# Patient Record
Sex: FEMALE | Race: ASIAN | Employment: UNEMPLOYED | URBAN - METROPOLITAN AREA
[De-identification: names, ages, dates, MRNs, and addresses within clinical notes are randomized per-mention and may not be internally consistent; named-entity substitution may affect disease eponyms.]

---

## 2018-01-01 ENCOUNTER — OFFICE VISIT (OUTPATIENT)
Dept: INFUSION THERAPY | Facility: CLINIC | Age: 58
End: 2018-01-01
Attending: OBSTETRICS & GYNECOLOGY
Payer: COMMERCIAL

## 2018-01-01 ENCOUNTER — RADIANT APPOINTMENT (OUTPATIENT)
Dept: RADIOLOGY | Facility: AMBULATORY SURGERY CENTER | Age: 58
End: 2018-01-01
Attending: OBSTETRICS & GYNECOLOGY
Payer: COMMERCIAL

## 2018-01-01 ENCOUNTER — APPOINTMENT (OUTPATIENT)
Dept: PHYSICAL THERAPY | Facility: CLINIC | Age: 58
DRG: 356 | End: 2018-01-01
Attending: OBSTETRICS & GYNECOLOGY
Payer: COMMERCIAL

## 2018-01-01 ENCOUNTER — RADIANT APPOINTMENT (OUTPATIENT)
Dept: ULTRASOUND IMAGING | Facility: CLINIC | Age: 58
End: 2018-01-01
Attending: OBSTETRICS & GYNECOLOGY
Payer: COMMERCIAL

## 2018-01-01 ENCOUNTER — RADIANT APPOINTMENT (OUTPATIENT)
Dept: GENERAL RADIOLOGY | Facility: CLINIC | Age: 58
End: 2018-01-01
Attending: NURSE PRACTITIONER
Payer: COMMERCIAL

## 2018-01-01 ENCOUNTER — OFFICE VISIT (OUTPATIENT)
Dept: INTERPRETER SERVICES | Facility: CLINIC | Age: 58
End: 2018-01-01
Payer: COMMERCIAL

## 2018-01-01 ENCOUNTER — ANESTHESIA EVENT (OUTPATIENT)
Dept: SURGERY | Facility: AMBULATORY SURGERY CENTER | Age: 58
End: 2018-01-01

## 2018-01-01 ENCOUNTER — APPOINTMENT (OUTPATIENT)
Dept: ULTRASOUND IMAGING | Facility: CLINIC | Age: 58
DRG: 356 | End: 2018-01-01
Attending: OBSTETRICS & GYNECOLOGY
Payer: COMMERCIAL

## 2018-01-01 ENCOUNTER — APPOINTMENT (OUTPATIENT)
Dept: PHYSICAL THERAPY | Facility: CLINIC | Age: 58
DRG: 754 | End: 2018-01-01
Payer: COMMERCIAL

## 2018-01-01 ENCOUNTER — APPOINTMENT (OUTPATIENT)
Dept: PHYSICAL THERAPY | Facility: CLINIC | Age: 58
DRG: 356 | End: 2018-01-01
Attending: NURSE PRACTITIONER
Payer: COMMERCIAL

## 2018-01-01 ENCOUNTER — OFFICE VISIT (OUTPATIENT)
Dept: URGENT CARE | Facility: URGENT CARE | Age: 58
End: 2018-01-01
Payer: COMMERCIAL

## 2018-01-01 ENCOUNTER — ONCOLOGY VISIT (OUTPATIENT)
Dept: ONCOLOGY | Facility: CLINIC | Age: 58
DRG: 356 | End: 2018-01-01
Attending: OBSTETRICS & GYNECOLOGY
Payer: COMMERCIAL

## 2018-01-01 ENCOUNTER — APPOINTMENT (OUTPATIENT)
Dept: CARDIOLOGY | Facility: CLINIC | Age: 58
DRG: 754 | End: 2018-01-01
Payer: COMMERCIAL

## 2018-01-01 ENCOUNTER — CARE COORDINATION (OUTPATIENT)
Dept: ONCOLOGY | Facility: CLINIC | Age: 58
End: 2018-01-01

## 2018-01-01 ENCOUNTER — APPOINTMENT (OUTPATIENT)
Dept: GENERAL RADIOLOGY | Facility: CLINIC | Age: 58
DRG: 754 | End: 2018-01-01
Payer: COMMERCIAL

## 2018-01-01 ENCOUNTER — APPOINTMENT (OUTPATIENT)
Dept: GENERAL RADIOLOGY | Facility: CLINIC | Age: 58
DRG: 754 | End: 2018-01-01
Attending: EMERGENCY MEDICINE
Payer: COMMERCIAL

## 2018-01-01 ENCOUNTER — APPOINTMENT (OUTPATIENT)
Dept: GENERAL RADIOLOGY | Facility: CLINIC | Age: 58
DRG: 754 | End: 2018-01-01
Attending: STUDENT IN AN ORGANIZED HEALTH CARE EDUCATION/TRAINING PROGRAM
Payer: COMMERCIAL

## 2018-01-01 ENCOUNTER — HOSPITAL ENCOUNTER (INPATIENT)
Facility: CLINIC | Age: 58
LOS: 7 days | Discharge: HOME HEALTH | DRG: 356 | End: 2018-08-11
Attending: OBSTETRICS & GYNECOLOGY | Admitting: OBSTETRICS & GYNECOLOGY
Payer: COMMERCIAL

## 2018-01-01 ENCOUNTER — APPOINTMENT (OUTPATIENT)
Dept: OCCUPATIONAL THERAPY | Facility: CLINIC | Age: 58
DRG: 754 | End: 2018-01-01
Payer: COMMERCIAL

## 2018-01-01 ENCOUNTER — OFFICE VISIT (OUTPATIENT)
Dept: PALLIATIVE CARE | Facility: CLINIC | Age: 58
End: 2018-01-01
Attending: INTERNAL MEDICINE
Payer: COMMERCIAL

## 2018-01-01 ENCOUNTER — HOSPITAL ENCOUNTER (INPATIENT)
Facility: CLINIC | Age: 58
LOS: 3 days | Discharge: HOME | DRG: 754 | End: 2018-07-13
Attending: EMERGENCY MEDICINE | Admitting: OBSTETRICS & GYNECOLOGY
Payer: COMMERCIAL

## 2018-01-01 ENCOUNTER — APPOINTMENT (OUTPATIENT)
Dept: GENERAL RADIOLOGY | Facility: CLINIC | Age: 58
DRG: 746 | End: 2018-01-01
Attending: STUDENT IN AN ORGANIZED HEALTH CARE EDUCATION/TRAINING PROGRAM
Payer: COMMERCIAL

## 2018-01-01 ENCOUNTER — APPOINTMENT (OUTPATIENT)
Dept: SPEECH THERAPY | Facility: CLINIC | Age: 58
DRG: 754 | End: 2018-01-01
Payer: COMMERCIAL

## 2018-01-01 ENCOUNTER — APPOINTMENT (OUTPATIENT)
Dept: CARDIOLOGY | Facility: CLINIC | Age: 58
DRG: 746 | End: 2018-01-01
Attending: STUDENT IN AN ORGANIZED HEALTH CARE EDUCATION/TRAINING PROGRAM
Payer: COMMERCIAL

## 2018-01-01 ENCOUNTER — APPOINTMENT (OUTPATIENT)
Dept: CT IMAGING | Facility: CLINIC | Age: 58
DRG: 746 | End: 2018-01-01
Attending: PHYSICIAN ASSISTANT
Payer: COMMERCIAL

## 2018-01-01 ENCOUNTER — APPOINTMENT (OUTPATIENT)
Dept: CT IMAGING | Facility: CLINIC | Age: 58
DRG: 356 | End: 2018-01-01
Attending: OBSTETRICS & GYNECOLOGY
Payer: COMMERCIAL

## 2018-01-01 ENCOUNTER — APPOINTMENT (OUTPATIENT)
Dept: INTERVENTIONAL RADIOLOGY/VASCULAR | Facility: CLINIC | Age: 58
DRG: 356 | End: 2018-01-01
Attending: RADIOLOGY PRACTITIONER ASSISTANT
Payer: COMMERCIAL

## 2018-01-01 ENCOUNTER — APPOINTMENT (OUTPATIENT)
Dept: INTERVENTIONAL RADIOLOGY/VASCULAR | Facility: CLINIC | Age: 58
DRG: 356 | End: 2018-01-01
Attending: PHYSICIAN ASSISTANT
Payer: COMMERCIAL

## 2018-01-01 ENCOUNTER — TELEPHONE (OUTPATIENT)
Dept: ONCOLOGY | Facility: CLINIC | Age: 58
End: 2018-01-01

## 2018-01-01 ENCOUNTER — APPOINTMENT (OUTPATIENT)
Dept: INTERVENTIONAL RADIOLOGY/VASCULAR | Facility: CLINIC | Age: 58
DRG: 754 | End: 2018-01-01
Attending: PHYSICIAN ASSISTANT
Payer: COMMERCIAL

## 2018-01-01 ENCOUNTER — APPOINTMENT (OUTPATIENT)
Dept: OCCUPATIONAL THERAPY | Facility: CLINIC | Age: 58
DRG: 746 | End: 2018-01-01
Attending: STUDENT IN AN ORGANIZED HEALTH CARE EDUCATION/TRAINING PROGRAM
Payer: COMMERCIAL

## 2018-01-01 ENCOUNTER — SURGERY (OUTPATIENT)
Age: 58
End: 2018-01-01

## 2018-01-01 ENCOUNTER — APPOINTMENT (OUTPATIENT)
Dept: GENERAL RADIOLOGY | Facility: CLINIC | Age: 58
DRG: 356 | End: 2018-01-01
Attending: OBSTETRICS & GYNECOLOGY
Payer: COMMERCIAL

## 2018-01-01 ENCOUNTER — APPOINTMENT (OUTPATIENT)
Dept: LAB | Facility: CLINIC | Age: 58
End: 2018-01-01
Attending: OBSTETRICS & GYNECOLOGY
Payer: COMMERCIAL

## 2018-01-01 ENCOUNTER — HOSPITAL ENCOUNTER (INPATIENT)
Facility: CLINIC | Age: 58
LOS: 15 days | Discharge: HOSPICE | DRG: 754 | End: 2018-09-04
Attending: EMERGENCY MEDICINE | Admitting: OBSTETRICS & GYNECOLOGY
Payer: COMMERCIAL

## 2018-01-01 ENCOUNTER — APPOINTMENT (OUTPATIENT)
Dept: CT IMAGING | Facility: CLINIC | Age: 58
DRG: 746 | End: 2018-01-01
Attending: OBSTETRICS & GYNECOLOGY
Payer: COMMERCIAL

## 2018-01-01 ENCOUNTER — APPOINTMENT (OUTPATIENT)
Dept: CT IMAGING | Facility: CLINIC | Age: 58
DRG: 754 | End: 2018-01-01
Attending: STUDENT IN AN ORGANIZED HEALTH CARE EDUCATION/TRAINING PROGRAM
Payer: COMMERCIAL

## 2018-01-01 ENCOUNTER — HOSPITAL ENCOUNTER (INPATIENT)
Facility: CLINIC | Age: 58
LOS: 5 days | Discharge: HOME | DRG: 746 | End: 2018-06-24
Attending: OBSTETRICS & GYNECOLOGY | Admitting: OBSTETRICS & GYNECOLOGY
Payer: COMMERCIAL

## 2018-01-01 ENCOUNTER — INFUSION THERAPY VISIT (OUTPATIENT)
Dept: ONCOLOGY | Facility: CLINIC | Age: 58
End: 2018-01-01
Attending: NURSE PRACTITIONER
Payer: COMMERCIAL

## 2018-01-01 ENCOUNTER — APPOINTMENT (OUTPATIENT)
Dept: OCCUPATIONAL THERAPY | Facility: CLINIC | Age: 58
DRG: 754 | End: 2018-01-01
Attending: NURSE PRACTITIONER
Payer: COMMERCIAL

## 2018-01-01 ENCOUNTER — HOSPITAL ENCOUNTER (OUTPATIENT)
Facility: AMBULATORY SURGERY CENTER | Age: 58
End: 2018-07-09
Attending: PHYSICIAN ASSISTANT
Payer: COMMERCIAL

## 2018-01-01 ENCOUNTER — INTERPRETER (OUTPATIENT)
Dept: INTERPRETER SERVICES | Facility: CLINIC | Age: 58
End: 2018-01-01
Payer: COMMERCIAL

## 2018-01-01 ENCOUNTER — ANESTHESIA (OUTPATIENT)
Dept: SURGERY | Facility: AMBULATORY SURGERY CENTER | Age: 58
End: 2018-01-01

## 2018-01-01 ENCOUNTER — TRANSFERRED RECORDS (OUTPATIENT)
Dept: HEALTH INFORMATION MANAGEMENT | Facility: CLINIC | Age: 58
End: 2018-01-01

## 2018-01-01 ENCOUNTER — APPOINTMENT (OUTPATIENT)
Dept: ULTRASOUND IMAGING | Facility: CLINIC | Age: 58
DRG: 754 | End: 2018-01-01
Attending: NURSE PRACTITIONER
Payer: COMMERCIAL

## 2018-01-01 ENCOUNTER — DOCUMENTATION ONLY (OUTPATIENT)
Dept: OTHER | Facility: CLINIC | Age: 58
End: 2018-01-01

## 2018-01-01 ENCOUNTER — APPOINTMENT (OUTPATIENT)
Dept: GENERAL RADIOLOGY | Facility: CLINIC | Age: 58
DRG: 754 | End: 2018-01-01
Attending: OBSTETRICS & GYNECOLOGY
Payer: COMMERCIAL

## 2018-01-01 ENCOUNTER — ONCOLOGY VISIT (OUTPATIENT)
Dept: ONCOLOGY | Facility: CLINIC | Age: 58
End: 2018-01-01
Attending: OBSTETRICS & GYNECOLOGY
Payer: COMMERCIAL

## 2018-01-01 VITALS
TEMPERATURE: 97.3 F | DIASTOLIC BLOOD PRESSURE: 62 MMHG | WEIGHT: 95.8 LBS | BODY MASS INDEX: 17.52 KG/M2 | OXYGEN SATURATION: 99 % | HEART RATE: 105 BPM | SYSTOLIC BLOOD PRESSURE: 109 MMHG

## 2018-01-01 VITALS
DIASTOLIC BLOOD PRESSURE: 46 MMHG | BODY MASS INDEX: 18.85 KG/M2 | HEART RATE: 104 BPM | WEIGHT: 96.5 LBS | SYSTOLIC BLOOD PRESSURE: 94 MMHG

## 2018-01-01 VITALS
WEIGHT: 108 LBS | HEIGHT: 62 IN | SYSTOLIC BLOOD PRESSURE: 112 MMHG | HEIGHT: 60 IN | DIASTOLIC BLOOD PRESSURE: 68 MMHG | RESPIRATION RATE: 12 BRPM | DIASTOLIC BLOOD PRESSURE: 68 MMHG | OXYGEN SATURATION: 97 % | BODY MASS INDEX: 21.2 KG/M2 | WEIGHT: 98 LBS | RESPIRATION RATE: 16 BRPM | TEMPERATURE: 98.1 F | HEART RATE: 103 BPM | SYSTOLIC BLOOD PRESSURE: 128 MMHG | BODY MASS INDEX: 18.03 KG/M2 | OXYGEN SATURATION: 99 % | HEART RATE: 105 BPM | TEMPERATURE: 98.9 F

## 2018-01-01 VITALS
OXYGEN SATURATION: 96 % | SYSTOLIC BLOOD PRESSURE: 94 MMHG | DIASTOLIC BLOOD PRESSURE: 41 MMHG | BODY MASS INDEX: 19.47 KG/M2 | WEIGHT: 99.7 LBS | RESPIRATION RATE: 16 BRPM | TEMPERATURE: 98.2 F | HEART RATE: 95 BPM

## 2018-01-01 VITALS
DIASTOLIC BLOOD PRESSURE: 71 MMHG | RESPIRATION RATE: 28 BRPM | OXYGEN SATURATION: 95 % | TEMPERATURE: 98.4 F | HEART RATE: 98 BPM | SYSTOLIC BLOOD PRESSURE: 117 MMHG

## 2018-01-01 VITALS
OXYGEN SATURATION: 98 % | RESPIRATION RATE: 16 BRPM | WEIGHT: 101.38 LBS | DIASTOLIC BLOOD PRESSURE: 66 MMHG | SYSTOLIC BLOOD PRESSURE: 108 MMHG | BODY MASS INDEX: 18.66 KG/M2 | HEART RATE: 109 BPM | HEIGHT: 62 IN | TEMPERATURE: 98 F

## 2018-01-01 VITALS
DIASTOLIC BLOOD PRESSURE: 59 MMHG | WEIGHT: 94.8 LBS | BODY MASS INDEX: 16.79 KG/M2 | SYSTOLIC BLOOD PRESSURE: 108 MMHG | RESPIRATION RATE: 18 BRPM | HEART RATE: 99 BPM | TEMPERATURE: 98.3 F | OXYGEN SATURATION: 96 %

## 2018-01-01 VITALS
WEIGHT: 103 LBS | OXYGEN SATURATION: 94 % | BODY MASS INDEX: 18.25 KG/M2 | SYSTOLIC BLOOD PRESSURE: 132 MMHG | TEMPERATURE: 97.5 F | DIASTOLIC BLOOD PRESSURE: 67 MMHG | HEART RATE: 102 BPM | HEIGHT: 63 IN | RESPIRATION RATE: 26 BRPM

## 2018-01-01 VITALS
HEART RATE: 99 BPM | BODY MASS INDEX: 15.05 KG/M2 | OXYGEN SATURATION: 100 % | DIASTOLIC BLOOD PRESSURE: 54 MMHG | TEMPERATURE: 96.6 F | WEIGHT: 82.3 LBS | SYSTOLIC BLOOD PRESSURE: 90 MMHG | RESPIRATION RATE: 28 BRPM

## 2018-01-01 VITALS
WEIGHT: 99 LBS | SYSTOLIC BLOOD PRESSURE: 131 MMHG | HEART RATE: 83 BPM | BODY MASS INDEX: 18.22 KG/M2 | RESPIRATION RATE: 20 BRPM | OXYGEN SATURATION: 95 % | TEMPERATURE: 97.6 F | DIASTOLIC BLOOD PRESSURE: 58 MMHG | HEIGHT: 62 IN

## 2018-01-01 VITALS
SYSTOLIC BLOOD PRESSURE: 113 MMHG | RESPIRATION RATE: 36 BRPM | DIASTOLIC BLOOD PRESSURE: 78 MMHG | OXYGEN SATURATION: 99 %

## 2018-01-01 VITALS
TEMPERATURE: 98.5 F | WEIGHT: 100.4 LBS | SYSTOLIC BLOOD PRESSURE: 102 MMHG | BODY MASS INDEX: 17.79 KG/M2 | OXYGEN SATURATION: 96 % | DIASTOLIC BLOOD PRESSURE: 45 MMHG | HEART RATE: 110 BPM

## 2018-01-01 VITALS
TEMPERATURE: 98.9 F | HEIGHT: 62 IN | OXYGEN SATURATION: 97 % | SYSTOLIC BLOOD PRESSURE: 107 MMHG | WEIGHT: 96.7 LBS | RESPIRATION RATE: 36 BRPM | DIASTOLIC BLOOD PRESSURE: 57 MMHG | BODY MASS INDEX: 17.79 KG/M2 | HEART RATE: 98 BPM

## 2018-01-01 VITALS
HEART RATE: 101 BPM | RESPIRATION RATE: 16 BRPM | SYSTOLIC BLOOD PRESSURE: 116 MMHG | DIASTOLIC BLOOD PRESSURE: 57 MMHG | TEMPERATURE: 98.5 F

## 2018-01-01 VITALS
TEMPERATURE: 99.5 F | DIASTOLIC BLOOD PRESSURE: 49 MMHG | BODY MASS INDEX: 20.52 KG/M2 | RESPIRATION RATE: 18 BRPM | SYSTOLIC BLOOD PRESSURE: 115 MMHG | HEART RATE: 104 BPM | HEIGHT: 60 IN | OXYGEN SATURATION: 95 % | WEIGHT: 104.5 LBS

## 2018-01-01 VITALS
HEART RATE: 82 BPM | DIASTOLIC BLOOD PRESSURE: 58 MMHG | OXYGEN SATURATION: 98 % | TEMPERATURE: 99.2 F | WEIGHT: 104.6 LBS | SYSTOLIC BLOOD PRESSURE: 151 MMHG

## 2018-01-01 DIAGNOSIS — C56.9 OVARIAN CANCER, UNSPECIFIED LATERALITY (H): ICD-10-CM

## 2018-01-01 DIAGNOSIS — R06.02 SHORTNESS OF BREATH: ICD-10-CM

## 2018-01-01 DIAGNOSIS — R18.0 MALIGNANT ASCITES (H): Primary | ICD-10-CM

## 2018-01-01 DIAGNOSIS — D63.0 ANEMIA IN NEOPLASTIC DISEASE: ICD-10-CM

## 2018-01-01 DIAGNOSIS — J18.9 COMMUNITY ACQUIRED PNEUMONIA OF LEFT LOWER LOBE OF LUNG: ICD-10-CM

## 2018-01-01 DIAGNOSIS — G89.3 NEOPLASM RELATED PAIN: ICD-10-CM

## 2018-01-01 DIAGNOSIS — R19.00 PELVIC MASS: ICD-10-CM

## 2018-01-01 DIAGNOSIS — I26.99 OTHER ACUTE PULMONARY EMBOLISM WITHOUT ACUTE COR PULMONALE (H): ICD-10-CM

## 2018-01-01 DIAGNOSIS — C56.9 MALIGNANT NEOPLASM OF OVARY, UNSPECIFIED LATERALITY (H): Primary | ICD-10-CM

## 2018-01-01 DIAGNOSIS — E87.1 HYPONATREMIA: ICD-10-CM

## 2018-01-01 DIAGNOSIS — I26.99 OTHER ACUTE PULMONARY EMBOLISM WITHOUT ACUTE COR PULMONALE (H): Primary | ICD-10-CM

## 2018-01-01 DIAGNOSIS — R18.8 ASCITES: ICD-10-CM

## 2018-01-01 DIAGNOSIS — R06.82 TACHYPNEA: ICD-10-CM

## 2018-01-01 DIAGNOSIS — R18.0 MALIGNANT ASCITES (H): ICD-10-CM

## 2018-01-01 DIAGNOSIS — R14.0 DISTENDED ABDOMEN: Primary | ICD-10-CM

## 2018-01-01 DIAGNOSIS — C56.9 OVARIAN CANCER, UNSPECIFIED LATERALITY (H): Primary | ICD-10-CM

## 2018-01-01 DIAGNOSIS — T45.1X5A CHEMOTHERAPY-INDUCED NEUTROPENIA (H): ICD-10-CM

## 2018-01-01 DIAGNOSIS — Z79.899 ENCOUNTER FOR LONG-TERM (CURRENT) USE OF MEDICATIONS: ICD-10-CM

## 2018-01-01 DIAGNOSIS — R63.4 WEIGHT LOSS: ICD-10-CM

## 2018-01-01 DIAGNOSIS — R79.89 ELEVATED TROPONIN: ICD-10-CM

## 2018-01-01 DIAGNOSIS — D70.1 CHEMOTHERAPY-INDUCED NEUTROPENIA (H): ICD-10-CM

## 2018-01-01 DIAGNOSIS — D64.9 ANEMIA, UNSPECIFIED TYPE: ICD-10-CM

## 2018-01-01 DIAGNOSIS — J18.9 HEALTHCARE-ASSOCIATED PNEUMONIA: ICD-10-CM

## 2018-01-01 DIAGNOSIS — I26.99 OTHER PULMONARY EMBOLISM WITHOUT ACUTE COR PULMONALE (H): ICD-10-CM

## 2018-01-01 DIAGNOSIS — C56.9 MALIGNANT NEOPLASM OF OVARY, UNSPECIFIED LATERALITY (H): ICD-10-CM

## 2018-01-01 DIAGNOSIS — Z51.11 ENCOUNTER FOR ANTINEOPLASTIC CHEMOTHERAPY: ICD-10-CM

## 2018-01-01 DIAGNOSIS — I26.99 OTHER PULMONARY EMBOLISM WITHOUT ACUTE COR PULMONALE (H): Primary | ICD-10-CM

## 2018-01-01 DIAGNOSIS — R60.0 BILATERAL LOWER EXTREMITY EDEMA: ICD-10-CM

## 2018-01-01 DIAGNOSIS — T80.92XA BLOOD TRANSFUSION REACTION, INITIAL ENCOUNTER: ICD-10-CM

## 2018-01-01 DIAGNOSIS — J90 PLEURAL EFFUSION: Primary | ICD-10-CM

## 2018-01-01 DIAGNOSIS — D63.0 ANEMIA IN NEOPLASTIC DISEASE: Primary | ICD-10-CM

## 2018-01-01 DIAGNOSIS — R11.0 NAUSEA: ICD-10-CM

## 2018-01-01 DIAGNOSIS — Z51.5 ENCOUNTER FOR PALLIATIVE CARE: ICD-10-CM

## 2018-01-01 DIAGNOSIS — M62.81 MUSCLE WEAKNESS (GENERALIZED): ICD-10-CM

## 2018-01-01 DIAGNOSIS — R53.83 OTHER FATIGUE: ICD-10-CM

## 2018-01-01 DIAGNOSIS — C80.1 CANCER (H): Primary | ICD-10-CM

## 2018-01-01 LAB
ABO + RH BLD: NORMAL
ALBUMIN SERPL-MCNC: 1.3 G/DL (ref 3.4–5)
ALBUMIN SERPL-MCNC: 1.3 G/DL (ref 3.4–5)
ALBUMIN SERPL-MCNC: 1.4 G/DL (ref 3.4–5)
ALBUMIN SERPL-MCNC: 1.4 G/DL (ref 3.4–5)
ALBUMIN SERPL-MCNC: 1.5 G/DL (ref 3.4–5)
ALBUMIN SERPL-MCNC: 1.6 G/DL (ref 3.4–5)
ALBUMIN SERPL-MCNC: 1.6 G/DL (ref 3.4–5)
ALBUMIN SERPL-MCNC: 1.9 G/DL (ref 3.4–5)
ALBUMIN SERPL-MCNC: 2.3 G/DL (ref 3.4–5)
ALBUMIN UR-MCNC: 10 MG/DL
ALBUMIN UR-MCNC: 30 MG/DL
ALBUMIN UR-MCNC: NEGATIVE MG/DL
ALBUMIN UR-MCNC: NEGATIVE MG/DL
ALP SERPL-CCNC: 105 U/L (ref 40–150)
ALP SERPL-CCNC: 110 U/L (ref 40–150)
ALP SERPL-CCNC: 126 U/L (ref 40–150)
ALP SERPL-CCNC: 129 U/L (ref 40–150)
ALP SERPL-CCNC: 143 U/L (ref 40–150)
ALP SERPL-CCNC: 70 U/L (ref 40–150)
ALP SERPL-CCNC: 73 U/L (ref 40–150)
ALP SERPL-CCNC: 85 U/L (ref 40–150)
ALP SERPL-CCNC: 94 U/L (ref 40–150)
ALT SERPL W P-5'-P-CCNC: 13 U/L (ref 0–50)
ALT SERPL W P-5'-P-CCNC: 13 U/L (ref 0–50)
ALT SERPL W P-5'-P-CCNC: 15 U/L (ref 0–50)
ALT SERPL W P-5'-P-CCNC: 23 U/L (ref 0–50)
ALT SERPL W P-5'-P-CCNC: 33 U/L (ref 0–50)
ALT SERPL W P-5'-P-CCNC: 36 U/L (ref 0–50)
ALT SERPL W P-5'-P-CCNC: 38 U/L (ref 0–50)
ALT SERPL W P-5'-P-CCNC: 39 U/L (ref 0–50)
ALT SERPL W P-5'-P-CCNC: 50 U/L (ref 0–50)
AMORPH CRY #/AREA URNS HPF: ABNORMAL /HPF
ANION GAP SERPL CALCULATED.3IONS-SCNC: 10 MMOL/L (ref 3–14)
ANION GAP SERPL CALCULATED.3IONS-SCNC: 12 MMOL/L (ref 3–14)
ANION GAP SERPL CALCULATED.3IONS-SCNC: 12 MMOL/L (ref 3–14)
ANION GAP SERPL CALCULATED.3IONS-SCNC: 13 MMOL/L (ref 3–14)
ANION GAP SERPL CALCULATED.3IONS-SCNC: 14 MMOL/L (ref 3–14)
ANION GAP SERPL CALCULATED.3IONS-SCNC: 3 MMOL/L (ref 3–14)
ANION GAP SERPL CALCULATED.3IONS-SCNC: 6 MMOL/L (ref 3–14)
ANION GAP SERPL CALCULATED.3IONS-SCNC: 6 MMOL/L (ref 3–14)
ANION GAP SERPL CALCULATED.3IONS-SCNC: 7 MMOL/L (ref 3–14)
ANION GAP SERPL CALCULATED.3IONS-SCNC: 8 MMOL/L (ref 3–14)
ANION GAP SERPL CALCULATED.3IONS-SCNC: 9 MMOL/L (ref 3–14)
APPEARANCE FLD: NORMAL
APPEARANCE UR: CLEAR
APTT PPP: 37 SEC (ref 22–37)
APTT PPP: 54 SEC (ref 22–37)
AST SERPL W P-5'-P-CCNC: 12 U/L (ref 0–45)
AST SERPL W P-5'-P-CCNC: 14 U/L (ref 0–45)
AST SERPL W P-5'-P-CCNC: 24 U/L (ref 0–45)
AST SERPL W P-5'-P-CCNC: 24 U/L (ref 0–45)
AST SERPL W P-5'-P-CCNC: 26 U/L (ref 0–45)
AST SERPL W P-5'-P-CCNC: 32 U/L (ref 0–45)
AST SERPL W P-5'-P-CCNC: 53 U/L (ref 0–45)
BACTERIA SPEC CULT: ABNORMAL
BACTERIA SPEC CULT: NO GROWTH
BACTERIA SPEC CULT: NORMAL
BASOPHILS # BLD AUTO: 0 10E9/L (ref 0–0.2)
BASOPHILS # BLD AUTO: 0.1 10E9/L (ref 0–0.2)
BASOPHILS NFR BLD AUTO: 0.1 %
BASOPHILS NFR BLD AUTO: 0.2 %
BASOPHILS NFR BLD AUTO: 0.3 %
BASOPHILS NFR BLD AUTO: 0.4 %
BASOPHILS NFR BLD AUTO: 0.5 %
BASOPHILS NFR BLD AUTO: 0.6 %
BASOPHILS NFR BLD AUTO: 0.7 %
BILIRUB DIRECT SERPL-MCNC: 0.2 MG/DL (ref 0–0.2)
BILIRUB SERPL-MCNC: 0.3 MG/DL (ref 0.2–1.3)
BILIRUB SERPL-MCNC: 0.4 MG/DL (ref 0.2–1.3)
BILIRUB SERPL-MCNC: 0.5 MG/DL (ref 0.2–1.3)
BILIRUB SERPL-MCNC: 0.6 MG/DL (ref 0.2–1.3)
BILIRUB SERPL-MCNC: 0.7 MG/DL (ref 0.2–1.3)
BILIRUB SERPL-MCNC: 0.7 MG/DL (ref 0.2–1.3)
BILIRUB SERPL-MCNC: 0.8 MG/DL (ref 0.2–1.3)
BILIRUB UR QL STRIP: NEGATIVE
BLD GP AB SCN SERPL QL: NORMAL
BLD PROD TYP BPU: NORMAL
BLD UNIT ID BPU: 0
BLOOD BANK CMNT PATIENT-IMP: NORMAL
BLOOD PRODUCT CODE: NORMAL
BPU ID: NORMAL
BUN SERPL-MCNC: 10 MG/DL (ref 7–30)
BUN SERPL-MCNC: 11 MG/DL (ref 7–30)
BUN SERPL-MCNC: 12 MG/DL (ref 7–30)
BUN SERPL-MCNC: 13 MG/DL (ref 7–30)
BUN SERPL-MCNC: 16 MG/DL (ref 7–30)
BUN SERPL-MCNC: 16 MG/DL (ref 7–30)
BUN SERPL-MCNC: 17 MG/DL (ref 7–30)
BUN SERPL-MCNC: 24 MG/DL (ref 7–30)
BUN SERPL-MCNC: 8 MG/DL (ref 7–30)
BUN SERPL-MCNC: 9 MG/DL (ref 7–30)
CA-I BLD-MCNC: 4.1 MG/DL (ref 4.4–5.2)
CALCIUM SERPL-MCNC: 6.7 MG/DL (ref 8.5–10.1)
CALCIUM SERPL-MCNC: 7.4 MG/DL (ref 8.5–10.1)
CALCIUM SERPL-MCNC: 7.5 MG/DL (ref 8.5–10.1)
CALCIUM SERPL-MCNC: 7.6 MG/DL (ref 8.5–10.1)
CALCIUM SERPL-MCNC: 7.7 MG/DL (ref 8.5–10.1)
CALCIUM SERPL-MCNC: 7.8 MG/DL (ref 8.5–10.1)
CALCIUM SERPL-MCNC: 7.9 MG/DL (ref 8.5–10.1)
CALCIUM SERPL-MCNC: 8 MG/DL (ref 8.5–10.1)
CALCIUM SERPL-MCNC: 8.1 MG/DL (ref 8.5–10.1)
CALCIUM SERPL-MCNC: 8.2 MG/DL (ref 8.5–10.1)
CALCIUM SERPL-MCNC: 8.8 MG/DL (ref 8.5–10.1)
CANCER AG125 SERPL-ACNC: 220 U/ML (ref 0–30)
CANCER AG125 SERPL-ACNC: 403 U/ML (ref 0–30)
CANCER AG125 SERPL-ACNC: 592 U/ML (ref 0–30)
CANCER AG19-9 SERPL-ACNC: 22 U/ML (ref 0–37)
CANCER AG19-9 SERPL-ACNC: 25 U/ML (ref 0–37)
CEA SERPL-MCNC: <0.5 UG/L (ref 0–2.5)
CEA SERPL-MCNC: <0.5 UG/L (ref 0–2.5)
CEA SERPL-MCNC: NORMAL UG/L (ref 0–2.5)
CHLORIDE BLD-SCNC: 99 MMOL/L (ref 94–109)
CHLORIDE SERPL-SCNC: 100 MMOL/L (ref 94–109)
CHLORIDE SERPL-SCNC: 101 MMOL/L (ref 94–109)
CHLORIDE SERPL-SCNC: 102 MMOL/L (ref 94–109)
CHLORIDE SERPL-SCNC: 102 MMOL/L (ref 94–109)
CHLORIDE SERPL-SCNC: 103 MMOL/L (ref 94–109)
CHLORIDE SERPL-SCNC: 104 MMOL/L (ref 94–109)
CHLORIDE SERPL-SCNC: 105 MMOL/L (ref 94–109)
CHLORIDE SERPL-SCNC: 106 MMOL/L (ref 94–109)
CHLORIDE SERPL-SCNC: 92 MMOL/L (ref 94–109)
CHLORIDE SERPL-SCNC: 94 MMOL/L (ref 94–109)
CHLORIDE SERPL-SCNC: 95 MMOL/L (ref 94–109)
CHLORIDE SERPL-SCNC: 96 MMOL/L (ref 94–109)
CHLORIDE SERPL-SCNC: 97 MMOL/L (ref 94–109)
CHLORIDE SERPL-SCNC: 97 MMOL/L (ref 94–109)
CHLORIDE SERPL-SCNC: 98 MMOL/L (ref 94–109)
CHLORIDE SERPL-SCNC: 99 MMOL/L (ref 94–109)
CO2 SERPL-SCNC: 20 MMOL/L (ref 20–32)
CO2 SERPL-SCNC: 21 MMOL/L (ref 20–32)
CO2 SERPL-SCNC: 22 MMOL/L (ref 20–32)
CO2 SERPL-SCNC: 23 MMOL/L (ref 20–32)
CO2 SERPL-SCNC: 24 MMOL/L (ref 20–32)
CO2 SERPL-SCNC: 24 MMOL/L (ref 20–32)
CO2 SERPL-SCNC: 25 MMOL/L (ref 20–32)
CO2 SERPL-SCNC: 26 MMOL/L (ref 20–32)
CO2 SERPL-SCNC: 27 MMOL/L (ref 20–32)
CO2 SERPL-SCNC: 27 MMOL/L (ref 20–32)
CO2 SERPL-SCNC: 28 MMOL/L (ref 20–32)
COLOR FLD: NORMAL
COLOR UR AUTO: ABNORMAL
COLOR UR AUTO: YELLOW
COPATH REPORT: NORMAL
COPATH REPORT: NORMAL
CREAT SERPL-MCNC: 0.3 MG/DL (ref 0.52–1.04)
CREAT SERPL-MCNC: 0.31 MG/DL (ref 0.52–1.04)
CREAT SERPL-MCNC: 0.34 MG/DL (ref 0.52–1.04)
CREAT SERPL-MCNC: 0.35 MG/DL (ref 0.52–1.04)
CREAT SERPL-MCNC: 0.36 MG/DL (ref 0.52–1.04)
CREAT SERPL-MCNC: 0.37 MG/DL (ref 0.52–1.04)
CREAT SERPL-MCNC: 0.38 MG/DL (ref 0.52–1.04)
CREAT SERPL-MCNC: 0.38 MG/DL (ref 0.52–1.04)
CREAT SERPL-MCNC: 0.39 MG/DL (ref 0.52–1.04)
CREAT SERPL-MCNC: 0.41 MG/DL (ref 0.52–1.04)
CREAT SERPL-MCNC: 0.43 MG/DL (ref 0.52–1.04)
CREAT SERPL-MCNC: 0.43 MG/DL (ref 0.52–1.04)
CREAT SERPL-MCNC: 0.44 MG/DL (ref 0.52–1.04)
CREAT SERPL-MCNC: 0.44 MG/DL (ref 0.52–1.04)
CREAT SERPL-MCNC: 0.47 MG/DL (ref 0.52–1.04)
CREAT SERPL-MCNC: 0.47 MG/DL (ref 0.52–1.04)
CREAT SERPL-MCNC: 0.49 MG/DL (ref 0.52–1.04)
CREAT SERPL-MCNC: 0.51 MG/DL (ref 0.52–1.04)
CREAT SERPL-MCNC: 0.52 MG/DL (ref 0.52–1.04)
CREAT SERPL-MCNC: 0.55 MG/DL (ref 0.52–1.04)
CREAT SERPL-MCNC: 0.62 MG/DL (ref 0.52–1.04)
DAT POLY-SP REAG RBC QL: NORMAL
DIFFERENTIAL METHOD BLD: ABNORMAL
EOSINOPHIL # BLD AUTO: 0 10E9/L (ref 0–0.7)
EOSINOPHIL # BLD AUTO: 0.1 10E9/L (ref 0–0.7)
EOSINOPHIL NFR BLD AUTO: 0 %
EOSINOPHIL NFR BLD AUTO: 0.1 %
EOSINOPHIL NFR BLD AUTO: 0.2 %
EOSINOPHIL NFR BLD AUTO: 0.2 %
EOSINOPHIL NFR BLD AUTO: 0.3 %
EOSINOPHIL NFR BLD AUTO: 0.3 %
EOSINOPHIL NFR BLD AUTO: 0.4 %
EOSINOPHIL NFR BLD AUTO: 0.5 %
EOSINOPHIL NFR BLD AUTO: 0.5 %
EOSINOPHIL NFR BLD AUTO: 0.6 %
EOSINOPHIL NFR BLD AUTO: 0.6 %
EOSINOPHIL NFR BLD AUTO: 0.7 %
EOSINOPHIL NFR BLD AUTO: 0.8 %
EOSINOPHIL NFR BLD AUTO: 1 %
EOSINOPHIL NFR BLD AUTO: 1.4 %
ERYTHROCYTE [DISTWIDTH] IN BLOOD BY AUTOMATED COUNT: 17.2 % (ref 10–15)
ERYTHROCYTE [DISTWIDTH] IN BLOOD BY AUTOMATED COUNT: 17.6 % (ref 10–15)
ERYTHROCYTE [DISTWIDTH] IN BLOOD BY AUTOMATED COUNT: 18 % (ref 10–15)
ERYTHROCYTE [DISTWIDTH] IN BLOOD BY AUTOMATED COUNT: 18.2 % (ref 10–15)
ERYTHROCYTE [DISTWIDTH] IN BLOOD BY AUTOMATED COUNT: 18.3 % (ref 10–15)
ERYTHROCYTE [DISTWIDTH] IN BLOOD BY AUTOMATED COUNT: 18.7 % (ref 10–15)
ERYTHROCYTE [DISTWIDTH] IN BLOOD BY AUTOMATED COUNT: 19 % (ref 10–15)
ERYTHROCYTE [DISTWIDTH] IN BLOOD BY AUTOMATED COUNT: 19.1 % (ref 10–15)
ERYTHROCYTE [DISTWIDTH] IN BLOOD BY AUTOMATED COUNT: 19.2 % (ref 10–15)
ERYTHROCYTE [DISTWIDTH] IN BLOOD BY AUTOMATED COUNT: 19.4 % (ref 10–15)
ERYTHROCYTE [DISTWIDTH] IN BLOOD BY AUTOMATED COUNT: 19.8 % (ref 10–15)
ERYTHROCYTE [DISTWIDTH] IN BLOOD BY AUTOMATED COUNT: 20.4 % (ref 10–15)
ERYTHROCYTE [DISTWIDTH] IN BLOOD BY AUTOMATED COUNT: 20.5 % (ref 10–15)
ERYTHROCYTE [DISTWIDTH] IN BLOOD BY AUTOMATED COUNT: 20.6 % (ref 10–15)
ERYTHROCYTE [DISTWIDTH] IN BLOOD BY AUTOMATED COUNT: 20.6 % (ref 10–15)
ERYTHROCYTE [DISTWIDTH] IN BLOOD BY AUTOMATED COUNT: 20.8 % (ref 10–15)
ERYTHROCYTE [DISTWIDTH] IN BLOOD BY AUTOMATED COUNT: 20.8 % (ref 10–15)
ERYTHROCYTE [DISTWIDTH] IN BLOOD BY AUTOMATED COUNT: 20.9 % (ref 10–15)
ERYTHROCYTE [DISTWIDTH] IN BLOOD BY AUTOMATED COUNT: 21 % (ref 10–15)
ERYTHROCYTE [DISTWIDTH] IN BLOOD BY AUTOMATED COUNT: 21.3 % (ref 10–15)
ERYTHROCYTE [DISTWIDTH] IN BLOOD BY AUTOMATED COUNT: 23.2 % (ref 10–15)
ERYTHROCYTE [DISTWIDTH] IN BLOOD BY AUTOMATED COUNT: 23.4 % (ref 10–15)
ERYTHROCYTE [DISTWIDTH] IN BLOOD BY AUTOMATED COUNT: 23.7 % (ref 10–15)
ERYTHROCYTE [DISTWIDTH] IN BLOOD BY AUTOMATED COUNT: 23.8 % (ref 10–15)
ERYTHROCYTE [DISTWIDTH] IN BLOOD BY AUTOMATED COUNT: 24.4 % (ref 10–15)
ERYTHROCYTE [DISTWIDTH] IN BLOOD BY AUTOMATED COUNT: 24.4 % (ref 10–15)
ERYTHROCYTE [DISTWIDTH] IN BLOOD BY AUTOMATED COUNT: 24.6 % (ref 10–15)
ERYTHROCYTE [DISTWIDTH] IN BLOOD BY AUTOMATED COUNT: 24.7 % (ref 10–15)
ERYTHROCYTE [DISTWIDTH] IN BLOOD BY AUTOMATED COUNT: 24.7 % (ref 10–15)
ERYTHROCYTE [DISTWIDTH] IN BLOOD BY AUTOMATED COUNT: 24.8 % (ref 10–15)
ERYTHROCYTE [DISTWIDTH] IN BLOOD BY AUTOMATED COUNT: 24.8 % (ref 10–15)
ERYTHROCYTE [DISTWIDTH] IN BLOOD BY AUTOMATED COUNT: 24.9 % (ref 10–15)
ERYTHROCYTE [DISTWIDTH] IN BLOOD BY AUTOMATED COUNT: 25 % (ref 10–15)
ERYTHROCYTE [DISTWIDTH] IN BLOOD BY AUTOMATED COUNT: 25.1 % (ref 10–15)
ERYTHROCYTE [DISTWIDTH] IN BLOOD BY AUTOMATED COUNT: 25.3 % (ref 10–15)
ERYTHROCYTE [SEDIMENTATION RATE] IN BLOOD BY WESTERGREN METHOD: 137 MM/H (ref 0–30)
GFR SERPL CREATININE-BSD FRML MDRD: >90 ML/MIN/1.7M2
GLUCOSE BLD-MCNC: 149 MG/DL (ref 70–99)
GLUCOSE BLDC GLUCOMTR-MCNC: 114 MG/DL (ref 70–99)
GLUCOSE BLDC GLUCOMTR-MCNC: 169 MG/DL (ref 70–99)
GLUCOSE BLDC GLUCOMTR-MCNC: 70 MG/DL (ref 70–99)
GLUCOSE BLDC GLUCOMTR-MCNC: 77 MG/DL (ref 70–99)
GLUCOSE BLDC GLUCOMTR-MCNC: 82 MG/DL (ref 70–99)
GLUCOSE BLDC GLUCOMTR-MCNC: 85 MG/DL (ref 70–99)
GLUCOSE BLDC GLUCOMTR-MCNC: 99 MG/DL (ref 70–99)
GLUCOSE SERPL-MCNC: 101 MG/DL (ref 70–99)
GLUCOSE SERPL-MCNC: 102 MG/DL (ref 70–99)
GLUCOSE SERPL-MCNC: 102 MG/DL (ref 70–99)
GLUCOSE SERPL-MCNC: 103 MG/DL (ref 70–99)
GLUCOSE SERPL-MCNC: 104 MG/DL (ref 70–99)
GLUCOSE SERPL-MCNC: 110 MG/DL (ref 70–99)
GLUCOSE SERPL-MCNC: 111 MG/DL (ref 70–99)
GLUCOSE SERPL-MCNC: 111 MG/DL (ref 70–99)
GLUCOSE SERPL-MCNC: 112 MG/DL (ref 70–99)
GLUCOSE SERPL-MCNC: 113 MG/DL (ref 70–99)
GLUCOSE SERPL-MCNC: 114 MG/DL (ref 70–99)
GLUCOSE SERPL-MCNC: 118 MG/DL (ref 70–99)
GLUCOSE SERPL-MCNC: 119 MG/DL (ref 70–99)
GLUCOSE SERPL-MCNC: 121 MG/DL (ref 70–99)
GLUCOSE SERPL-MCNC: 130 MG/DL (ref 70–99)
GLUCOSE SERPL-MCNC: 140 MG/DL (ref 70–99)
GLUCOSE SERPL-MCNC: 146 MG/DL (ref 70–99)
GLUCOSE SERPL-MCNC: 42 MG/DL (ref 70–99)
GLUCOSE SERPL-MCNC: 45 MG/DL (ref 70–99)
GLUCOSE SERPL-MCNC: 73 MG/DL (ref 70–99)
GLUCOSE SERPL-MCNC: 74 MG/DL (ref 70–99)
GLUCOSE SERPL-MCNC: 74 MG/DL (ref 70–99)
GLUCOSE SERPL-MCNC: 78 MG/DL (ref 70–99)
GLUCOSE SERPL-MCNC: 82 MG/DL (ref 70–99)
GLUCOSE SERPL-MCNC: 92 MG/DL (ref 70–99)
GLUCOSE SERPL-MCNC: 94 MG/DL (ref 70–99)
GLUCOSE SERPL-MCNC: 97 MG/DL (ref 70–99)
GLUCOSE SERPL-MCNC: 98 MG/DL (ref 70–99)
GLUCOSE UR STRIP-MCNC: NEGATIVE MG/DL
GRAM STN SPEC: NORMAL
HCT VFR BLD AUTO: 19 % (ref 35–47)
HCT VFR BLD AUTO: 20.6 % (ref 35–47)
HCT VFR BLD AUTO: 21.6 % (ref 35–47)
HCT VFR BLD AUTO: 21.8 % (ref 35–47)
HCT VFR BLD AUTO: 22.6 % (ref 35–47)
HCT VFR BLD AUTO: 24.2 % (ref 35–47)
HCT VFR BLD AUTO: 24.2 % (ref 35–47)
HCT VFR BLD AUTO: 24.5 % (ref 35–47)
HCT VFR BLD AUTO: 24.7 % (ref 35–47)
HCT VFR BLD AUTO: 25 % (ref 35–47)
HCT VFR BLD AUTO: 25.2 % (ref 35–47)
HCT VFR BLD AUTO: 25.5 % (ref 35–47)
HCT VFR BLD AUTO: 25.5 % (ref 35–47)
HCT VFR BLD AUTO: 25.7 % (ref 35–47)
HCT VFR BLD AUTO: 25.8 % (ref 35–47)
HCT VFR BLD AUTO: 25.8 % (ref 35–47)
HCT VFR BLD AUTO: 26.4 % (ref 35–47)
HCT VFR BLD AUTO: 26.4 % (ref 35–47)
HCT VFR BLD AUTO: 26.7 % (ref 35–47)
HCT VFR BLD AUTO: 26.9 % (ref 35–47)
HCT VFR BLD AUTO: 27.1 % (ref 35–47)
HCT VFR BLD AUTO: 27.3 % (ref 35–47)
HCT VFR BLD AUTO: 27.4 % (ref 35–47)
HCT VFR BLD AUTO: 27.6 % (ref 35–47)
HCT VFR BLD AUTO: 28 % (ref 35–47)
HCT VFR BLD AUTO: 28.1 % (ref 35–47)
HCT VFR BLD AUTO: 28.2 % (ref 35–47)
HCT VFR BLD AUTO: 28.3 % (ref 35–47)
HCT VFR BLD AUTO: 28.4 % (ref 35–47)
HCT VFR BLD AUTO: 28.5 % (ref 35–47)
HCT VFR BLD AUTO: 28.6 % (ref 35–47)
HCT VFR BLD AUTO: 28.6 % (ref 35–47)
HCT VFR BLD AUTO: 29.1 % (ref 35–47)
HCT VFR BLD AUTO: 29.4 % (ref 35–47)
HCT VFR BLD AUTO: 29.6 % (ref 35–47)
HCT VFR BLD AUTO: 31.1 % (ref 35–47)
HCT VFR BLD AUTO: 31.6 % (ref 35–47)
HEMOCCULT STL QL: POSITIVE
HGB BLD-MCNC: 10.1 G/DL (ref 11.7–15.7)
HGB BLD-MCNC: 6 G/DL (ref 11.7–15.7)
HGB BLD-MCNC: 6.5 G/DL (ref 11.7–15.7)
HGB BLD-MCNC: 6.6 G/DL (ref 11.7–15.7)
HGB BLD-MCNC: 6.7 G/DL (ref 11.7–15.7)
HGB BLD-MCNC: 6.9 G/DL (ref 11.7–15.7)
HGB BLD-MCNC: 7 G/DL (ref 11.7–15.7)
HGB BLD-MCNC: 7.1 G/DL (ref 11.7–15.7)
HGB BLD-MCNC: 7.4 G/DL (ref 11.7–15.7)
HGB BLD-MCNC: 7.6 G/DL (ref 11.7–15.7)
HGB BLD-MCNC: 7.7 G/DL (ref 11.7–15.7)
HGB BLD-MCNC: 7.8 G/DL (ref 11.7–15.7)
HGB BLD-MCNC: 7.9 G/DL (ref 11.7–15.7)
HGB BLD-MCNC: 8 G/DL (ref 11.7–15.7)
HGB BLD-MCNC: 8.1 G/DL (ref 11.7–15.7)
HGB BLD-MCNC: 8.1 G/DL (ref 11.7–15.7)
HGB BLD-MCNC: 8.2 G/DL (ref 11.7–15.7)
HGB BLD-MCNC: 8.3 G/DL (ref 11.7–15.7)
HGB BLD-MCNC: 8.4 G/DL (ref 11.7–15.7)
HGB BLD-MCNC: 8.4 G/DL (ref 11.7–15.7)
HGB BLD-MCNC: 8.5 G/DL (ref 11.7–15.7)
HGB BLD-MCNC: 8.6 G/DL (ref 11.7–15.7)
HGB BLD-MCNC: 8.7 G/DL (ref 11.7–15.7)
HGB BLD-MCNC: 8.7 G/DL (ref 11.7–15.7)
HGB BLD-MCNC: 8.8 G/DL (ref 11.7–15.7)
HGB BLD-MCNC: 8.9 G/DL (ref 11.7–15.7)
HGB BLD-MCNC: 8.9 G/DL (ref 11.7–15.7)
HGB BLD-MCNC: 9 G/DL (ref 11.7–15.7)
HGB BLD-MCNC: 9 G/DL (ref 11.7–15.7)
HGB BLD-MCNC: 9.1 G/DL (ref 11.7–15.7)
HGB BLD-MCNC: 9.2 G/DL (ref 11.7–15.7)
HGB BLD-MCNC: 9.4 G/DL (ref 11.7–15.7)
HGB BLD-MCNC: 9.4 G/DL (ref 11.7–15.7)
HGB BLD-MCNC: 9.5 G/DL (ref 11.7–15.7)
HGB BLD-MCNC: 9.9 G/DL (ref 11.7–15.7)
HGB UR QL STRIP: ABNORMAL
HGB UR QL STRIP: NEGATIVE
IMM GRANULOCYTES # BLD: 0 10E9/L (ref 0–0.4)
IMM GRANULOCYTES # BLD: 0.1 10E9/L (ref 0–0.4)
IMM GRANULOCYTES # BLD: 0.2 10E9/L (ref 0–0.4)
IMM GRANULOCYTES # BLD: 0.3 10E9/L (ref 0–0.4)
IMM GRANULOCYTES # BLD: 0.3 10E9/L (ref 0–0.4)
IMM GRANULOCYTES NFR BLD: 0.4 %
IMM GRANULOCYTES NFR BLD: 0.5 %
IMM GRANULOCYTES NFR BLD: 0.6 %
IMM GRANULOCYTES NFR BLD: 0.6 %
IMM GRANULOCYTES NFR BLD: 0.8 %
IMM GRANULOCYTES NFR BLD: 0.8 %
IMM GRANULOCYTES NFR BLD: 1.1 %
IMM GRANULOCYTES NFR BLD: 1.1 %
IMM GRANULOCYTES NFR BLD: 1.3 %
IMM GRANULOCYTES NFR BLD: 1.4 %
IMM GRANULOCYTES NFR BLD: 1.5 %
IMM GRANULOCYTES NFR BLD: 1.6 %
IMM GRANULOCYTES NFR BLD: 1.6 %
IMM GRANULOCYTES NFR BLD: 1.7 %
IMM GRANULOCYTES NFR BLD: 2 %
IMM GRANULOCYTES NFR BLD: 2.9 %
INR PPP: 1.16 (ref 0.86–1.14)
INR PPP: 1.19 (ref 0.86–1.14)
INR PPP: 1.21 (ref 0.86–1.14)
INR PPP: 1.24 (ref 0.86–1.14)
INR PPP: 1.25 (ref 0.86–1.14)
INR PPP: 1.27 (ref 0.86–1.14)
INTERPRETATION ECG - MUSE: NORMAL
KETONES UR STRIP-MCNC: NEGATIVE MG/DL
LACTATE BLD-SCNC: 0.9 MMOL/L (ref 0.4–1.9)
LACTATE BLD-SCNC: 0.9 MMOL/L (ref 0.4–1.9)
LACTATE BLD-SCNC: 1 MMOL/L (ref 0.4–1.9)
LACTATE BLD-SCNC: 1 MMOL/L (ref 0.7–2)
LACTATE BLD-SCNC: 1.1 MMOL/L (ref 0.7–2)
LACTATE BLD-SCNC: 1.2 MMOL/L (ref 0.7–2)
LACTATE BLD-SCNC: 1.3 MMOL/L (ref 0.7–2)
LACTATE BLD-SCNC: 1.4 MMOL/L (ref 0.7–2)
LACTATE BLD-SCNC: 1.7 MMOL/L (ref 0.4–1.9)
LACTATE BLD-SCNC: 1.7 MMOL/L (ref 0.7–2)
LACTATE BLD-SCNC: 1.8 MMOL/L (ref 0.7–2)
LACTATE BLD-SCNC: 1.8 MMOL/L (ref 0.7–2)
LACTATE BLD-SCNC: 1.9 MMOL/L (ref 0.7–2)
LACTATE BLD-SCNC: 1.9 MMOL/L (ref 0.7–2)
LACTATE BLD-SCNC: 2 MMOL/L (ref 0.7–2)
LACTATE BLD-SCNC: 2.3 MMOL/L (ref 0.7–2)
LACTATE BLD-SCNC: 2.6 MMOL/L (ref 0.7–2)
LACTATE BLD-SCNC: 3 MMOL/L (ref 0.7–2)
LACTATE BLD-SCNC: 3.1 MMOL/L (ref 0.4–1.9)
LACTATE BLD-SCNC: 3.4 MMOL/L (ref 0.4–1.9)
LEUKOCYTE ESTERASE UR QL STRIP: NEGATIVE
LIPASE SERPL-CCNC: 98 U/L (ref 73–393)
LMWH PPP CHRO-ACNC: 0.1 IU/ML
LMWH PPP CHRO-ACNC: 0.15 IU/ML
LMWH PPP CHRO-ACNC: 0.17 IU/ML
LMWH PPP CHRO-ACNC: 0.18 IU/ML
LMWH PPP CHRO-ACNC: 0.31 IU/ML
LMWH PPP CHRO-ACNC: 0.34 IU/ML
LMWH PPP CHRO-ACNC: 0.44 IU/ML
LMWH PPP CHRO-ACNC: 0.45 IU/ML
LMWH PPP CHRO-ACNC: 0.53 IU/ML
LMWH PPP CHRO-ACNC: 0.55 IU/ML
LMWH PPP CHRO-ACNC: 0.86 IU/ML
LMWH PPP CHRO-ACNC: 0.93 IU/ML
LMWH PPP CHRO-ACNC: 0.95 IU/ML
LMWH PPP CHRO-ACNC: <0.1 IU/ML
LMWH PPP CHRO-ACNC: <0.1 IU/ML
LYMPHOCYTES # BLD AUTO: 0.3 10E9/L (ref 0.8–5.3)
LYMPHOCYTES # BLD AUTO: 0.4 10E9/L (ref 0.8–5.3)
LYMPHOCYTES # BLD AUTO: 0.5 10E9/L (ref 0.8–5.3)
LYMPHOCYTES # BLD AUTO: 0.6 10E9/L (ref 0.8–5.3)
LYMPHOCYTES # BLD AUTO: 0.7 10E9/L (ref 0.8–5.3)
LYMPHOCYTES # BLD AUTO: 1.1 10E9/L (ref 0.8–5.3)
LYMPHOCYTES NFR BLD AUTO: 13.8 %
LYMPHOCYTES NFR BLD AUTO: 17.4 %
LYMPHOCYTES NFR BLD AUTO: 2.2 %
LYMPHOCYTES NFR BLD AUTO: 2.4 %
LYMPHOCYTES NFR BLD AUTO: 24 %
LYMPHOCYTES NFR BLD AUTO: 3 %
LYMPHOCYTES NFR BLD AUTO: 3.4 %
LYMPHOCYTES NFR BLD AUTO: 3.4 %
LYMPHOCYTES NFR BLD AUTO: 3.9 %
LYMPHOCYTES NFR BLD AUTO: 4 %
LYMPHOCYTES NFR BLD AUTO: 4.1 %
LYMPHOCYTES NFR BLD AUTO: 4.4 %
LYMPHOCYTES NFR BLD AUTO: 4.7 %
LYMPHOCYTES NFR BLD AUTO: 4.8 %
LYMPHOCYTES NFR BLD AUTO: 4.9 %
LYMPHOCYTES NFR BLD AUTO: 5 %
LYMPHOCYTES NFR BLD AUTO: 5.1 %
LYMPHOCYTES NFR BLD AUTO: 5.1 %
LYMPHOCYTES NFR BLD AUTO: 7 %
LYMPHOCYTES NFR BLD AUTO: 9 %
LYMPHOCYTES NFR FLD MANUAL: 24 %
Lab: ABNORMAL
Lab: NORMAL
MAGNESIUM SERPL-MCNC: 1.8 MG/DL (ref 1.6–2.3)
MAGNESIUM SERPL-MCNC: 1.8 MG/DL (ref 1.6–2.3)
MAGNESIUM SERPL-MCNC: 1.9 MG/DL (ref 1.6–2.3)
MAGNESIUM SERPL-MCNC: 1.9 MG/DL (ref 1.6–2.3)
MAGNESIUM SERPL-MCNC: 2 MG/DL (ref 1.6–2.3)
MAGNESIUM SERPL-MCNC: 2.1 MG/DL (ref 1.6–2.3)
MAGNESIUM SERPL-MCNC: 2.2 MG/DL (ref 1.6–2.3)
MCH RBC QN AUTO: 20.9 PG (ref 26.5–33)
MCH RBC QN AUTO: 21.1 PG (ref 26.5–33)
MCH RBC QN AUTO: 21.4 PG (ref 26.5–33)
MCH RBC QN AUTO: 21.6 PG (ref 26.5–33)
MCH RBC QN AUTO: 21.7 PG (ref 26.5–33)
MCH RBC QN AUTO: 21.7 PG (ref 26.5–33)
MCH RBC QN AUTO: 22.2 PG (ref 26.5–33)
MCH RBC QN AUTO: 22.3 PG (ref 26.5–33)
MCH RBC QN AUTO: 22.4 PG (ref 26.5–33)
MCH RBC QN AUTO: 22.5 PG (ref 26.5–33)
MCH RBC QN AUTO: 22.5 PG (ref 26.5–33)
MCH RBC QN AUTO: 22.8 PG (ref 26.5–33)
MCH RBC QN AUTO: 23.6 PG (ref 26.5–33)
MCH RBC QN AUTO: 23.6 PG (ref 26.5–33)
MCH RBC QN AUTO: 23.7 PG (ref 26.5–33)
MCH RBC QN AUTO: 24.1 PG (ref 26.5–33)
MCH RBC QN AUTO: 24.1 PG (ref 26.5–33)
MCH RBC QN AUTO: 24.2 PG (ref 26.5–33)
MCH RBC QN AUTO: 25.4 PG (ref 26.5–33)
MCH RBC QN AUTO: 25.5 PG (ref 26.5–33)
MCH RBC QN AUTO: 25.6 PG (ref 26.5–33)
MCH RBC QN AUTO: 25.6 PG (ref 26.5–33)
MCH RBC QN AUTO: 25.7 PG (ref 26.5–33)
MCH RBC QN AUTO: 25.9 PG (ref 26.5–33)
MCH RBC QN AUTO: 25.9 PG (ref 26.5–33)
MCH RBC QN AUTO: 26 PG (ref 26.5–33)
MCH RBC QN AUTO: 26.4 PG (ref 26.5–33)
MCH RBC QN AUTO: 26.4 PG (ref 26.5–33)
MCH RBC QN AUTO: 26.6 PG (ref 26.5–33)
MCH RBC QN AUTO: 27 PG (ref 26.5–33)
MCH RBC QN AUTO: 27 PG (ref 26.5–33)
MCH RBC QN AUTO: 27.8 PG (ref 26.5–33)
MCH RBC QN AUTO: 27.9 PG (ref 26.5–33)
MCH RBC QN AUTO: 28.1 PG (ref 26.5–33)
MCH RBC QN AUTO: 28.2 PG (ref 26.5–33)
MCHC RBC AUTO-ENTMCNC: 30.3 G/DL (ref 31.5–36.5)
MCHC RBC AUTO-ENTMCNC: 30.4 G/DL (ref 31.5–36.5)
MCHC RBC AUTO-ENTMCNC: 30.5 G/DL (ref 31.5–36.5)
MCHC RBC AUTO-ENTMCNC: 30.6 G/DL (ref 31.5–36.5)
MCHC RBC AUTO-ENTMCNC: 30.6 G/DL (ref 31.5–36.5)
MCHC RBC AUTO-ENTMCNC: 30.7 G/DL (ref 31.5–36.5)
MCHC RBC AUTO-ENTMCNC: 30.8 G/DL (ref 31.5–36.5)
MCHC RBC AUTO-ENTMCNC: 30.8 G/DL (ref 31.5–36.5)
MCHC RBC AUTO-ENTMCNC: 30.9 G/DL (ref 31.5–36.5)
MCHC RBC AUTO-ENTMCNC: 31 G/DL (ref 31.5–36.5)
MCHC RBC AUTO-ENTMCNC: 31.1 G/DL (ref 31.5–36.5)
MCHC RBC AUTO-ENTMCNC: 31.2 G/DL (ref 31.5–36.5)
MCHC RBC AUTO-ENTMCNC: 31.3 G/DL (ref 31.5–36.5)
MCHC RBC AUTO-ENTMCNC: 31.4 G/DL (ref 31.5–36.5)
MCHC RBC AUTO-ENTMCNC: 31.5 G/DL (ref 31.5–36.5)
MCHC RBC AUTO-ENTMCNC: 31.6 G/DL (ref 31.5–36.5)
MCHC RBC AUTO-ENTMCNC: 31.6 G/DL (ref 31.5–36.5)
MCHC RBC AUTO-ENTMCNC: 31.8 G/DL (ref 31.5–36.5)
MCHC RBC AUTO-ENTMCNC: 32 G/DL (ref 31.5–36.5)
MCHC RBC AUTO-ENTMCNC: 32.3 G/DL (ref 31.5–36.5)
MCHC RBC AUTO-ENTMCNC: 32.3 G/DL (ref 31.5–36.5)
MCHC RBC AUTO-ENTMCNC: 32.5 G/DL (ref 31.5–36.5)
MCV RBC AUTO: 67 FL (ref 78–100)
MCV RBC AUTO: 69 FL (ref 78–100)
MCV RBC AUTO: 71 FL (ref 78–100)
MCV RBC AUTO: 72 FL (ref 78–100)
MCV RBC AUTO: 73 FL (ref 78–100)
MCV RBC AUTO: 74 FL (ref 78–100)
MCV RBC AUTO: 75 FL (ref 78–100)
MCV RBC AUTO: 75 FL (ref 78–100)
MCV RBC AUTO: 76 FL (ref 78–100)
MCV RBC AUTO: 78 FL (ref 78–100)
MCV RBC AUTO: 81 FL (ref 78–100)
MCV RBC AUTO: 81 FL (ref 78–100)
MCV RBC AUTO: 82 FL (ref 78–100)
MCV RBC AUTO: 83 FL (ref 78–100)
MCV RBC AUTO: 84 FL (ref 78–100)
MCV RBC AUTO: 84 FL (ref 78–100)
MCV RBC AUTO: 85 FL (ref 78–100)
MCV RBC AUTO: 86 FL (ref 78–100)
MCV RBC AUTO: 86 FL (ref 78–100)
MCV RBC AUTO: 88 FL (ref 78–100)
MCV RBC AUTO: 89 FL (ref 78–100)
MCV RBC AUTO: 90 FL (ref 78–100)
MCV RBC AUTO: 90 FL (ref 78–100)
MONOCYTES # BLD AUTO: 0.2 10E9/L (ref 0–1.3)
MONOCYTES # BLD AUTO: 0.2 10E9/L (ref 0–1.3)
MONOCYTES # BLD AUTO: 0.3 10E9/L (ref 0–1.3)
MONOCYTES # BLD AUTO: 0.3 10E9/L (ref 0–1.3)
MONOCYTES # BLD AUTO: 0.4 10E9/L (ref 0–1.3)
MONOCYTES # BLD AUTO: 0.5 10E9/L (ref 0–1.3)
MONOCYTES # BLD AUTO: 0.6 10E9/L (ref 0–1.3)
MONOCYTES # BLD AUTO: 0.6 10E9/L (ref 0–1.3)
MONOCYTES # BLD AUTO: 0.8 10E9/L (ref 0–1.3)
MONOCYTES # BLD AUTO: 1 10E9/L (ref 0–1.3)
MONOCYTES # BLD AUTO: 1.1 10E9/L (ref 0–1.3)
MONOCYTES # BLD AUTO: 1.1 10E9/L (ref 0–1.3)
MONOCYTES # BLD AUTO: 1.2 10E9/L (ref 0–1.3)
MONOCYTES NFR BLD AUTO: 1.2 %
MONOCYTES NFR BLD AUTO: 11.8 %
MONOCYTES NFR BLD AUTO: 13.2 %
MONOCYTES NFR BLD AUTO: 14.2 %
MONOCYTES NFR BLD AUTO: 15.4 %
MONOCYTES NFR BLD AUTO: 2 %
MONOCYTES NFR BLD AUTO: 3.2 %
MONOCYTES NFR BLD AUTO: 3.5 %
MONOCYTES NFR BLD AUTO: 3.5 %
MONOCYTES NFR BLD AUTO: 4.4 %
MONOCYTES NFR BLD AUTO: 4.8 %
MONOCYTES NFR BLD AUTO: 6 %
MONOCYTES NFR BLD AUTO: 6.3 %
MONOCYTES NFR BLD AUTO: 6.7 %
MONOCYTES NFR BLD AUTO: 7.7 %
MONOCYTES NFR BLD AUTO: 8.1 %
MONOCYTES NFR BLD AUTO: 8.3 %
MUCOUS THREADS #/AREA URNS LPF: PRESENT /LPF
NEUTROPHILS # BLD AUTO: 1.2 10E9/L (ref 1.6–8.3)
NEUTROPHILS # BLD AUTO: 1.7 10E9/L (ref 1.6–8.3)
NEUTROPHILS # BLD AUTO: 10.3 10E9/L (ref 1.6–8.3)
NEUTROPHILS # BLD AUTO: 11 10E9/L (ref 1.6–8.3)
NEUTROPHILS # BLD AUTO: 11.4 10E9/L (ref 1.6–8.3)
NEUTROPHILS # BLD AUTO: 11.9 10E9/L (ref 1.6–8.3)
NEUTROPHILS # BLD AUTO: 12.1 10E9/L (ref 1.6–8.3)
NEUTROPHILS # BLD AUTO: 12.2 10E9/L (ref 1.6–8.3)
NEUTROPHILS # BLD AUTO: 12.3 10E9/L (ref 1.6–8.3)
NEUTROPHILS # BLD AUTO: 12.5 10E9/L (ref 1.6–8.3)
NEUTROPHILS # BLD AUTO: 12.8 10E9/L (ref 1.6–8.3)
NEUTROPHILS # BLD AUTO: 13.4 10E9/L (ref 1.6–8.3)
NEUTROPHILS # BLD AUTO: 14.5 10E9/L (ref 1.6–8.3)
NEUTROPHILS # BLD AUTO: 15.1 10E9/L (ref 1.6–8.3)
NEUTROPHILS # BLD AUTO: 15.3 10E9/L (ref 1.6–8.3)
NEUTROPHILS # BLD AUTO: 18.1 10E9/L (ref 1.6–8.3)
NEUTROPHILS # BLD AUTO: 2.5 10E9/L (ref 1.6–8.3)
NEUTROPHILS # BLD AUTO: 3.2 10E9/L (ref 1.6–8.3)
NEUTROPHILS # BLD AUTO: 8.5 10E9/L (ref 1.6–8.3)
NEUTROPHILS # BLD AUTO: 9.3 10E9/L (ref 1.6–8.3)
NEUTROPHILS NFR BLD AUTO: 58.8 %
NEUTROPHILS NFR BLD AUTO: 66 %
NEUTROPHILS NFR BLD AUTO: 73.2 %
NEUTROPHILS NFR BLD AUTO: 76.2 %
NEUTROPHILS NFR BLD AUTO: 84.7 %
NEUTROPHILS NFR BLD AUTO: 85.4 %
NEUTROPHILS NFR BLD AUTO: 85.5 %
NEUTROPHILS NFR BLD AUTO: 86.1 %
NEUTROPHILS NFR BLD AUTO: 86.3 %
NEUTROPHILS NFR BLD AUTO: 87.6 %
NEUTROPHILS NFR BLD AUTO: 88.3 %
NEUTROPHILS NFR BLD AUTO: 88.9 %
NEUTROPHILS NFR BLD AUTO: 90.2 %
NEUTROPHILS NFR BLD AUTO: 90.6 %
NEUTROPHILS NFR BLD AUTO: 90.7 %
NEUTROPHILS NFR BLD AUTO: 90.7 %
NEUTROPHILS NFR BLD AUTO: 91.5 %
NEUTROPHILS NFR BLD AUTO: 92 %
NEUTROPHILS NFR BLD AUTO: 95 %
NEUTROPHILS NFR BLD AUTO: 95.5 %
NEUTS BAND NFR FLD MANUAL: 38 %
NITRATE UR QL: NEGATIVE
NRBC # BLD AUTO: 0 10*3/UL
NRBC BLD AUTO-RTO: 0 /100
NRBC BLD AUTO-RTO: 1 /100
NUM BPU REQUESTED: 1
NUM BPU REQUESTED: 2
OSMOLALITY SERPL: 268 MMOL/KG (ref 275–295)
OTHER CELLS FLD MANUAL: 38 %
PH UR STRIP: 6.5 PH (ref 5–7)
PH UR STRIP: 6.5 PH (ref 5–7)
PH UR STRIP: 7 PH (ref 5–7)
PH UR STRIP: 7 PH (ref 5–7)
PHOSPHATE SERPL-MCNC: 3.4 MG/DL (ref 2.5–4.5)
PLATELET # BLD AUTO: 134 10E9/L (ref 150–450)
PLATELET # BLD AUTO: 160 10E9/L (ref 150–450)
PLATELET # BLD AUTO: 181 10E9/L (ref 150–450)
PLATELET # BLD AUTO: 195 10E9/L (ref 150–450)
PLATELET # BLD AUTO: 204 10E9/L (ref 150–450)
PLATELET # BLD AUTO: 213 10E9/L (ref 150–450)
PLATELET # BLD AUTO: 215 10E9/L (ref 150–450)
PLATELET # BLD AUTO: 216 10E9/L (ref 150–450)
PLATELET # BLD AUTO: 234 10E9/L (ref 150–450)
PLATELET # BLD AUTO: 252 10E9/L (ref 150–450)
PLATELET # BLD AUTO: 254 10E9/L (ref 150–450)
PLATELET # BLD AUTO: 267 10E9/L (ref 150–450)
PLATELET # BLD AUTO: 268 10E9/L (ref 150–450)
PLATELET # BLD AUTO: 269 10E9/L (ref 150–450)
PLATELET # BLD AUTO: 271 10E9/L (ref 150–450)
PLATELET # BLD AUTO: 273 10E9/L (ref 150–450)
PLATELET # BLD AUTO: 276 10E9/L (ref 150–450)
PLATELET # BLD AUTO: 277 10E9/L (ref 150–450)
PLATELET # BLD AUTO: 284 10E9/L (ref 150–450)
PLATELET # BLD AUTO: 284 10E9/L (ref 150–450)
PLATELET # BLD AUTO: 327 10E9/L (ref 150–450)
PLATELET # BLD AUTO: 333 10E9/L (ref 150–450)
PLATELET # BLD AUTO: 347 10E9/L (ref 150–450)
PLATELET # BLD AUTO: 354 10E9/L (ref 150–450)
PLATELET # BLD AUTO: 359 10E9/L (ref 150–450)
PLATELET # BLD AUTO: 376 10E9/L (ref 150–450)
PLATELET # BLD AUTO: 382 10E9/L (ref 150–450)
PLATELET # BLD AUTO: 391 10E9/L (ref 150–450)
PLATELET # BLD AUTO: 403 10E9/L (ref 150–450)
PLATELET # BLD AUTO: 408 10E9/L (ref 150–450)
PLATELET # BLD AUTO: 414 10E9/L (ref 150–450)
PLATELET # BLD AUTO: 439 10E9/L (ref 150–450)
PLATELET # BLD AUTO: 461 10E9/L (ref 150–450)
PLATELET # BLD AUTO: 527 10E9/L (ref 150–450)
PLATELET # BLD AUTO: 538 10E9/L (ref 150–450)
PLATELET # BLD AUTO: 679 10E9/L (ref 150–450)
PLATELET # BLD AUTO: 77 10E9/L (ref 150–450)
PLATELET # BLD EST: ABNORMAL 10*3/UL
POTASSIUM BLD-SCNC: 4.1 MMOL/L (ref 3.4–5.3)
POTASSIUM SERPL-SCNC: 2.9 MMOL/L (ref 3.4–5.3)
POTASSIUM SERPL-SCNC: 3 MMOL/L (ref 3.4–5.3)
POTASSIUM SERPL-SCNC: 3.1 MMOL/L (ref 3.4–5.3)
POTASSIUM SERPL-SCNC: 3.1 MMOL/L (ref 3.4–5.3)
POTASSIUM SERPL-SCNC: 3.2 MMOL/L (ref 3.4–5.3)
POTASSIUM SERPL-SCNC: 3.3 MMOL/L (ref 3.4–5.3)
POTASSIUM SERPL-SCNC: 3.4 MMOL/L (ref 3.4–5.3)
POTASSIUM SERPL-SCNC: 3.5 MMOL/L (ref 3.4–5.3)
POTASSIUM SERPL-SCNC: 3.6 MMOL/L (ref 3.4–5.3)
POTASSIUM SERPL-SCNC: 3.8 MMOL/L (ref 3.4–5.3)
POTASSIUM SERPL-SCNC: 4 MMOL/L (ref 3.4–5.3)
POTASSIUM SERPL-SCNC: 4.1 MMOL/L (ref 3.4–5.3)
POTASSIUM SERPL-SCNC: 4.4 MMOL/L (ref 3.4–5.3)
POTASSIUM SERPL-SCNC: 4.4 MMOL/L (ref 3.4–5.3)
POTASSIUM SERPL-SCNC: 4.5 MMOL/L (ref 3.4–5.3)
PROCALCITONIN SERPL-MCNC: 0.24 NG/ML
PROT FLD-MCNC: 3.5 G/DL
PROT SERPL-MCNC: 5.3 G/DL (ref 6.8–8.8)
PROT SERPL-MCNC: 6 G/DL (ref 6.8–8.8)
PROT SERPL-MCNC: 6.2 G/DL (ref 6.8–8.8)
PROT SERPL-MCNC: 6.7 G/DL (ref 6.8–8.8)
PROT SERPL-MCNC: 6.7 G/DL (ref 6.8–8.8)
PROT SERPL-MCNC: 6.9 G/DL (ref 6.8–8.8)
PROT SERPL-MCNC: 6.9 G/DL (ref 6.8–8.8)
PROT SERPL-MCNC: 7 G/DL (ref 6.8–8.8)
PROT SERPL-MCNC: 7.9 G/DL (ref 6.8–8.8)
RBC # BLD AUTO: 2.32 10E12/L (ref 3.8–5.2)
RBC # BLD AUTO: 2.41 10E12/L (ref 3.8–5.2)
RBC # BLD AUTO: 2.63 10E12/L (ref 3.8–5.2)
RBC # BLD AUTO: 2.77 10E12/L (ref 3.8–5.2)
RBC # BLD AUTO: 2.88 10E12/L (ref 3.8–5.2)
RBC # BLD AUTO: 2.93 10E12/L (ref 3.8–5.2)
RBC # BLD AUTO: 2.94 10E12/L (ref 3.8–5.2)
RBC # BLD AUTO: 2.95 10E12/L (ref 3.8–5.2)
RBC # BLD AUTO: 2.99 10E12/L (ref 3.8–5.2)
RBC # BLD AUTO: 3.02 10E12/L (ref 3.8–5.2)
RBC # BLD AUTO: 3.03 10E12/L (ref 3.8–5.2)
RBC # BLD AUTO: 3.04 10E12/L (ref 3.8–5.2)
RBC # BLD AUTO: 3.05 10E12/L (ref 3.8–5.2)
RBC # BLD AUTO: 3.12 10E12/L (ref 3.8–5.2)
RBC # BLD AUTO: 3.16 10E12/L (ref 3.8–5.2)
RBC # BLD AUTO: 3.31 10E12/L (ref 3.8–5.2)
RBC # BLD AUTO: 3.38 10E12/L (ref 3.8–5.2)
RBC # BLD AUTO: 3.41 10E12/L (ref 3.8–5.2)
RBC # BLD AUTO: 3.41 10E12/L (ref 3.8–5.2)
RBC # BLD AUTO: 3.51 10E12/L (ref 3.8–5.2)
RBC # BLD AUTO: 3.52 10E12/L (ref 3.8–5.2)
RBC # BLD AUTO: 3.54 10E12/L (ref 3.8–5.2)
RBC # BLD AUTO: 3.6 10E12/L (ref 3.8–5.2)
RBC # BLD AUTO: 3.64 10E12/L (ref 3.8–5.2)
RBC # BLD AUTO: 3.65 10E12/L (ref 3.8–5.2)
RBC # BLD AUTO: 3.65 10E12/L (ref 3.8–5.2)
RBC # BLD AUTO: 3.66 10E12/L (ref 3.8–5.2)
RBC # BLD AUTO: 3.68 10E12/L (ref 3.8–5.2)
RBC # BLD AUTO: 3.76 10E12/L (ref 3.8–5.2)
RBC # BLD AUTO: 3.85 10E12/L (ref 3.8–5.2)
RBC # BLD AUTO: 3.86 10E12/L (ref 3.8–5.2)
RBC # BLD AUTO: 3.9 10E12/L (ref 3.8–5.2)
RBC # BLD AUTO: 3.92 10E12/L (ref 3.8–5.2)
RBC # BLD AUTO: 3.95 10E12/L (ref 3.8–5.2)
RBC # BLD AUTO: 4.19 10E12/L (ref 3.8–5.2)
RBC # BLD AUTO: 4.19 10E12/L (ref 3.8–5.2)
RBC # BLD AUTO: 4.22 10E12/L (ref 3.8–5.2)
RBC #/AREA URNS AUTO: 0 /HPF (ref 0–2)
RBC #/AREA URNS AUTO: 2 /HPF (ref 0–2)
RBC #/AREA URNS AUTO: 4 /HPF (ref 0–2)
RBC #/AREA URNS AUTO: <1 /HPF (ref 0–2)
SODIUM BLD-SCNC: 129 MMOL/L (ref 133–144)
SODIUM SERPL-SCNC: 126 MMOL/L (ref 133–144)
SODIUM SERPL-SCNC: 126 MMOL/L (ref 133–144)
SODIUM SERPL-SCNC: 127 MMOL/L (ref 133–144)
SODIUM SERPL-SCNC: 128 MMOL/L (ref 133–144)
SODIUM SERPL-SCNC: 129 MMOL/L (ref 133–144)
SODIUM SERPL-SCNC: 129 MMOL/L (ref 133–144)
SODIUM SERPL-SCNC: 130 MMOL/L (ref 133–144)
SODIUM SERPL-SCNC: 131 MMOL/L (ref 133–144)
SODIUM SERPL-SCNC: 132 MMOL/L (ref 133–144)
SODIUM SERPL-SCNC: 133 MMOL/L (ref 133–144)
SODIUM SERPL-SCNC: 133 MMOL/L (ref 133–144)
SODIUM SERPL-SCNC: 134 MMOL/L (ref 133–144)
SODIUM SERPL-SCNC: 135 MMOL/L (ref 133–144)
SODIUM SERPL-SCNC: 136 MMOL/L (ref 133–144)
SODIUM SERPL-SCNC: 137 MMOL/L (ref 133–144)
SODIUM SERPL-SCNC: 138 MMOL/L (ref 133–144)
SODIUM SERPL-SCNC: 139 MMOL/L (ref 133–144)
SODIUM SERPL-SCNC: 140 MMOL/L (ref 133–144)
SOURCE: ABNORMAL
SP GR UR STRIP: 1.01 (ref 1–1.03)
SP GR UR STRIP: 1.02 (ref 1–1.03)
SPECIMEN EXP DATE BLD: NORMAL
SPECIMEN SOURCE FLD: NORMAL
SPECIMEN SOURCE FLD: NORMAL
SPECIMEN SOURCE: ABNORMAL
SPECIMEN SOURCE: NORMAL
SQUAMOUS #/AREA URNS AUTO: <1 /HPF (ref 0–1)
SQUAMOUS #/AREA URNS AUTO: <1 /HPF (ref 0–1)
TRANS CELLS #/AREA URNS HPF: <1 /HPF (ref 0–1)
TRANSFUSION STATUS PATIENT QL: NORMAL
TROPONIN I SERPL-MCNC: 0.1 UG/L (ref 0–0.04)
TROPONIN I SERPL-MCNC: 0.16 UG/L (ref 0–0.04)
TROPONIN I SERPL-MCNC: 0.2 UG/L (ref 0–0.04)
TROPONIN I SERPL-MCNC: 0.2 UG/L (ref 0–0.04)
TROPONIN I SERPL-MCNC: <0.015 UG/L (ref 0–0.04)
TSH SERPL DL<=0.005 MIU/L-ACNC: 1.92 MU/L (ref 0.4–4)
TSH SERPL DL<=0.005 MIU/L-ACNC: 2.92 MU/L (ref 0.4–4)
UROBILINOGEN UR STRIP-MCNC: 2 MG/DL (ref 0–2)
UROBILINOGEN UR STRIP-MCNC: 4 MG/DL (ref 0–2)
UROBILINOGEN UR STRIP-MCNC: NORMAL MG/DL (ref 0–2)
UROBILINOGEN UR STRIP-MCNC: NORMAL MG/DL (ref 0–2)
WBC # BLD AUTO: 10.3 10E9/L (ref 4–11)
WBC # BLD AUTO: 10.3 10E9/L (ref 4–11)
WBC # BLD AUTO: 11.1 10E9/L (ref 4–11)
WBC # BLD AUTO: 11.4 10E9/L (ref 4–11)
WBC # BLD AUTO: 11.7 10E9/L (ref 4–11)
WBC # BLD AUTO: 11.9 10E9/L (ref 4–11)
WBC # BLD AUTO: 12.5 10E9/L (ref 4–11)
WBC # BLD AUTO: 12.8 10E9/L (ref 4–11)
WBC # BLD AUTO: 12.8 10E9/L (ref 4–11)
WBC # BLD AUTO: 12.9 10E9/L (ref 4–11)
WBC # BLD AUTO: 12.9 10E9/L (ref 4–11)
WBC # BLD AUTO: 13.3 10E9/L (ref 4–11)
WBC # BLD AUTO: 13.4 10E9/L (ref 4–11)
WBC # BLD AUTO: 13.4 10E9/L (ref 4–11)
WBC # BLD AUTO: 13.6 10E9/L (ref 4–11)
WBC # BLD AUTO: 13.8 10E9/L (ref 4–11)
WBC # BLD AUTO: 13.8 10E9/L (ref 4–11)
WBC # BLD AUTO: 14.1 10E9/L (ref 4–11)
WBC # BLD AUTO: 14.1 10E9/L (ref 4–11)
WBC # BLD AUTO: 14.2 10E9/L (ref 4–11)
WBC # BLD AUTO: 15.2 10E9/L (ref 4–11)
WBC # BLD AUTO: 16.4 10E9/L (ref 4–11)
WBC # BLD AUTO: 16.5 10E9/L (ref 4–11)
WBC # BLD AUTO: 16.6 10E9/L (ref 4–11)
WBC # BLD AUTO: 18.9 10E9/L (ref 4–11)
WBC # BLD AUTO: 2 10E9/L (ref 4–11)
WBC # BLD AUTO: 2.6 10E9/L (ref 4–11)
WBC # BLD AUTO: 3.4 10E9/L (ref 4–11)
WBC # BLD AUTO: 4.2 10E9/L (ref 4–11)
WBC # BLD AUTO: 5.2 10E9/L (ref 4–11)
WBC # BLD AUTO: 6.3 10E9/L (ref 4–11)
WBC # BLD AUTO: 6.8 10E9/L (ref 4–11)
WBC # BLD AUTO: 8.6 10E9/L (ref 4–11)
WBC # BLD AUTO: 9 10E9/L (ref 4–11)
WBC # BLD AUTO: 9 10E9/L (ref 4–11)
WBC # BLD AUTO: 9.4 10E9/L (ref 4–11)
WBC # BLD AUTO: 9.5 10E9/L (ref 4–11)
WBC # FLD AUTO: 1508 /UL
WBC #/AREA URNS AUTO: 1 /HPF (ref 0–5)
WBC #/AREA URNS AUTO: 6 /HPF (ref 0–5)

## 2018-01-01 PROCEDURE — 36430 TRANSFUSION BLD/BLD COMPNT: CPT | Performed by: EMERGENCY MEDICINE

## 2018-01-01 PROCEDURE — 40000225 ZZH STATISTIC SLP WARD VISIT

## 2018-01-01 PROCEDURE — 25000128 H RX IP 250 OP 636: Performed by: NURSE PRACTITIONER

## 2018-01-01 PROCEDURE — 86901 BLOOD TYPING SEROLOGIC RH(D): CPT | Performed by: OBSTETRICS & GYNECOLOGY

## 2018-01-01 PROCEDURE — 36415 COLL VENOUS BLD VENIPUNCTURE: CPT | Performed by: STUDENT IN AN ORGANIZED HEALTH CARE EDUCATION/TRAINING PROGRAM

## 2018-01-01 PROCEDURE — 85025 COMPLETE CBC W/AUTO DIFF WBC: CPT | Performed by: STUDENT IN AN ORGANIZED HEALTH CARE EDUCATION/TRAINING PROGRAM

## 2018-01-01 PROCEDURE — 99232 SBSQ HOSP IP/OBS MODERATE 35: CPT | Mod: GC | Performed by: OBSTETRICS & GYNECOLOGY

## 2018-01-01 PROCEDURE — 25000128 H RX IP 250 OP 636: Performed by: STUDENT IN AN ORGANIZED HEALTH CARE EDUCATION/TRAINING PROGRAM

## 2018-01-01 PROCEDURE — 36415 COLL VENOUS BLD VENIPUNCTURE: CPT | Performed by: NURSE PRACTITIONER

## 2018-01-01 PROCEDURE — C1769 GUIDE WIRE: HCPCS

## 2018-01-01 PROCEDURE — 85610 PROTHROMBIN TIME: CPT | Performed by: STUDENT IN AN ORGANIZED HEALTH CARE EDUCATION/TRAINING PROGRAM

## 2018-01-01 PROCEDURE — 94799 UNLISTED PULMONARY SVC/PX: CPT

## 2018-01-01 PROCEDURE — 36592 COLLECT BLOOD FROM PICC: CPT | Performed by: STUDENT IN AN ORGANIZED HEALTH CARE EDUCATION/TRAINING PROGRAM

## 2018-01-01 PROCEDURE — 25000132 ZZH RX MED GY IP 250 OP 250 PS 637: Performed by: NURSE PRACTITIONER

## 2018-01-01 PROCEDURE — 25000125 ZZHC RX 250: Performed by: RADIOLOGY

## 2018-01-01 PROCEDURE — T1013 SIGN LANG/ORAL INTERPRETER: HCPCS | Mod: U3

## 2018-01-01 PROCEDURE — 00000102 ZZHCL STATISTIC CYTO WRIGHT STAIN TC: Performed by: STUDENT IN AN ORGANIZED HEALTH CARE EDUCATION/TRAINING PROGRAM

## 2018-01-01 PROCEDURE — 36415 COLL VENOUS BLD VENIPUNCTURE: CPT | Performed by: OBSTETRICS & GYNECOLOGY

## 2018-01-01 PROCEDURE — 36592 COLLECT BLOOD FROM PICC: CPT | Performed by: OBSTETRICS & GYNECOLOGY

## 2018-01-01 PROCEDURE — 99231 SBSQ HOSP IP/OBS SF/LOW 25: CPT | Mod: GC | Performed by: OBSTETRICS & GYNECOLOGY

## 2018-01-01 PROCEDURE — 83605 ASSAY OF LACTIC ACID: CPT | Performed by: OBSTETRICS & GYNECOLOGY

## 2018-01-01 PROCEDURE — 25000132 ZZH RX MED GY IP 250 OP 250 PS 637: Performed by: EMERGENCY MEDICINE

## 2018-01-01 PROCEDURE — 12000001 ZZH R&B MED SURG/OB UMMC

## 2018-01-01 PROCEDURE — 80048 BASIC METABOLIC PNL TOTAL CA: CPT | Performed by: STUDENT IN AN ORGANIZED HEALTH CARE EDUCATION/TRAINING PROGRAM

## 2018-01-01 PROCEDURE — 99153 MOD SED SAME PHYS/QHP EA: CPT

## 2018-01-01 PROCEDURE — 83605 ASSAY OF LACTIC ACID: CPT | Performed by: STUDENT IN AN ORGANIZED HEALTH CARE EDUCATION/TRAINING PROGRAM

## 2018-01-01 PROCEDURE — 25000125 ZZHC RX 250: Performed by: STUDENT IN AN ORGANIZED HEALTH CARE EDUCATION/TRAINING PROGRAM

## 2018-01-01 PROCEDURE — 85027 COMPLETE CBC AUTOMATED: CPT | Performed by: STUDENT IN AN ORGANIZED HEALTH CARE EDUCATION/TRAINING PROGRAM

## 2018-01-01 PROCEDURE — 27211039 ZZH NEEDLE CR2

## 2018-01-01 PROCEDURE — 85520 HEPARIN ASSAY: CPT | Performed by: STUDENT IN AN ORGANIZED HEALTH CARE EDUCATION/TRAINING PROGRAM

## 2018-01-01 PROCEDURE — 97530 THERAPEUTIC ACTIVITIES: CPT | Mod: GP

## 2018-01-01 PROCEDURE — 97116 GAIT TRAINING THERAPY: CPT | Mod: GP | Performed by: PHYSICAL THERAPIST

## 2018-01-01 PROCEDURE — 93005 ELECTROCARDIOGRAM TRACING: CPT

## 2018-01-01 PROCEDURE — 96367 TX/PROPH/DG ADDL SEQ IV INF: CPT | Performed by: EMERGENCY MEDICINE

## 2018-01-01 PROCEDURE — 99222 1ST HOSP IP/OBS MODERATE 55: CPT | Mod: AI | Performed by: OBSTETRICS & GYNECOLOGY

## 2018-01-01 PROCEDURE — 83605 ASSAY OF LACTIC ACID: CPT

## 2018-01-01 PROCEDURE — 96366 THER/PROPH/DIAG IV INF ADDON: CPT

## 2018-01-01 PROCEDURE — P9047 ALBUMIN (HUMAN), 25%, 50ML: HCPCS | Mod: ZF | Performed by: OBSTETRICS & GYNECOLOGY

## 2018-01-01 PROCEDURE — 97110 THERAPEUTIC EXERCISES: CPT | Mod: GP

## 2018-01-01 PROCEDURE — 40000133 ZZH STATISTIC OT WARD VISIT: Performed by: OCCUPATIONAL THERAPIST

## 2018-01-01 PROCEDURE — 25000128 H RX IP 250 OP 636: Performed by: OBSTETRICS & GYNECOLOGY

## 2018-01-01 PROCEDURE — 85520 HEPARIN ASSAY: CPT | Performed by: OBSTETRICS & GYNECOLOGY

## 2018-01-01 PROCEDURE — 87205 SMEAR GRAM STAIN: CPT | Performed by: STUDENT IN AN ORGANIZED HEALTH CARE EDUCATION/TRAINING PROGRAM

## 2018-01-01 PROCEDURE — 96361 HYDRATE IV INFUSION ADD-ON: CPT

## 2018-01-01 PROCEDURE — 97535 SELF CARE MNGMENT TRAINING: CPT | Mod: GO | Performed by: OCCUPATIONAL THERAPIST

## 2018-01-01 PROCEDURE — 84295 ASSAY OF SERUM SODIUM: CPT | Performed by: STUDENT IN AN ORGANIZED HEALTH CARE EDUCATION/TRAINING PROGRAM

## 2018-01-01 PROCEDURE — 82378 CARCINOEMBRYONIC ANTIGEN: CPT | Performed by: OBSTETRICS & GYNECOLOGY

## 2018-01-01 PROCEDURE — 99215 OFFICE O/P EST HI 40 MIN: CPT | Mod: ZP | Performed by: OBSTETRICS & GYNECOLOGY

## 2018-01-01 PROCEDURE — 12000008 ZZH R&B INTERMEDIATE UMMC

## 2018-01-01 PROCEDURE — 25000132 ZZH RX MED GY IP 250 OP 250 PS 637: Performed by: STUDENT IN AN ORGANIZED HEALTH CARE EDUCATION/TRAINING PROGRAM

## 2018-01-01 PROCEDURE — 25000128 H RX IP 250 OP 636: Performed by: RADIOLOGY

## 2018-01-01 PROCEDURE — 71046 X-RAY EXAM CHEST 2 VIEWS: CPT

## 2018-01-01 PROCEDURE — 85610 PROTHROMBIN TIME: CPT | Performed by: EMERGENCY MEDICINE

## 2018-01-01 PROCEDURE — 84295 ASSAY OF SERUM SODIUM: CPT

## 2018-01-01 PROCEDURE — 86304 IMMUNOASSAY TUMOR CA 125: CPT | Performed by: OBSTETRICS & GYNECOLOGY

## 2018-01-01 PROCEDURE — P9016 RBC LEUKOCYTES REDUCED: HCPCS | Performed by: OBSTETRICS & GYNECOLOGY

## 2018-01-01 PROCEDURE — 85018 HEMOGLOBIN: CPT | Performed by: STUDENT IN AN ORGANIZED HEALTH CARE EDUCATION/TRAINING PROGRAM

## 2018-01-01 PROCEDURE — 25000128 H RX IP 250 OP 636: Mod: ZF | Performed by: NURSE PRACTITIONER

## 2018-01-01 PROCEDURE — 97116 GAIT TRAINING THERAPY: CPT | Mod: GP | Performed by: REHABILITATION PRACTITIONER

## 2018-01-01 PROCEDURE — 99223 1ST HOSP IP/OBS HIGH 75: CPT | Mod: GC | Performed by: INTERNAL MEDICINE

## 2018-01-01 PROCEDURE — 83735 ASSAY OF MAGNESIUM: CPT | Performed by: OBSTETRICS & GYNECOLOGY

## 2018-01-01 PROCEDURE — 25000132 ZZH RX MED GY IP 250 OP 250 PS 637: Performed by: OBSTETRICS & GYNECOLOGY

## 2018-01-01 PROCEDURE — 97116 GAIT TRAINING THERAPY: CPT | Mod: GP

## 2018-01-01 PROCEDURE — 99232 SBSQ HOSP IP/OBS MODERATE 35: CPT | Performed by: NURSE PRACTITIONER

## 2018-01-01 PROCEDURE — 80053 COMPREHEN METABOLIC PANEL: CPT | Performed by: OBSTETRICS & GYNECOLOGY

## 2018-01-01 PROCEDURE — 83690 ASSAY OF LIPASE: CPT | Performed by: EMERGENCY MEDICINE

## 2018-01-01 PROCEDURE — 94640 AIRWAY INHALATION TREATMENT: CPT | Mod: 76

## 2018-01-01 PROCEDURE — 85730 THROMBOPLASTIN TIME PARTIAL: CPT | Performed by: EMERGENCY MEDICINE

## 2018-01-01 PROCEDURE — 27210732 ZZH ACCESSORY CR1

## 2018-01-01 PROCEDURE — 40000141 ZZH STATISTIC PERIPHERAL IV START W/O US GUIDANCE

## 2018-01-01 PROCEDURE — 97530 THERAPEUTIC ACTIVITIES: CPT | Mod: GP | Performed by: PHYSICAL THERAPIST

## 2018-01-01 PROCEDURE — 83605 ASSAY OF LACTIC ACID: CPT | Performed by: EMERGENCY MEDICINE

## 2018-01-01 PROCEDURE — 49180 BIOPSY ABDOMINAL MASS: CPT

## 2018-01-01 PROCEDURE — 99285 EMERGENCY DEPT VISIT HI MDM: CPT | Mod: 25 | Performed by: EMERGENCY MEDICINE

## 2018-01-01 PROCEDURE — 97165 OT EVAL LOW COMPLEX 30 MIN: CPT | Mod: GO

## 2018-01-01 PROCEDURE — 99152 MOD SED SAME PHYS/QHP 5/>YRS: CPT

## 2018-01-01 PROCEDURE — 0W9B3ZZ DRAINAGE OF LEFT PLEURAL CAVITY, PERCUTANEOUS APPROACH: ICD-10-PCS | Performed by: INTERNAL MEDICINE

## 2018-01-01 PROCEDURE — 80048 BASIC METABOLIC PNL TOTAL CA: CPT | Performed by: NURSE PRACTITIONER

## 2018-01-01 PROCEDURE — 25000128 H RX IP 250 OP 636: Performed by: PHYSICIAN ASSISTANT

## 2018-01-01 PROCEDURE — 27210190 US PARACENTESIS

## 2018-01-01 PROCEDURE — G0463 HOSPITAL OUTPT CLINIC VISIT: HCPCS | Mod: 25

## 2018-01-01 PROCEDURE — 93010 ELECTROCARDIOGRAM REPORT: CPT | Performed by: INTERNAL MEDICINE

## 2018-01-01 PROCEDURE — 27210908 ZZH NEEDLE CR4

## 2018-01-01 PROCEDURE — G0463 HOSPITAL OUTPT CLINIC VISIT: HCPCS | Mod: ZF

## 2018-01-01 PROCEDURE — 83605 ASSAY OF LACTIC ACID: CPT | Performed by: NURSE PRACTITIONER

## 2018-01-01 PROCEDURE — 86301 IMMUNOASSAY TUMOR CA 19-9: CPT | Performed by: STUDENT IN AN ORGANIZED HEALTH CARE EDUCATION/TRAINING PROGRAM

## 2018-01-01 PROCEDURE — 74019 RADEX ABDOMEN 2 VIEWS: CPT | Mod: FY

## 2018-01-01 PROCEDURE — 99204 OFFICE O/P NEW MOD 45 MIN: CPT | Performed by: INTERNAL MEDICINE

## 2018-01-01 PROCEDURE — 99215 OFFICE O/P EST HI 40 MIN: CPT | Mod: ZP | Performed by: NURSE PRACTITIONER

## 2018-01-01 PROCEDURE — 49083 ABD PARACENTESIS W/IMAGING: CPT | Mod: GC | Performed by: HOSPITALIST

## 2018-01-01 PROCEDURE — 25000125 ZZHC RX 250: Performed by: OBSTETRICS & GYNECOLOGY

## 2018-01-01 PROCEDURE — 25000132 ZZH RX MED GY IP 250 OP 250 PS 637: Performed by: RADIOLOGY

## 2018-01-01 PROCEDURE — 49083 ABD PARACENTESIS W/IMAGING: CPT

## 2018-01-01 PROCEDURE — 25000128 H RX IP 250 OP 636

## 2018-01-01 PROCEDURE — 36592 COLLECT BLOOD FROM PICC: CPT | Performed by: NURSE PRACTITIONER

## 2018-01-01 PROCEDURE — 87040 BLOOD CULTURE FOR BACTERIA: CPT | Performed by: EMERGENCY MEDICINE

## 2018-01-01 PROCEDURE — 25000128 H RX IP 250 OP 636: Mod: ZF | Performed by: OBSTETRICS & GYNECOLOGY

## 2018-01-01 PROCEDURE — 25000125 ZZHC RX 250: Mod: ZF | Performed by: OBSTETRICS & GYNECOLOGY

## 2018-01-01 PROCEDURE — 40000193 ZZH STATISTIC PT WARD VISIT: Performed by: PHYSICAL THERAPIST

## 2018-01-01 PROCEDURE — 85520 HEPARIN ASSAY: CPT | Performed by: HOSPITALIST

## 2018-01-01 PROCEDURE — 97162 PT EVAL MOD COMPLEX 30 MIN: CPT | Mod: GP | Performed by: PHYSICAL THERAPIST

## 2018-01-01 PROCEDURE — 0W9B3ZZ DRAINAGE OF LEFT PLEURAL CAVITY, PERCUTANEOUS APPROACH: ICD-10-PCS | Performed by: RADIOLOGY

## 2018-01-01 PROCEDURE — 40000802 ZZH SITE CHECK

## 2018-01-01 PROCEDURE — 84132 ASSAY OF SERUM POTASSIUM: CPT

## 2018-01-01 PROCEDURE — 84132 ASSAY OF SERUM POTASSIUM: CPT | Performed by: STUDENT IN AN ORGANIZED HEALTH CARE EDUCATION/TRAINING PROGRAM

## 2018-01-01 PROCEDURE — 3E04305 INTRODUCTION OF OTHER ANTINEOPLASTIC INTO CENTRAL VEIN, PERCUTANEOUS APPROACH: ICD-10-PCS | Performed by: OBSTETRICS & GYNECOLOGY

## 2018-01-01 PROCEDURE — 27210903 ZZH KIT CR5

## 2018-01-01 PROCEDURE — 86900 BLOOD TYPING SEROLOGIC ABO: CPT | Performed by: NURSE PRACTITIONER

## 2018-01-01 PROCEDURE — 86900 BLOOD TYPING SEROLOGIC ABO: CPT | Performed by: OBSTETRICS & GYNECOLOGY

## 2018-01-01 PROCEDURE — 99223 1ST HOSP IP/OBS HIGH 75: CPT | Performed by: CLINICAL NURSE SPECIALIST

## 2018-01-01 PROCEDURE — 94640 AIRWAY INHALATION TREATMENT: CPT

## 2018-01-01 PROCEDURE — 85025 COMPLETE CBC W/AUTO DIFF WBC: CPT | Performed by: NURSE PRACTITIONER

## 2018-01-01 PROCEDURE — G0378 HOSPITAL OBSERVATION PER HR: HCPCS

## 2018-01-01 PROCEDURE — 82272 OCCULT BLD FECES 1-3 TESTS: CPT | Performed by: STUDENT IN AN ORGANIZED HEALTH CARE EDUCATION/TRAINING PROGRAM

## 2018-01-01 PROCEDURE — 82565 ASSAY OF CREATININE: CPT | Performed by: STUDENT IN AN ORGANIZED HEALTH CARE EDUCATION/TRAINING PROGRAM

## 2018-01-01 PROCEDURE — P9016 RBC LEUKOCYTES REDUCED: HCPCS | Performed by: NURSE PRACTITIONER

## 2018-01-01 PROCEDURE — 84145 PROCALCITONIN (PCT): CPT | Performed by: STUDENT IN AN ORGANIZED HEALTH CARE EDUCATION/TRAINING PROGRAM

## 2018-01-01 PROCEDURE — 0WBN3ZX EXCISION OF FEMALE PERINEUM, PERCUTANEOUS APPROACH, DIAGNOSTIC: ICD-10-PCS | Performed by: RADIOLOGY

## 2018-01-01 PROCEDURE — 82947 ASSAY GLUCOSE BLOOD QUANT: CPT

## 2018-01-01 PROCEDURE — 82378 CARCINOEMBRYONIC ANTIGEN: CPT | Performed by: STUDENT IN AN ORGANIZED HEALTH CARE EDUCATION/TRAINING PROGRAM

## 2018-01-01 PROCEDURE — 76942 ECHO GUIDE FOR BIOPSY: CPT

## 2018-01-01 PROCEDURE — 83930 ASSAY OF BLOOD OSMOLALITY: CPT | Performed by: STUDENT IN AN ORGANIZED HEALTH CARE EDUCATION/TRAINING PROGRAM

## 2018-01-01 PROCEDURE — 83540 ASSAY OF IRON: CPT | Performed by: STUDENT IN AN ORGANIZED HEALTH CARE EDUCATION/TRAINING PROGRAM

## 2018-01-01 PROCEDURE — 96374 THER/PROPH/DIAG INJ IV PUSH: CPT

## 2018-01-01 PROCEDURE — 97535 SELF CARE MNGMENT TRAINING: CPT | Mod: GP | Performed by: REHABILITATION PRACTITIONER

## 2018-01-01 PROCEDURE — 25000125 ZZHC RX 250: Performed by: NURSE PRACTITIONER

## 2018-01-01 PROCEDURE — 84443 ASSAY THYROID STIM HORMONE: CPT | Performed by: NURSE PRACTITIONER

## 2018-01-01 PROCEDURE — 99203 OFFICE O/P NEW LOW 30 MIN: CPT | Performed by: NURSE PRACTITIONER

## 2018-01-01 PROCEDURE — 80053 COMPREHEN METABOLIC PANEL: CPT | Performed by: EMERGENCY MEDICINE

## 2018-01-01 PROCEDURE — 85004 AUTOMATED DIFF WBC COUNT: CPT | Performed by: STUDENT IN AN ORGANIZED HEALTH CARE EDUCATION/TRAINING PROGRAM

## 2018-01-01 PROCEDURE — P9016 RBC LEUKOCYTES REDUCED: HCPCS | Performed by: EMERGENCY MEDICINE

## 2018-01-01 PROCEDURE — 25000132 ZZH RX MED GY IP 250 OP 250 PS 637: Performed by: INTERNAL MEDICINE

## 2018-01-01 PROCEDURE — 92610 EVALUATE SWALLOWING FUNCTION: CPT | Mod: GN

## 2018-01-01 PROCEDURE — 86850 RBC ANTIBODY SCREEN: CPT | Performed by: EMERGENCY MEDICINE

## 2018-01-01 PROCEDURE — 97110 THERAPEUTIC EXERCISES: CPT | Mod: GP | Performed by: REHABILITATION PRACTITIONER

## 2018-01-01 PROCEDURE — 40000193 ZZH STATISTIC PT WARD VISIT

## 2018-01-01 PROCEDURE — 93005 ELECTROCARDIOGRAM TRACING: CPT | Performed by: EMERGENCY MEDICINE

## 2018-01-01 PROCEDURE — 88341 IMHCHEM/IMCYTCHM EA ADD ANTB: CPT | Performed by: NURSE PRACTITIONER

## 2018-01-01 PROCEDURE — G0463 HOSPITAL OUTPT CLINIC VISIT: HCPCS

## 2018-01-01 PROCEDURE — 99221 1ST HOSP IP/OBS SF/LOW 40: CPT | Mod: AI | Performed by: OBSTETRICS & GYNECOLOGY

## 2018-01-01 PROCEDURE — 87040 BLOOD CULTURE FOR BACTERIA: CPT | Performed by: STUDENT IN AN ORGANIZED HEALTH CARE EDUCATION/TRAINING PROGRAM

## 2018-01-01 PROCEDURE — 85652 RBC SED RATE AUTOMATED: CPT | Performed by: EMERGENCY MEDICINE

## 2018-01-01 PROCEDURE — 84132 ASSAY OF SERUM POTASSIUM: CPT | Performed by: OBSTETRICS & GYNECOLOGY

## 2018-01-01 PROCEDURE — 40000986 XR CHEST PORT 1 VW

## 2018-01-01 PROCEDURE — 86850 RBC ANTIBODY SCREEN: CPT | Performed by: OBSTETRICS & GYNECOLOGY

## 2018-01-01 PROCEDURE — 97530 THERAPEUTIC ACTIVITIES: CPT | Mod: GP | Performed by: REHABILITATION PRACTITIONER

## 2018-01-01 PROCEDURE — 84484 ASSAY OF TROPONIN QUANT: CPT | Performed by: STUDENT IN AN ORGANIZED HEALTH CARE EDUCATION/TRAINING PROGRAM

## 2018-01-01 PROCEDURE — 85027 COMPLETE CBC AUTOMATED: CPT | Performed by: NURSE PRACTITIONER

## 2018-01-01 PROCEDURE — 85520 HEPARIN ASSAY: CPT | Performed by: NURSE PRACTITIONER

## 2018-01-01 PROCEDURE — 87088 URINE BACTERIA CULTURE: CPT | Performed by: STUDENT IN AN ORGANIZED HEALTH CARE EDUCATION/TRAINING PROGRAM

## 2018-01-01 PROCEDURE — 87070 CULTURE OTHR SPECIMN AEROBIC: CPT | Performed by: STUDENT IN AN ORGANIZED HEALTH CARE EDUCATION/TRAINING PROGRAM

## 2018-01-01 PROCEDURE — 87086 URINE CULTURE/COLONY COUNT: CPT | Performed by: STUDENT IN AN ORGANIZED HEALTH CARE EDUCATION/TRAINING PROGRAM

## 2018-01-01 PROCEDURE — 71260 CT THORAX DX C+: CPT

## 2018-01-01 PROCEDURE — 92526 ORAL FUNCTION THERAPY: CPT | Mod: GN

## 2018-01-01 PROCEDURE — 27210732 IR PARACENTESIS

## 2018-01-01 PROCEDURE — 82435 ASSAY OF BLOOD CHLORIDE: CPT

## 2018-01-01 PROCEDURE — 93321 DOPPLER ECHO F-UP/LMTD STD: CPT | Mod: 26 | Performed by: INTERNAL MEDICINE

## 2018-01-01 PROCEDURE — 36415 COLL VENOUS BLD VENIPUNCTURE: CPT

## 2018-01-01 PROCEDURE — 87075 CULTR BACTERIA EXCEPT BLOOD: CPT | Performed by: STUDENT IN AN ORGANIZED HEALTH CARE EDUCATION/TRAINING PROGRAM

## 2018-01-01 PROCEDURE — 83735 ASSAY OF MAGNESIUM: CPT | Performed by: STUDENT IN AN ORGANIZED HEALTH CARE EDUCATION/TRAINING PROGRAM

## 2018-01-01 PROCEDURE — 85025 COMPLETE CBC W/AUTO DIFF WBC: CPT | Performed by: EMERGENCY MEDICINE

## 2018-01-01 PROCEDURE — 93010 ELECTROCARDIOGRAM REPORT: CPT | Mod: Z6 | Performed by: EMERGENCY MEDICINE

## 2018-01-01 PROCEDURE — 40000133 ZZH STATISTIC OT WARD VISIT

## 2018-01-01 PROCEDURE — 27210995 ZZH RX 272: Performed by: STUDENT IN AN ORGANIZED HEALTH CARE EDUCATION/TRAINING PROGRAM

## 2018-01-01 PROCEDURE — 81001 URINALYSIS AUTO W/SCOPE: CPT | Performed by: EMERGENCY MEDICINE

## 2018-01-01 PROCEDURE — 0W9G3ZZ DRAINAGE OF PERITONEAL CAVITY, PERCUTANEOUS APPROACH: ICD-10-PCS | Performed by: RADIOLOGY

## 2018-01-01 PROCEDURE — 83735 ASSAY OF MAGNESIUM: CPT | Performed by: NURSE PRACTITIONER

## 2018-01-01 PROCEDURE — 84157 ASSAY OF PROTEIN OTHER: CPT | Performed by: STUDENT IN AN ORGANIZED HEALTH CARE EDUCATION/TRAINING PROGRAM

## 2018-01-01 PROCEDURE — 97530 THERAPEUTIC ACTIVITIES: CPT | Mod: GO

## 2018-01-01 PROCEDURE — 85025 COMPLETE CBC W/AUTO DIFF WBC: CPT | Performed by: OBSTETRICS & GYNECOLOGY

## 2018-01-01 PROCEDURE — 99233 SBSQ HOSP IP/OBS HIGH 50: CPT | Performed by: INTERNAL MEDICINE

## 2018-01-01 PROCEDURE — 88112 CYTOPATH CELL ENHANCE TECH: CPT | Performed by: STUDENT IN AN ORGANIZED HEALTH CARE EDUCATION/TRAINING PROGRAM

## 2018-01-01 PROCEDURE — 74177 CT ABD & PELVIS W/CONTRAST: CPT

## 2018-01-01 PROCEDURE — 80053 COMPREHEN METABOLIC PANEL: CPT | Performed by: NURSE PRACTITIONER

## 2018-01-01 PROCEDURE — 93308 TTE F-UP OR LMTD: CPT | Mod: 26 | Performed by: INTERNAL MEDICINE

## 2018-01-01 PROCEDURE — 81001 URINALYSIS AUTO W/SCOPE: CPT | Performed by: STUDENT IN AN ORGANIZED HEALTH CARE EDUCATION/TRAINING PROGRAM

## 2018-01-01 PROCEDURE — 00000146 ZZHCL STATISTIC GLUCOSE BY METER IP

## 2018-01-01 PROCEDURE — 84132 ASSAY OF SERUM POTASSIUM: CPT | Performed by: HOSPITALIST

## 2018-01-01 PROCEDURE — P9047 ALBUMIN (HUMAN), 25%, 50ML: HCPCS | Performed by: STUDENT IN AN ORGANIZED HEALTH CARE EDUCATION/TRAINING PROGRAM

## 2018-01-01 PROCEDURE — 88333 PATH CONSLTJ SURG CYTO XM 1: CPT | Performed by: NURSE PRACTITIONER

## 2018-01-01 PROCEDURE — 89051 BODY FLUID CELL COUNT: CPT | Performed by: STUDENT IN AN ORGANIZED HEALTH CARE EDUCATION/TRAINING PROGRAM

## 2018-01-01 PROCEDURE — 37191 INS ENDOVAS VENA CAVA FILTR: CPT

## 2018-01-01 PROCEDURE — 96372 THER/PROPH/DIAG INJ SC/IM: CPT

## 2018-01-01 PROCEDURE — 82330 ASSAY OF CALCIUM: CPT

## 2018-01-01 PROCEDURE — 32555 ASPIRATE PLEURA W/ IMAGING: CPT | Mod: GC | Performed by: INTERNAL MEDICINE

## 2018-01-01 PROCEDURE — 84443 ASSAY THYROID STIM HORMONE: CPT | Performed by: STUDENT IN AN ORGANIZED HEALTH CARE EDUCATION/TRAINING PROGRAM

## 2018-01-01 PROCEDURE — 84484 ASSAY OF TROPONIN QUANT: CPT | Performed by: EMERGENCY MEDICINE

## 2018-01-01 PROCEDURE — 86900 BLOOD TYPING SEROLOGIC ABO: CPT | Performed by: EMERGENCY MEDICINE

## 2018-01-01 PROCEDURE — 86923 COMPATIBILITY TEST ELECTRIC: CPT | Performed by: OBSTETRICS & GYNECOLOGY

## 2018-01-01 PROCEDURE — P9016 RBC LEUKOCYTES REDUCED: HCPCS | Performed by: STUDENT IN AN ORGANIZED HEALTH CARE EDUCATION/TRAINING PROGRAM

## 2018-01-01 PROCEDURE — 40000193 ZZH STATISTIC PT WARD VISIT: Performed by: REHABILITATION PRACTITIONER

## 2018-01-01 PROCEDURE — C1880 VENA CAVA FILTER: HCPCS

## 2018-01-01 PROCEDURE — 40000275 ZZH STATISTIC RCP TIME EA 10 MIN

## 2018-01-01 PROCEDURE — 85018 HEMOGLOBIN: CPT | Performed by: OBSTETRICS & GYNECOLOGY

## 2018-01-01 PROCEDURE — T1013 SIGN LANG/ORAL INTERPRETER: HCPCS | Mod: U3,ZF

## 2018-01-01 PROCEDURE — 36430 TRANSFUSION BLD/BLD COMPNT: CPT

## 2018-01-01 PROCEDURE — 97535 SELF CARE MNGMENT TRAINING: CPT | Mod: GO

## 2018-01-01 PROCEDURE — 80053 COMPREHEN METABOLIC PANEL: CPT | Performed by: STUDENT IN AN ORGANIZED HEALTH CARE EDUCATION/TRAINING PROGRAM

## 2018-01-01 PROCEDURE — 99238 HOSP IP/OBS DSCHRG MGMT 30/<: CPT | Mod: GC | Performed by: OBSTETRICS & GYNECOLOGY

## 2018-01-01 PROCEDURE — C1729 CATH, DRAINAGE: HCPCS

## 2018-01-01 PROCEDURE — 97110 THERAPEUTIC EXERCISES: CPT | Mod: GO | Performed by: OCCUPATIONAL THERAPIST

## 2018-01-01 PROCEDURE — 93306 TTE W/DOPPLER COMPLETE: CPT

## 2018-01-01 PROCEDURE — 71045 X-RAY EXAM CHEST 1 VIEW: CPT

## 2018-01-01 PROCEDURE — 25000128 H RX IP 250 OP 636: Performed by: EMERGENCY MEDICINE

## 2018-01-01 PROCEDURE — 86901 BLOOD TYPING SEROLOGIC RH(D): CPT | Performed by: EMERGENCY MEDICINE

## 2018-01-01 PROCEDURE — 93306 TTE W/DOPPLER COMPLETE: CPT | Mod: 26 | Performed by: INTERNAL MEDICINE

## 2018-01-01 PROCEDURE — 86901 BLOOD TYPING SEROLOGIC RH(D): CPT | Performed by: STUDENT IN AN ORGANIZED HEALTH CARE EDUCATION/TRAINING PROGRAM

## 2018-01-01 PROCEDURE — 86923 COMPATIBILITY TEST ELECTRIC: CPT | Performed by: NURSE PRACTITIONER

## 2018-01-01 PROCEDURE — 80076 HEPATIC FUNCTION PANEL: CPT | Performed by: STUDENT IN AN ORGANIZED HEALTH CARE EDUCATION/TRAINING PROGRAM

## 2018-01-01 PROCEDURE — 93321 DOPPLER ECHO F-UP/LMTD STD: CPT

## 2018-01-01 PROCEDURE — 97530 THERAPEUTIC ACTIVITIES: CPT | Mod: GO | Performed by: OCCUPATIONAL THERAPIST

## 2018-01-01 PROCEDURE — 25000125 ZZHC RX 250

## 2018-01-01 PROCEDURE — 06H03DZ INSERTION OF INTRALUMINAL DEVICE INTO INFERIOR VENA CAVA, PERCUTANEOUS APPROACH: ICD-10-PCS | Performed by: RADIOLOGY

## 2018-01-01 PROCEDURE — 84100 ASSAY OF PHOSPHORUS: CPT | Performed by: STUDENT IN AN ORGANIZED HEALTH CARE EDUCATION/TRAINING PROGRAM

## 2018-01-01 PROCEDURE — 25800025 ZZH RX 258

## 2018-01-01 PROCEDURE — 86850 RBC ANTIBODY SCREEN: CPT | Performed by: NURSE PRACTITIONER

## 2018-01-01 PROCEDURE — 88342 IMHCHEM/IMCYTCHM 1ST ANTB: CPT | Performed by: NURSE PRACTITIONER

## 2018-01-01 PROCEDURE — 83550 IRON BINDING TEST: CPT | Performed by: STUDENT IN AN ORGANIZED HEALTH CARE EDUCATION/TRAINING PROGRAM

## 2018-01-01 PROCEDURE — 0CJS8ZZ INSPECTION OF LARYNX, VIA NATURAL OR ARTIFICIAL OPENING ENDOSCOPIC: ICD-10-PCS | Performed by: PHYSICIAN ASSISTANT

## 2018-01-01 PROCEDURE — 27210903 IR PARACENTESIS

## 2018-01-01 PROCEDURE — 76705 ECHO EXAM OF ABDOMEN: CPT

## 2018-01-01 PROCEDURE — 99233 SBSQ HOSP IP/OBS HIGH 50: CPT | Mod: GC | Performed by: OBSTETRICS & GYNECOLOGY

## 2018-01-01 PROCEDURE — 96365 THER/PROPH/DIAG IV INF INIT: CPT | Performed by: EMERGENCY MEDICINE

## 2018-01-01 PROCEDURE — 86880 COOMBS TEST DIRECT: CPT | Performed by: EMERGENCY MEDICINE

## 2018-01-01 PROCEDURE — 99223 1ST HOSP IP/OBS HIGH 75: CPT | Performed by: NURSE PRACTITIONER

## 2018-01-01 PROCEDURE — 0W993ZZ DRAINAGE OF RIGHT PLEURAL CAVITY, PERCUTANEOUS APPROACH: ICD-10-PCS | Performed by: RADIOLOGY

## 2018-01-01 PROCEDURE — 86923 COMPATIBILITY TEST ELECTRIC: CPT | Performed by: STUDENT IN AN ORGANIZED HEALTH CARE EDUCATION/TRAINING PROGRAM

## 2018-01-01 PROCEDURE — 96361 HYDRATE IV INFUSION ADD-ON: CPT | Performed by: EMERGENCY MEDICINE

## 2018-01-01 PROCEDURE — 27210738 ZZH ACCESSORY CR2

## 2018-01-01 PROCEDURE — 99232 SBSQ HOSP IP/OBS MODERATE 35: CPT | Mod: 24 | Performed by: OBSTETRICS & GYNECOLOGY

## 2018-01-01 PROCEDURE — 86850 RBC ANTIBODY SCREEN: CPT | Performed by: STUDENT IN AN ORGANIZED HEALTH CARE EDUCATION/TRAINING PROGRAM

## 2018-01-01 PROCEDURE — 86900 BLOOD TYPING SEROLOGIC ABO: CPT | Performed by: STUDENT IN AN ORGANIZED HEALTH CARE EDUCATION/TRAINING PROGRAM

## 2018-01-01 PROCEDURE — 88305 TISSUE EXAM BY PATHOLOGIST: CPT | Performed by: NURSE PRACTITIONER

## 2018-01-01 PROCEDURE — G0463 HOSPITAL OUTPT CLINIC VISIT: HCPCS | Mod: 25,ZF

## 2018-01-01 PROCEDURE — 85610 PROTHROMBIN TIME: CPT | Performed by: NURSE PRACTITIONER

## 2018-01-01 PROCEDURE — 0W9G3ZZ DRAINAGE OF PERITONEAL CAVITY, PERCUTANEOUS APPROACH: ICD-10-PCS | Performed by: PHYSICIAN ASSISTANT

## 2018-01-01 PROCEDURE — 27210909 ZZH NEEDLE CR5

## 2018-01-01 PROCEDURE — 32555 ASPIRATE PLEURA W/ IMAGING: CPT | Mod: 50

## 2018-01-01 PROCEDURE — 36592 COLLECT BLOOD FROM PICC: CPT

## 2018-01-01 PROCEDURE — 25800025 ZZH RX 258: Performed by: OBSTETRICS & GYNECOLOGY

## 2018-01-01 PROCEDURE — 36416 COLLJ CAPILLARY BLOOD SPEC: CPT | Performed by: NURSE PRACTITIONER

## 2018-01-01 PROCEDURE — 27210905 ZZH KIT CR7

## 2018-01-01 PROCEDURE — 86923 COMPATIBILITY TEST ELECTRIC: CPT | Performed by: EMERGENCY MEDICINE

## 2018-01-01 PROCEDURE — 97161 PT EVAL LOW COMPLEX 20 MIN: CPT | Mod: GP | Performed by: PHYSICAL THERAPIST

## 2018-01-01 PROCEDURE — 88305 TISSUE EXAM BY PATHOLOGIST: CPT | Performed by: STUDENT IN AN ORGANIZED HEALTH CARE EDUCATION/TRAINING PROGRAM

## 2018-01-01 PROCEDURE — 93970 EXTREMITY STUDY: CPT

## 2018-01-01 PROCEDURE — 84295 ASSAY OF SERUM SODIUM: CPT | Performed by: OBSTETRICS & GYNECOLOGY

## 2018-01-01 PROCEDURE — 93325 DOPPLER ECHO COLOR FLOW MAPG: CPT | Mod: 26 | Performed by: INTERNAL MEDICINE

## 2018-01-01 PROCEDURE — 86901 BLOOD TYPING SEROLOGIC RH(D): CPT | Performed by: NURSE PRACTITIONER

## 2018-01-01 PROCEDURE — 96375 TX/PRO/DX INJ NEW DRUG ADDON: CPT

## 2018-01-01 PROCEDURE — 82728 ASSAY OF FERRITIN: CPT | Performed by: STUDENT IN AN ORGANIZED HEALTH CARE EDUCATION/TRAINING PROGRAM

## 2018-01-01 PROCEDURE — 00000155 ZZHCL STATISTIC H-CELL BLOCK W/STAIN: Performed by: STUDENT IN AN ORGANIZED HEALTH CARE EDUCATION/TRAINING PROGRAM

## 2018-01-01 DEVICE — CATH PORT POWERPORT CLEARVUE SLIM 6FR 5616000: Type: IMPLANTABLE DEVICE | Site: CHEST  WALL | Status: FUNCTIONAL

## 2018-01-01 RX ORDER — ALBUMIN (HUMAN) 12.5 G/50ML
12.5 SOLUTION INTRAVENOUS 4 TIMES DAILY PRN
Status: CANCELLED
Start: 2018-01-01

## 2018-01-01 RX ORDER — IOPAMIDOL 755 MG/ML
79 INJECTION, SOLUTION INTRAVASCULAR ONCE
Status: COMPLETED | OUTPATIENT
Start: 2018-01-01 | End: 2018-01-01

## 2018-01-01 RX ORDER — LIDOCAINE HYDROCHLORIDE 10 MG/ML
4 INJECTION, SOLUTION EPIDURAL; INFILTRATION; INTRACAUDAL; PERINEURAL ONCE
Status: COMPLETED | OUTPATIENT
Start: 2018-01-01 | End: 2018-01-01

## 2018-01-01 RX ORDER — EPINEPHRINE 1 MG/ML
0.3 INJECTION, SOLUTION, CONCENTRATE INTRAVENOUS EVERY 5 MIN PRN
Status: DISCONTINUED | OUTPATIENT
Start: 2018-01-01 | End: 2018-01-01 | Stop reason: HOSPADM

## 2018-01-01 RX ORDER — FUROSEMIDE 10 MG/ML
10 INJECTION INTRAMUSCULAR; INTRAVENOUS ONCE
Status: COMPLETED | OUTPATIENT
Start: 2018-01-01 | End: 2018-01-01

## 2018-01-01 RX ORDER — FLUMAZENIL 0.1 MG/ML
0.2 INJECTION, SOLUTION INTRAVENOUS
Status: DISCONTINUED | OUTPATIENT
Start: 2018-01-01 | End: 2018-01-01 | Stop reason: HOSPADM

## 2018-01-01 RX ORDER — AZITHROMYCIN 250 MG/1
250 TABLET, FILM COATED ORAL DAILY
Status: DISCONTINUED | OUTPATIENT
Start: 2018-01-01 | End: 2018-01-01 | Stop reason: HOSPADM

## 2018-01-01 RX ORDER — POTASSIUM CHLORIDE 29.8 MG/ML
20 INJECTION INTRAVENOUS ONCE
Status: COMPLETED | OUTPATIENT
Start: 2018-01-01 | End: 2018-01-01

## 2018-01-01 RX ORDER — AMOXICILLIN 250 MG
2 CAPSULE ORAL 2 TIMES DAILY
Status: DISCONTINUED | OUTPATIENT
Start: 2018-01-01 | End: 2018-01-01 | Stop reason: HOSPADM

## 2018-01-01 RX ORDER — POTASSIUM CL/LIDO/0.9 % NACL 10MEQ/0.1L
10 INTRAVENOUS SOLUTION, PIGGYBACK (ML) INTRAVENOUS
Status: DISCONTINUED | OUTPATIENT
Start: 2018-01-01 | End: 2018-01-01 | Stop reason: HOSPADM

## 2018-01-01 RX ORDER — LIDOCAINE 40 MG/G
CREAM TOPICAL
Status: DISCONTINUED | OUTPATIENT
Start: 2018-01-01 | End: 2018-01-01

## 2018-01-01 RX ORDER — DIPHENHYDRAMINE HYDROCHLORIDE 50 MG/ML
50 INJECTION INTRAMUSCULAR; INTRAVENOUS ONCE
Status: DISCONTINUED | OUTPATIENT
Start: 2018-01-01 | End: 2018-01-01 | Stop reason: HOSPADM

## 2018-01-01 RX ORDER — HEPARIN SODIUM (PORCINE) LOCK FLUSH IV SOLN 100 UNIT/ML 100 UNIT/ML
5 SOLUTION INTRAVENOUS
Status: DISCONTINUED | OUTPATIENT
Start: 2018-01-01 | End: 2018-01-01 | Stop reason: HOSPADM

## 2018-01-01 RX ORDER — MORPHINE SULFATE 2 MG/ML
1 INJECTION, SOLUTION INTRAMUSCULAR; INTRAVENOUS EVERY 4 HOURS PRN
Status: DISCONTINUED | OUTPATIENT
Start: 2018-01-01 | End: 2018-01-01 | Stop reason: HOSPADM

## 2018-01-01 RX ORDER — POLYETHYLENE GLYCOL 3350 17 G/17G
17 POWDER, FOR SOLUTION ORAL DAILY PRN
Status: DISCONTINUED | OUTPATIENT
Start: 2018-01-01 | End: 2018-01-01

## 2018-01-01 RX ORDER — OXYCODONE HYDROCHLORIDE 5 MG/1
5-10 TABLET ORAL EVERY 4 HOURS PRN
Status: DISCONTINUED | OUTPATIENT
Start: 2018-01-01 | End: 2018-01-01 | Stop reason: HOSPADM

## 2018-01-01 RX ORDER — DEXAMETHASONE SODIUM PHOSPHATE 4 MG/ML
20 INJECTION, SOLUTION INTRA-ARTICULAR; INTRALESIONAL; INTRAMUSCULAR; INTRAVENOUS; SOFT TISSUE ONCE
Status: COMPLETED | OUTPATIENT
Start: 2018-01-01 | End: 2018-01-01

## 2018-01-01 RX ORDER — ACETAMINOPHEN 325 MG/1
650 TABLET ORAL EVERY 4 HOURS PRN
Status: COMPLETED | OUTPATIENT
Start: 2018-01-01 | End: 2018-01-01

## 2018-01-01 RX ORDER — POTASSIUM CHLORIDE 29.8 MG/ML
20 INJECTION INTRAVENOUS
Status: COMPLETED | OUTPATIENT
Start: 2018-01-01 | End: 2018-01-01

## 2018-01-01 RX ORDER — DEXTROSE MONOHYDRATE 25 G/50ML
INJECTION, SOLUTION INTRAVENOUS
Status: COMPLETED
Start: 2018-01-01 | End: 2018-01-01

## 2018-01-01 RX ORDER — OXYCODONE HYDROCHLORIDE 5 MG/1
5 TABLET ORAL EVERY 4 HOURS PRN
Qty: 20 TABLET | Refills: 0 | Status: ON HOLD | OUTPATIENT
Start: 2018-01-01 | End: 2018-01-01

## 2018-01-01 RX ORDER — AMOXICILLIN 250 MG
1 CAPSULE ORAL 2 TIMES DAILY
Qty: 100 TABLET | Refills: 0 | Status: SHIPPED | OUTPATIENT
Start: 2018-01-01 | End: 2018-01-01

## 2018-01-01 RX ORDER — ALBUTEROL SULFATE 0.83 MG/ML
2.5 SOLUTION RESPIRATORY (INHALATION)
Status: DISCONTINUED | OUTPATIENT
Start: 2018-01-01 | End: 2018-01-01 | Stop reason: HOSPADM

## 2018-01-01 RX ORDER — LIDOCAINE 40 MG/G
CREAM TOPICAL
Status: DISCONTINUED | OUTPATIENT
Start: 2018-01-01 | End: 2018-01-01 | Stop reason: HOSPADM

## 2018-01-01 RX ORDER — NALOXONE HYDROCHLORIDE 0.4 MG/ML
.1-.4 INJECTION, SOLUTION INTRAMUSCULAR; INTRAVENOUS; SUBCUTANEOUS
Status: DISCONTINUED | OUTPATIENT
Start: 2018-01-01 | End: 2018-01-01 | Stop reason: HOSPADM

## 2018-01-01 RX ORDER — POTASSIUM CHLORIDE 14.9 MG/ML
20 INJECTION INTRAVENOUS
Status: DISCONTINUED | OUTPATIENT
Start: 2018-01-01 | End: 2018-01-01 | Stop reason: HOSPADM

## 2018-01-01 RX ORDER — ONDANSETRON 2 MG/ML
4 INJECTION INTRAMUSCULAR; INTRAVENOUS EVERY 6 HOURS PRN
Status: DISCONTINUED | OUTPATIENT
Start: 2018-01-01 | End: 2018-01-01 | Stop reason: HOSPADM

## 2018-01-01 RX ORDER — AMOXICILLIN 250 MG
1 CAPSULE ORAL 2 TIMES DAILY
Status: DISCONTINUED | OUTPATIENT
Start: 2018-01-01 | End: 2018-01-01 | Stop reason: HOSPADM

## 2018-01-01 RX ORDER — HEPARIN SODIUM,PORCINE 10 UNIT/ML
5-10 VIAL (ML) INTRAVENOUS
Status: DISCONTINUED | OUTPATIENT
Start: 2018-01-01 | End: 2018-01-01 | Stop reason: HOSPADM

## 2018-01-01 RX ORDER — ACETAMINOPHEN 325 MG/1
650 TABLET ORAL EVERY 4 HOURS PRN
Qty: 100 TABLET | Status: ON HOLD | COMMUNITY
Start: 2018-01-01 | End: 2018-01-01

## 2018-01-01 RX ORDER — LIDOCAINE HYDROCHLORIDE 20 MG/ML
INJECTION, SOLUTION INFILTRATION; PERINEURAL PRN
Status: DISCONTINUED | OUTPATIENT
Start: 2018-01-01 | End: 2018-01-01

## 2018-01-01 RX ORDER — LIDOCAINE HYDROCHLORIDE 10 MG/ML
1-30 INJECTION, SOLUTION EPIDURAL; INFILTRATION; INTRACAUDAL; PERINEURAL
Status: COMPLETED | OUTPATIENT
Start: 2018-01-01 | End: 2018-01-01

## 2018-01-01 RX ORDER — PROPOFOL 10 MG/ML
INJECTION, EMULSION INTRAVENOUS CONTINUOUS PRN
Status: DISCONTINUED | OUTPATIENT
Start: 2018-01-01 | End: 2018-01-01

## 2018-01-01 RX ORDER — ONDANSETRON 2 MG/ML
4 INJECTION INTRAMUSCULAR; INTRAVENOUS ONCE
Status: COMPLETED | OUTPATIENT
Start: 2018-01-01 | End: 2018-01-01

## 2018-01-01 RX ORDER — SODIUM CHLORIDE 9 MG/ML
INJECTION, SOLUTION INTRAVENOUS CONTINUOUS
Status: DISCONTINUED | OUTPATIENT
Start: 2018-01-01 | End: 2018-01-01 | Stop reason: HOSPADM

## 2018-01-01 RX ORDER — POTASSIUM CHLORIDE 29.8 MG/ML
20 INJECTION INTRAVENOUS
Status: DISCONTINUED | OUTPATIENT
Start: 2018-01-01 | End: 2018-01-01

## 2018-01-01 RX ORDER — MEGESTROL ACETATE 40 MG/1
320 TABLET ORAL DAILY
Status: DISCONTINUED | OUTPATIENT
Start: 2018-01-01 | End: 2018-01-01

## 2018-01-01 RX ORDER — 3% SODIUM CHLORIDE 3 G/100ML
INJECTION, SOLUTION INTRAVENOUS CONTINUOUS
Status: DISCONTINUED | OUTPATIENT
Start: 2018-01-01 | End: 2018-01-01

## 2018-01-01 RX ORDER — ACETAMINOPHEN 325 MG/1
650 TABLET ORAL EVERY 4 HOURS PRN
Status: DISCONTINUED | OUTPATIENT
Start: 2018-01-01 | End: 2018-01-01 | Stop reason: HOSPADM

## 2018-01-01 RX ORDER — OXYCODONE HYDROCHLORIDE 5 MG/1
5-10 TABLET ORAL
Status: DISCONTINUED | OUTPATIENT
Start: 2018-01-01 | End: 2018-01-01

## 2018-01-01 RX ORDER — PROCHLORPERAZINE MALEATE 10 MG
10 TABLET ORAL EVERY 6 HOURS PRN
Qty: 30 TABLET | Refills: 2 | Status: ON HOLD | OUTPATIENT
Start: 2018-01-01 | End: 2018-01-01

## 2018-01-01 RX ORDER — SODIUM CHLORIDE 9 MG/ML
INJECTION, SOLUTION INTRAVENOUS CONTINUOUS
Status: DISCONTINUED | OUTPATIENT
Start: 2018-01-01 | End: 2018-01-01

## 2018-01-01 RX ORDER — AMOXICILLIN 250 MG
2 CAPSULE ORAL 2 TIMES DAILY
Status: DISCONTINUED | OUTPATIENT
Start: 2018-01-01 | End: 2018-01-01

## 2018-01-01 RX ORDER — SODIUM CHLORIDE, SODIUM LACTATE, POTASSIUM CHLORIDE, CALCIUM CHLORIDE 600; 310; 30; 20 MG/100ML; MG/100ML; MG/100ML; MG/100ML
INJECTION, SOLUTION INTRAVENOUS CONTINUOUS
Status: DISCONTINUED | OUTPATIENT
Start: 2018-01-01 | End: 2018-01-01

## 2018-01-01 RX ORDER — LIDOCAINE HYDROCHLORIDE 10 MG/ML
INJECTION, SOLUTION EPIDURAL; INFILTRATION; INTRACAUDAL; PERINEURAL
Status: COMPLETED
Start: 2018-01-01 | End: 2018-01-01

## 2018-01-01 RX ORDER — OXYCODONE HYDROCHLORIDE 5 MG/1
5 TABLET ORAL EVERY 4 HOURS PRN
Qty: 20 TABLET | Refills: 0 | Status: SHIPPED | OUTPATIENT
Start: 2018-01-01 | End: 2018-01-01

## 2018-01-01 RX ORDER — PROCHLORPERAZINE MALEATE 5 MG
10 TABLET ORAL EVERY 6 HOURS PRN
Status: DISCONTINUED | OUTPATIENT
Start: 2018-01-01 | End: 2018-01-01 | Stop reason: HOSPADM

## 2018-01-01 RX ORDER — DEXTROSE MONOHYDRATE 25 G/50ML
25-50 INJECTION, SOLUTION INTRAVENOUS
Status: DISCONTINUED | OUTPATIENT
Start: 2018-01-01 | End: 2018-01-01 | Stop reason: HOSPADM

## 2018-01-01 RX ORDER — SODIUM CHLORIDE 9 MG/ML
1000 INJECTION, SOLUTION INTRAVENOUS CONTINUOUS PRN
Status: DISCONTINUED | OUTPATIENT
Start: 2018-01-01 | End: 2018-01-01 | Stop reason: HOSPADM

## 2018-01-01 RX ORDER — ACETAMINOPHEN 325 MG/1
650 TABLET ORAL EVERY 4 HOURS PRN
Status: DISCONTINUED | OUTPATIENT
Start: 2018-01-01 | End: 2018-01-01

## 2018-01-01 RX ORDER — AMOXICILLIN 875 MG
875 TABLET ORAL EVERY 12 HOURS SCHEDULED
Status: DISCONTINUED | OUTPATIENT
Start: 2018-01-01 | End: 2018-01-01 | Stop reason: HOSPADM

## 2018-01-01 RX ORDER — OXYCODONE HYDROCHLORIDE 5 MG/1
5 TABLET ORAL EVERY 4 HOURS PRN
Qty: 10 TABLET | Refills: 0 | Status: SHIPPED | OUTPATIENT
Start: 2018-01-01 | End: 2018-01-01

## 2018-01-01 RX ORDER — OXYCODONE HYDROCHLORIDE 5 MG/1
5 TABLET ORAL EVERY 6 HOURS PRN
Qty: 30 TABLET | Refills: 0 | Status: SHIPPED | OUTPATIENT
Start: 2018-01-01 | End: 2018-01-01

## 2018-01-01 RX ORDER — MIRTAZAPINE 15 MG/1
15 TABLET, FILM COATED ORAL AT BEDTIME
Status: DISCONTINUED | OUTPATIENT
Start: 2018-01-01 | End: 2018-01-01 | Stop reason: HOSPADM

## 2018-01-01 RX ORDER — AMOXICILLIN 250 MG
1-2 CAPSULE ORAL 2 TIMES DAILY PRN
Qty: 100 TABLET | Refills: 0 | Status: ON HOLD | OUTPATIENT
Start: 2018-01-01 | End: 2018-01-01

## 2018-01-01 RX ORDER — ACETAMINOPHEN 325 MG/1
975 TABLET ORAL ONCE
Status: COMPLETED | OUTPATIENT
Start: 2018-01-01 | End: 2018-01-01

## 2018-01-01 RX ORDER — AMOXICILLIN 250 MG
1-2 CAPSULE ORAL 2 TIMES DAILY PRN
Status: DISCONTINUED | OUTPATIENT
Start: 2018-01-01 | End: 2018-01-01 | Stop reason: HOSPADM

## 2018-01-01 RX ORDER — MEPERIDINE HYDROCHLORIDE 25 MG/ML
12.5 INJECTION INTRAMUSCULAR; INTRAVENOUS; SUBCUTANEOUS
Status: DISCONTINUED | OUTPATIENT
Start: 2018-01-01 | End: 2018-01-01 | Stop reason: HOSPADM

## 2018-01-01 RX ORDER — IBUPROFEN 200 MG
400 TABLET ORAL EVERY 6 HOURS PRN
Status: DISCONTINUED | OUTPATIENT
Start: 2018-01-01 | End: 2018-01-01

## 2018-01-01 RX ORDER — ALBUMIN (HUMAN) 12.5 G/50ML
12.5-5 SOLUTION INTRAVENOUS CONTINUOUS PRN
Status: DISCONTINUED | OUTPATIENT
Start: 2018-01-01 | End: 2018-01-01

## 2018-01-01 RX ORDER — IPRATROPIUM BROMIDE AND ALBUTEROL SULFATE 2.5; .5 MG/3ML; MG/3ML
3 SOLUTION RESPIRATORY (INHALATION)
Status: DISCONTINUED | OUTPATIENT
Start: 2018-01-01 | End: 2018-01-01

## 2018-01-01 RX ORDER — MEPERIDINE HYDROCHLORIDE 25 MG/ML
25 INJECTION INTRAMUSCULAR; INTRAVENOUS; SUBCUTANEOUS EVERY 30 MIN PRN
Status: DISCONTINUED | OUTPATIENT
Start: 2018-01-01 | End: 2018-01-01 | Stop reason: HOSPADM

## 2018-01-01 RX ORDER — CEFTRIAXONE 1 G/1
1 INJECTION, POWDER, FOR SOLUTION INTRAMUSCULAR; INTRAVENOUS EVERY 24 HOURS
Status: DISCONTINUED | OUTPATIENT
Start: 2018-01-01 | End: 2018-01-01

## 2018-01-01 RX ORDER — BISACODYL 10 MG
10 SUPPOSITORY, RECTAL RECTAL DAILY PRN
Status: DISCONTINUED | OUTPATIENT
Start: 2018-01-01 | End: 2018-01-01 | Stop reason: HOSPADM

## 2018-01-01 RX ORDER — OXYCODONE HYDROCHLORIDE 5 MG/1
5-10 TABLET ORAL
Status: DISCONTINUED | OUTPATIENT
Start: 2018-01-01 | End: 2018-01-01 | Stop reason: HOSPADM

## 2018-01-01 RX ORDER — FUROSEMIDE 10 MG/ML
INJECTION INTRAMUSCULAR; INTRAVENOUS
Status: DISCONTINUED
Start: 2018-01-01 | End: 2018-01-01 | Stop reason: HOSPADM

## 2018-01-01 RX ORDER — PIPERACILLIN SODIUM, TAZOBACTAM SODIUM 3; .375 G/15ML; G/15ML
3.38 INJECTION, POWDER, LYOPHILIZED, FOR SOLUTION INTRAVENOUS ONCE
Status: COMPLETED | OUTPATIENT
Start: 2018-01-01 | End: 2018-01-01

## 2018-01-01 RX ORDER — SENNOSIDES 8.6 MG
1-2 TABLET ORAL 2 TIMES DAILY PRN
Qty: 100 TABLET | Refills: 1 | Status: SHIPPED | OUTPATIENT
Start: 2018-01-01

## 2018-01-01 RX ORDER — POTASSIUM CHLORIDE 750 MG/1
20-40 TABLET, EXTENDED RELEASE ORAL
Status: DISCONTINUED | OUTPATIENT
Start: 2018-01-01 | End: 2018-01-01 | Stop reason: HOSPADM

## 2018-01-01 RX ORDER — HYDROMORPHONE HYDROCHLORIDE 1 MG/ML
.3-.5 INJECTION, SOLUTION INTRAMUSCULAR; INTRAVENOUS; SUBCUTANEOUS EVERY 10 MIN PRN
Status: DISCONTINUED | OUTPATIENT
Start: 2018-01-01 | End: 2018-01-01 | Stop reason: HOSPADM

## 2018-01-01 RX ORDER — AMOXICILLIN 250 MG
1 CAPSULE ORAL 2 TIMES DAILY
Qty: 100 TABLET | Refills: 0 | Status: ON HOLD | OUTPATIENT
Start: 2018-01-01 | End: 2018-01-01

## 2018-01-01 RX ORDER — POLYETHYLENE GLYCOL 3350 17 G/17G
17 POWDER, FOR SOLUTION ORAL DAILY
Qty: 30 PACKET | Refills: 1 | Status: SHIPPED | OUTPATIENT
Start: 2018-01-01 | End: 2018-01-01

## 2018-01-01 RX ORDER — 3% SODIUM CHLORIDE 3 G/100ML
INJECTION, SOLUTION INTRAVENOUS ONCE
Status: COMPLETED | OUTPATIENT
Start: 2018-01-01 | End: 2018-01-01

## 2018-01-01 RX ORDER — ALBUTEROL SULFATE 90 UG/1
1-2 AEROSOL, METERED RESPIRATORY (INHALATION)
Status: DISCONTINUED | OUTPATIENT
Start: 2018-01-01 | End: 2018-01-01 | Stop reason: HOSPADM

## 2018-01-01 RX ORDER — MULTIPLE VITAMINS W/ MINERALS TAB 9MG-400MCG
1 TAB ORAL DAILY
Status: DISCONTINUED | OUTPATIENT
Start: 2018-01-01 | End: 2018-01-01 | Stop reason: HOSPADM

## 2018-01-01 RX ORDER — OXYCODONE HCL 5 MG/5 ML
5-10 SOLUTION, ORAL ORAL
Qty: 500 ML | Refills: 0 | Status: SHIPPED | OUTPATIENT
Start: 2018-01-01 | End: 2018-01-01

## 2018-01-01 RX ORDER — ACETAMINOPHEN 650 MG/1
650 SUPPOSITORY RECTAL EVERY 4 HOURS PRN
Qty: 12 SUPPOSITORY | Refills: 1 | Status: SHIPPED | OUTPATIENT
Start: 2018-01-01

## 2018-01-01 RX ORDER — METHYLPREDNISOLONE SODIUM SUCCINATE 125 MG/2ML
125 INJECTION, POWDER, LYOPHILIZED, FOR SOLUTION INTRAMUSCULAR; INTRAVENOUS
Status: DISCONTINUED | OUTPATIENT
Start: 2018-01-01 | End: 2018-01-01 | Stop reason: HOSPADM

## 2018-01-01 RX ORDER — LORAZEPAM 1 MG/1
1 TABLET ORAL EVERY 6 HOURS PRN
Qty: 30 TABLET | Refills: 1 | Status: ON HOLD | OUTPATIENT
Start: 2018-01-01 | End: 2018-01-01

## 2018-01-01 RX ORDER — LIDOCAINE HYDROCHLORIDE 10 MG/ML
10 INJECTION, SOLUTION EPIDURAL; INFILTRATION; INTRACAUDAL; PERINEURAL ONCE
Status: COMPLETED | OUTPATIENT
Start: 2018-01-01 | End: 2018-01-01

## 2018-01-01 RX ORDER — LIDOCAINE 4 G/G
2 PATCH TOPICAL
Status: DISCONTINUED | OUTPATIENT
Start: 2018-01-01 | End: 2018-01-01 | Stop reason: HOSPADM

## 2018-01-01 RX ORDER — HEPARIN SODIUM,PORCINE 10 UNIT/ML
5-10 VIAL (ML) INTRAVENOUS EVERY 24 HOURS
Status: DISCONTINUED | OUTPATIENT
Start: 2018-01-01 | End: 2018-01-01 | Stop reason: HOSPADM

## 2018-01-01 RX ORDER — PALONOSETRON 0.05 MG/ML
0.25 INJECTION, SOLUTION INTRAVENOUS ONCE
Status: COMPLETED | OUTPATIENT
Start: 2018-01-01 | End: 2018-01-01

## 2018-01-01 RX ORDER — CEFAZOLIN SODIUM 1 G/50ML
1250 SOLUTION INTRAVENOUS ONCE
Status: COMPLETED | OUTPATIENT
Start: 2018-01-01 | End: 2018-01-01

## 2018-01-01 RX ORDER — IPRATROPIUM BROMIDE AND ALBUTEROL SULFATE 2.5; .5 MG/3ML; MG/3ML
3 SOLUTION RESPIRATORY (INHALATION) EVERY 4 HOURS PRN
Status: DISCONTINUED | OUTPATIENT
Start: 2018-01-01 | End: 2018-01-01 | Stop reason: HOSPADM

## 2018-01-01 RX ORDER — EPINEPHRINE 0.3 MG/.3ML
0.3 INJECTION SUBCUTANEOUS EVERY 5 MIN PRN
Status: DISCONTINUED | OUTPATIENT
Start: 2018-01-01 | End: 2018-01-01

## 2018-01-01 RX ORDER — DIPHENHYDRAMINE HCL 50 MG
50 CAPSULE ORAL ONCE
Status: COMPLETED | OUTPATIENT
Start: 2018-01-01 | End: 2018-01-01

## 2018-01-01 RX ORDER — ONDANSETRON 4 MG/1
4 TABLET, ORALLY DISINTEGRATING ORAL EVERY 6 HOURS PRN
Status: DISCONTINUED | OUTPATIENT
Start: 2018-01-01 | End: 2018-01-01 | Stop reason: HOSPADM

## 2018-01-01 RX ORDER — NALOXONE HYDROCHLORIDE 0.4 MG/ML
.1-.4 INJECTION, SOLUTION INTRAMUSCULAR; INTRAVENOUS; SUBCUTANEOUS
Status: DISCONTINUED | OUTPATIENT
Start: 2018-01-01 | End: 2018-01-01

## 2018-01-01 RX ORDER — IOPAMIDOL 755 MG/ML
64 INJECTION, SOLUTION INTRAVASCULAR ONCE
Status: COMPLETED | OUTPATIENT
Start: 2018-01-01 | End: 2018-01-01

## 2018-01-01 RX ORDER — OXYCODONE HYDROCHLORIDE 5 MG/1
5 TABLET ORAL EVERY 4 HOURS PRN
Status: DISCONTINUED | OUTPATIENT
Start: 2018-01-01 | End: 2018-01-01

## 2018-01-01 RX ORDER — ALBUMIN (HUMAN) 12.5 G/50ML
12.5 SOLUTION INTRAVENOUS 4 TIMES DAILY PRN
Status: DISCONTINUED | OUTPATIENT
Start: 2018-01-01 | End: 2018-01-01 | Stop reason: HOSPADM

## 2018-01-01 RX ORDER — PROCHLORPERAZINE 25 MG
25 SUPPOSITORY, RECTAL RECTAL EVERY 12 HOURS PRN
Status: DISCONTINUED | OUTPATIENT
Start: 2018-01-01 | End: 2018-01-01 | Stop reason: HOSPADM

## 2018-01-01 RX ORDER — ONDANSETRON 4 MG/1
4 TABLET, FILM COATED ORAL EVERY 6 HOURS PRN
Qty: 20 TABLET | Refills: 1 | Status: ON HOLD | OUTPATIENT
Start: 2018-01-01 | End: 2018-01-01

## 2018-01-01 RX ORDER — AZITHROMYCIN 250 MG/1
500 TABLET, FILM COATED ORAL ONCE
Status: COMPLETED | OUTPATIENT
Start: 2018-01-01 | End: 2018-01-01

## 2018-01-01 RX ORDER — BISACODYL 10 MG
SUPPOSITORY, RECTAL RECTAL
Qty: 12 SUPPOSITORY | Refills: 1 | Status: SHIPPED | OUTPATIENT
Start: 2018-01-01

## 2018-01-01 RX ORDER — HEPARIN SODIUM,PORCINE 10 UNIT/ML
5 VIAL (ML) INTRAVENOUS EVERY 24 HOURS
Status: DISCONTINUED | OUTPATIENT
Start: 2018-01-01 | End: 2018-01-01 | Stop reason: HOSPADM

## 2018-01-01 RX ORDER — MAGNESIUM SULFATE HEPTAHYDRATE 40 MG/ML
4 INJECTION, SOLUTION INTRAVENOUS EVERY 4 HOURS PRN
Status: DISCONTINUED | OUTPATIENT
Start: 2018-01-01 | End: 2018-01-01 | Stop reason: HOSPADM

## 2018-01-01 RX ORDER — SODIUM CHLORIDE 9 MG/ML
INJECTION, SOLUTION INTRAVENOUS
Status: DISPENSED
Start: 2018-01-01 | End: 2018-01-01

## 2018-01-01 RX ORDER — HEPARIN SODIUM 10000 [USP'U]/100ML
0-3500 INJECTION, SOLUTION INTRAVENOUS CONTINUOUS
Status: DISCONTINUED | OUTPATIENT
Start: 2018-01-01 | End: 2018-01-01

## 2018-01-01 RX ORDER — ACETAMINOPHEN 650 MG/1
650 SUPPOSITORY RECTAL EVERY 4 HOURS PRN
Status: DISCONTINUED | OUTPATIENT
Start: 2018-01-01 | End: 2018-01-01 | Stop reason: HOSPADM

## 2018-01-01 RX ORDER — IOPAMIDOL 755 MG/ML
50 INJECTION, SOLUTION INTRAVASCULAR ONCE
Status: COMPLETED | OUTPATIENT
Start: 2018-01-01 | End: 2018-01-01

## 2018-01-01 RX ORDER — PANTOPRAZOLE SODIUM 40 MG/1
40 TABLET, DELAYED RELEASE ORAL
Status: DISCONTINUED | OUTPATIENT
Start: 2018-01-01 | End: 2018-01-01

## 2018-01-01 RX ORDER — ESOMEPRAZOLE SODIUM 40 MG/5ML
40 INJECTION INTRAVENOUS 2 TIMES DAILY
Status: DISCONTINUED | OUTPATIENT
Start: 2018-01-01 | End: 2018-01-01 | Stop reason: ALTCHOICE

## 2018-01-01 RX ORDER — OXYCODONE HCL 5 MG/5 ML
5-10 SOLUTION, ORAL ORAL
Status: DISCONTINUED | OUTPATIENT
Start: 2018-01-01 | End: 2018-01-01 | Stop reason: HOSPADM

## 2018-01-01 RX ORDER — POTASSIUM CL/LIDO/0.9 % NACL 10MEQ/0.1L
10 INTRAVENOUS SOLUTION, PIGGYBACK (ML) INTRAVENOUS
Status: DISCONTINUED | OUTPATIENT
Start: 2018-01-01 | End: 2018-01-01 | Stop reason: RX

## 2018-01-01 RX ORDER — OXYCODONE HYDROCHLORIDE 5 MG/1
5 TABLET ORAL EVERY 4 HOURS PRN
Qty: 180 TABLET | Refills: 0 | Status: ON HOLD | OUTPATIENT
Start: 2018-01-01 | End: 2018-01-01

## 2018-01-01 RX ORDER — DRONABINOL 2.5 MG/1
2.5 CAPSULE ORAL 2 TIMES DAILY
Status: DISCONTINUED | OUTPATIENT
Start: 2018-01-01 | End: 2018-01-01 | Stop reason: HOSPADM

## 2018-01-01 RX ORDER — SODIUM CHLORIDE 1 G/1
1 TABLET ORAL 2 TIMES DAILY WITH MEALS
Status: DISCONTINUED | OUTPATIENT
Start: 2018-01-01 | End: 2018-01-01 | Stop reason: HOSPADM

## 2018-01-01 RX ORDER — DEXTROSE MONOHYDRATE 25 G/50ML
25 INJECTION, SOLUTION INTRAVENOUS ONCE
Status: COMPLETED | OUTPATIENT
Start: 2018-01-01 | End: 2018-01-01

## 2018-01-01 RX ORDER — AMOXICILLIN 250 MG
1-2 CAPSULE ORAL 2 TIMES DAILY PRN
Qty: 100 TABLET | Refills: 0 | Status: SHIPPED | OUTPATIENT
Start: 2018-01-01 | End: 2018-01-01

## 2018-01-01 RX ORDER — POTASSIUM CHLORIDE 7.45 MG/ML
10 INJECTION INTRAVENOUS
Status: DISCONTINUED | OUTPATIENT
Start: 2018-01-01 | End: 2018-01-01 | Stop reason: HOSPADM

## 2018-01-01 RX ORDER — DRONABINOL 2.5 MG/1
2.5 CAPSULE ORAL 2 TIMES DAILY
Qty: 30 CAPSULE | Refills: 0 | Status: SHIPPED | OUTPATIENT
Start: 2018-01-01

## 2018-01-01 RX ORDER — LORAZEPAM 2 MG/ML
1 INJECTION INTRAMUSCULAR EVERY 6 HOURS PRN
Status: DISCONTINUED | OUTPATIENT
Start: 2018-01-01 | End: 2018-01-01 | Stop reason: HOSPADM

## 2018-01-01 RX ORDER — ONDANSETRON 4 MG/1
4 TABLET, ORALLY DISINTEGRATING ORAL EVERY 30 MIN PRN
Status: DISCONTINUED | OUTPATIENT
Start: 2018-01-01 | End: 2018-01-01

## 2018-01-01 RX ORDER — OXYCODONE HYDROCHLORIDE 5 MG/1
5-10 TABLET ORAL EVERY 4 HOURS PRN
Status: DISCONTINUED | OUTPATIENT
Start: 2018-01-01 | End: 2018-01-01

## 2018-01-01 RX ORDER — ACETAMINOPHEN 325 MG/1
650 TABLET ORAL EVERY 4 HOURS
Status: DISPENSED | OUTPATIENT
Start: 2018-01-01 | End: 2018-01-01

## 2018-01-01 RX ORDER — HEPARIN SODIUM,PORCINE 10 UNIT/ML
VIAL (ML) INTRAVENOUS PRN
Status: DISCONTINUED | OUTPATIENT
Start: 2018-01-01 | End: 2018-01-01 | Stop reason: HOSPADM

## 2018-01-01 RX ORDER — POLYETHYLENE GLYCOL 3350 17 G/17G
17 POWDER, FOR SOLUTION ORAL DAILY
Status: DISCONTINUED | OUTPATIENT
Start: 2018-01-01 | End: 2018-01-01 | Stop reason: HOSPADM

## 2018-01-01 RX ORDER — SODIUM CHLORIDE, SODIUM LACTATE, POTASSIUM CHLORIDE, CALCIUM CHLORIDE 600; 310; 30; 20 MG/100ML; MG/100ML; MG/100ML; MG/100ML
INJECTION, SOLUTION INTRAVENOUS CONTINUOUS
Status: DISCONTINUED | OUTPATIENT
Start: 2018-01-01 | End: 2018-01-01 | Stop reason: HOSPADM

## 2018-01-01 RX ORDER — POLYETHYLENE GLYCOL 3350 17 G/17G
17 POWDER, FOR SOLUTION ORAL DAILY PRN
Status: DISCONTINUED | OUTPATIENT
Start: 2018-01-01 | End: 2018-01-01 | Stop reason: HOSPADM

## 2018-01-01 RX ORDER — POTASSIUM CHLORIDE 750 MG/1
20-40 TABLET, EXTENDED RELEASE ORAL
Status: DISCONTINUED | OUTPATIENT
Start: 2018-01-01 | End: 2018-01-01

## 2018-01-01 RX ORDER — MIRTAZAPINE 15 MG/1
15 TABLET, FILM COATED ORAL AT BEDTIME
Qty: 30 TABLET | Refills: 3 | Status: SHIPPED | OUTPATIENT
Start: 2018-01-01

## 2018-01-01 RX ORDER — PIPERACILLIN SODIUM, TAZOBACTAM SODIUM 4; .5 G/20ML; G/20ML
4.5 INJECTION, POWDER, LYOPHILIZED, FOR SOLUTION INTRAVENOUS EVERY 6 HOURS
Status: DISCONTINUED | OUTPATIENT
Start: 2018-01-01 | End: 2018-01-01

## 2018-01-01 RX ORDER — ONDANSETRON 4 MG/1
4 TABLET, FILM COATED ORAL EVERY 6 HOURS PRN
Status: DISCONTINUED | OUTPATIENT
Start: 2018-01-01 | End: 2018-01-01 | Stop reason: HOSPADM

## 2018-01-01 RX ORDER — ESOMEPRAZOLE SODIUM 40 MG/5ML
80 INJECTION INTRAVENOUS ONCE
Status: DISCONTINUED | OUTPATIENT
Start: 2018-01-01 | End: 2018-01-01 | Stop reason: ALTCHOICE

## 2018-01-01 RX ORDER — MULTIPLE VITAMINS W/ MINERALS TAB 9MG-400MCG
1 TAB ORAL DAILY
Status: DISCONTINUED | OUTPATIENT
Start: 2018-01-01 | End: 2018-01-01

## 2018-01-01 RX ORDER — ATROPINE SULFATE 10 MG/ML
2-4 SOLUTION/ DROPS OPHTHALMIC
Qty: 5 ML | Refills: 1 | Status: SHIPPED | OUTPATIENT
Start: 2018-01-01

## 2018-01-01 RX ORDER — FENTANYL CITRATE 50 UG/ML
25-50 INJECTION, SOLUTION INTRAMUSCULAR; INTRAVENOUS EVERY 5 MIN PRN
Status: DISCONTINUED | OUTPATIENT
Start: 2018-01-01 | End: 2018-01-01 | Stop reason: HOSPADM

## 2018-01-01 RX ORDER — NICOTINE POLACRILEX 4 MG
15-30 LOZENGE BUCCAL
Status: DISCONTINUED | OUTPATIENT
Start: 2018-01-01 | End: 2018-01-01

## 2018-01-01 RX ORDER — DEXAMETHASONE SODIUM PHOSPHATE 10 MG/ML
20 INJECTION INTRAMUSCULAR; INTRAVENOUS ONCE
Status: COMPLETED | OUTPATIENT
Start: 2018-01-01 | End: 2018-01-01

## 2018-01-01 RX ORDER — DIPHENHYDRAMINE HYDROCHLORIDE 50 MG/ML
50 INJECTION INTRAMUSCULAR; INTRAVENOUS
Status: DISCONTINUED | OUTPATIENT
Start: 2018-01-01 | End: 2018-01-01 | Stop reason: HOSPADM

## 2018-01-01 RX ORDER — POTASSIUM CHLORIDE 1.5 G/1.58G
20-40 POWDER, FOR SOLUTION ORAL
Status: DISCONTINUED | OUTPATIENT
Start: 2018-01-01 | End: 2018-01-01 | Stop reason: HOSPADM

## 2018-01-01 RX ORDER — NICOTINE POLACRILEX 4 MG
15-30 LOZENGE BUCCAL
Status: DISCONTINUED | OUTPATIENT
Start: 2018-01-01 | End: 2018-01-01 | Stop reason: HOSPADM

## 2018-01-01 RX ORDER — OXYCODONE HYDROCHLORIDE 5 MG/1
5 TABLET ORAL ONCE
Status: COMPLETED | OUTPATIENT
Start: 2018-01-01 | End: 2018-01-01

## 2018-01-01 RX ORDER — POTASSIUM CHLORIDE 1.5 G/1.58G
20-40 POWDER, FOR SOLUTION ORAL
Status: DISCONTINUED | OUTPATIENT
Start: 2018-01-01 | End: 2018-01-01

## 2018-01-01 RX ORDER — ALBUMIN (HUMAN) 12.5 G/50ML
12.5 SOLUTION INTRAVENOUS ONCE
Status: COMPLETED | OUTPATIENT
Start: 2018-01-01 | End: 2018-01-01

## 2018-01-01 RX ORDER — AZITHROMYCIN 250 MG/1
250 TABLET, FILM COATED ORAL DAILY
Qty: 3 TABLET | Refills: 0 | Status: ON HOLD | OUTPATIENT
Start: 2018-01-01 | End: 2018-01-01

## 2018-01-01 RX ORDER — ONDANSETRON 2 MG/ML
4 INJECTION INTRAMUSCULAR; INTRAVENOUS EVERY 30 MIN PRN
Status: DISCONTINUED | OUTPATIENT
Start: 2018-01-01 | End: 2018-01-01

## 2018-01-01 RX ORDER — AMOXICILLIN 250 MG
1 CAPSULE ORAL 2 TIMES DAILY PRN
Status: DISCONTINUED | OUTPATIENT
Start: 2018-01-01 | End: 2018-01-01

## 2018-01-01 RX ORDER — EPINEPHRINE 0.3 MG/.3ML
0.3 INJECTION SUBCUTANEOUS EVERY 5 MIN PRN
Status: DISCONTINUED | OUTPATIENT
Start: 2018-01-01 | End: 2018-01-01 | Stop reason: HOSPADM

## 2018-01-01 RX ORDER — AMOXICILLIN 875 MG
875 TABLET ORAL EVERY 12 HOURS
Qty: 6 TABLET | Refills: 0 | Status: SHIPPED | OUTPATIENT
Start: 2018-01-01 | End: 2018-01-01

## 2018-01-01 RX ORDER — OXYCODONE HYDROCHLORIDE 5 MG/1
5 TABLET ORAL EVERY 6 HOURS PRN
Qty: 10 TABLET | Refills: 0 | Status: SHIPPED | OUTPATIENT
Start: 2018-01-01 | End: 2018-01-01

## 2018-01-01 RX ORDER — POLYETHYLENE GLYCOL 3350 17 G/17G
17 POWDER, FOR SOLUTION ORAL DAILY
Qty: 30 PACKET | Refills: 1 | Status: ON HOLD | OUTPATIENT
Start: 2018-01-01 | End: 2018-01-01

## 2018-01-01 RX ORDER — OXYCODONE HCL 20 MG/ML
10 CONCENTRATE, ORAL ORAL EVERY 4 HOURS PRN
Qty: 30 ML | Refills: 0 | Status: SHIPPED | OUTPATIENT
Start: 2018-01-01

## 2018-01-01 RX ORDER — SODIUM CHLORIDE 9 MG/ML
1000 INJECTION, SOLUTION INTRAVENOUS CONTINUOUS
Status: DISCONTINUED | OUTPATIENT
Start: 2018-01-01 | End: 2018-01-01

## 2018-01-01 RX ORDER — OXYCODONE HYDROCHLORIDE 5 MG/1
5 TABLET ORAL EVERY 4 HOURS PRN
Status: DISCONTINUED | OUTPATIENT
Start: 2018-01-01 | End: 2018-01-01 | Stop reason: HOSPADM

## 2018-01-01 RX ORDER — ONDANSETRON 4 MG/1
4 TABLET, ORALLY DISINTEGRATING ORAL EVERY 6 HOURS PRN
Status: DISCONTINUED | OUTPATIENT
Start: 2018-01-01 | End: 2018-01-01

## 2018-01-01 RX ORDER — HALOPERIDOL 1 MG/1
1-2 TABLET ORAL EVERY 6 HOURS PRN
Qty: 30 TABLET | Refills: 1 | Status: SHIPPED | OUTPATIENT
Start: 2018-01-01

## 2018-01-01 RX ORDER — POTASSIUM CHLORIDE 7.45 MG/ML
10 INJECTION INTRAVENOUS
Status: DISCONTINUED | OUTPATIENT
Start: 2018-01-01 | End: 2018-01-01

## 2018-01-01 RX ORDER — LORAZEPAM 0.5 MG/1
0.5 TABLET ORAL EVERY 6 HOURS PRN
Status: DISCONTINUED | OUTPATIENT
Start: 2018-01-01 | End: 2018-01-01 | Stop reason: HOSPADM

## 2018-01-01 RX ORDER — AMOXICILLIN 250 MG
1 CAPSULE ORAL 2 TIMES DAILY
Status: DISCONTINUED | OUTPATIENT
Start: 2018-01-01 | End: 2018-01-01

## 2018-01-01 RX ORDER — PANTOPRAZOLE SODIUM 40 MG/1
40 TABLET, DELAYED RELEASE ORAL
Status: DISCONTINUED | OUTPATIENT
Start: 2018-01-01 | End: 2018-01-01 | Stop reason: HOSPADM

## 2018-01-01 RX ORDER — LORAZEPAM 0.5 MG/1
.5-1 TABLET ORAL EVERY 4 HOURS PRN
Qty: 30 TABLET | Refills: 1 | Status: SHIPPED | OUTPATIENT
Start: 2018-01-01

## 2018-01-01 RX ORDER — AMOXICILLIN 875 MG
875 TABLET ORAL EVERY 12 HOURS
Qty: 6 TABLET | Refills: 0 | Status: ON HOLD | OUTPATIENT
Start: 2018-01-01 | End: 2018-01-01

## 2018-01-01 RX ORDER — FENTANYL CITRATE 50 UG/ML
25-50 INJECTION, SOLUTION INTRAMUSCULAR; INTRAVENOUS
Status: DISCONTINUED | OUTPATIENT
Start: 2018-01-01 | End: 2018-01-01 | Stop reason: HOSPADM

## 2018-01-01 RX ORDER — LIDOCAINE HYDROCHLORIDE 10 MG/ML
INJECTION, SOLUTION EPIDURAL; INFILTRATION; INTRACAUDAL; PERINEURAL
Status: DISCONTINUED
Start: 2018-01-01 | End: 2018-01-01 | Stop reason: WASHOUT

## 2018-01-01 RX ORDER — IODIXANOL 320 MG/ML
50 INJECTION, SOLUTION INTRAVASCULAR ONCE
Status: COMPLETED | OUTPATIENT
Start: 2018-01-01 | End: 2018-01-01

## 2018-01-01 RX ORDER — ACETAMINOPHEN 325 MG/1
325-650 TABLET ORAL EVERY 4 HOURS PRN
Qty: 100 TABLET | Refills: 1 | Status: SHIPPED | OUTPATIENT
Start: 2018-01-01

## 2018-01-01 RX ORDER — MIRTAZAPINE 15 MG/1
15 TABLET, FILM COATED ORAL AT BEDTIME
Qty: 30 TABLET | Refills: 3 | Status: SHIPPED | OUTPATIENT
Start: 2018-01-01 | End: 2018-01-01

## 2018-01-01 RX ORDER — ONDANSETRON 2 MG/ML
4 INJECTION INTRAMUSCULAR; INTRAVENOUS EVERY 6 HOURS PRN
Status: DISCONTINUED | OUTPATIENT
Start: 2018-01-01 | End: 2018-01-01

## 2018-01-01 RX ORDER — 3% SODIUM CHLORIDE 3 G/100ML
INJECTION, SOLUTION INTRAVENOUS CONTINUOUS
Status: DISPENSED | OUTPATIENT
Start: 2018-01-01 | End: 2018-01-01

## 2018-01-01 RX ORDER — FERROUS SULFATE 325(65) MG
325 TABLET ORAL DAILY
Status: DISCONTINUED | OUTPATIENT
Start: 2018-01-01 | End: 2018-01-01

## 2018-01-01 RX ADMIN — SENNOSIDES AND DOCUSATE SODIUM 2 TABLET: 8.6; 5 TABLET ORAL at 08:42

## 2018-01-01 RX ADMIN — FERROUS SULFATE TAB 325 MG (65 MG ELEMENTAL FE) 325 MG: 325 (65 FE) TAB at 08:10

## 2018-01-01 RX ADMIN — SODIUM CHLORIDE: 9 INJECTION, SOLUTION INTRAVENOUS at 03:55

## 2018-01-01 RX ADMIN — SODIUM CHLORIDE, PRESERVATIVE FREE 5 ML: 5 INJECTION INTRAVENOUS at 19:44

## 2018-01-01 RX ADMIN — OXYCODONE HYDROCHLORIDE 5 MG: 5 TABLET ORAL at 15:31

## 2018-01-01 RX ADMIN — FENTANYL CITRATE 50 MCG: 50 INJECTION INTRAMUSCULAR; INTRAVENOUS at 14:50

## 2018-01-01 RX ADMIN — FERROUS SULFATE TAB 325 MG (65 MG ELEMENTAL FE) 325 MG: 325 (65 FE) TAB at 07:34

## 2018-01-01 RX ADMIN — OXYCODONE HYDROCHLORIDE 5 MG: 5 TABLET ORAL at 12:41

## 2018-01-01 RX ADMIN — ACETAMINOPHEN 650 MG: 325 TABLET, FILM COATED ORAL at 18:46

## 2018-01-01 RX ADMIN — OXYCODONE HYDROCHLORIDE 5 MG: 5 TABLET ORAL at 03:00

## 2018-01-01 RX ADMIN — OXYCODONE HYDROCHLORIDE 5 MG: 5 TABLET ORAL at 12:33

## 2018-01-01 RX ADMIN — LIDOCAINE HYDROCHLORIDE 20 ML: 10 INJECTION, SOLUTION EPIDURAL; INFILTRATION; INTRACAUDAL; PERINEURAL at 13:07

## 2018-01-01 RX ADMIN — ENOXAPARIN SODIUM 50 MG: 60 INJECTION SUBCUTANEOUS at 10:26

## 2018-01-01 RX ADMIN — SODIUM CHLORIDE 1000 ML: 9 INJECTION, SOLUTION INTRAVENOUS at 09:33

## 2018-01-01 RX ADMIN — FUROSEMIDE 10 MG: 10 INJECTION, SOLUTION INTRAVENOUS at 17:11

## 2018-01-01 RX ADMIN — OXYCODONE HYDROCHLORIDE 5 MG: 5 TABLET ORAL at 19:01

## 2018-01-01 RX ADMIN — MIDAZOLAM 1 MG: 1 INJECTION INTRAMUSCULAR; INTRAVENOUS at 14:50

## 2018-01-01 RX ADMIN — ACETAMINOPHEN 650 MG: 325 TABLET, FILM COATED ORAL at 00:23

## 2018-01-01 RX ADMIN — PANTOPRAZOLE SODIUM 40 MG: 40 INJECTION, POWDER, FOR SOLUTION INTRAVENOUS at 08:38

## 2018-01-01 RX ADMIN — RANITIDINE 150 MG: 150 TABLET, FILM COATED ORAL at 20:23

## 2018-01-01 RX ADMIN — SODIUM CHLORIDE, POTASSIUM CHLORIDE, SODIUM LACTATE AND CALCIUM CHLORIDE 500 ML: 600; 310; 30; 20 INJECTION, SOLUTION INTRAVENOUS at 15:45

## 2018-01-01 RX ADMIN — ENOXAPARIN SODIUM 60 MG: 60 INJECTION SUBCUTANEOUS at 07:47

## 2018-01-01 RX ADMIN — SODIUM CHLORIDE, PRESERVATIVE FREE 65 ML: 5 INJECTION INTRAVENOUS at 19:35

## 2018-01-01 RX ADMIN — SODIUM CHLORIDE TAB 1 GM 1 G: 1 TAB at 18:31

## 2018-01-01 RX ADMIN — RANITIDINE 150 MG: 150 TABLET, FILM COATED ORAL at 21:20

## 2018-01-01 RX ADMIN — ALBUMIN HUMAN 12.5 G: 0.25 SOLUTION INTRAVENOUS at 10:29

## 2018-01-01 RX ADMIN — ENOXAPARIN SODIUM 60 MG: 60 INJECTION SUBCUTANEOUS at 03:57

## 2018-01-01 RX ADMIN — OXYCODONE HYDROCHLORIDE 5 MG: 5 TABLET ORAL at 03:21

## 2018-01-01 RX ADMIN — SODIUM CHLORIDE, PRESERVATIVE FREE 5 ML: 5 INJECTION INTRAVENOUS at 16:16

## 2018-01-01 RX ADMIN — SENNOSIDES AND DOCUSATE SODIUM 2 TABLET: 8.6; 5 TABLET ORAL at 22:39

## 2018-01-01 RX ADMIN — FAMOTIDINE 40 MG: 20 INJECTION, SOLUTION INTRAVENOUS at 10:13

## 2018-01-01 RX ADMIN — ALBUMIN HUMAN 12.5 G: 0.25 SOLUTION INTRAVENOUS at 14:47

## 2018-01-01 RX ADMIN — SODIUM CHLORIDE, PRESERVATIVE FREE 5 ML: 5 INJECTION INTRAVENOUS at 05:32

## 2018-01-01 RX ADMIN — DIPHENHYDRAMINE HYDROCHLORIDE 50 MG: 50 CAPSULE ORAL at 16:16

## 2018-01-01 RX ADMIN — POTASSIUM CHLORIDE 20 MEQ: 400 INJECTION, SOLUTION INTRAVENOUS at 05:37

## 2018-01-01 RX ADMIN — FAMOTIDINE 20 MG: 20 INJECTION, SOLUTION INTRAVENOUS at 13:52

## 2018-01-01 RX ADMIN — LIDOCAINE HYDROCHLORIDE 20 MG: 10 INJECTION, SOLUTION EPIDURAL; INFILTRATION; INTRACAUDAL; PERINEURAL at 15:36

## 2018-01-01 RX ADMIN — DRONABINOL 2.5 MG: 2.5 CAPSULE ORAL at 20:26

## 2018-01-01 RX ADMIN — DRONABINOL 2.5 MG: 2.5 CAPSULE ORAL at 08:17

## 2018-01-01 RX ADMIN — IPRATROPIUM BROMIDE AND ALBUTEROL SULFATE 3 ML: .5; 3 SOLUTION RESPIRATORY (INHALATION) at 16:45

## 2018-01-01 RX ADMIN — SODIUM CHLORIDE 80 MG: 9 INJECTION, SOLUTION INTRAVENOUS at 01:42

## 2018-01-01 RX ADMIN — ENOXAPARIN SODIUM 60 MG: 60 INJECTION SUBCUTANEOUS at 20:08

## 2018-01-01 RX ADMIN — OXYCODONE HYDROCHLORIDE 5 MG: 5 TABLET ORAL at 05:43

## 2018-01-01 RX ADMIN — ALBUMIN HUMAN 12.5 G: 0.25 SOLUTION INTRAVENOUS at 10:24

## 2018-01-01 RX ADMIN — SODIUM CHLORIDE, PRESERVATIVE FREE 5 ML: 5 INJECTION INTRAVENOUS at 05:26

## 2018-01-01 RX ADMIN — SODIUM CHLORIDE, PRESERVATIVE FREE 5 ML: 5 INJECTION INTRAVENOUS at 11:22

## 2018-01-01 RX ADMIN — SODIUM CHLORIDE, POTASSIUM CHLORIDE, SODIUM LACTATE AND CALCIUM CHLORIDE: 600; 310; 30; 20 INJECTION, SOLUTION INTRAVENOUS at 22:52

## 2018-01-01 RX ADMIN — ENOXAPARIN SODIUM 50 MG: 60 INJECTION SUBCUTANEOUS at 11:22

## 2018-01-01 RX ADMIN — ENOXAPARIN SODIUM 60 MG: 60 INJECTION SUBCUTANEOUS at 06:02

## 2018-01-01 RX ADMIN — OXYCODONE HYDROCHLORIDE 10 MG: 5 SOLUTION ORAL at 16:23

## 2018-01-01 RX ADMIN — POLYETHYLENE GLYCOL 3350 17 G: 17 POWDER, FOR SOLUTION ORAL at 09:33

## 2018-01-01 RX ADMIN — SODIUM CHLORIDE, PRESERVATIVE FREE 5 ML: 5 INJECTION INTRAVENOUS at 08:35

## 2018-01-01 RX ADMIN — OXYCODONE HYDROCHLORIDE 5 MG: 5 TABLET ORAL at 15:46

## 2018-01-01 RX ADMIN — SODIUM CHLORIDE TAB 1 GM 1 G: 1 TAB at 07:47

## 2018-01-01 RX ADMIN — POTASSIUM CHLORIDE 20 MEQ: 14.9 INJECTION, SOLUTION INTRAVENOUS at 20:19

## 2018-01-01 RX ADMIN — POTASSIUM CHLORIDE 40 MEQ: 1.5 POWDER, FOR SOLUTION ORAL at 09:13

## 2018-01-01 RX ADMIN — OXYCODONE HYDROCHLORIDE 10 MG: 5 TABLET ORAL at 08:47

## 2018-01-01 RX ADMIN — OXYCODONE HYDROCHLORIDE 5 MG: 5 TABLET ORAL at 15:04

## 2018-01-01 RX ADMIN — ENOXAPARIN SODIUM 50 MG: 80 INJECTION SUBCUTANEOUS at 00:14

## 2018-01-01 RX ADMIN — IODIXANOL 30 ML: 320 INJECTION, SOLUTION INTRAVASCULAR at 14:52

## 2018-01-01 RX ADMIN — SODIUM CHLORIDE, PRESERVATIVE FREE 5 ML: 5 INJECTION INTRAVENOUS at 10:36

## 2018-01-01 RX ADMIN — OXYCODONE HYDROCHLORIDE 10 MG: 5 TABLET ORAL at 09:37

## 2018-01-01 RX ADMIN — OXYCODONE HYDROCHLORIDE 5 MG: 5 TABLET ORAL at 05:42

## 2018-01-01 RX ADMIN — PROCHLORPERAZINE EDISYLATE 10 MG: 5 INJECTION INTRAMUSCULAR; INTRAVENOUS at 10:52

## 2018-01-01 RX ADMIN — ENOXAPARIN SODIUM 60 MG: 60 INJECTION SUBCUTANEOUS at 09:49

## 2018-01-01 RX ADMIN — SODIUM CHLORIDE TAB 1 GM 1 G: 1 TAB at 08:43

## 2018-01-01 RX ADMIN — OXYCODONE HYDROCHLORIDE 5 MG: 5 TABLET ORAL at 15:44

## 2018-01-01 RX ADMIN — SODIUM CHLORIDE, PRESERVATIVE FREE 5 ML: 5 INJECTION INTRAVENOUS at 06:40

## 2018-01-01 RX ADMIN — SODIUM CHLORIDE, PRESERVATIVE FREE 66 ML: 5 INJECTION INTRAVENOUS at 11:46

## 2018-01-01 RX ADMIN — ENOXAPARIN SODIUM 47 MG: 100 INJECTION SUBCUTANEOUS at 21:57

## 2018-01-01 RX ADMIN — OXYCODONE HYDROCHLORIDE 10 MG: 5 SOLUTION ORAL at 10:36

## 2018-01-01 RX ADMIN — HEPARIN SODIUM 850 UNITS/HR: 10000 INJECTION, SOLUTION INTRAVENOUS at 20:06

## 2018-01-01 RX ADMIN — ENOXAPARIN SODIUM 60 MG: 60 INJECTION SUBCUTANEOUS at 17:11

## 2018-01-01 RX ADMIN — OXYCODONE HYDROCHLORIDE 5 MG: 5 TABLET ORAL at 02:44

## 2018-01-01 RX ADMIN — OXYCODONE HYDROCHLORIDE 5 MG: 5 TABLET ORAL at 13:39

## 2018-01-01 RX ADMIN — IOPAMIDOL 50 ML: 755 INJECTION, SOLUTION INTRAVENOUS at 01:00

## 2018-01-01 RX ADMIN — LIDOCAINE HYDROCHLORIDE 10 ML: 10 INJECTION, SOLUTION EPIDURAL; INFILTRATION; INTRACAUDAL; PERINEURAL at 11:28

## 2018-01-01 RX ADMIN — DEXAMETHASONE SODIUM PHOSPHATE 20 MG: 10 INJECTION INTRAMUSCULAR; INTRAVENOUS at 14:07

## 2018-01-01 RX ADMIN — CARBOPLATIN 535 MG: 10 INJECTION, SOLUTION INTRAVENOUS at 20:30

## 2018-01-01 RX ADMIN — SENNOSIDES AND DOCUSATE SODIUM 2 TABLET: 8.6; 5 TABLET ORAL at 08:54

## 2018-01-01 RX ADMIN — SENNOSIDES AND DOCUSATE SODIUM 2 TABLET: 8.6; 5 TABLET ORAL at 19:30

## 2018-01-01 RX ADMIN — SODIUM CHLORIDE, PRESERVATIVE FREE 5 ML: 5 INJECTION INTRAVENOUS at 08:38

## 2018-01-01 RX ADMIN — SODIUM CHLORIDE, PRESERVATIVE FREE 5 ML: 5 INJECTION INTRAVENOUS at 17:06

## 2018-01-01 RX ADMIN — FUROSEMIDE 10 MG: 10 INJECTION, SOLUTION INTRAVENOUS at 16:49

## 2018-01-01 RX ADMIN — DIPHENHYDRAMINE HYDROCHLORIDE 50 MG: 50 CAPSULE ORAL at 13:51

## 2018-01-01 RX ADMIN — LIDOCAINE HYDROCHLORIDE 10 ML: 10 INJECTION, SOLUTION INFILTRATION; PERINEURAL at 14:51

## 2018-01-01 RX ADMIN — PANTOPRAZOLE SODIUM 40 MG: 40 TABLET, DELAYED RELEASE ORAL at 08:04

## 2018-01-01 RX ADMIN — OXYCODONE HYDROCHLORIDE 5 MG: 5 TABLET ORAL at 11:18

## 2018-01-01 RX ADMIN — IOPAMIDOL 79 ML: 755 INJECTION, SOLUTION INTRAVENOUS at 19:35

## 2018-01-01 RX ADMIN — OXYCODONE HYDROCHLORIDE 5 MG: 5 TABLET ORAL at 11:19

## 2018-01-01 RX ADMIN — SODIUM CHLORIDE: 9 INJECTION, SOLUTION INTRAVENOUS at 07:13

## 2018-01-01 RX ADMIN — SENNOSIDES AND DOCUSATE SODIUM 2 TABLET: 8.6; 5 TABLET ORAL at 07:41

## 2018-01-01 RX ADMIN — RANITIDINE 150 MG: 150 TABLET, FILM COATED ORAL at 09:32

## 2018-01-01 RX ADMIN — SENNOSIDES AND DOCUSATE SODIUM 2 TABLET: 8.6; 5 TABLET ORAL at 09:32

## 2018-01-01 RX ADMIN — ENOXAPARIN SODIUM 47 MG: 100 INJECTION SUBCUTANEOUS at 06:33

## 2018-01-01 RX ADMIN — FUROSEMIDE 10 MG: 10 INJECTION, SOLUTION INTRAVENOUS at 09:29

## 2018-01-01 RX ADMIN — ALBUMIN HUMAN 12.5 G: 0.25 SOLUTION INTRAVENOUS at 14:43

## 2018-01-01 RX ADMIN — FAMOTIDINE 40 MG: 10 INJECTION, SOLUTION INTRAVENOUS at 16:17

## 2018-01-01 RX ADMIN — SODIUM CHLORIDE 0.1 MG/KG/HR: 9 INJECTION, SOLUTION INTRAVENOUS at 12:00

## 2018-01-01 RX ADMIN — OXYCODONE HYDROCHLORIDE 5 MG: 5 TABLET ORAL at 05:18

## 2018-01-01 RX ADMIN — SODIUM CHLORIDE, PRESERVATIVE FREE 5 ML: 5 INJECTION INTRAVENOUS at 16:21

## 2018-01-01 RX ADMIN — SODIUM CHLORIDE: 9 INJECTION, SOLUTION INTRAVENOUS at 01:53

## 2018-01-01 RX ADMIN — SODIUM CHLORIDE, PRESERVATIVE FREE 5 ML: 5 INJECTION INTRAVENOUS at 21:06

## 2018-01-01 RX ADMIN — ONDANSETRON HYDROCHLORIDE 4 MG: 2 INJECTION, SOLUTION INTRAMUSCULAR; INTRAVENOUS at 05:45

## 2018-01-01 RX ADMIN — FUROSEMIDE 10 MG: 10 INJECTION, SOLUTION INTRAVENOUS at 13:24

## 2018-01-01 RX ADMIN — OXYCODONE HYDROCHLORIDE 5 MG: 5 TABLET ORAL at 22:59

## 2018-01-01 RX ADMIN — HEPARIN SODIUM 800 UNITS/HR: 10000 INJECTION, SOLUTION INTRAVENOUS at 08:38

## 2018-01-01 RX ADMIN — LIDOCAINE HYDROCHLORIDE 10 ML: 10 INJECTION, SOLUTION EPIDURAL; INFILTRATION; INTRACAUDAL; PERINEURAL at 10:16

## 2018-01-01 RX ADMIN — OXYCODONE HYDROCHLORIDE 5 MG: 5 TABLET ORAL at 22:27

## 2018-01-01 RX ADMIN — SODIUM CHLORIDE 0.1 MG/KG/HR: 9 INJECTION, SOLUTION INTRAVENOUS at 19:34

## 2018-01-01 RX ADMIN — PIPERACILLIN SODIUM AND TAZOBACTAM SODIUM 3.38 G: 3; .375 INJECTION, POWDER, LYOPHILIZED, FOR SOLUTION INTRAVENOUS at 21:38

## 2018-01-01 RX ADMIN — MULTIPLE VITAMINS W/ MINERALS TAB 1 TABLET: TAB at 08:35

## 2018-01-01 RX ADMIN — ACETAMINOPHEN 650 MG: 325 TABLET, FILM COATED ORAL at 00:00

## 2018-01-01 RX ADMIN — OXYCODONE HYDROCHLORIDE 10 MG: 5 TABLET ORAL at 14:03

## 2018-01-01 RX ADMIN — SODIUM CHLORIDE, PRESERVATIVE FREE 5 ML: 5 INJECTION INTRAVENOUS at 02:45

## 2018-01-01 RX ADMIN — OXYCODONE HYDROCHLORIDE 5 MG: 5 TABLET ORAL at 01:29

## 2018-01-01 RX ADMIN — SODIUM CHLORIDE, PRESERVATIVE FREE 5 ML: 5 INJECTION INTRAVENOUS at 04:34

## 2018-01-01 RX ADMIN — OXYCODONE HYDROCHLORIDE 5 MG: 5 TABLET ORAL at 23:32

## 2018-01-01 RX ADMIN — OXYCODONE HYDROCHLORIDE 5 MG: 5 TABLET ORAL at 22:36

## 2018-01-01 RX ADMIN — ACETAMINOPHEN 650 MG: 325 TABLET, FILM COATED ORAL at 08:39

## 2018-01-01 RX ADMIN — FUROSEMIDE 10 MG: 10 INJECTION, SOLUTION INTRAVENOUS at 13:39

## 2018-01-01 RX ADMIN — OXYCODONE HYDROCHLORIDE 5 MG: 5 TABLET ORAL at 21:08

## 2018-01-01 RX ADMIN — PACLITAXEL 252 MG: 6 INJECTION, SOLUTION INTRAVENOUS at 17:06

## 2018-01-01 RX ADMIN — SENNOSIDES AND DOCUSATE SODIUM 2 TABLET: 8.6; 5 TABLET ORAL at 07:57

## 2018-01-01 RX ADMIN — LIDOCAINE HYDROCHLORIDE 10 ML: 10 INJECTION, SOLUTION EPIDURAL; INFILTRATION; INTRACAUDAL; PERINEURAL at 15:35

## 2018-01-01 RX ADMIN — OXYCODONE HYDROCHLORIDE 5 MG: 5 TABLET ORAL at 01:05

## 2018-01-01 RX ADMIN — RANITIDINE 150 MG: 150 TABLET, FILM COATED ORAL at 19:44

## 2018-01-01 RX ADMIN — ACETAMINOPHEN 650 MG: 325 TABLET, FILM COATED ORAL at 14:10

## 2018-01-01 RX ADMIN — OXYCODONE HYDROCHLORIDE 5 MG: 5 TABLET ORAL at 07:47

## 2018-01-01 RX ADMIN — SODIUM CHLORIDE 1000 ML: 9 INJECTION, SOLUTION INTRAVENOUS at 19:43

## 2018-01-01 RX ADMIN — SODIUM CHLORIDE, PRESERVATIVE FREE 5 ML: 5 INJECTION INTRAVENOUS at 16:42

## 2018-01-01 RX ADMIN — ENOXAPARIN SODIUM 60 MG: 60 INJECTION SUBCUTANEOUS at 22:37

## 2018-01-01 RX ADMIN — SODIUM CHLORIDE: 3 INJECTION, SOLUTION INTRAVENOUS at 15:04

## 2018-01-01 RX ADMIN — ENOXAPARIN SODIUM 60 MG: 60 INJECTION SUBCUTANEOUS at 07:44

## 2018-01-01 RX ADMIN — FUROSEMIDE 10 MG: 10 INJECTION, SOLUTION INTRAVENOUS at 16:20

## 2018-01-01 RX ADMIN — ALBUMIN HUMAN 25 G: 0.25 SOLUTION INTRAVENOUS at 08:34

## 2018-01-01 RX ADMIN — SODIUM CHLORIDE, PRESERVATIVE FREE 5 ML: 5 INJECTION INTRAVENOUS at 05:05

## 2018-01-01 RX ADMIN — OXYCODONE HYDROCHLORIDE 5 MG: 5 TABLET ORAL at 05:47

## 2018-01-01 RX ADMIN — ENOXAPARIN SODIUM 60 MG: 60 INJECTION SUBCUTANEOUS at 21:33

## 2018-01-01 RX ADMIN — LIDOCAINE HYDROCHLORIDE 20 MG: 10 INJECTION, SOLUTION EPIDURAL; INFILTRATION; INTRACAUDAL; PERINEURAL at 15:35

## 2018-01-01 RX ADMIN — MIRTAZAPINE 15 MG: 15 TABLET, FILM COATED ORAL at 23:00

## 2018-01-01 RX ADMIN — PALONOSETRON HYDROCHLORIDE 0.25 MG: 0.25 INJECTION INTRAVENOUS at 16:16

## 2018-01-01 RX ADMIN — SODIUM CHLORIDE: 9 INJECTION, SOLUTION INTRAVENOUS at 15:00

## 2018-01-01 RX ADMIN — OXYCODONE HYDROCHLORIDE 5 MG: 5 TABLET ORAL at 18:23

## 2018-01-01 RX ADMIN — OXYCODONE HYDROCHLORIDE 5 MG: 5 TABLET ORAL at 21:39

## 2018-01-01 RX ADMIN — SODIUM CHLORIDE: 9 INJECTION, SOLUTION INTRAVENOUS at 20:17

## 2018-01-01 RX ADMIN — ENOXAPARIN SODIUM 60 MG: 60 INJECTION SUBCUTANEOUS at 08:42

## 2018-01-01 RX ADMIN — SODIUM CHLORIDE, PRESERVATIVE FREE 5 ML: 5 INJECTION INTRAVENOUS at 14:03

## 2018-01-01 RX ADMIN — Medication 4 ML: at 08:09

## 2018-01-01 RX ADMIN — RANITIDINE 150 MG: 150 TABLET, FILM COATED ORAL at 08:05

## 2018-01-01 RX ADMIN — OXYCODONE HYDROCHLORIDE 5 MG: 5 TABLET ORAL at 01:20

## 2018-01-01 RX ADMIN — POLYETHYLENE GLYCOL 3350 17 G: 17 POWDER, FOR SOLUTION ORAL at 07:41

## 2018-01-01 RX ADMIN — MULTIPLE VITAMINS W/ MINERALS TAB 1 TABLET: TAB at 08:31

## 2018-01-01 RX ADMIN — LIDOCAINE 2 PATCH: 560 PATCH PERCUTANEOUS; TOPICAL; TRANSDERMAL at 16:06

## 2018-01-01 RX ADMIN — SODIUM CHLORIDE 250 ML: 9 INJECTION, SOLUTION INTRAVENOUS at 16:42

## 2018-01-01 RX ADMIN — DEXTROSE MONOHYDRATE 25 ML: 25 INJECTION, SOLUTION INTRAVENOUS at 06:31

## 2018-01-01 RX ADMIN — OXYCODONE HYDROCHLORIDE 5 MG: 5 TABLET ORAL at 03:23

## 2018-01-01 RX ADMIN — OXYCODONE HYDROCHLORIDE 5 MG: 5 TABLET ORAL at 22:47

## 2018-01-01 RX ADMIN — LIDOCAINE HYDROCHLORIDE 15 ML: 10 INJECTION, SOLUTION EPIDURAL; INFILTRATION; INTRACAUDAL; PERINEURAL at 10:46

## 2018-01-01 RX ADMIN — POTASSIUM CHLORIDE 20 MEQ: 1.5 POWDER, FOR SOLUTION ORAL at 05:04

## 2018-01-01 RX ADMIN — POTASSIUM CHLORIDE 40 MEQ: 750 TABLET, EXTENDED RELEASE ORAL at 02:43

## 2018-01-01 RX ADMIN — FUROSEMIDE 10 MG: 10 INJECTION, SOLUTION INTRAVENOUS at 15:31

## 2018-01-01 RX ADMIN — OXYCODONE HYDROCHLORIDE 10 MG: 5 TABLET ORAL at 02:24

## 2018-01-01 RX ADMIN — MIRTAZAPINE 15 MG: 15 TABLET, FILM COATED ORAL at 22:47

## 2018-01-01 RX ADMIN — SODIUM CHLORIDE, PRESERVATIVE FREE 5 ML: 5 INJECTION INTRAVENOUS at 21:37

## 2018-01-01 RX ADMIN — ENOXAPARIN SODIUM 60 MG: 60 INJECTION SUBCUTANEOUS at 04:00

## 2018-01-01 RX ADMIN — SODIUM CHLORIDE, POTASSIUM CHLORIDE, SODIUM LACTATE AND CALCIUM CHLORIDE: 600; 310; 30; 20 INJECTION, SOLUTION INTRAVENOUS at 19:03

## 2018-01-01 RX ADMIN — ACETAMINOPHEN 650 MG: 325 TABLET, FILM COATED ORAL at 04:21

## 2018-01-01 RX ADMIN — IBUPROFEN 400 MG: 200 TABLET, FILM COATED ORAL at 14:39

## 2018-01-01 RX ADMIN — ENOXAPARIN SODIUM 50 MG: 60 INJECTION SUBCUTANEOUS at 00:11

## 2018-01-01 RX ADMIN — FUROSEMIDE 10 MG: 10 INJECTION, SOLUTION INTRAVENOUS at 07:59

## 2018-01-01 RX ADMIN — LIDOCAINE HYDROCHLORIDE 20 MG: 10 INJECTION, SOLUTION EPIDURAL; INFILTRATION; INTRACAUDAL; PERINEURAL at 13:27

## 2018-01-01 RX ADMIN — OXYCODONE HYDROCHLORIDE 5 MG: 5 TABLET ORAL at 22:49

## 2018-01-01 RX ADMIN — FERROUS SULFATE TAB 325 MG (65 MG ELEMENTAL FE) 325 MG: 325 (65 FE) TAB at 07:56

## 2018-01-01 RX ADMIN — FERROUS SULFATE TAB 325 MG (65 MG ELEMENTAL FE) 325 MG: 325 (65 FE) TAB at 15:31

## 2018-01-01 RX ADMIN — SODIUM CHLORIDE: 9 INJECTION, SOLUTION INTRAVENOUS at 20:15

## 2018-01-01 RX ADMIN — SODIUM CHLORIDE, PRESERVATIVE FREE 5 ML: 5 INJECTION INTRAVENOUS at 05:06

## 2018-01-01 RX ADMIN — OXYCODONE HYDROCHLORIDE 5 MG: 5 TABLET ORAL at 11:49

## 2018-01-01 RX ADMIN — MORPHINE SULFATE 1 MG: 2 INJECTION, SOLUTION INTRAMUSCULAR; INTRAVENOUS at 12:38

## 2018-01-01 RX ADMIN — POTASSIUM CHLORIDE 20 MEQ: 400 INJECTION, SOLUTION INTRAVENOUS at 08:14

## 2018-01-01 RX ADMIN — SODIUM CHLORIDE, PRESERVATIVE FREE 5 ML: 5 INJECTION INTRAVENOUS at 18:15

## 2018-01-01 RX ADMIN — MIRTAZAPINE 15 MG: 15 TABLET, FILM COATED ORAL at 22:34

## 2018-01-01 RX ADMIN — SODIUM CHLORIDE, PRESERVATIVE FREE 5 ML: 5 INJECTION INTRAVENOUS at 13:46

## 2018-01-01 RX ADMIN — OXYCODONE HYDROCHLORIDE 5 MG: 5 TABLET ORAL at 20:38

## 2018-01-01 RX ADMIN — DRONABINOL 2.5 MG: 2.5 CAPSULE ORAL at 08:12

## 2018-01-01 RX ADMIN — SODIUM CHLORIDE, POTASSIUM CHLORIDE, SODIUM LACTATE AND CALCIUM CHLORIDE: 600; 310; 30; 20 INJECTION, SOLUTION INTRAVENOUS at 10:15

## 2018-01-01 RX ADMIN — ALBUMIN HUMAN 12.5 G: 0.25 SOLUTION INTRAVENOUS at 08:28

## 2018-01-01 RX ADMIN — MULTIPLE VITAMINS W/ MINERALS TAB 1 TABLET: TAB at 08:10

## 2018-01-01 RX ADMIN — HEPARIN SODIUM 1150 UNITS/HR: 10000 INJECTION, SOLUTION INTRAVENOUS at 01:32

## 2018-01-01 RX ADMIN — OXYCODONE HYDROCHLORIDE 5 MG: 5 TABLET ORAL at 13:21

## 2018-01-01 RX ADMIN — Medication 15 ML: at 08:09

## 2018-01-01 RX ADMIN — SODIUM CHLORIDE, PRESERVATIVE FREE 5 ML: 5 INJECTION INTRAVENOUS at 11:20

## 2018-01-01 RX ADMIN — MULTIPLE VITAMINS W/ MINERALS TAB 1 TABLET: TAB at 08:21

## 2018-01-01 RX ADMIN — FUROSEMIDE 10 MG: 10 INJECTION, SOLUTION INTRAVENOUS at 21:58

## 2018-01-01 RX ADMIN — OXYCODONE HYDROCHLORIDE 5 MG: 5 TABLET ORAL at 06:05

## 2018-01-01 RX ADMIN — ENOXAPARIN SODIUM 47 MG: 100 INJECTION SUBCUTANEOUS at 18:20

## 2018-01-01 RX ADMIN — OXYCODONE HYDROCHLORIDE 5 MG: 5 TABLET ORAL at 16:18

## 2018-01-01 RX ADMIN — FUROSEMIDE 10 MG: 10 INJECTION, SOLUTION INTRAVENOUS at 23:01

## 2018-01-01 RX ADMIN — AMOXICILLIN 875 MG: 875 TABLET, FILM COATED ORAL at 10:26

## 2018-01-01 RX ADMIN — SODIUM CHLORIDE TAB 1 GM 1 G: 1 TAB at 12:04

## 2018-01-01 RX ADMIN — CARBOPLATIN 715 MG: 10 INJECTION, SOLUTION INTRAVENOUS at 14:07

## 2018-01-01 RX ADMIN — ENOXAPARIN SODIUM 50 MG: 80 INJECTION SUBCUTANEOUS at 12:39

## 2018-01-01 RX ADMIN — DRONABINOL 2.5 MG: 2.5 CAPSULE ORAL at 07:35

## 2018-01-01 RX ADMIN — PACLITAXEL 252 MG: 6 INJECTION, SOLUTION INTRAVENOUS at 10:53

## 2018-01-01 RX ADMIN — ACETAMINOPHEN 650 MG: 325 TABLET, FILM COATED ORAL at 19:30

## 2018-01-01 RX ADMIN — SENNOSIDES AND DOCUSATE SODIUM 2 TABLET: 8.6; 5 TABLET ORAL at 08:21

## 2018-01-01 RX ADMIN — DRONABINOL 2.5 MG: 2.5 CAPSULE ORAL at 19:44

## 2018-01-01 RX ADMIN — OXYCODONE HYDROCHLORIDE 5 MG: 5 TABLET ORAL at 04:03

## 2018-01-01 RX ADMIN — FUROSEMIDE 10 MG: 10 INJECTION, SOLUTION INTRAVENOUS at 11:43

## 2018-01-01 RX ADMIN — HEPARIN SODIUM 1400 UNITS/HR: 10000 INJECTION, SOLUTION INTRAVENOUS at 03:39

## 2018-01-01 RX ADMIN — SODIUM CHLORIDE, PRESERVATIVE FREE 5 ML: 5 INJECTION INTRAVENOUS at 13:35

## 2018-01-01 RX ADMIN — BENZOCAINE AND MENTHOL 1 LOZENGE: 15; 3.6 LOZENGE ORAL at 17:12

## 2018-01-01 RX ADMIN — SODIUM CHLORIDE 100 ML: 3 INJECTION, SOLUTION INTRAVENOUS at 20:57

## 2018-01-01 RX ADMIN — MULTIPLE VITAMINS W/ MINERALS TAB 1 TABLET: TAB at 07:34

## 2018-01-01 RX ADMIN — OXYCODONE HYDROCHLORIDE 5 MG: 5 TABLET ORAL at 20:06

## 2018-01-01 RX ADMIN — LIDOCAINE HYDROCHLORIDE 6 ML: 10 INJECTION, SOLUTION EPIDURAL; INFILTRATION; INTRACAUDAL; PERINEURAL at 12:42

## 2018-01-01 RX ADMIN — MIRTAZAPINE 15 MG: 15 TABLET, FILM COATED ORAL at 22:19

## 2018-01-01 RX ADMIN — IOPAMIDOL 64 ML: 755 INJECTION, SOLUTION INTRAVENOUS at 11:45

## 2018-01-01 RX ADMIN — LIDOCAINE HYDROCHLORIDE 60 MG: 20 INJECTION, SOLUTION INFILTRATION; PERINEURAL at 07:17

## 2018-01-01 RX ADMIN — OXYCODONE HYDROCHLORIDE 5 MG: 5 TABLET ORAL at 07:57

## 2018-01-01 RX ADMIN — SENNOSIDES AND DOCUSATE SODIUM 2 TABLET: 8.6; 5 TABLET ORAL at 07:47

## 2018-01-01 RX ADMIN — FUROSEMIDE 10 MG: 10 INJECTION, SOLUTION INTRAVENOUS at 21:32

## 2018-01-01 RX ADMIN — SODIUM CHLORIDE, PRESERVATIVE FREE 5 ML: 5 INJECTION INTRAVENOUS at 10:00

## 2018-01-01 RX ADMIN — OXYCODONE HYDROCHLORIDE 5 MG: 5 TABLET ORAL at 19:46

## 2018-01-01 RX ADMIN — SODIUM CHLORIDE: 9 INJECTION, SOLUTION INTRAVENOUS at 23:16

## 2018-01-01 RX ADMIN — LIDOCAINE HYDROCHLORIDE 4 ML: 10 INJECTION, SOLUTION EPIDURAL; INFILTRATION; INTRACAUDAL; PERINEURAL at 16:11

## 2018-01-01 RX ADMIN — RANITIDINE 150 MG: 150 TABLET, FILM COATED ORAL at 07:58

## 2018-01-01 RX ADMIN — RANITIDINE 150 MG: 150 TABLET, FILM COATED ORAL at 21:30

## 2018-01-01 RX ADMIN — ENOXAPARIN SODIUM 60 MG: 60 INJECTION SUBCUTANEOUS at 21:29

## 2018-01-01 RX ADMIN — OXYCODONE HYDROCHLORIDE 5 MG: 5 TABLET ORAL at 13:55

## 2018-01-01 RX ADMIN — MEGESTROL ACETATE 160 MG: 40 TABLET ORAL at 07:48

## 2018-01-01 RX ADMIN — CEFTRIAXONE SODIUM 1 G: 1 INJECTION, POWDER, FOR SOLUTION INTRAMUSCULAR; INTRAVENOUS at 20:47

## 2018-01-01 RX ADMIN — ACETAMINOPHEN 975 MG: 325 TABLET ORAL at 06:23

## 2018-01-01 RX ADMIN — MIRTAZAPINE 15 MG: 15 TABLET, FILM COATED ORAL at 22:24

## 2018-01-01 RX ADMIN — MULTIPLE VITAMINS W/ MINERALS TAB 1 TABLET: TAB at 16:16

## 2018-01-01 RX ADMIN — SODIUM CHLORIDE, PRESERVATIVE FREE 5 ML: 5 INJECTION INTRAVENOUS at 15:44

## 2018-01-01 RX ADMIN — POLYETHYLENE GLYCOL 3350 17 G: 17 POWDER, FOR SOLUTION ORAL at 07:58

## 2018-01-01 RX ADMIN — ENOXAPARIN SODIUM 60 MG: 60 INJECTION SUBCUTANEOUS at 05:24

## 2018-01-01 RX ADMIN — OXYCODONE HYDROCHLORIDE 5 MG: 5 TABLET ORAL at 18:47

## 2018-01-01 RX ADMIN — FERROUS SULFATE TAB 325 MG (65 MG ELEMENTAL FE) 325 MG: 325 (65 FE) TAB at 07:47

## 2018-01-01 RX ADMIN — SODIUM CHLORIDE, PRESERVATIVE FREE 5 ML: 5 INJECTION INTRAVENOUS at 15:28

## 2018-01-01 RX ADMIN — ENOXAPARIN SODIUM 60 MG: 60 INJECTION SUBCUTANEOUS at 05:19

## 2018-01-01 RX ADMIN — ONDANSETRON HYDROCHLORIDE 4 MG: 2 INJECTION, SOLUTION INTRAMUSCULAR; INTRAVENOUS at 13:51

## 2018-01-01 RX ADMIN — OXYCODONE HYDROCHLORIDE 5 MG: 5 TABLET ORAL at 08:22

## 2018-01-01 RX ADMIN — LIDOCAINE HYDROCHLORIDE 15 ML: 10 INJECTION, SOLUTION EPIDURAL; INFILTRATION; INTRACAUDAL; PERINEURAL at 14:43

## 2018-01-01 RX ADMIN — OXYCODONE HYDROCHLORIDE 5 MG: 5 TABLET ORAL at 15:33

## 2018-01-01 RX ADMIN — SODIUM CHLORIDE, PRESERVATIVE FREE 5 ML: 5 INJECTION INTRAVENOUS at 17:57

## 2018-01-01 RX ADMIN — MULTIPLE VITAMINS W/ MINERALS TAB 1 TABLET: TAB at 09:38

## 2018-01-01 RX ADMIN — DEXAMETHASONE SODIUM PHOSPHATE 20 MG: 4 INJECTION, SOLUTION INTRA-ARTICULAR; INTRALESIONAL; INTRAMUSCULAR; INTRAVENOUS; SOFT TISSUE at 16:24

## 2018-01-01 RX ADMIN — OXYCODONE HYDROCHLORIDE 5 MG: 5 TABLET ORAL at 05:41

## 2018-01-01 RX ADMIN — SODIUM CHLORIDE 0.1 MG/KG/HR: 9 INJECTION, SOLUTION INTRAVENOUS at 09:00

## 2018-01-01 RX ADMIN — SODIUM CHLORIDE, PRESERVATIVE FREE 5 ML: 5 INJECTION INTRAVENOUS at 12:28

## 2018-01-01 RX ADMIN — SODIUM CHLORIDE 0.1 MG/KG/HR: 9 INJECTION, SOLUTION INTRAVENOUS at 05:06

## 2018-01-01 RX ADMIN — DEXAMETHASONE SODIUM PHOSPHATE 20 MG: 4 INJECTION, SOLUTION INTRAMUSCULAR; INTRAVENOUS at 09:48

## 2018-01-01 RX ADMIN — FUROSEMIDE 10 MG: 10 INJECTION, SOLUTION INTRAVENOUS at 13:12

## 2018-01-01 RX ADMIN — SODIUM CHLORIDE, PRESERVATIVE FREE 5 ML: 5 INJECTION INTRAVENOUS at 08:33

## 2018-01-01 RX ADMIN — MIDAZOLAM 2 MG: 1 INJECTION INTRAMUSCULAR; INTRAVENOUS at 11:27

## 2018-01-01 RX ADMIN — SODIUM CHLORIDE, PRESERVATIVE FREE 5 ML: 5 INJECTION INTRAVENOUS at 14:58

## 2018-01-01 RX ADMIN — MULTIPLE VITAMINS W/ MINERALS TAB 1 TABLET: TAB at 07:56

## 2018-01-01 RX ADMIN — Medication 5000 UNITS: at 14:52

## 2018-01-01 RX ADMIN — ENOXAPARIN SODIUM 60 MG: 60 INJECTION SUBCUTANEOUS at 18:26

## 2018-01-01 RX ADMIN — FENTANYL CITRATE 100 MCG: 50 INJECTION INTRAMUSCULAR; INTRAVENOUS at 11:27

## 2018-01-01 RX ADMIN — ENOXAPARIN SODIUM 60 MG: 60 INJECTION SUBCUTANEOUS at 15:49

## 2018-01-01 RX ADMIN — MIRTAZAPINE 15 MG: 15 TABLET, FILM COATED ORAL at 22:36

## 2018-01-01 RX ADMIN — OXYCODONE HYDROCHLORIDE 10 MG: 5 SOLUTION ORAL at 00:05

## 2018-01-01 RX ADMIN — RANITIDINE 150 MG: 150 TABLET, FILM COATED ORAL at 08:15

## 2018-01-01 RX ADMIN — POTASSIUM CHLORIDE 20 MEQ: 14.9 INJECTION, SOLUTION INTRAVENOUS at 22:49

## 2018-01-01 RX ADMIN — POTASSIUM CHLORIDE 20 MEQ: 29.8 INJECTION, SOLUTION INTRAVENOUS at 12:49

## 2018-01-01 RX ADMIN — RANITIDINE 150 MG: 150 TABLET, FILM COATED ORAL at 20:00

## 2018-01-01 RX ADMIN — AZITHROMYCIN 250 MG: 250 TABLET, FILM COATED ORAL at 20:12

## 2018-01-01 RX ADMIN — RANITIDINE 150 MG: 150 TABLET, FILM COATED ORAL at 22:37

## 2018-01-01 RX ADMIN — ALBUMIN HUMAN 12.5 G: 0.25 SOLUTION INTRAVENOUS at 08:35

## 2018-01-01 RX ADMIN — OXYCODONE HYDROCHLORIDE 5 MG: 5 TABLET ORAL at 23:13

## 2018-01-01 RX ADMIN — LIDOCAINE HYDROCHLORIDE 15 ML: 10 INJECTION, SOLUTION EPIDURAL; INFILTRATION; INTRACAUDAL; PERINEURAL at 08:15

## 2018-01-01 RX ADMIN — MULTIPLE VITAMINS W/ MINERALS TAB 1 TABLET: TAB at 08:04

## 2018-01-01 RX ADMIN — FUROSEMIDE 10 MG: 10 INJECTION, SOLUTION INTRAVENOUS at 12:34

## 2018-01-01 RX ADMIN — ALBUMIN HUMAN 50 G: 0.25 SOLUTION INTRAVENOUS at 10:09

## 2018-01-01 RX ADMIN — OXYCODONE HYDROCHLORIDE 5 MG: 5 TABLET ORAL at 05:52

## 2018-01-01 RX ADMIN — PROPOFOL 75 MCG/KG/MIN: 10 INJECTION, EMULSION INTRAVENOUS at 07:17

## 2018-01-01 RX ADMIN — AZITHROMYCIN 500 MG: 250 TABLET, FILM COATED ORAL at 20:47

## 2018-01-01 RX ADMIN — IPRATROPIUM BROMIDE AND ALBUTEROL SULFATE 3 ML: .5; 3 SOLUTION RESPIRATORY (INHALATION) at 13:35

## 2018-01-01 RX ADMIN — POTASSIUM CHLORIDE 20 MEQ: 29.8 INJECTION, SOLUTION INTRAVENOUS at 10:04

## 2018-01-01 RX ADMIN — OXYCODONE HYDROCHLORIDE 5 MG: 5 TABLET ORAL at 14:20

## 2018-01-01 RX ADMIN — FUROSEMIDE 10 MG: 10 INJECTION, SOLUTION INTRAMUSCULAR; INTRAVENOUS at 17:44

## 2018-01-01 RX ADMIN — SODIUM CHLORIDE, PRESERVATIVE FREE 5 ML: 5 INJECTION INTRAVENOUS at 13:12

## 2018-01-01 RX ADMIN — ENOXAPARIN SODIUM 50 MG: 60 INJECTION SUBCUTANEOUS at 01:20

## 2018-01-01 RX ADMIN — SODIUM CHLORIDE, PRESERVATIVE FREE 5 ML: 5 INJECTION INTRAVENOUS at 16:59

## 2018-01-01 RX ADMIN — OXYCODONE HYDROCHLORIDE 5 MG: 5 TABLET ORAL at 00:15

## 2018-01-01 RX ADMIN — PANTOPRAZOLE SODIUM 40 MG: 40 TABLET, DELAYED RELEASE ORAL at 07:34

## 2018-01-01 RX ADMIN — HEPARIN SODIUM 1400 UNITS/HR: 10000 INJECTION, SOLUTION INTRAVENOUS at 22:24

## 2018-01-01 RX ADMIN — SODIUM CHLORIDE 0.1 MG/KG/HR: 9 INJECTION, SOLUTION INTRAVENOUS at 17:04

## 2018-01-01 RX ADMIN — OXYCODONE HYDROCHLORIDE 5 MG: 5 TABLET ORAL at 03:47

## 2018-01-01 RX ADMIN — SODIUM CHLORIDE: 9 INJECTION, SOLUTION INTRAVENOUS at 06:57

## 2018-01-01 RX ADMIN — OXYCODONE HYDROCHLORIDE 5 MG: 5 TABLET ORAL at 11:25

## 2018-01-01 RX ADMIN — FUROSEMIDE 10 MG: 10 INJECTION, SOLUTION INTRAVENOUS at 20:00

## 2018-01-01 RX ADMIN — SODIUM CHLORIDE, PRESERVATIVE FREE 5 ML: 5 INJECTION INTRAVENOUS at 17:32

## 2018-01-01 RX ADMIN — POTASSIUM CHLORIDE 20 MEQ: 29.8 INJECTION, SOLUTION INTRAVENOUS at 12:04

## 2018-01-01 RX ADMIN — CEFTRIAXONE SODIUM 1 G: 1 INJECTION, POWDER, FOR SOLUTION INTRAMUSCULAR; INTRAVENOUS at 20:14

## 2018-01-01 RX ADMIN — POTASSIUM CHLORIDE 20 MEQ: 29.8 INJECTION, SOLUTION INTRAVENOUS at 10:06

## 2018-01-01 RX ADMIN — SENNOSIDES AND DOCUSATE SODIUM 2 TABLET: 8.6; 5 TABLET ORAL at 21:39

## 2018-01-01 RX ADMIN — OXYCODONE HYDROCHLORIDE 10 MG: 5 TABLET ORAL at 04:20

## 2018-01-01 RX ADMIN — ACETAMINOPHEN 650 MG: 325 TABLET, FILM COATED ORAL at 22:52

## 2018-01-01 RX ADMIN — POTASSIUM CHLORIDE 20 MEQ: 1.5 POWDER, FOR SOLUTION ORAL at 14:41

## 2018-01-01 RX ADMIN — MULTIPLE VITAMINS W/ MINERALS TAB 1 TABLET: TAB at 07:41

## 2018-01-01 RX ADMIN — DIPHENHYDRAMINE HYDROCHLORIDE 50 MG: 50 CAPSULE ORAL at 09:54

## 2018-01-01 RX ADMIN — ENOXAPARIN SODIUM 60 MG: 60 INJECTION SUBCUTANEOUS at 20:23

## 2018-01-01 RX ADMIN — SODIUM CHLORIDE 0.1 MG/KG/HR: 9 INJECTION, SOLUTION INTRAVENOUS at 23:03

## 2018-01-01 RX ADMIN — ENOXAPARIN SODIUM 60 MG: 60 INJECTION SUBCUTANEOUS at 09:33

## 2018-01-01 RX ADMIN — MULTIPLE VITAMINS W/ MINERALS TAB 1 TABLET: TAB at 07:47

## 2018-01-01 RX ADMIN — OXYCODONE HYDROCHLORIDE 5 MG: 5 TABLET ORAL at 08:23

## 2018-01-01 RX ADMIN — ACETAMINOPHEN 650 MG: 325 TABLET, FILM COATED ORAL at 22:35

## 2018-01-01 RX ADMIN — SODIUM CHLORIDE: 3 INJECTION, SOLUTION INTRAVENOUS at 13:35

## 2018-01-01 RX ADMIN — OXYCODONE HYDROCHLORIDE 10 MG: 5 TABLET ORAL at 04:04

## 2018-01-01 RX ADMIN — ACETAMINOPHEN 650 MG: 325 TABLET, FILM COATED ORAL at 14:55

## 2018-01-01 RX ADMIN — SODIUM CHLORIDE, PRESERVATIVE FREE 5 ML: 5 INJECTION INTRAVENOUS at 16:23

## 2018-01-01 RX ADMIN — FUROSEMIDE 10 MG: 10 INJECTION, SOLUTION INTRAVENOUS at 00:35

## 2018-01-01 RX ADMIN — ENOXAPARIN SODIUM 60 MG: 60 INJECTION SUBCUTANEOUS at 21:09

## 2018-01-01 RX ADMIN — ENOXAPARIN SODIUM 50 MG: 80 INJECTION SUBCUTANEOUS at 09:43

## 2018-01-01 RX ADMIN — DEXTROSE MONOHYDRATE 75 ML: 100 INJECTION, SOLUTION INTRAVENOUS at 10:15

## 2018-01-01 RX ADMIN — LIDOCAINE 2 PATCH: 560 PATCH PERCUTANEOUS; TOPICAL; TRANSDERMAL at 16:30

## 2018-01-01 RX ADMIN — OXYCODONE HYDROCHLORIDE 5 MG: 5 TABLET ORAL at 10:44

## 2018-01-01 RX ADMIN — OXYCODONE HYDROCHLORIDE 10 MG: 5 TABLET ORAL at 06:31

## 2018-01-01 RX ADMIN — OXYCODONE HYDROCHLORIDE 10 MG: 5 SOLUTION ORAL at 05:33

## 2018-01-01 RX ADMIN — VANCOMYCIN HYDROCHLORIDE 1250 MG: 10 INJECTION, POWDER, LYOPHILIZED, FOR SOLUTION INTRAVENOUS at 22:30

## 2018-01-01 RX ADMIN — ENOXAPARIN SODIUM 47 MG: 100 INJECTION SUBCUTANEOUS at 06:28

## 2018-01-01 RX ADMIN — OXYCODONE HYDROCHLORIDE 5 MG: 5 TABLET ORAL at 21:34

## 2018-01-01 RX ADMIN — ONDANSETRON HYDROCHLORIDE 4 MG: 2 INJECTION, SOLUTION INTRAMUSCULAR; INTRAVENOUS at 09:49

## 2018-01-01 RX ADMIN — POTASSIUM CHLORIDE 20 MEQ: 29.8 INJECTION, SOLUTION INTRAVENOUS at 14:33

## 2018-01-01 RX ADMIN — SODIUM CHLORIDE, PRESERVATIVE FREE 5 ML: 5 INJECTION INTRAVENOUS at 05:50

## 2018-01-01 RX ADMIN — OXYCODONE HYDROCHLORIDE 5 MG: 5 TABLET ORAL at 19:42

## 2018-01-01 RX ADMIN — OXYCODONE HYDROCHLORIDE 10 MG: 5 TABLET ORAL at 17:01

## 2018-01-01 RX ADMIN — ENOXAPARIN SODIUM 50 MG: 60 INJECTION SUBCUTANEOUS at 22:35

## 2018-01-01 RX ADMIN — PROCHLORPERAZINE EDISYLATE 10 MG: 5 INJECTION INTRAMUSCULAR; INTRAVENOUS at 18:13

## 2018-01-01 RX ADMIN — SENNOSIDES AND DOCUSATE SODIUM 2 TABLET: 8.6; 5 TABLET ORAL at 20:25

## 2018-01-01 RX ADMIN — LIDOCAINE HYDROCHLORIDE 20 ML: 10 INJECTION, SOLUTION EPIDURAL; INFILTRATION; INTRACAUDAL; PERINEURAL at 08:34

## 2018-01-01 RX ADMIN — SODIUM CHLORIDE, PRESERVATIVE FREE 5 ML: 5 INJECTION INTRAVENOUS at 08:24

## 2018-01-01 RX ADMIN — POTASSIUM CHLORIDE 20 MEQ: 400 INJECTION, SOLUTION INTRAVENOUS at 22:20

## 2018-01-01 RX ADMIN — MIRTAZAPINE 15 MG: 15 TABLET, FILM COATED ORAL at 23:13

## 2018-01-01 RX ADMIN — PANTOPRAZOLE SODIUM 40 MG: 40 TABLET, DELAYED RELEASE ORAL at 08:08

## 2018-01-01 RX ADMIN — MIRTAZAPINE 15 MG: 15 TABLET, FILM COATED ORAL at 21:39

## 2018-01-01 RX ADMIN — ENOXAPARIN SODIUM 60 MG: 60 INJECTION SUBCUTANEOUS at 18:44

## 2018-01-01 RX ADMIN — RANITIDINE 150 MG: 150 TABLET, FILM COATED ORAL at 08:42

## 2018-01-01 RX ADMIN — RANITIDINE 150 MG: 150 TABLET, FILM COATED ORAL at 20:25

## 2018-01-01 RX ADMIN — OXYCODONE HYDROCHLORIDE 5 MG: 5 TABLET ORAL at 16:16

## 2018-01-01 RX ADMIN — SODIUM CHLORIDE, PRESERVATIVE FREE 5 ML: 5 INJECTION INTRAVENOUS at 10:53

## 2018-01-01 RX ADMIN — SODIUM CHLORIDE, PRESERVATIVE FREE 5 ML: 5 INJECTION INTRAVENOUS at 18:26

## 2018-01-01 RX ADMIN — SENNOSIDES AND DOCUSATE SODIUM 1 TABLET: 8.6; 5 TABLET ORAL at 20:12

## 2018-01-01 RX ADMIN — SODIUM CHLORIDE, PRESERVATIVE FREE 5 ML: 5 INJECTION INTRAVENOUS at 12:10

## 2018-01-01 RX ADMIN — OXYCODONE HYDROCHLORIDE 5 MG: 5 TABLET ORAL at 19:44

## 2018-01-01 RX ADMIN — ALBUMIN HUMAN 25 G: 0.25 SOLUTION INTRAVENOUS at 13:07

## 2018-01-01 RX ADMIN — DRONABINOL 2.5 MG: 2.5 CAPSULE ORAL at 20:37

## 2018-01-01 RX ADMIN — MIRTAZAPINE 15 MG: 15 TABLET, FILM COATED ORAL at 21:03

## 2018-01-01 RX ADMIN — FERROUS SULFATE TAB 325 MG (65 MG ELEMENTAL FE) 325 MG: 325 (65 FE) TAB at 08:04

## 2018-01-01 RX ADMIN — OXYCODONE HYDROCHLORIDE 5 MG: 5 TABLET ORAL at 23:47

## 2018-01-01 RX ADMIN — SODIUM CHLORIDE, PRESERVATIVE FREE 82 ML: 5 INJECTION INTRAVENOUS at 01:01

## 2018-01-01 RX ADMIN — OXYCODONE HYDROCHLORIDE 5 MG: 5 TABLET ORAL at 09:46

## 2018-01-01 RX ADMIN — OXYCODONE HYDROCHLORIDE 10 MG: 5 TABLET ORAL at 22:36

## 2018-01-01 RX ADMIN — SODIUM CHLORIDE, PRESERVATIVE FREE 5 ML: 5 INJECTION INTRAVENOUS at 10:32

## 2018-01-01 ASSESSMENT — ACTIVITIES OF DAILY LIVING (ADL)
SWALLOWING: 0-->SWALLOWS FOODS/LIQUIDS WITHOUT DIFFICULTY
ADLS_ACUITY_SCORE: 11
ADLS_ACUITY_SCORE: 24
ADLS_ACUITY_SCORE: 22
ADLS_ACUITY_SCORE: 11
ADLS_ACUITY_SCORE: 11
ADLS_ACUITY_SCORE: 23
ADLS_ACUITY_SCORE: 23
ADLS_ACUITY_SCORE: 11
ADLS_ACUITY_SCORE: 24
ADLS_ACUITY_SCORE: 23
ADLS_ACUITY_SCORE: 11
ADLS_ACUITY_SCORE: 22
ADLS_ACUITY_SCORE: 22
ADLS_ACUITY_SCORE: 11
AMBULATION: 0-->INDEPENDENT
ADLS_ACUITY_SCORE: 24
ADLS_ACUITY_SCORE: 23
PREVIOUS_RESPONSIBILITIES: MEAL PREP
ADLS_ACUITY_SCORE: 26
ADLS_ACUITY_SCORE: 28
ADLS_ACUITY_SCORE: 11
FALL_HISTORY_WITHIN_LAST_SIX_MONTHS: NO
TOILETING: 0-->INDEPENDENT
ADLS_ACUITY_SCORE: 24
ADLS_ACUITY_SCORE: 24
ADLS_ACUITY_SCORE: 22
ADLS_ACUITY_SCORE: 22
ADLS_ACUITY_SCORE: 23
ADLS_ACUITY_SCORE: 22
ADLS_ACUITY_SCORE: 28
ADLS_ACUITY_SCORE: 11
ADLS_ACUITY_SCORE: 23
ADLS_ACUITY_SCORE: 22
ADLS_ACUITY_SCORE: 23
RETIRED_COMMUNICATION: 0-->UNDERSTANDS/COMMUNICATES WITHOUT DIFFICULTY
ADLS_ACUITY_SCORE: 11
ADLS_ACUITY_SCORE: 23
ADLS_ACUITY_SCORE: 26
ADLS_ACUITY_SCORE: 23
TOILETING: 0-->INDEPENDENT
ADLS_ACUITY_SCORE: 24
BATHING: 0-->INDEPENDENT
ADLS_ACUITY_SCORE: 23
ADLS_ACUITY_SCORE: 11
ADLS_ACUITY_SCORE: 23
ADLS_ACUITY_SCORE: 27
ADLS_ACUITY_SCORE: 11
ADLS_ACUITY_SCORE: 28
SWALLOWING: 0-->SWALLOWS FOODS/LIQUIDS WITHOUT DIFFICULTY
ADLS_ACUITY_SCORE: 11
ADLS_ACUITY_SCORE: 23
ADLS_ACUITY_SCORE: 23
ADLS_ACUITY_SCORE: 24
ADLS_ACUITY_SCORE: 11
ADLS_ACUITY_SCORE: 23
ADLS_ACUITY_SCORE: 23
ADLS_ACUITY_SCORE: 28
ADLS_ACUITY_SCORE: 23
ADLS_ACUITY_SCORE: 23
ADLS_ACUITY_SCORE: 27
ADLS_ACUITY_SCORE: 11
ADLS_ACUITY_SCORE: 11
RETIRED_COMMUNICATION: 0-->UNDERSTANDS/COMMUNICATES WITHOUT DIFFICULTY
ADLS_ACUITY_SCORE: 11
ADLS_ACUITY_SCORE: 22
ADLS_ACUITY_SCORE: 24
ADLS_ACUITY_SCORE: 23
ADLS_ACUITY_SCORE: 22
ADLS_ACUITY_SCORE: 22
ADLS_ACUITY_SCORE: 11
ADLS_ACUITY_SCORE: 24
ADLS_ACUITY_SCORE: 23
ADLS_ACUITY_SCORE: 22
ADLS_ACUITY_SCORE: 11
ADLS_ACUITY_SCORE: 23
ADLS_ACUITY_SCORE: 11
ADLS_ACUITY_SCORE: 11
ADLS_ACUITY_SCORE: 24
ADLS_ACUITY_SCORE: 11
COGNITION: 0 - NO COGNITION ISSUES REPORTED
TRANSFERRING: 0-->INDEPENDENT
ADLS_ACUITY_SCORE: 28
DRESS: 0-->INDEPENDENT
ADLS_ACUITY_SCORE: 23
ADLS_ACUITY_SCORE: 11
RETIRED_EATING: 0-->INDEPENDENT
ADLS_ACUITY_SCORE: 11
ADLS_ACUITY_SCORE: 27
ADLS_ACUITY_SCORE: 23
ADLS_ACUITY_SCORE: 11
ADLS_ACUITY_SCORE: 24
ADLS_ACUITY_SCORE: 23
ADLS_ACUITY_SCORE: 22
DRESS: 0-->INDEPENDENT
ADLS_ACUITY_SCORE: 28
ADLS_ACUITY_SCORE: 22
ADLS_ACUITY_SCORE: 22
ADLS_ACUITY_SCORE: 23
ADLS_ACUITY_SCORE: 11
ADLS_ACUITY_SCORE: 23
ADLS_ACUITY_SCORE: 11
ADLS_ACUITY_SCORE: 23
ADLS_ACUITY_SCORE: 11
ADLS_ACUITY_SCORE: 24
ADLS_ACUITY_SCORE: 11
ADLS_ACUITY_SCORE: 11
ADLS_ACUITY_SCORE: 23
ADLS_ACUITY_SCORE: 23
ADLS_ACUITY_SCORE: 26
ADLS_ACUITY_SCORE: 24
ADLS_ACUITY_SCORE: 11
ADLS_ACUITY_SCORE: 23
ADLS_ACUITY_SCORE: 22
ADLS_ACUITY_SCORE: 11
ADLS_ACUITY_SCORE: 27
ADLS_ACUITY_SCORE: 11
RETIRED_EATING: 0-->INDEPENDENT
ADLS_ACUITY_SCORE: 24
ADLS_ACUITY_SCORE: 28
ADLS_ACUITY_SCORE: 11
ADLS_ACUITY_SCORE: 23
ADLS_ACUITY_SCORE: 24
ADLS_ACUITY_SCORE: 24
ADLS_ACUITY_SCORE: 11
ADLS_ACUITY_SCORE: 11
ADLS_ACUITY_SCORE: 22
ADLS_ACUITY_SCORE: 23
ADLS_ACUITY_SCORE: 11
ADLS_ACUITY_SCORE: 23
ADLS_ACUITY_SCORE: 27
ADLS_ACUITY_SCORE: 11
ADLS_ACUITY_SCORE: 26
PREVIOUS_RESPONSIBILITIES: MEAL PREP;HOUSEKEEPING;LAUNDRY;SHOPPING;DRIVING;WORK;CHILD CARE
ADLS_ACUITY_SCORE: 28
BATHING: 0-->INDEPENDENT
ADLS_ACUITY_SCORE: 27
ADLS_ACUITY_SCORE: 23
ADLS_ACUITY_SCORE: 27
ADLS_ACUITY_SCORE: 24
ADLS_ACUITY_SCORE: 11

## 2018-01-01 ASSESSMENT — PAIN DESCRIPTION - DESCRIPTORS
DESCRIPTORS: DISCOMFORT;SORE
DESCRIPTORS: DISCOMFORT
DESCRIPTORS: PATIENT UNABLE TO DESCRIBE
DESCRIPTORS: SORE
DESCRIPTORS: PRESSURE
DESCRIPTORS: DISCOMFORT
DESCRIPTORS: ACHING
DESCRIPTORS: ACHING
DESCRIPTORS: ACHING;SORE
DESCRIPTORS: ACHING
DESCRIPTORS: DISCOMFORT
DESCRIPTORS: ACHING
DESCRIPTORS: DISCOMFORT
DESCRIPTORS: SHARP
DESCRIPTORS: ACHING
DESCRIPTORS: DISCOMFORT;SORE
DESCRIPTORS: ACHING
DESCRIPTORS: SORE
DESCRIPTORS: ACHING
DESCRIPTORS: ACHING;SORE
DESCRIPTORS: DISCOMFORT
DESCRIPTORS: SORE;ACHING
DESCRIPTORS: ACHING
DESCRIPTORS: DISCOMFORT
DESCRIPTORS: PATIENT UNABLE TO DESCRIBE
DESCRIPTORS: DISCOMFORT;PRESSURE
DESCRIPTORS: DISCOMFORT
DESCRIPTORS: PATIENT UNABLE TO DESCRIBE
DESCRIPTORS: PATIENT UNABLE TO DESCRIBE
DESCRIPTORS: DISCOMFORT
DESCRIPTORS: ACHING
DESCRIPTORS: PATIENT UNABLE TO DESCRIBE
DESCRIPTORS: ACHING
DESCRIPTORS: DISCOMFORT
DESCRIPTORS: SORE
DESCRIPTORS: PRESSURE
DESCRIPTORS: DISCOMFORT
DESCRIPTORS: ACHING;SHARP
DESCRIPTORS: DISCOMFORT
DESCRIPTORS: DISCOMFORT;SORE
DESCRIPTORS: DISCOMFORT
DESCRIPTORS: SORE
DESCRIPTORS: DISCOMFORT
DESCRIPTORS: SORE
DESCRIPTORS: ACHING
DESCRIPTORS: DISCOMFORT;DULL
DESCRIPTORS: PRESSURE
DESCRIPTORS: DISCOMFORT
DESCRIPTORS: DISCOMFORT
DESCRIPTORS: SORE
DESCRIPTORS: ACHING
DESCRIPTORS: ACHING
DESCRIPTORS: SORE
DESCRIPTORS: ACHING
DESCRIPTORS: DISCOMFORT
DESCRIPTORS: PATIENT UNABLE TO DESCRIBE
DESCRIPTORS: DISCOMFORT
DESCRIPTORS: DISCOMFORT;SORE;TIGHTNESS
DESCRIPTORS: PRESSURE;SORE;TIGHTNESS
DESCRIPTORS: PRESSURE
DESCRIPTORS: SORE

## 2018-01-01 ASSESSMENT — ENCOUNTER SYMPTOMS
SHORTNESS OF BREATH: 0
FEVER: 0
APPETITE CHANGE: 1
FATIGUE: 1
FEVER: 0
VOMITING: 0
DIARRHEA: 0
CHILLS: 0
ABDOMINAL PAIN: 0
WEAKNESS: 1
DIFFICULTY URINATING: 0
RHINORRHEA: 0
ARTHRALGIAS: 0
SORE THROAT: 0
CONFUSION: 0
COLOR CHANGE: 0
FATIGUE: 1
UNEXPECTED WEIGHT CHANGE: 1
EYES NEGATIVE: 1
HEADACHES: 0
DIAPHORESIS: 1
COUGH: 0
SHORTNESS OF BREATH: 1
SHORTNESS OF BREATH: 0
ENDOCRINE NEGATIVE: 1
FEVER: 1
ABDOMINAL DISTENTION: 1
HEADACHES: 0
NECK STIFFNESS: 0
EYE REDNESS: 0
NAUSEA: 0

## 2018-01-01 ASSESSMENT — PAIN SCALES - GENERAL
PAINLEVEL: NO PAIN (0)
PAINLEVEL: MODERATE PAIN (4)
PAINLEVEL: MODERATE PAIN (4)
PAINLEVEL: NO PAIN (0)

## 2018-05-30 NOTE — MR AVS SNAPSHOT
"              After Visit Summary   2018    Maria Esther Wang    MRN: [id number]           Patient Information     Date Of Birth          1960        Visit Information        Provider Department      2018 11:25 AM Marla Rai NP [facility]        Today's Diagnoses     Distended abdomen    -  1    Weight loss        Other fatigue           Follow-ups after your visit        Follow-up notes from your care team     Return if symptoms worsen or fail to improve.      Who to contact     If you have questions or need follow up information about today's clinic visit or your schedule please contact [facility] directly at [phone].  Normal or non-critical lab and imaging results will be communicated to you by MyChart, letter or phone within 4 business days after the clinic has received the results. If you do not hear from us within 7 days, please contact the clinic through MyChart or phone. If you have a critical or abnormal lab result, we will notify you by phone as soon as possible.  Submit refill requests through [facility] or call your pharmacy and they will forward the refill request to us. Please allow 3 business days for your refill to be completed.          Additional Information About Your Visit        MyChart Information     [facility] lets you send messages to your doctor, view your test results, renew your prescriptions, schedule appointments and more. To sign up, go to [email or web address] . Click on \"Log in\" on the left side of the screen, which will take you to the Welcome page. Then click on \"Sign up Now\" on the right side of the page.     You will be asked to enter the access code listed below, as well as some personal information. Please follow the directions to create your username and password.     Your access code is: [email or web address]  Expires: 2018  1:27 PM     Your access code will  in 90 days. If you need help or a new code, please call " your [place] clinic or [phone].        Care EveryWhere ID     This is your Care EveryWhere ID. This could be used by other organizations to access your [place] medical records  [id number]        Your Vitals Were     Pulse Temperature Pulse Oximetry             82 99.2  F (37.3  C) (Oral) 98%          Blood Pressure from Last 3 Encounters:   05/30/18 151/58    Weight from Last 3 Encounters:   05/30/18 104 lb 9.6 oz (47.4 kg)              We Performed the Following     CBC with platelets and differential     Comprehensive metabolic panel (BMP + Alb, Alk Phos, ALT, AST, Total. Bili, TP)     TSH with free T4 reflex     XR Abdomen 2 Views        Primary Care Provider Fax #    Provider Not In System [phone]                Equal Access to Services     KHADIJAH ROCHA : Hadii aad ku hadasho Soomaali, waaxda luqadaha, qaybta kaalmada adeegyada, waxay kayleigh jacques . So [facility] [place] [phone].    ATENCIÓN: Si habla español, tiene a alford disposición servicios gratuitos de asistencia lingüística. Llame al [phone].    We comply with applicable federal civil rights laws and [place] laws. We do not discriminate on the basis of race, color, national origin, age, disability, sex, sexual orientation, or gender identity.            Thank you!     Thank you for choosing [facility]  for your care. Our goal is always to provide you with excellent care. Hearing back from our patients is one way we can continue to improve our services. Please take a few minutes to complete the written survey that you may receive in the mail after your visit with us. Thank you!             Your Updated Medication List - Protect others around you: Learn how to safely use, store and throw away your medicines at www.disposemymeds.org.          This list is accurate as of 5/30/18  1:27 PM.  Always use your most recent med list.                   Brand Name Dispense Instructions for use Diagnosis    PRILOSEC PO

## 2018-05-30 NOTE — PROGRESS NOTES
SUBJECTIVE:   Maria Esther Wang is a 58 year old female presenting with a chief complaint of   Chief Complaint   Patient presents with     Abdominal Pain     Patient complains of fullness in abdomen, weight loss, and fatigue       She is a new patient of [facility].  Maria Esther is a 58 years old female presenting with complains of abdominal distention that has been progressing in the last 2 weeks, weight loss in the last 2 weeks of 11 pounds and fatigue.  The patient reports that she has never seen a doctor for many years.  She denies abdominal pain, constipation, diarrhea or gas distention.  She reports normal bowel movements and has been eating well.  She has not used any medications. The patient is here with his sister.  An appointment will be made with our scheduling team       Review of Systems   Constitutional: Positive for fatigue and unexpected weight change. Negative for chills and fever.   HENT: Negative for congestion, ear pain, rhinorrhea and sore throat.    Eyes: Negative.    Respiratory: Negative for cough and shortness of breath.    Gastrointestinal: Positive for abdominal distention. Negative for diarrhea, nausea and vomiting.   Endocrine: Negative.    Skin: Negative.    Neurological: Negative for headaches.       No past medical history on file.  No family history on file.  Current Outpatient Prescriptions   Medication Sig Dispense Refill     Omeprazole (PRILOSEC PO)        Social History   Substance Use Topics     Smoking status: Never Smoker     Smokeless tobacco: Never Used     Alcohol use Not on file       OBJECTIVE  /58 (BP Location: Left arm, Patient Position: Chair, Cuff Size: Adult Regular)  Pulse 82  Temp 99.2  F (37.3  C) (Oral)  Wt 104 lb 9.6 oz (47.4 kg)  SpO2 98%    Physical Exam   Constitutional: She appears well-developed and well-nourished. No distress.   HENT:   Head: Normocephalic and atraumatic.   Right Ear: Tympanic membrane and external ear normal.   Left Ear:  Tympanic membrane and external ear normal.   Mouth/Throat: Oropharynx is clear and moist.   Eyes: EOM are normal. Pupils are equal, round, and reactive to light.   Neck: Normal range of motion. Neck supple.   Pulmonary/Chest: Effort normal and breath sounds normal. No respiratory distress.   Abdominal: Bowel sounds are normal. She exhibits distension.   Lymphadenopathy:     She has no cervical adenopathy.   Neurological: She is alert. No cranial nerve deficit.   Skin: Skin is warm and dry. She is not diaphoretic.   Psychiatric: She has a normal mood and affect.   Nursing note and vitals reviewed.      Labs:  Results for orders placed or performed in visit on 05/30/18 (from the past 24 hour(s))   XR Abdomen 2 Views    Narrative    XR ABDOMEN 2 VW 5/30/2018 11:52 AM    COMPARISON: None.    HISTORY: Distended abdomen for 2 weeks, no constipation.      Impression    IMPRESSION: Centralization of bowel loops suggests ascites. No  distended loops of bowel are seen. Lung bases are clear. No free air  identified.    SAMUEL BAH MD       ASSESSMENT:      ICD-10-CM    1. Distended abdomen R14.0 Comprehensive metabolic panel (BMP + Alb, Alk Phos, ALT, AST, Total. Bili, TP)     XR Abdomen 2 Views   2. Weight loss R63.4 CBC with platelets and differential   3. Other fatigue R53.83 CBC with platelets and differential     TSH with free T4 reflex        Differential Diagnosis:  GERD/Ulcer, Bowel Obstruction, Hepatitis and Ascites    Serious Comorbid Conditions:  Adult:  None    PLAN:  Unexplained weight loss in the last 2 weeks, abdominal distension with no constipation or diarrhea. Fatigue. I will order a bdominal xray, CBC, Comprehensive panel and have a follow up with primary in a day or two.  Advised to go to ER if abdominal distension or other symptoms are getting worse and unable to see PCP.

## 2018-06-19 PROBLEM — I26.99 PULMONARY EMBOLI (H): Status: ACTIVE | Noted: 2018-01-01

## 2018-06-19 NOTE — H&P
Gynecology/Oncology   History and Physical    CC: Pulmonary embolism    HPI: Maria Esther Wang is a 58 year old  post-menopausal female who presents as a transfer of care from [facility] for new diagnosis of incidental PE in the setting of suspected gynecologic malignancy.  She reports no cardiac or respiratory symptoms- denies chest pain, shortness of breath, or dyspnea. She has had a cough for 2 weeks which has been treated with over the counter cough medications. Denies a family history of DVT or PE.    She reports being healthy until 3 weeks ago when she noted increased abdominal bloating and tightness. She endorses associated decreased appetite and a 10 pound weight loss over the past month. She denies nausea or vomiting. Denies fevers, chills, urinary symptoms, vaginal/rectal/urinary bleeding, or constipation. Had a loose bowel movement this morning. She denies a family history of cervical, uterine, ovarian, breast, or colon cancer.     From an OB/GYN history, she reports she has never had a Pap smear. She has never been pregnant.     Cancer History  Patient presented to urgent care on 18 with complaints of abdominal bloating, 11 lb wt loss in span of 2-3 wks. At urgent care, XR showed ascites. She followed up with her PCP on 18. Per the PCP note she denied pain, diarrhea, constipation, bloody stools, dysuria, and fever/chills. PCP obtained CT abdomen/pelvis which showed complex pelvic masses highly suspicious for primary ovarian malignancy, possible invasin of uterus, diffuse peritoneal carcinomatosis, metastatic LAD, and large volume ascites. She was then referred to IR at [facility] for paracentesis and CT w/contrast, scheduled for 18, during this appt she was incidentally found to have bilateral PE and was admitted directly to [facility] ICU and was started on a heparin gtt. She was also found to be anemic to 6.5 and received 1 unit PRBCs with appropriate rise to 7.6 on  discharge. Her anemia was evaluated with peripheral smear and iron studies, which were consistent with anemia of chronic disease. She was transferred to [facility] for management of her PE in the setting of suspected gynecologic malignancy.     ROS:  Gen: No fevers/chills  Neuro: No dizziness.  CV: No CP  Pulm: No SOB or cough  GI: No N/V.  No constipation.   : No dysuria, frequency, urgency.  No bothersome vaginal discharge  Psych: No anxiety/depression    PMH:  Past Medical History:   Diagnosis Date     NO ACTIVE PROBLEMS        PSHx:  Past Surgical History:   Procedure Laterality Date     OTHER SURGICAL HISTORY      no surgical history       Meds:  None     Allergies:   No Known Allergies     FamHx:  Denies a family history of DVT or PE. Denies a family history of cervical, uterine, ovarian, breast, or colon cancer.     SocialHx:  Social History     Social History     Marital status: Single     Spouse name: N/A     Number of children: N/A     Years of education: N/A     Social History Main Topics     Smoking status: Never Smoker     Smokeless tobacco: Never Used     Alcohol use None     Drug use: Denies     Sexual activity: Not sexually active       Vitals:  Vitals:    06/19/18 1922   BP: 120/53   BP Location: Left arm   Pulse: 108   Resp: 18   Temp: 100  F (37.8  C)   TempSrc: Oral     PEx:  Gen: Cachectic. No distress, comfortable; lines present include PIV x1  HEENT: Normocephalic, atraumatic  Neuro: grossly normal motor movement bilaterally  CV: RRR, nl S1/S2, no murmurs/clicks/gallops, peripheral pulses 2+ bilaterally  Pulm: Diminished left lower lobe lung sounds, bilateral crackles. No wheezing. Non labored breathing   Abd: Distended but soft, dullness to percussion. + fluid wave.   Pelvic: deferred per patient preference, will perform exam in AM  Ext: Non-tender, no erythema; trace edema  Skin: No rashes appreciated  Psych: Appropriate insight/judgment    Labs from [facility]   6/18/18 Labs   6/19/18  Repeat Labs  Potassium 3.3   3.7  Mg 2.1    2.0  Cr 0.49    0.47  CA-125 = 267  Na 135  Ca 7.6  Albumin 1.8  Direct bili 0.31    WBC    10.8  RBC    3.45  Hgb    7.6  Hct     24.3  MCV    70  Plt    263    6/18/2018 Iron studies  TIBC 115 (L) 250 - 450 ug/dL   IRON SATURATION 14 (L) 15 - 50 %   FERRITIN, SERUM 657 (H) 8 - 388 ng/mL   IRON 16 (L) 40 - 170 ug/dL   TRANSFERRIN, SERUM 83 (L)      HAPTOGLOBIN, SERUM 298 (H) 30 - 200 mg/dL     6/18/2018 LFTs  ALT 37 12 - 68 IU/L   ALKALINE P'TASE 65 45 - 117 IU/L   AST (SGOT) 25 12 - 37 IU/L   PROTEIN TOTAL 6.5 6.4 - 8.2 g/dL   ALBUMIN 1.8 (L) 3.4 - 5.0 gm/dL   BILIRUBIN-DIRECT 0.31 (H) 0.05 - 0.24 mg/dL   BILIRUBIN-TOTAL 0.8 0.2 - 1.0 mg/dL     6/18/18 Blood smear  Peripheral blood, morphology -   1. Moderate microcytic anemia  2. Mild absolute neutrophilia  3. Moderate absolute lymphocytopenia  Comment Serologic iron studies are most consistent with anemia of chronic disease.     Peritoneal fluid cytology  Final Diagnosis Peritoneal fluid, cytology and cell block-Adenocarcinoma.   Gross Description Peritoneal fluid: 50 cc of cloudy, red fluid received fresh. One ThinPrep, one Diff-Quik, and one cell block. ([facility])   Microscopic Description Three cytologic preparations and cell block reveal abundant malignant appearing glandular structures consistent with involvement of peritoneal fluid by adenocarcinoma.       Labs on admission- 6/19/2018  Component      Latest Ref Rng & Units 6/19/2018   Sodium      133 - 144 mmol/L 135   Potassium      3.4 - 5.3 mmol/L 3.5   Chloride      94 - 109 mmol/L 104   Carbon Dioxide      20 - 32 mmol/L 22   Anion Gap      3 - 14 mmol/L 8   Glucose      70 - 99 mg/dL 112 (H)   Urea Nitrogen      7 - 30 mg/dL 13   Creatinine      0.52 - 1.04 mg/dL 0.51 (L)     Component      Latest Ref Rng & Units 6/19/2018   Albumin      3.4 - 5.0 g/dL 1.6 (L)   Protein Total      6.8 - 8.8 g/dL 6.7 (L)     Component      Latest Ref Rng & Units 6/19/2018   WBC       4.0 - 11.0 10e9/L 12.9 (H)   RBC Count      3.8 - 5.2 10e12/L 3.85   Hemoglobin      11.7 - 15.7 g/dL 8.3 (L)   Hematocrit      35.0 - 47.0 % 27.3 (L)   MCV      78 - 100 fl 71 (L)   MCH      26.5 - 33.0 pg 21.6 (L)   MCHC      31.5 - 36.5 g/dL 30.4 (L)   RDW      10.0 - 15.0 % 24.8 (H)   Platelet Count      150 - 450 10e9/L 327     Component      Latest Ref Rng & Units 6/19/2018   Lactate for Sepsis Protocol      0.4 - 1.9 mmol/L 0.9       Imaging:  Pelvic U/S 6/18/2018   IMPRESSION:  1.  Enlarged mixed cystic solid left adnexal mass with dominant solid-appearing mural nodules probably of ovarian etiology and concerning for neoplasm. A CT abdomen pelvis is pending.  2.  Suspected right ovary appears normal.  3.  Unremarkable uterus.  4.  Large complex pelvic ascites in keeping with carcinomatosis.    Lower extremity venous U/S 6/18/2018  IMPRESSION:  This study is positive for deep vein thrombosis bilaterally with clot seen in the right and left popliteal veins, the right and left posterior tibial veins, and in the left peroneal vein. Clot is also seen at the left saphenofemoral Junction.    CT abdomen/pelvis 6/1/2018  Impression:     1. Complex pelvic masses, highly suspicious for primary ovarian     malignancy (e.g. cystadenocarcinoma) with possible invasion of the     uterine fundus.    2. Diffuse peritoneal carcinomatosis and metastatic lymphadenopathy.    3. Large volume ascites.       Assessment: Maria Esther Wang is a 58 year old with a pelvic mass, malignant ascites, and peritoneal carcinomatosis concerning for ovarian or primary peritoneal malignancy, currently admitted for a new diagnosis of bilateral pulmonary emboli. She was on a heparin gtt at OSH, but now will transition to therapeutic lovenox. She has cytology proven adenocarcinoma based on thoracentesis performed at [facility], however we don't have biopsy proven diagnosis. Therefore will obtain an discuss CT images with IR to determine  if any lesions are amenable to biopsy.      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 Elevated CA-125 and peritoneal fluid positive for adenocarcinoma. Imaging suggests metastatic disease with mediastinal and axillary lymphadenopathy. Plan for biopsy if amenable to IR guided biopsy.  FEN: NPO at midnight in anticipation of possible biopsy tomorrow. Nutrition consulted for poor nutritional status.   Pain: PRN tylenol   Heme: Anemia of chronic disease based on workup at OSH, s/p 1U pRBCs. Hemoglobin stable.  CV: Bilateral DVTs and pulmonary embolism, likely secondary to malignancy. Was on heparin gtt at OSH, will transition to lovenox now.  Resp: Newly diagnosed bilateral PEs, see above.  Not requiring oxygen, asymptomatic.    GI: Malignancy ascites s/p therapeutic and diagnostic paracentesis on 6/18/18 with 3.6L. .   : No issues.   MSK: No issues  Neuro/Psych: No issues  Endocrine: No issues.   ID: Febrile shortly after admission, Lactate and WBC unremakable. Urinalysis ordered. Blood culture collected. PPx: SCDs, on therapeutic lovenox  Disposition: Inpatient for transition to lovenox anticoagulation, and possible biopsy of metastatic lesion.     Staffed with Dr. Phipps and Dr. Jim Dasilva MD   Resident Physician, PGY3  Obstetrics, Gynecology, and Women's Health      Provider Disclosure:   I agree with above History, Review of Systems, Physical exam and Plan. I have reviewed the content of the documentation and have edited it as needed. I have seen and personally performed the services documented here and the documentation accurately represents those services and the decisions I have made.     Electronically signed by:   Geo Fernandez MD    Gynecologic Oncology   [facility]

## 2018-06-19 NOTE — IP AVS SNAPSHOT
[address] [address]    Phone:  [phone]                                       After Visit Summary   6/19/2018    Maria Esther Wang    MRN: [id number]           After Visit Summary Signature Page     I have received my discharge instructions, and my questions have been answered. I have discussed any challenges I see with this plan with the nurse or doctor.    ..........................................................................................................................................  Patient/Patient Representative Signature      ..........................................................................................................................................  Patient Representative Print Name and Relationship to Patient    ..................................................               ................................................  Date                                            Time    ..........................................................................................................................................  Reviewed by Signature/Title    ...................................................              ..............................................  Date                                                            Time

## 2018-06-19 NOTE — IP AVS SNAPSHOT
MRN:[id number]                      After Visit Summary   6/19/2018    Maria Esther Wang    MRN: [id number]           Thank you!     Thank you for choosing [facility] for your care. Our goal is always to provide you with excellent care. Hearing back from our patients is one way we can continue to improve our services. Please take a few minutes to complete the written survey that you may receive in the mail after you visit with us. Thank you!        Patient Information     Date Of Birth          1960        Designated Caregiver       Most Recent Value    Caregiver    Will someone help with your care after discharge? yes    Name of designated caregiver Jarret    Phone number of caregiver [phone]    Caregiver address declined      About your hospital stay     You were admitted on:  June 19, 2018 You last received care in the:  [facility]    You were discharged on:  June 24, 2018        Reason for your hospital stay       Pelvic mass  Ascites  Cancer                  Who to Call     For medical emergencies, please call 911.  For non-urgent questions about your medical care, please call your primary care provider or clinic, None          Attending Provider     Provider Specialty    Geo Fernandez MD Obstetrics & Gynecology, Maternal & Fetal Medicine       Primary Care Provider Fax #    Physician No Ref-Primary [phone]      After Care Instructions     Activity       Your activity upon discharge: activity as tolerated            Diet       Follow this diet upon discharge: Orders Placed This Encounter      Snacks/Supplements Adult: Boost Shake; Between Meals      Regular Diet Adult                  Follow-up Appointments     Adult [facility] Follow-up and recommended labs and tests       Follow-up appointment: Monday 6/25 8:15 AM Dr. Fernandez [address] [facility].    Please call the clinic at [phone] with questions or concerns.                  Your next 10  appointments already scheduled     Jun 25, 2018  7:45 AM CDT   [facility]  [facility]   [facility] ([facility])    [address]  [address]  [facility] [address] [phone]            Jun 25, 2018  8:15 AM CDT   New Patient Visit with Geo Fernandez MD   [facility] ([facility])    [address]  [address]  [facility] [address] [phone]              Pending Results     Date and Time Order Name Status Description    6/22/2018 1407 Urine Culture Aerobic Bacterial Preliminary     6/22/2018 1407 Blood culture Preliminary     6/21/2018 0658 Fluid Culture Aerobic Bacterial Preliminary     6/20/2018 1800 Surgical Path Exam In process     6/20/2018 0922 IR Paracentesis Preliminary     6/20/2018 0921 IR Abdominal Retroperitoneal Biopsy Preliminary     6/20/2018 0040 Blood culture Preliminary     6/20/2018 0040 Blood culture Preliminary             Statement of Approval     Ordered          06/24/18 1304  I have reviewed and agree with all the recommendations and orders detailed in this document.  EFFECTIVE NOW     Approved and electronically signed by:  Argelia Brooks MD           06/22/18 1225  I have reviewed and agree with all the recommendations and orders detailed in this document.  EFFECTIVE NOW     Approved and electronically signed by:  Argelia Brooks MD             Admission Information     Date & Time Provider Department Dept. Phone    6/19/2018 Geo Fernandez MD [address] [phone]      Your Vitals Were     Blood Pressure Pulse Temperature Respirations Height Weight    115/49 (BP Location: Left arm) 104 99.5  F (37.5  C) (Oral) 18 1.524 m (5') 47.4 kg (104 lb 8 oz)    Pulse Oximetry BMI (Body Mass Index)                95% 20.41 kg/m2          MyChart Information     [facility] lets you send messages to your doctor, view your test results, renew your  "prescriptions, schedule appointments and more. To sign up, go to [email or web address] . Click on \"Log in\" on the left side of the screen, which will take you to the Welcome page. Then click on \"Sign up Now\" on the right side of the page.     You will be asked to enter the access code listed below, as well as some personal information. Please follow the directions to create your username and password.     Your access code is: [email or web address]  Expires: 2018  1:27 PM     Your access code will  in 90 days. If you need help or a new code, please call your [facility] clinic or [phone].        Care EveryWhere ID     This is your Care EveryWhere ID. This could be used by other organizations to access your [facility] medical records  [id number]        Equal Access to Services     KHADIJAH ROCHA : Arthur Nathan, brett de paz, jackson conklin, bessie jacques . [facility] [phone].    ATENCIÓN: Si habla español, tiene a alford disposición servicios gratuitos de asistencia lingüística. Llame al [phone].    We comply with applicable federal civil rights laws and [place] laws. We do not discriminate on the basis of race, color, national origin, age, disability, sex, sexual orientation, or gender identity.               Review of your medicines      START taking        Dose / Directions    amoxicillin 875 MG tablet   Commonly known as:  AMOXIL   Indication:  Community Acquired Pneumonia   Used for:  Community acquired pneumonia of left lower lobe of lung (H)        Dose:  875 mg   Take 1 tablet (875 mg) by mouth every 12 hours   Quantity:  6 tablet   Refills:  0       azithromycin 250 MG tablet   Commonly known as:  ZITHROMAX   Indication:  Community Acquired Pneumonia   Used for:  Community acquired pneumonia of left lower lobe of lung (H)        Dose:  250 mg   Take 1 tablet (250 mg) by mouth daily   Quantity:  3 tablet   Refills:  0       enoxaparin 60 " MG/0.6ML injection   Commonly known as:  LOVENOX        Dose:  1 mg/kg   Inject 0.47 mLs (47 mg) Subcutaneous every 12 hours   Quantity:  60 Syringe   Refills:  0       oxyCODONE IR 5 MG tablet   Commonly known as:  ROXICODONE   Used for:  Pelvic mass        Dose:  5 mg   Take 1 tablet (5 mg) by mouth every 4 hours as needed for moderate to severe pain   Quantity:  10 tablet   Refills:  0       senna-docusate 8.6-50 MG per tablet   Commonly known as:  SENOKOT-S;PERICOLACE   Used for:  Pelvic mass        Dose:  1 tablet   Take 1 tablet by mouth 2 times daily   Quantity:  100 tablet   Refills:  0         CONTINUE these medicines which have NOT CHANGED        Dose / Directions    PRILOSEC PO        Refills:  0            Where to get your medicines      These medications were sent to [facility] [place], [place] - [address]  [address] [address]     Phone:  [phone]     amoxicillin 875 MG tablet    azithromycin 250 MG tablet    enoxaparin 60 MG/0.6ML injection    senna-docusate 8.6-50 MG per tablet         Some of these will need a paper prescription and others can be bought over the counter. Ask your nurse if you have questions.     Bring a paper prescription for each of these medications     oxyCODONE IR 5 MG tablet                Protect others around you: Learn how to safely use, store and throw away your medicines at www.disposemymeds.org.        ANTIBIOTIC INSTRUCTION     You've Been Prescribed an Antibiotic - Now What?  Your healthcare team thinks that you or your loved one might have an infection. Some infections can be treated with antibiotics, which are powerful, life-saving drugs. Like all medications, antibiotics have side effects and should only be used when necessary. There are some important things you should know about your antibiotic treatment.      Your healthcare team may run tests before you start taking an antibiotic.    Your team may take  samples (e.g., from your blood, urine or other areas) to run tests to look for bacteria. These test can be important to determine if you need an antibiotic at all and, if you do, which antibiotic will work best.      Within a few days, your healthcare team might change or even stop your antibiotic.    Your team may start you on an antibiotic while they are working to find out what is making you sick.    Your team might change your antibiotic because test results show that a different antibiotic would be better to treat your infection.    In some cases, once your team has more information, they learn that you do not need an antibiotic at all. They may find out that you don't have an infection, or that the antibiotic you're taking won't work against your infection. For example, an infection caused by a virus can't be treated with antibiotics. Staying on an antibiotic when you don't need it is more likely to be harmful than helpful.      You may experience side effects from your antibiotic.    Like all medications, antibiotics have side effects. Some of these can be serious.    Let you healthcare team know if you have any known allergies when you are admitted to the hospital.    One significant side effect of nearly all antibiotics is the risk of severe and sometimes deadly diarrhea caused by Clostridium difficile (C. Difficile). This occurs when a person takes antibiotics because some good germs are destroyed. Antibiotic use allows C. diificile to take over, putting patients at high risk for this serious infection.    As a patient or caregiver, it is important to understand your or your loved one's antibiotic treatment. It is especially important for caregivers to speak up when patients can't speak for themselves. Here are some important questions to ask your healthcare team.    What infection is this antibiotic treating and how do you know I have that infection?    What side effects might occur from this  antibiotic?    How long will I need to take this antibiotic?    Is it safe to take this antibiotic with other medications or supplements (e.g., vitamins) that I am taking?     Are there any special directions I need to know about taking this antibiotic? For example, should I take it with food?    How will I be monitored to know whether my infection is responding to the antibiotic?    What tests may help to make sure the right antibiotic is prescribed for me?      Information provided by:  www.cdc.gov/getsmart  U.S. Department of Health and Human Services  Centers for disease Control and Prevention  National Center for Emerging and Zoonotic Infectious Diseases  Division of Healthcare Quality Promotion        Information about OPIOIDS     PRESCRIPTION OPIOIDS: WHAT YOU NEED TO KNOW   We gave you an opioid (narcotic) pain medicine. It is important to manage your pain, but opioids are not always the best choice. You should first try all the other options your care team gave you. Take this medicine for as short a time (and as few doses) as possible.     These medicines have risks:    DO NOT drive when on new or higher doses of pain medicine. These medicines can affect your alertness and reaction times, and you could be arrested for driving under the influence (DUI). If you need to use opioids long-term, talk to your care team about driving.    DO NOT operate heave machinery    DO NOT do any other dangerous activities while taking these medicines.     DO NOT drink any alcohol while taking these medicines.      If the opioid prescribed includes acetaminophen, DO NOT take with any other medicines that contain acetaminophen. Read all labels carefully. Look for the word  acetaminophen  or  Tylenol.  Ask your pharmacist if you have questions or are unsure.    You can get addicted to pain medicines, especially if you have a history of addiction (chemical, alcohol or substance dependence). Talk to your care team about ways to  reduce this risk.    Store your pills in a secure place, locked if possible. We will not replace any lost or stolen medicine. If you don t finish your medicine, please throw away (dispose) as directed by your pharmacist. The [place] Pollution Control Agency has more information about safe disposal: https://www.pca.[facility].[place].us/living-green/managing-unwanted-medications.     All opioids tend to cause constipation. Drink plenty of water and eat foods that have a lot of fiber, such as fruits, vegetables, prune juice, apple juice and high-fiber cereal. Take a laxative (Miralax, milk of magnesia, Colace, Senna) if you don t move your bowels at least every other day.              Medication List: This is a list of all your medications and when to take them. Check marks below indicate your daily home schedule. Keep this list as a reference.      Medications           Morning Afternoon Evening Bedtime As Needed    amoxicillin 875 MG tablet   Commonly known as:  AMOXIL   Take 1 tablet (875 mg) by mouth every 12 hours   Last time this was given:  875 mg on 6/24/2018 10:26 AM                                azithromycin 250 MG tablet   Commonly known as:  ZITHROMAX   Take 1 tablet (250 mg) by mouth daily   Last time this was given:  250 mg on 6/23/2018  8:12 PM                                enoxaparin 60 MG/0.6ML injection   Commonly known as:  LOVENOX   Inject 0.47 mLs (47 mg) Subcutaneous every 12 hours   Last time this was given:  50 mg on 6/24/2018 10:26 AM                                oxyCODONE IR 5 MG tablet   Commonly known as:  ROXICODONE   Take 1 tablet (5 mg) by mouth every 4 hours as needed for moderate to severe pain                                PRILOSEC PO                                senna-docusate 8.6-50 MG per tablet   Commonly known as:  SENOKOT-S;PERICOLACE   Take 1 tablet by mouth 2 times daily   Last time this was given:  1 tablet on 6/23/2018  8:12 PM

## 2018-06-19 NOTE — LETTER
[address] [address]  [phone]          June 21, 2018    RE:  Maria Esther Wang                                                                                                                                                       [address]  [place] [address]            To whom it may concern:    Maria Esther Wang is under my professional care. She has been hospitalized since 6/18/18 until present. She will require frequent clinic visits and medical care over the next 6 months. Please contact our clinic [facility] at [phone]/Fax [phone] for additional questions or concerns.    Sincerely,          Modesta Cheng, PRABHA  6/21/2018 12:18 PM

## 2018-06-20 NOTE — PLAN OF CARE
Problem: Patient Care Overview  Goal: Plan of Care/Patient Progress Review  7C PT: Orders acknowledged and appreciated. Per discussion with OT, pt is ambulating with SBA and has no acute PT needs, only one discipline needs to follow. PT to defer at this time, OT to continue to follow.

## 2018-06-20 NOTE — PLAN OF CARE
Problem: Patient Care Overview  Goal: Plan of Care/Patient Progress Review  Outcome: No Change  Heparin drip started this morning at 800u/hr per protocol. Heparin 10A check placed for 14:30. Patient had a [place]  present this morning. Abdomen rounded with acities, patient will go to IR tomorrow. Family at bedside, patient ambulated with OT this morning. Tmax 99.4, heart rate in low 100's.

## 2018-06-20 NOTE — PROVIDER NOTIFICATION
Notified Resident at 0025 AM regarding elevated temperature.      Spoke with: [address]    Orders were obtained.    Comments: Temp 100.4 @ 2250- Recheck of 99.6.  Ordered BC, UA. Continue to monitor temp

## 2018-06-20 NOTE — PROGRESS NOTES
" 06/20/18 0900   Quick Adds   Type of Visit Initial Occupational Therapy Evaluation   Living Environment   Lives With other relative(s) (specify)   Living Arrangements house   Home Accessibility stairs (1 railing present);stairs to enter home;stairs within home;bed and bath on same level   Number of Stairs to Enter Home 3   Number of Stairs Within Home 10   Stair Railings at Home inside, present on left side   Transportation Available car;family or friend will provide   Living Environment Comment Pt lives with brother and multiple neices and nephews. All needs met on main floor, including laundry, but pt does reports accessing upper level on occasion.    Self-Care   Dominant Hand left   Usual Activity Tolerance good   Current Activity Tolerance fair   Regular Exercise no   Equipment Currently Used at Home none   Activity/Exercise/Self-Care Comment Pt works full time as a cook; also completes house cleaning, cooking, and laundry. Pt drives self.   Functional Level Prior   Ambulation 0-->independent   Transferring 0-->independent   Toileting 0-->independent   Bathing 0-->independent   Dressing 0-->independent   Eating 0-->independent   Communication 0-->understands/communicates without difficulty   Swallowing 0-->swallows foods/liquids without difficulty   Cognition 0 - no cognition issues reported   Fall history within last six months no   Which of the above functional risks had a recent onset or change? none   Prior Functional Level Comment Pt endorses increased difficulty with LB dressing recently given increased abdominal distension; has remained IND.        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    Visual Perception   Visual Perception No deficits were identified;Wears glasses   Sensory Examination   Sensory Comments No symptoms currently, but pt endorses N/T in hands and feet on occasion which she attributes to working too mujch   Integumentary/Edema   Integumentary/Edema Comments Significant abdominal distension but no other areas noted   Posture   Posture not impaired   Range of Motion (ROM)   ROM Quick Adds No deficits were identified   Strength   Strength Comments MMT: BUE grossly 4/5; generalized weakness noted   Hand Strength   Hand Strength Comments Bilateral gross grasp WFL   Coordination   Upper Extremity Coordination No deficits were identified   Mobility   Bed Mobility Bed mobility skill: Supine to sit;Bed mobility skill: Sit to supine   Bed Mobility Skill: Sit to Supine   Level of [place]: Sit/Supine independent   Bed Mobility Skill: Supine to Sit   Level of [place]: Supine/Sit independent   Transfer Skill: Sit to Stand   Level of [place]: Sit/Stand stand-by assist   Transfer Skill: Toilet Transfer   Level of [place]: Toilet stand-by assist   Toileting   Level of [place]: Toilet independent   Eating/Self Feeding   Level of [place]: Eating independent   Instrumental Activities of Daily Living (IADL)   Previous Responsibilities meal prep;housekeeping;laundry;shopping;driving;work;   Activities of Daily " "Living Analysis   Impairments Contributing to Impaired Activities of Daily Living strength decreased;pain   General Therapy Interventions   Planned Therapy Interventions strengthening;home program guidelines;progressive activity/exercise;risk factor education;ADL retraining   Clinical Impression   Criteria for Skilled Therapeutic Interventions Met yes, treatment indicated   OT Diagnosis decreased tolerance for I/ADLs   Influenced by the following impairments pain/discomfort, functional strength and endurance   Assessment of Occupational Performance 1-3 Performance Deficits   Identified Performance Deficits home management, work   Clinical Decision Making (Complexity) Low complexity   Therapy Frequency 3 times/wk   Predicted Duration of Therapy Intervention (days/wks) 2 weeks   Anticipated Discharge Disposition Home with Assist   Risks and Benefits of Treatment have been explained. Yes   Patient, Family & other staff in agreement with plan of care Yes   [facility] [employer] TM \"6 Clicks\"   2016, Trustees of [facility], under license to [employer].  All rights reserved.   6 Clicks Short Forms Daily Activity Inpatient Short Form   [facility] [employer]  \"6 Clicks\" Daily Activity Inpatient Short Form   1. Putting on and taking off regular lower body clothing? 4 - None   2. Bathing (including washing, rinsing, drying)? 4 - None   3. Toileting, which includes using toilet, bedpan or urinal? 4 - None   4. Putting on and taking off regular upper body clothing? 4 - None   5. Taking care of personal grooming such as brushing teeth? 4 - None   6. Eating meals? 4 - None   Daily Activity Raw Score (Score out of 24.Lower scores equate to lower levels of function) 24   Total Evaluation Time   Total Evaluation Time (Minutes) 15     "

## 2018-06-20 NOTE — PLAN OF CARE
Problem: Patient Care Overview  Goal: Plan of Care/Patient Progress Review  OT 7C:   Discharge Planner OT   Patient plan for discharge: home   Current status: Pt IND with bed mobility. SBA for STS and ~125 feet functional mobility with no AD/external support. Pt quick to fatigue; also limited by abdominal discomfort given extent of distension. SBA for toilet transfer and IND with toileting.   Barriers to return to prior living situation: medical status   Recommendations for discharge: home   Rationale for recommendations: Pt with support at home and currently ambulating SBA and IND in ADLs; will continue to follow inpatient to facilitate increased strength and endurance given deconditioning with medical changes.        Entered by: Daphne Claire 06/20/2018 11:14 AM

## 2018-06-20 NOTE — PLAN OF CARE
Problem: Patient Care Overview  Goal: Plan of Care/Patient Progress Review  Outcome: No Change    /63 (BP Location: Left arm)  Pulse 105  Temp 100.4  F (38  C) (Oral)  Resp 16  Wt 45.6 kg (100 lb 9.6 oz)  SpO2 93%    Time: 9798-3517   Reason for admission: Transferred from [facility] around 1930. Her for bilateral DVTs in legs & PE as well as thought tumors in kidneys & ovaries. [place] speaking, understands basic English, blue  phone in room.   VS: VSS on RA. Tmax of 100.4, PRN Tylenol given. Sepsis triggered, lactic acid 0.9.   Activity: Up with SBA, steady on feet, calls appropriately.   Neuros: A&Ox4. Denies numbness or tingling in hands or feet. Denies pain.   Cardiac: WDL except tachycardia.   Respiratory: WDL except infrequent productive cough.   GI/: WDL, voiding without difficulty. No BM on this shift, LBM today 6/19. +BS, +gas. Denies nausea or vomiting. Abdomen very distended, taut. Right pin point site from previous paracentesis yesterday.   Diet: NPO at midnight for possible IR guided biopsy tomorrow, pt aware.   Skin: WDL except distended abdomen & para site.   Lines: x2 right PIVs, one infusing LR at 75 mL/hr, other SL'd.   Labs: WDL except WBC 12.9. To be started on Lovenox this evening, awaiting med to arrive from pharmacy.   New changes this shift: Admitted to unit, heparin gtt from outside hospital stopped, Lovenox started.   Plan: Monitor VS, possible IR guided biopsy tomorrow, NPO.   Will continue to monitor & follow POC.

## 2018-06-20 NOTE — PROGRESS NOTES
Gynecologic Oncology   Progress Note      HD#:2 admitted for PE  Disease: pelvic mass, malignant ascites, peritoneal carcinomatosis likely gynecologic primary    24 hour events:   - Admitted for the above  - Started on lovenox for PE     Subjective: Patient denies pain this morning. Slept well overnight. Denies fevers or chills. She is eating and drinking small amounts, no nausea or vomiting.  Passing flatus, soft BM.  Voiding spontaneously.     Objective:   Vitals:    06/19/18 2253 06/20/18 0012 06/20/18 0320 06/20/18 0727   BP: 124/63  129/54 125/52   BP Location: Left arm  Left arm Left arm   Pulse: 105  101 101   Resp: 16 16 18   Temp: 100.4  F (38  C) 99.6  F (37.6  C) 97.6  F (36.4  C) 99.3  F (37.4  C)   TempSrc: Oral  Oral Oral   SpO2: 93%  92% 96%   Weight:         GEN - NAD, sitting comfortably in bed  CV - RRR  PULM - CTAB, no wheezing  ABD - distended but soft. Dull to percussion. No rebound or guarding.   Pelvic exam -  External genitalia normal well-estrogenized, healthy tissue. On bimanual exam, no pelvic tenderness. Cervix without obvious masses on bimanual exam. Uterus difficult to palpate abdominally due to tense ascites, but does not appear to be fixed. Unable to palpate for adnexal masses due to tense ascites.    Ext - warm and well perfused, trace edema, non-tender, SCDs on    Lines/drains:   PIV    I/Os  (Yesterday // Since Midnight)  PO: 0cc // 0cc  IVF: 10cc // 0cc  Urine: 200cc // 0cc      New Labs/Imaging-   Results for orders placed or performed during the hospital encounter of 06/19/18 (from the past 24 hour(s))   CBC with platelets   Result Value Ref Range    WBC 12.9 (H) 4.0 - 11.0 10e9/L    RBC Count 3.85 3.8 - 5.2 10e12/L    Hemoglobin 8.3 (L) 11.7 - 15.7 g/dL    Hematocrit 27.3 (L) 35.0 - 47.0 %    MCV 71 (L) 78 - 100 fl    MCH 21.6 (L) 26.5 - 33.0 pg    MCHC 30.4 (L) 31.5 - 36.5 g/dL    RDW 24.8 (H) 10.0 - 15.0 %    Platelet Count 327 150 - 450 10e9/L   Comprehensive metabolic  panel   Result Value Ref Range    Sodium 135 133 - 144 mmol/L    Potassium 3.5 3.4 - 5.3 mmol/L    Chloride 104 94 - 109 mmol/L    Carbon Dioxide 22 20 - 32 mmol/L    Anion Gap 8 3 - 14 mmol/L    Glucose 112 (H) 70 - 99 mg/dL    Urea Nitrogen 13 7 - 30 mg/dL    Creatinine 0.51 (L) 0.52 - 1.04 mg/dL    GFR Estimate >90 >60 mL/min/1.7m2    GFR Estimate If Black >90 >60 mL/min/1.7m2    Calcium 7.7 (L) 8.5 - 10.1 mg/dL    Bilirubin Total 0.7 0.2 - 1.3 mg/dL    Albumin 1.6 (L) 3.4 - 5.0 g/dL    Protein Total 6.7 (L) 6.8 - 8.8 g/dL    Alkaline Phosphatase 73 40 - 150 U/L    ALT 50 0 - 50 U/L    AST 53 (H) 0 - 45 U/L   Lactic acid level STAT for sepsis protocol   Result Value Ref Range    Lactate for Sepsis Protocol 0.9 0.4 - 1.9 mmol/L   Blood culture   Result Value Ref Range    Specimen Description Blood Left Hand     Special Requests Received in aerobic bottle only     Culture Micro No growth after 4 hours    Blood culture   Result Value Ref Range    Specimen Description Blood Left Arm     Special Requests Received in aerobic bottle only     Culture Micro No growth after 4 hours    Urine Culture Aerobic Bacterial   Result Value Ref Range    Specimen Description Midstream Urine     Special Requests Specimen received in preservative     Culture Micro PENDING    UA with Microscopic   Result Value Ref Range    Color Urine Yellow     Appearance Urine Clear     Glucose Urine Negative NEG^Negative mg/dL    Bilirubin Urine Negative NEG^Negative    Ketones Urine Negative NEG^Negative mg/dL    Specific Gravity Urine 1.019 1.003 - 1.035    Blood Urine Negative NEG^Negative    pH Urine 6.5 5.0 - 7.0 pH    Protein Albumin Urine 30 (A) NEG^Negative mg/dL    Urobilinogen mg/dL 4.0 (H) 0.0 - 2.0 mg/dL    Nitrite Urine Negative NEG^Negative    Leukocyte Esterase Urine Negative NEG^Negative    Source Midstream Urine     WBC Urine 6 (H) 0 - 5 /HPF    RBC Urine 2 0 - 2 /HPF    Squamous Epithelial /HPF Urine <1 0 - 1 /HPF    Mucous Urine  Present (A) NEG^Negative /LPF   Basic metabolic panel   Result Value Ref Range    Sodium 138 133 - 144 mmol/L    Potassium 3.4 3.4 - 5.3 mmol/L    Chloride 106 94 - 109 mmol/L    Carbon Dioxide 20 20 - 32 mmol/L    Anion Gap 12 3 - 14 mmol/L    Glucose 103 (H) 70 - 99 mg/dL    Urea Nitrogen 11 7 - 30 mg/dL    Creatinine 0.49 (L) 0.52 - 1.04 mg/dL    GFR Estimate >90 >60 mL/min/1.7m2    GFR Estimate If Black >90 >60 mL/min/1.7m2    Calcium 7.7 (L) 8.5 - 10.1 mg/dL   CBC with platelets   Result Value Ref Range    WBC 11.4 (H) 4.0 - 11.0 10e9/L    RBC Count 3.41 (L) 3.8 - 5.2 10e12/L    Hemoglobin 7.4 (L) 11.7 - 15.7 g/dL    Hematocrit 24.2 (L) 35.0 - 47.0 %    MCV 71 (L) 78 - 100 fl    MCH 21.7 (L) 26.5 - 33.0 pg    MCHC 30.6 (L) 31.5 - 36.5 g/dL    RDW 24.7 (H) 10.0 - 15.0 %    Platelet Count 252 150 - 450 10e9/L   INR   Result Value Ref Range    INR 1.27 (H) 0.86 - 1.14       Pending cultures (date obtained):   Urine culture (6/19)- pending  Blood culture (6/19)- NGTD    Assessment:  Maria Esther Wang is a 58 year old with a pelvic mass, malignant ascites, and peritoneal carcinomatosis concerning for ovarian or primary peritoneal malignancy, currently admitted for a new diagnosis of bilateral pulmonary emboli. Plan for biopsy of metastatic lesion today for tissue diagnosis. However due to bilateral DVTs, she will need an IVC filter placed to allow her to be off anticoagulation for biopsy of metastatic lesion. Currently on therapeutic lovenox, but will transition back to heparin drip in anticipation of procedure today.    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 Elevated CA-125 and peritoneal fluid positive for adenocarcinoma. Imaging suggests metastatic disease with  mediastinal and axillary lymphadenopathy. Plan for biopsy if amenable to IR guided biopsy.  FEN: NPO in anticipation of possible biopsy. Nutrition consulted for poor nutritional status.   Pain: PRN tylenol   Heme: Anemia of chronic disease based on workup at OSH, s/p 1U pRBCs. Hemoglobin downtrending this morning but no active site of bleeding, suspect due to hemodilution. Continue to trend.  CV: Bilateral DVTs and pulmonary embolism, likely secondary to malignancy. Will transition back to heparin drip due to anticipated procedure today.   Resp: Newly diagnosed bilateral PEs, see above. Mild left pleural effusion.  Not requiring oxygen, asymptomatic.  GI: Malignancy ascites s/p therapeutic and diagnostic paracentesis on 6/18/18 with 3.6L.    : No issues.   MSK: No issues  Neuro/Psych: No issues  Endocrine: No issues.   ID: Febrile shortly after admission, Lactate and WBC unremakable. Urinalysis negative. Blood culture collected. PPx: SCDs, transition to heparin drip for planned procedure.  Disposition: Inpatient for transition to lovenox anticoagulation, and possible biopsy of metastatic lesion.     # Pain Assessment:   Maria Esther khan pain level was assessed and she currently denies pain.      Jocy Dasilva MD   Resident Physician, PGY3  Obstetrics, Gynecology, and Women's Health    I agree with the resident's findings, assessment and plan. I interviewed and examined the patient.     Jillian Phipps MD  Gynecologic Oncology Fellow

## 2018-06-20 NOTE — PROGRESS NOTES
"CLINICAL NUTRITION SERVICES - ASSESSMENT NOTE     Nutrition Prescription    RECOMMENDATIONS FOR MDs/PROVIDERS TO ORDER:  None at this time.    Malnutrition Status:    Severe malnutrition in the context of acute illness.     Recommendations already ordered by Registered Dietitian (RD):  None at this time.    Future/Additional Recommendations:  - If pt gets distension/gas from Boost Plus Shake, change order to Boost Plus vanilla (w/out ice cream) which is lactose free.   - Encourage intake of high protein foods and oral nutrition supplements.   - Monitor weights  - If pt consistently eating <50% of meals TID, order calorie counts to quantify PO intake and assess whether nutrition support indicated.     REASON FOR ASSESSMENT  Maria Esther Wang is a/an 58 year old female assessed by the dietitian for Admission Nutrition Risk Screen for unintentional loss of 10# or more in the past two months and Provider Order - \"poor nutrition status\".     NUTRITION HISTORY  Spoke with assistance of phone  during visit with patient. Per pt, her usual body weight is 117 lbs but she could not specify when she last weighed this. Her typical intake is 2-3 meals per day of a variety of foods including [place] dishes (fish, vegetables, chicken, beef, fruits, etc.). Prior to admission pt says her appetite was good at home. No food allergies per pt report. Pt says appetite now is fair (ate ~1/2 burger for lunch per pt and family report). Pt also had Boost Plus Shake at bedside during visit and pt says she liked the taste (vanilla).     CURRENT NUTRITION ORDERS  Diet: Regular  Intake: per pt, she ate 1/2 burger with fair appetite for lunch today. Has Boost Plus Shakes (ice cream) ordered.     LABS  Labs reviewed    MEDICATIONS  Bowel regimen    ANTHROPOMETRICS  Height: 0 cm (Data Unavailable) 5' 0\" - Per Care Everywhere (6/8/18)  Most Recent Weight: 45.6 kg (100 lb 9.6 oz)    IBW: 45.5 kg  BMI: Normal BMI (19.5)  Weight History: pt " says her UBW is 117 lbs but unsure when she last weighed this. A 17 lb loss is 15% of the patients UBW. Per chart review pt has 4% wt loss in 3 weeks.   Wt Readings from Last 10 Encounters:   06/19/18 45.6 kg (100 lb 9.6 oz)   05/30/18 47.4 kg (104 lb 9.6 oz)     Dosing Weight: 46 kg (actual) - based on lowest documented wt so far this adm (45.6 kg on 6/19) and IBW of 45.5 kg.     ASSESSED NUTRITION NEEDS  Estimated Energy Needs: 8945-6763 kcals/day (30 - 35 kcals/kg )  Justification: Repletion  Estimated Protein Needs: 55-69 grams protein/day (1.2 - 1.5 grams of pro/kg)  Justification: Repletion  Estimated Fluid Needs: (1 mL/kcal)   Justification: Maintenance and Per provider pending fluid status    PHYSICAL FINDINGS  See malnutrition section below.  Ascites - visible    MALNUTRITION  % Intake: No decreased intake noted  % Weight Loss: Up to 5% in 1 month (non-severe) - suspect pt meets criteria for severe wt loss given 15% body weight loss, however, unable to quantify time period of weight loss.   Subcutaneous Fat Loss: Facial region, Upper arm and Thoracic/intercostal:  Moderate  Muscle Loss: Temporal, Thoracic region (clavicle, acromium bone, deltoid, trapezius, pectoral), Lower arm  (forearm) and Dorsal hand:  Moderate-Severe  Fluid Accumulation/Edema: None noted  Malnutrition Diagnosis: Severe malnutrition in the context of acute illness.     NUTRITION DIAGNOSIS  Unintended weight loss related to predicted inadequate oral intake r/t early satiety from ascites as evidenced by pt with 17 lbs (15%) weight loss overall and 4% wt loss in 3 weeks.    INTERVENTIONS  Implementation  Nutrition Education: Encouraged pt to eat foods high in protein (eggs, meats, beans, etc) to maintain muscle mass given recent weight loss and pt understood. Discussed oral nutrition supplement shakes and encouraged pt to let RN know if Boost Plus Shake (w/ice cream) making her distended/gassy/abdominal pain.     Goals  Patient to consume  % of nutritionally adequate meal trays TID, or the equivalent with supplements/snacks.     Monitoring/Evaluation  Progress toward goals will be monitored and evaluated per protocol.    Adriane Davison MS, RD, LD  Unit 7C pgr: [phone]

## 2018-06-20 NOTE — PLAN OF CARE
Problem: Patient Care Overview  Goal: Plan of Care/Patient Progress Review  Outcome: No Change  Pt A/Ox4,up ind  first language [place] can speak and understand basic english, blue phone in room,max temp 100.4  Tylenol given recheck 97.6 HR tachy, triggered septic protocol LA 0.9,otherwise VSS, NPO, IVF@75ml/hr abdomin is distended and firm denies pain/nausea. MD notified of max temp- Blood cultures/UA/UC ordered and results pending, family at bedside continue to moniotr and follow POC

## 2018-06-20 NOTE — DISCHARGE SUMMARY
Gynecologic Oncology Discharge Summary    Maria Esther Wang  [id number]    Admit Date: 6/19/2018  Discharge Date: 6/22/2018  Admitting Provider: Geo Fernandez MD  Discharge Provider: Geo Fernandez MD    Admission Dx:   - Pelvic mass, malignant ascites and lymphadenopathy concerning for metastatic ovarian malignancy   - Bilateral pulmonary emboli  - Severe malnutrition   - Anemia of chronic disease  - Fevers     Discharge Dx:  - Pelvic mass, malignant ascites and lymphadenopathy concerning for metastatic ovarian malignancy   - Bilateral pulmonary emboli  - Severe malnutrition   - Anemia of chronic disease  - Fevers     Patient Active Problem List   Diagnosis     Pulmonary emboli (H)       Procedures:   - Therapeutic paracentesis   - IR biopsy of peritoneal nodule   - Heparin gtt   - 1 unit pRBCs    Prior to Admission Medications:  Prescriptions Prior to Admission   Medication Sig Dispense Refill Last Dose     Omeprazole (PRILOSEC PO)           Discharge Medications:   Maria Esther Wang   Home Medication Instructions TL:[id number]    Printed on:06/22/18 0709   Medication Information                      enoxaparin (LOVENOX) 60 MG/0.6ML injection  Inject 0.47 mLs (47 mg) Subcutaneous every 12 hours             Omeprazole (PRILOSEC PO)               oxyCODONE IR (ROXICODONE) 5 MG tablet  Take 1 tablet (5 mg) by mouth every 4 hours as needed for moderate to severe pain             senna-docusate (SENOKOT-S;PERICOLACE) 8.6-50 MG per tablet  Take 1 tablet by mouth 2 times daily                   Consultations:  Nutrition   Interventional radiology   Physical therapy   Occupational therapy    Brief History of Illness:  Maria Esther Wang is a 58 year old who presented to urgent care on 5/30/18 with complaints of abdominal bloating, 11 lb wt loss in span of 2-3 wks. At urgent care, XR showed ascites. She followed up with her PCP on 6/1/18. Per the PCP note she denied pain, diarrhea, constipation,  bloody stools, dysuria, and fever/chills. PCP obtained CT abdomen/pelvis which showed complex pelvic masses highly suspicious for primary ovarian malignancy, possible invasin of uterus, diffuse peritoneal carcinomatosis, metastatic LAD, and large volume ascites. She was then referred to IR at [facility] for paracentesis and CT w/contrast, scheduled for 6/18/18, during this appt she was incidentally found to have bilateral PE and was admitted directly to [facility] ICU and was started on a heparin gtt. She was also found to be anemic to 6.5 and received 1 unit PRBCs with appropriate rise to 7.6 on discharge. Her anemia was evaluated with peripheral smear and iron studies, which were consistent with anemia of chronic disease. She was transferred to [facility] for management of her PE in the setting of suspected gynecologic malignancy.     Hospital Course:  Dz:   - Newly diagnosed pelvic mass, malignant ascites and peritoneal carcinomatosis in the setting of elevated CA-125.  Ascites positive for malignancy at outside hospital.  Likely metastatic ovarian cancer due to mediastinal and axillary lymphadenopathy. She underwent IR-guided biopsy of a peritoneal nodule while inpatient on HD#3. Final pathology pending at the time of discharge. She will follow-up postoperatively for a care plan.  FEN:   - She was maintained on a regular diet except prior to procedures.  She was seen by nutrition due to her history of malnutrition with albumin 1.8.   Pain:   - She had no acute pain issues.   CV:   - She has no history of CV issues.  Her vital signs were stable while in house and she had no acute CV issues.  PULM:   - She had newly diagnosed DVTs and PE, secondary to malignancy.  She was continued on a heparin gtt until her procedures were completed with IR.  She was then transitioned to therapeutic lovenox, which she will continue on discharge.   HEME:   - She has anemia of chronic disease.  She underwent anemia work-up at OSH  and received 1U of pRBCs prior to transfer.  Her Hgb on HD#3 trended down to 7.0 and she was symptomatic with palpitations and fatigue, so she received 1u pRBC. Her Hgb was 8.7 at the time of discharge.   GI:   - She underwent a therapeutic paracentesis on HD#3 with return of 3.3 L of fluid.  She was given as needed anti-emetics and given a bowel regimen to prevent constipation.   :    - She had no acute  issues while in house.  ID:   - She was febrile to 100.4 F on the evening of HD#1 and to 100.8 F on the evening of HD#2.  Work-up was unremarkable including lactate and UA/UCx.  Blood cultures were NGTD at the time of discharge. Ascites sent for culture was NGTD at the time of discharge; gram stain. Low grade temp likely secondary to tumor burden and/or blood clots and she had no had no other fevers during her admission.  ENDO:   - No acute issues  PSYCH/NEURO:   - No acute issues  PPX:    - She was given SCDs and lovenox during her hospital course. She will be continued on lovenox after discharge for her PEs.       Discharge Instructions and Follow up:  Ms. Maria Esther Wang was discharged from the hospital with follow up for suspected ovarian cancer, bilateral pulmonary emboli.    Discharge Diet: Regular  Discharge Activity: Activity as tolerated  Discharge Follow up: 6/25/18 Dr Fernandez at 8:15 am     Discharge Disposition:  Discharged to home    Discharge Staff: Geo Fernandez MD     # Discharge Pain Plan:   - During her hospitalization, Maria Esther experienced pain due to ovarian cancer and related procedures.  The pain plan for discharge was discussed with Maria Esther and her spouse and the plan was created in a collaborative fashion.    - Opioids prescribed on discharge: Oxycodone  - Duration of opioids after discharge: Per CDC opioid prescribing guidelines, a 3 day prescription of opioids was provided.  - Bowel regimen: senna and nanabel Brooks MD  PGY-2 Department of Obstetrics,  Gynecology, and Women's Health  06/22/18

## 2018-06-21 NOTE — PROGRESS NOTES
Interventional Radiology Pre-Procedure Sedation Assessment   Time of Assessment: 10:40 AM    Expected Level: Moderate Sedation    Indication: Sedation is required for the following type of Procedure: Biopsy    Sedation and procedural consent: Risks, benefits and alternatives were discussed with Patient    PO Intake: Appropriately NPO for procedure    ASA Class: Class 2 - MILD SYSTEMIC DISEASE, NO ACUTE PROBLEMS, NO FUNCTIONAL LIMITATIONS.    Mallampati: Grade 2:  Soft palate, base of uvula, tonsillar pillars, and portion of posterior pharyngeal wall visible    Lungs: Lungs Clear with good breath sounds bilaterally    Heart: Normal heart sounds and rate    History and physical reviewed and no updates needed. I have reviewed the lab findings, diagnostic data, medications, and the plan for sedation. I have determined this patient to be an appropriate candidate for the planned sedation and procedure and have reassessed the patient IMMEDIATELY PRIOR to sedation and procedure.    Syed Agosto MD

## 2018-06-21 NOTE — PROGRESS NOTES
Patient seen at bedside due to sore in mouth.  On exam, appears to be canker sore.  No evidence of thrush or infection.  She may have bit the side of her mouth recently.  Will order oragel for comfort.      Modesta Mayer MD   OB/GYN PGY-4

## 2018-06-21 NOTE — PROGRESS NOTES
Gyn/Onc Brief Progress Note     Patient seen at bedside for subjective feeling of palpitations, inability to get enough air in.  She denies chest pain, SOB.  Has discomfort when taking in a deep breath.  These symptoms started a little bit after the procedure and worsened over the last 30 minutes.  She also feels hot.  Denies diaphoresis, nausea, vomiting.  Did eat after her procedure, but got full after a couple of bites.  Denies dizziness.  Has mild soreness over biopsy site.     Vital signs:  Temp: 99.6  F (37.6  C) Temp src: Oral BP: 106/52 Pulse: 105   Resp: 28 SpO2: 97 % O2 Device: None (Room air)   Height: 152.4 cm (5') Weight: 46.9 kg (103 lb 6.4 oz)  Estimated body mass index is 20.19 kg/(m^2) as calculated from the following:    Height as of this encounter: 1.524 m (5').    Weight as of this encounter: 46.9 kg (103 lb 6.4 oz).    Gen: A&O, appears mildly anxious   CV: tachycardia, regular rhythm, no r/m/g   Resp: lungs CTAB, mildly decreased breath sounds in bases   Abd: soft, decreased distention, no rebound or guarding  Ext: warm, well-perfused     A/P: 58 year old HD#3 admitted for PE, now POD#0 s/p paracentesis, IR biopsy now with pleuritic pain and palpitations.    - STAT EKG ordered.   - RN had placed patient on 1L O2 by NC, which improved symptoms.    - Suspect secondary to fluid shifts, but will have low threshold to ensure Hgb is stable.     Signed out to CARLOZ Dasilva PGY-3 who will follow-up on results     Modesta Mayer MD   OB/GYN PGY-4

## 2018-06-21 NOTE — PROGRESS NOTES
Patient Name: Maria Esther Wang  Medical Record Number: [id number]  Today's Date: 6/21/2018    Procedure: Paracentesis and peritoneal nodule biopsy  Proceduralist: Dr. Agosto and Dr. Mcgrath    Fentanyl Administered: 100 mcg  Versed Administered: 2 mg    Sedation start time: 1045  Sedation end time: 1130 minutes  Sedation medications administered: Fentanyl and Versed   Total sedation time: 45 minutes    Procedure start time: 1045  Puncture time: 1047  Procedure end time: 1130    Report given to: Apolonia WHITAKER  : Fuentes Gunter Notes: Pt arrived to IR room CT-2 from . Pt denies any questions or concerns regarding procedure. Pt positioned supine and monitored per protocol. Surg-Path present to examine samples.3,300 mls brow-serosanguenous fluid removed during paracentesis. Pt tolerated procedure without any noted complications. Pt transferred back to .    Matthew Yeboah RN

## 2018-06-21 NOTE — PROVIDER NOTIFICATION
At 1745 pt c/o shortness of breath and not able to catch breath, even though the saturation is 97% room. She c/o being hot. The vital sign is as follow: /52 (BP Location: Left arm)  Pulse 105  Temp 99.6  F (37.6  C) (Oral)  Resp 28  Ht 1.524 m (5')  Wt 46.9 kg (103 lb 6.4 oz)  SpO2 97%  BMI 20.19 kg/m2  MD was informed and came to assess the pt. The in person  is at bedside and is assisting with communication needs. Will continue to monitor.

## 2018-06-21 NOTE — PLAN OF CARE
Problem: Patient Care Overview  Goal: Plan of Care/Patient Progress Review  Outcome: No Change  Pt A/Ox4, SBA max temp 100.8 HR tachy tylenol given and recheck 99.5, otherwise VSS  heparin gtt @14ml/hrHep 10A was 0.34 required no change   voiding, denies pain and nausea. NPO need second preop shower, potential time for procedure  Is 1000 awaiting confirmation from IR, heparin gtt need to be stopped 2 hours before procedure. [place]  schedule for 1000 and 1600 .however pt can understand basic English  Continue to monitor and follow POC

## 2018-06-21 NOTE — BRIEF OP NOTE
Interventional Radiology Brief Post Procedure Note    Procedure: IR ABDOMINAL RETROPERITONEAL BIOPSY    Proceduralist: Syed Agosto MD and Jose Mcgrath MD    Assistant: None    Time Out: Prior to the start of the procedure and with procedural staff participation, I verbally confirmed the patient s identity using two indicators, relevant allergies, that the procedure was appropriate and matched the consent or emergent situation, and that the correct equipment/implants were available. Immediately prior to starting the procedure I conducted the Time Out with the procedural staff and re-confirmed the patient s name, procedure, and site/side. (The Joint Commission universal protocol was followed.)  Yes    Medications   Medication Event Details Admin User Admin Time   fentaNYL (PF) (SUBLIMAZE) injection 25-50 mcg Medication Given Dose: 100 mcg; Route: Intravenous Dose, JULIANNE Castro 6/21/2018 11:27 AM   midazolam (VERSED) injection 0.5-1 mg Medication Given Dose: 2 mg; Route: Intravenous Dose, JULIANNE Castro 6/21/2018 11:27 AM   lidocaine 1 % 1-30 mL Medication Given by Other Clinician Dose: 10 mL; Route: Intradermal Dose, JULIANNE Castro 6/21/2018 11:28 AM       Sedation: IR Nurse Monitored Care   Post Procedure Summary:  Prior to the start of the procedure and with procedural staff participation, I verbally confirmed the patient s identity using two indicators, relevant allergies, that the procedure was appropriate and matched the consent or emergent situation, and that the correct equipment/implants were available. Immediately prior to starting the procedure I conducted the Time Out with the procedural staff and re-confirmed the patient s name, procedure, and site/side. (The Joint Commission universal protocol was followed.)  Yes       Sedatives: Fentanyl and Midazolam (Versed)    Vital signs, airway and pulse oximetry were monitored and remained stable throughout the procedure and sedation was maintained until  the procedure was complete.  The patient was monitored by staff until sedation discharge criteria were met.    Patient tolerance: Patient tolerated the procedure well with no immediate complications.    Time of sedation in minutes: 45 Minutes minutes from beginning to end of physician one to one monitoring.          Findings: Dark red ascites On paracentesis. RUQ peritoneal nodule biopsied.    Estimated Blood Loss: Minimal    Fluoroscopy Time: 0 minute(s)    SPECIMENS: Core needle biopsy specimens sent for pathological analysis    Complications: 1. None     Condition: Stable    Plan: 1 hr bed rest after sedation    Comments: See dictated procedure note for full details.    Syed Agosto MD

## 2018-06-21 NOTE — PROGRESS NOTES
Gynecologic Oncology   Progress Note      HD#:3 admitted for PE  Disease: pelvic mass, malignant ascites, peritoneal carcinomatosis likely gynecologic primary      24 hour events:   - Febrile to 100.8 overnight x1  - Transitioned from lovenox to heparin gtt for planned procedure  - Evaluated by nutrition, recommend calorie count if eating <50% of meals  - Evaluated by PT, recommend discharge to home with asssit   - Urine culture final 10-50K urogenital jaya    Subjective: Patient reports overnight she felt warm, which resolved after taking tylenol. Denies any pain. Reports cough is stable, has had it for several weeks. It is nonproductive. She is eating and drinking small amounts, no nausea or vomiting. Passing flatus, soft BM.  Voiding spontaneously.     Objective:   Vitals:    06/21/18 0049 06/21/18 0356 06/21/18 0614 06/21/18 0800   BP:  118/51 119/61    BP Location:  Left arm Left arm    Pulse:  93 95    Resp:  16 16    Temp: 99.6  F (37.6  C) 99.5  F (37.5  C) 98.5  F (36.9  C)    TempSrc: Oral Oral Oral    SpO2:  95% 100%    Weight:    46.9 kg (103 lb 6.4 oz)   Height:         GEN - NAD, sitting comfortably in bed  CV - RRR  PULM - Decreased left lower lung sounds, bilateral crackles, no wheezing  ABD - distended but soft. Dull to percussion. No rebound or guarding.   Ext - warm and well perfused, +1 edema, non-tender, SCDs on    Lines/drains:   PIV    I/Os  (Yesterday // Since Midnight)  PO: 480cc // 0cc  IVF: 680cc // 56cc  Urine: 525cc // 200cc (not being recorded consistently)    New Labs/Imaging-   Results for orders placed or performed during the hospital encounter of 06/19/18 (from the past 24 hour(s))   Interventional Radiology Adult/Peds IP Consult: Patient to be seen: Routine within 24 hours; Call back #: [id number]; paracentesis and tissue biopsy    Narrative    Alexandro Sharif PA-C     6/20/2018  9:36 AM  Patient is on IR schedule on 6/21/2018 for a CT guided biopsy of   intra-abdominal metastatic  lesion. Consider ct series 2 image 115   but ultimately radiologist choice. Ultrasound guided paracentesis   following CT guided biopsy.   Labs WNL for procedure.    Orders for NPO, scrubs and antibiotics have been entered.   Medications to be held include: heparin drip prior to procedure  Consent will be done prior to procedure.      Please contact the [place] RN [place] [phone] for estimated time of   procedure.     Case discussed with Modesta PERALTA CNP and Erik Ordonez MD.    Alexandro Sharif PA-C  Interventional Radiology  Phone: [phone]  Pager: [phone]     Heparin 10a Level   Result Value Ref Range    Heparin 10A Level 0.10 IU/mL   Heparin 10a Level   Result Value Ref Range    Heparin 10A Level 0.15 IU/mL   Heparin 10a Level   Result Value Ref Range    Heparin 10A Level 0.34 IU/mL   CEA   Result Value Ref Range    CEA <0.5 0 - 2.5 ug/L   INR   Result Value Ref Range    INR 1.24 (H) 0.86 - 1.14       Pending cultures (date obtained):   Urine culture (6/20)-  10-50k mixed urogenital jaya  Blood culture (6/20)- NGTD     Assessment:  Maria Esther Wang is a 58 year old with a pelvic mass, malignant ascites, and peritoneal carcinomatosis concerning for ovarian or primary peritoneal malignancy, currently admitted for a new diagnosis of bilateral pulmonary emboli. Plan for biopsy of metastatic lesion today for tissue diagnosis.     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 Elevated CA-125 and peritoneal fluid positive for adenocarcinoma. Imaging suggests metastatic disease with mediastinal and axillary lymphadenopathy. Plan for biopsy if amenable to IR guided biopsy.  FEN: NPO in anticipation of biopsy today. Nutrition consulted for poor nutritional  status.   Pain: PRN tylenol   Heme: Anemia of chronic disease based on workup at OSH, s/p 1U pRBCs. Hemoglobin downtrending this morning but no active site of bleeding, suspect due to hemodilution. Continue to trend.  CV: Bilateral DVTs and pulmonary embolism, likely secondary to malignancy. On heparin drip.  Resp: Newly diagnosed bilateral PEs, see above. Mild left pleural effusion.  Not requiring oxygen, asymptomatic.  - Cough- IS ordered, continue symptomatic management  GI: Malignancy ascites s/p therapeutic and diagnostic paracentesis on 6/18/18 with 3.6L. Plan for paracentesis today following biopsy.  : No issues.   MSK: No issues  Neuro/Psych: No issues  Endocrine: No issues.   ID: Febrile overnight x1. Workup with Lactate and WBC unremakable. Urinalysis and UCx negative. Blood culture NGTD  PPx: SCDs, heparin gtt for planned procedure  Disposition: Inpatient for transition to lovenox anticoagulation, and biopsy of metastatic lesion. PT recommends home with assist.    # Pain Assessment:  Current Pain Score 6/20/2018   Patient currently in pain? denies   Maria Esther s pain level was assessed and she currently denies pain.      Jocy Dasilva MD   Resident Physician, PGY3  Obstetrics, Gynecology, and Women's Health    Provider Disclosure:   I agree with above History, Review of Systems, Physical exam and Plan. I have reviewed the content of the documentation and have edited it as needed. I have seen and personally performed the services documented here and the documentation accurately represents those services and the decisions I have made.     Electronically signed by:   Geo Fernandez MD   Gynecologic Oncology   [facility]

## 2018-06-21 NOTE — PLAN OF CARE
Problem: Patient Care Overview  Goal: Plan of Care/Patient Progress Review  Outcome: Improving  Patient went to IR this morning, they drained 3.3L of acities and took biopsies. Patient has primpore over right abdomen, dressings clean and dry.  and family at bedside. VSS, no complaints of pain, patient voiding, had a BM. Started sq Lovenox, heparin was shut off at 8 am, 2 hours prior to IR procedure. Patient tripped the sepsis protocol, lactic 0.9.

## 2018-06-21 NOTE — PLAN OF CARE
Problem: Patient Care Overview  Goal: Plan of Care/Patient Progress Review  Outcome: No Change  Transferred from OSH 6/19. Tachycardic otherwise VSS. Started on Heparin drip today d/t bilateral pulmonary embolism. Hep 10 A drawn at 2130. Completing draws and adjustments to drip q6h. Dry non productive cough frequently. Up SBA. 2 BMs today. Voiding but forgot to save. Writer reminded pt to use hat in toilet. Regular diet. Eating well. Denies pain. No shortness of breath or chest pain. 2 PIVs. Abdominal ascites. NPO at midnight. IV tomorrow for biopsy, drainage of abdominal ascites and IVC placement. Continue POC.    *Loas speaking.  scheduled for 0800, 1000 and 1600 tomorrow.   *FYI call in am to find out IR procedure time. Heparin drip needs to be stopped 2 hours before procedure.   *Pt showered this evening, although did not use surgical prep soap. Wipes completed this evening. Surgical scrub soap is in the room for am shower.       Pt would LIKE bedside shift report.

## 2018-06-22 NOTE — PLAN OF CARE
Problem: Patient Care Overview  Goal: Plan of Care/Patient Progress Review  Outcome: Improving   06/22/18 0542   OTHER   Plan Of Care Reviewed With patient   Plan of Care Review   Progress improving     Pt slept between care.  Neuro: A&Ox4.   Cardiac: SR. VSS.   Respiratory: Sating 98%on RA.  GI/: No Bm. Adequate urine output.   Diet/appetite: Tolerating regular diet, no solid intake for the night.  Activity:  Stand by assist, up to the bathroom.  Pain: At acceptable level on current regimen.   Skin: No new deficits noted.  LDA's: PIV saline locked. The PRBC finished without any problem.  Plan: Continue with POC. Notify primary team with changes.

## 2018-06-22 NOTE — PROGRESS NOTES
Temp 100.5. Discharge delayed, for blood cultures Lactic Acid protocol triggered. Continuous Pulse ox, UA ordered, CBC and lactic acid recheck in 4 hours.      /45 RR 24 97% on RA.

## 2018-06-22 NOTE — PLAN OF CARE
Problem: Patient Care Overview  Goal: Plan of Care/Patient Progress Review  OT 7C: Per RN, pt to d/c home with family in the early PM. OT session with  cancelled as was scheduled for 2pm.     Occupational Therapy Discharge Summary    Reason for therapy discharge:    Discharged to home.    Progress towards therapy goal(s). See goals on Care Plan in [facility] electronic health record for goal details.  Goals not met.  Barriers to achieving goals:   discharge from facility.    Therapy recommendation(s):    Pt may benefit from outpatient therapy to promote functional strength and endurance but has remained IND in ADLs during hospital admission. Has strong family support at home.

## 2018-06-22 NOTE — PLAN OF CARE
Problem: Patient Care Overview  Goal: Plan of Care/Patient Progress Review  Outcome: Improving   06/21/18 0186   OTHER   Plan Of Care Reviewed With patient   Plan of Care Review   Progress improving     BP 95/46 (BP Location: Left arm)  Pulse 88  Temp 97.3  F (36.3  C) (Oral)  Resp 18  Ht 1.524 m (5')  Wt 46.9 kg (103 lb 6.4 oz)  SpO2 98%  BMI 20.19 kg/m2  Pt reported feeling better after she was put on 1 liter of oxygen for shortness of breath. MD did EKG, when she c/o shortness of breath and heart palpitation. The hemoglobin was checked and it came back at 7.0 from 7.7 this am.  Neuro: A&Ox4.   Cardiac: SR. VSS.   Respiratory: Sating 99% on 1 liter.  GI/: No BM. Adequate urine output.   Diet/appetite: Tolerating regular diet. Eating well.  Activity:  Stand by assist up to chair and the bathroom.  Pain: At acceptable level on current regimen.   Skin: No new deficits noted.  LDA's: PIV saline locked x 1 and the other one is infusing PRBC for hgb of 7.0 from 7.7 this am.  Plan: Continue with POC. Notify primary team with changes.

## 2018-06-22 NOTE — PROGRESS NOTES
Gyn/Onc Brief Progress Note    S: Patient is still feeling symptomatic with palpitations. She has been feeling tired since her procedure.       O:Temp: 99.3  F (37.4  C) Temp src: Oral BP: 116/54 Pulse: 97   Resp: 28 SpO2: 97 % O2 Device: None (Room air)    General: cachectic, appears tired  CV: tachycardia, regular rhythm, no murmurs  Resp: tachypnic, decreased left lower lung sounds, bilateral crackles  Abd: markedly decreased distension from prior exams. Soft, nontender. Tympanic to percussion. No rebound or guarding. Paracentesis site covered in dressing, clean, dry.     Labs  Lab 06/21/18 1926   HGB 7.0*     ECG:   Sinus tachycardia (reviewed with on call internal medicine team)      A/P: Maria Esther Wang is a 58 year old admitted for new diagnosis of PE now s/p paracentesis and IR guided biopsy. Continues to feel palpitations, however her heart rate is at baseline. ECG with sinus tachycardia. Hemoglobin downtrended from 7.7 this morning to 7.0, which may be contributing to her palpitations and fatigue. Using a phone Georgian , the patient was counseled on the risks, benefits, and alternatives of blood transfusion. She signed consent for blood transfusion. Give 1u pRBC.     Jocy Dasilva MD   Resident Physician, PGY3  Obstetrics, Gynecology, and Women's Health

## 2018-06-22 NOTE — PROGRESS NOTES
Gynecologic Oncology   Progress Note      HD#:4 admitted for PE, now POD#1 s/p paracentesis and IR guided biopsy  Disease: pelvic mass, malignant ascites, peritoneal carcinomatosis likely gynecologic primary    24 hour events:   - Underwent paracentesis, 3.3L removed. Gram stain with WBCs, predominately mononuclear cells and RBCs.   - Underwent IR guided core needle biopsy of metastatic lesion  - Heparin discontinued, lovenox started  - Palpitations- ECG sinus tachy, Hg 7.0. Given 1U pRBC  - Triggered sepsis protocol due to tachycardia- lactate 0.9    Subjective: Patient reports palpitations and fatigue improved after blood transfusion. Has a small appetite, eating some foods. No nausea or vomiting. Denies pain. Passing flatus, had a BM two days ago. Doesn't feel constipated.  Voiding spontaneously.     Objective:   Vitals:    06/22/18 0020 06/22/18 0055 06/22/18 0330 06/22/18 0611   BP: 104/51 104/47 99/49 104/46   BP Location: Left arm  Left arm Left arm   Pulse: 81 80 83 80   Resp: 26 24 16 18   Temp: 97.6  F (36.4  C) 97.3  F (36.3  C) 98.3  F (36.8  C) 99.1  F (37.3  C)   TempSrc: Oral Oral Oral Oral   SpO2:  100% 97% 95%   Weight:       Height:         GEN - NAD, sitting comfortably in bed  CV - RRR  PULM - CTAB, no wheezing  ABD - compared to yesterday evening's exam, abdomen is more distended, remains soft and non-tender. Dullness to percussion  Ext - warm and well perfused, trace edema, non-tender, SCDs on    Lines/drains:   PIV    I/Os  (Yesterday // Since Midnight)  PO: 0cc // 0cc  IVF: 112cc // 0cc  Urine: 750cc // 850cc      New Labs/Imaging-   Results for orders placed or performed during the hospital encounter of 06/19/18 (from the past 24 hour(s))   Gram stain   Result Value Ref Range    Specimen Description Peritoneal fluid     Gram Stain No organisms seen     Gram Stain       Moderate  WBC'S seen  predominantly mononuclear cells      Gram Stain Many  Red blood cells seen       Gram Stain       Gram  stain and culture performed on concentrated specimen   Lactic acid level STAT for sepsis protocol   Result Value Ref Range    Lactate for Sepsis Protocol 0.9 0.4 - 1.9 mmol/L   Hemoglobin   Result Value Ref Range    Hemoglobin 7.0 (L) 11.7 - 15.7 g/dL   CBC with platelets   Result Value Ref Range    WBC 12.5 (H) 4.0 - 11.0 10e9/L    RBC Count 3.90 3.8 - 5.2 10e12/L    Hemoglobin 8.7 (L) 11.7 - 15.7 g/dL    Hematocrit 28.6 (L) 35.0 - 47.0 %    MCV 73 (L) 78 - 100 fl    MCH 22.3 (L) 26.5 - 33.0 pg    MCHC 30.4 (L) 31.5 - 36.5 g/dL    RDW 24.8 (H) 10.0 - 15.0 %    Platelet Count 347 150 - 450 10e9/L       Pending cultures (date obtained):   Urine culture (6/20)-  10-50k mixed urogenital jaya  Blood culture (6/20)- NGTD  Paracentesis culture (6/21)- pending      Assessment:  Maria Esther Wang is a 58 year old with a pelvic mass, malignant ascites, and peritoneal carcinomatosis concerning for ovarian or primary peritoneal malignancy, currently admitted for a new diagnosis of bilateral pulmonary emboli. Now s/p IR guided biopsy of metastatic lesion and paracentesis of 3.3L. Meeting all goals for discharge, plan for follow up on Monday with Dr. Fernandez for further management of her cancer.     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 Elevated CA-125 and peritoneal fluid positive for adenocarcinoma. Imaging suggests metastatic disease with mediastinal and axillary lymphadenopathy. Now s/p IR guided biopsy and paracentesis on 6/20. Patient will follow up as an outpatient for further management of disease when final pathology results.   FEN: Regular diet. Nutrition following for poor nutritional status.   Pain: PRN tylenol   Heme: Anemia of chronic disease based  on workup at OSH, s/p 1U pRBCs. Hemoglobin downtrended to 7.0 on 6/21, given 1u pRBC due to symptomatic anemia. Continue to trend.  CV: Bilateral DVTs and pulmonary embolism, likely secondary to malignancy. Now s/p heparin drip, transitioned to therapeutic lovenox.   Resp: Newly diagnosed bilateral PEs, see above. Mild left pleural effusion.  Not requiring oxygen, asymptomatic.  - Cough- IS ordered, continue symptomatic management  GI: Malignancy ascites s/p therapeutic paracentesis on 6/18/18 (3.6L) and 6/21 (3.3L).    : No issues.   MSK: No issues  Neuro/Psych: No issues  Endocrine: No issues.   ID: Afebrile overnight. Lactate and WBC unremakable. Urinalysis and UCx negative. Blood culture NGTD  PPx: SCDs, therapeutic lovenox  Disposition: Inpatient until clinically stable. PT recommends home with assist. Likely discharge today    # Pain Assessment:  Current Pain Score 6/22/2018   Patient currently in pain? denies   Pain location -   Maria Esther khan pain level was assessed and she currently denies pain.      Jocy Dasilva MD   Resident Physician, PGY3  Obstetrics, Gynecology, and Women's Health        Provider Disclosure:   I agree with above History, Review of Systems, Physical exam and Plan. I have reviewed the content of the documentation and have edited it as needed. I have seen and personally performed the services documented here and the documentation accurately represents those services and the decisions I have made.     Electronically signed by:   Geo Fernandez MD   Gynecologic Oncology   [facility]

## 2018-06-22 NOTE — PROGRESS NOTES
Brief Progress Note    Visited patient to discuss discharge planning, and she reported feeling warm. Temp obtained, 100.5 F. Triggered sepsis protocol, Lactate 3.4, returned to evaluate patient.    S: Patient reports feeling well, just feverish. Desires discharge to home. Having some pain over incisions from biopsy and paracentesis. Denies chest pain, shortness of breath. Cough is the same, not productive. Continues to eat well.    O: Temp: 100.4  F (38  C) Temp src: Oral BP: 115/55 Pulse: 106 Heart Rate: 106 Resp: (!) 32 SpO2: 97 % O2 Device: None (Room air) Oxygen Delivery: 1 LPM  General: appears flushed  CV: Mild tachycardia, regular rhythm, no m/g/r  Resp: tachypnea with shallow breath, slightly diminished breath sounds LLQ, no crackles or wheezes  Abd: moderately tense and distended, non-tender, dressings in place x 2, c/d/i without shadowing or erythema  Extremities: non-tender, trace edema    A/P: Maria Esther Wang is a 58 year old with a pelvic mass, malignant ascites, and peritoneal carcinomatosis concerning for ovarian or primary peritoneal malignancy, currently admitted for a new diagnosis of bilateral pulmonary emboli. Now POD#1 IR guided biopsy of metastatic lesion and paracentesis of 3.3L.    - lactic acid elevated at 3.4. She has had low grade fevers occasionally since admission, and lactate has been normal with these, so fever warrants further work-up.  - CBC ordered.  - UA, UCx, Blood Cx ordered  - Unclear if tachypnea, lung exam are due to known PE or new etiology. Tachypnea has been present throughout admission. Consider chest xray to evaluate for pneumonia.    Will discuss with fellow.    Argelia Brooks MD  PGY-2 Department of Obstetrics, Gynecology, and Women's Health  06/22/18

## 2018-06-22 NOTE — PLAN OF CARE
Problem: Patient Care Overview  Goal: Plan of Care/Patient Progress Review  Outcome: Adequate for Discharge Date Met: 06/22/18  HD4 admitted with a Pulmonary embolism. A&Ox4, VSS on room air, tachycardic with activity. LSC, diminished in bases. Edema present under right eye, pt sleeps on right side. BS+ passsing gas, LBM 6/21. Abd rounded, distended and taut. Demonstrated administration of lovenox with family present. Moves independently and voids adequately. Discharging to home.

## 2018-06-22 NOTE — DISCHARGE SUMMARY
Gynecologic Oncology Discharge Summary    Maria Esther Wang  [id number]    Admit Date: 6/19/2018  Discharge Date: 6/24/2018  Admitting Provider: Geo Hubbard MD  Discharge Provider: Geo hubbard MD    Admission Dx:   - Pelvic mass, malignant ascites and lymphadenopathy concerning for metastatic ovarian malignancy   - Bilateral pulmonary emboli  - Severe malnutrition   - Anemia of chronic disease  - Fevers     Discharge Dx:  - Pelvic mass, malignant ascites and lymphadenopathy concerning for metastatic ovarian malignancy   - Bilateral pulmonary emboli  - Severe malnutrition   - Anemia of chronic disease  - Fevers   - Suspected community acquired pneumonia    Patient Active Problem List   Diagnosis     Pulmonary emboli (H)       Procedures:   - Therapeutic paracentesis   - IR biopsy of peritoneal nodule   - Heparin gtt   - 1 unit pRBCs    Prior to Admission Medications:  Prescriptions Prior to Admission   Medication Sig Dispense Refill Last Dose     Omeprazole (PRILOSEC PO)           Discharge Medications:   Maria Esther Wang   Home Medication Instructions TL:[id number]    Printed on:06/24/18 0171   Medication Information                      amoxicillin (AMOXIL) 875 MG tablet  Take 1 tablet (875 mg) by mouth every 12 hours             azithromycin (ZITHROMAX) 250 MG tablet  Take 1 tablet (250 mg) by mouth daily             enoxaparin (LOVENOX) 60 MG/0.6ML injection  Inject 0.47 mLs (47 mg) Subcutaneous every 12 hours             Omeprazole (PRILOSEC PO)               oxyCODONE IR (ROXICODONE) 5 MG tablet  Take 1 tablet (5 mg) by mouth every 4 hours as needed for moderate to severe pain             senna-docusate (SENOKOT-S;PERICOLACE) 8.6-50 MG per tablet  Take 1 tablet by mouth 2 times daily                   Consultations:  Nutrition   Interventional radiology   Physical therapy   Occupational therapy    Brief History of Illness:  Maria Esther Wang is a 58 year old who presented to urgent  care on 5/30/18 with complaints of abdominal bloating, 11 lb wt loss in span of 2-3 wks. At urgent care, XR showed ascites. She followed up with her PCP on 6/1/18. Per the PCP note she denied pain, diarrhea, constipation, bloody stools, dysuria, and fever/chills. PCP obtained CT abdomen/pelvis which showed complex pelvic masses highly suspicious for primary ovarian malignancy, possible invasin of uterus, diffuse peritoneal carcinomatosis, metastatic LAD, and large volume ascites. She was then referred to IR at [facility] for paracentesis and CT w/contrast, scheduled for 6/18/18, during this appt she was incidentally found to have bilateral PE and was admitted directly to [facility] ICU and was started on a heparin gtt. She was also found to be anemic to 6.5 and received 1 unit PRBCs with appropriate rise to 7.6 on discharge. Her anemia was evaluated with peripheral smear and iron studies, which were consistent with anemia of chronic disease. She was transferred to [facility] for management of her PE in the setting of suspected gynecologic malignancy.     Hospital Course:  Dz:   - Newly diagnosed pelvic mass, malignant ascites and peritoneal carcinomatosis in the setting of elevated CA-125.  Ascites positive for malignancy at outside hospital.  Likely metastatic ovarian cancer due to mediastinal and axillary lymphadenopathy. She underwent IR-guided biopsy of a peritoneal nodule while inpatient on HD#3. Final pathology pending at the time of discharge. She will follow-up postoperatively for a care plan.  FEN:   - She was maintained on a regular diet except prior to procedures.  She was seen by nutrition due to her history of malnutrition with albumin 1.8.   Pain:   - She received Tylenol for pain.   CV:   - She has no history of CV issues.  EKG was performed for palpitations and was NSR. Echocardiogram was obtained due to bilateral pulmonary emboli and was normal with an EF of 60-65%. She received one dose of IV  albumin for hypovolemia.   PULM:   - She had newly diagnosed DVTs and PE, secondary to malignancy.  She was continued on a heparin gtt until her procedures were completed with IR.  She was then transitioned to therapeutic lovenox, which she will continue on discharge.   HEME:   - She has anemia of chronic disease.  She underwent anemia work-up at OSH and received 1U of pRBCs prior to transfer.  Her Hgb on HD#3 trended down to 7.0 and she was symptomatic with palpitations and fatigue, so she received 1u pRBC. Her Hgb was 8.7 at the time of discharge.   GI:   - She underwent a therapeutic paracentesis on HD#3 with return of 3.3 L of fluid.  She was given as needed anti-emetics and given a bowel regimen to prevent constipation.   :    - She had no acute  issues while in house.  ID:   - She was febrile to 100.4 F on the evening of HD#1 and to 100.8 F on the evening of HD#2. Lactate was normal, and work-up was unremarkable. On HD#4 she was febrile to 100.5 and lactate was elevated at 3.4. Urinalysis were normal. Chest xray was concerning for pneumonia, so she was started on antibiotics for community acquired pneumonia. She was transitioned to a PO regimen to complete a 5 day course and was discharged with these medications. At the time of discharge, she had been afebrile for greater than 24 hours, and her lactate was normal. Blood cultures and urine cultures were NGTD at the time of discharge.  Ascites sent for culture was NGTD at the time of discharge; gram stain was negative for organisms.  ENDO:   - No acute issues  PSYCH/NEURO:   - No acute issues  PPX:    - She was given SCDs and lovenox during her hospital course. She will be continued on lovenox after discharge for her PEs.     Discharge Instructions and Follow up:  Ms. Maria Esther Wang was discharged from the hospital with follow up for suspected ovarian cancer, bilateral pulmonary emboli.    Discharge Diet: Regular  Discharge Activity: Activity as  tolerated  Discharge Follow up: 6/25/18 Dr Fernandez at 8:15 am     Discharge Disposition:  Discharged to home    Discharge Staff: Geo Fernandez MD     # Discharge Pain Plan:   - During her hospitalization, Maria Esther experienced pain due to ovarian cancer and related procedures.  The pain plan for discharge was discussed with Maria Esther and her spouse and the plan was created in a collaborative fashion.    - Opioids prescribed on discharge: Oxycodone  - Duration of opioids after discharge: Per CDC opioid prescribing guidelines, a 3 day prescription of opioids was provided.  - Bowel regimen: senna and docusate    Argelia Brooks MD  PGY-2 Department of Obstetrics, Gynecology, and Women's Health  06/22/18      Provider Disclosure:   I agree with above History, Review of Systems, Physical exam and Plan. I have reviewed the content of the documentation and have edited it as needed. I have seen and personally performed the services documented here and the documentation accurately represents those services and the decisions I have made.     Electronically signed by:   Geo Fernandez MD   Gynecologic Oncology   [facility]

## 2018-06-22 NOTE — PROVIDER NOTIFICATION
Notified MD of Lactic acid of 3.4.     Response: doing blood cultures, has bilateral pleural effusions, may order xray, LSC, diminished in LLL, Continuous Pulse ox, UA ordered, CBC and lactic acid recheck in 4 hours.

## 2018-06-23 NOTE — PLAN OF CARE
Problem: Patient Care Overview  Goal: Plan of Care/Patient Progress Review  Outcome: No Change  T 99.3, BP soft. 124/41. AOVSS. Lactic 1.0, hemoglobin 7.9. Denies pain as well as n/v. Abdomen distended and round d/t ascites from malignant cancer. Underwent CT of chest/abdomen today and ECHO, awaiting results. MIVF running at 75ml/hr. Tolerating regular diet. Up SBA. Voiding adequate amounts. Pt is [place] speaking. Family is at bedside.  present today until 12pm. If needed call [place]  services.     Plan of care: Monitor labs and assess for s/s of fevers.

## 2018-06-23 NOTE — PLAN OF CARE
Problem: Patient Care Overview  Goal: Plan of Care/Patient Progress Review  Outcome: No Change  T Max 99.6, BPs soft, otherwise VSS. Plan was to dc today, but pt had lactic acid 3.4. 500 mL LR fluid bolus given. Lactic acid re-check 1.8. ECHO, chest x ray, BMP ordered. Treating for CAP. ECHO not completed yet. Called, no response back. Sodium low, fluid changed to NS. MIVF running @ 75 mL/hr. UA/UC sent. Regular diet.  Up SBA. BM x 2 today. Voiding adequately. Distended abdomen, ascites from malignant cancer. Denies pain, nausea, SOB, numbness/tingling and chest pain. Plan to possibly dc tomorrow. [place] speaking. Family at bedside currently.  should be ordered for 0800 and 1600.     Pt would LIKE to receive bedside shift report.

## 2018-06-23 NOTE — PROGRESS NOTES
Gynecologic Oncology   Progress Note      HD#5: admitted for PE, now POD#2 s/p paracentesis and IR guided biopsy  Disease: pelvic mass, malignant ascites, peritoneal carcinomatosis likely gynecologic primary    24 hour events:   - febrile to 100.5, triggered sepsis protocol. Lactic acid 3.4. WBC 14.2.  - CXR with left lower lobe opacity, started on Ceftriaxone/Azitrhomycin for CAP.    Subjective: Patient feels feverish and sick this morning. Has a small appetite, ate some stir meadows and chocolate boost last night. No nausea or vomiting. Denies pain. Passing flatus, had a BM yesterday.  Voiding spontaneously.     Objective:   Vitals:    06/22/18 2250 06/23/18 0412 06/23/18 0500 06/23/18 0720   BP: 136/49  107/44 98/47   BP Location: Left arm  Left arm Left arm   Pulse:    83   Resp: 16  16 20   Temp: 99.6  F (37.6  C) 101.2  F (38.4  C)  97.9  F (36.6  C)   TempSrc: Oral Oral  Oral   SpO2: 97%  100% 99%   Weight:       Height:         GEN - NAD, sitting comfortably in bed  CV - RRR  PULM - CTAB, increased work of breathing  ABD - compared to yesterday evening's exam, abdomen is more distended and tense.. Dullness to percussion  Ext - warm and well perfused, trace edema, non-tender, SCDs on    Lines/drains:   PIV    I/Os  (Yesterday // Since Midnight)  PO:  600 cc // 0cc  IVF: 705 cc // not recorded yet  Urine: 700 cc // 300cc      New Labs/Imaging-   Lactate 3.4>1.8> 1.0  WBC 14.2> 15.2>12.9  Na 132  BMP otherwise normal  Mg Phos normal  Hgb 12.5 >EBL 1000.   Results for LEANNE ESCOBEDO (MRN [id number]) as of 6/22/2018 20:55   6/22/2018 18:05   Color Urine Yellow   Appearance Urine Clear   Glucose Urine Negative   Bilirubin Urine Negative   Ketones Urine Negative   Specific Gravity Urine 1.008   pH Urine 6.5   Protein Albumin Urine 10 (A)   Urobilinogen mg/dL 2.0   Nitrite Urine Negative   Blood Urine Negative   Leukocyte Esterase Urine Negative   Source Catheterized Urine   WBC Urine 1   RBC Urine 0   Mucous  Urine Present (A)       Pending cultures (date obtained):   Urine culture (6/20)-  10-50k mixed urogenital jaya  Blood culture (6/20)- NGTD  Paracentesis culture (6/21)- pending  Urine culture (6/22)- pending  Blood cultures (6/22)- pending      CXR 6/22  PA and lateral views of the chest were obtained. Small left  pleural effusions with overlying opacities. The right lung is  relatively clear. Distinct pulmonary vasculature. Cardiomediastinal  silhouette within normal limits. No right pleural effusion. No  pneumothorax. No acute bone abnormalities.       Assessment:  Maria Esther Wang is a 58 year old with a pelvic mass, malignant ascites, and peritoneal carcinomatosis concerning for ovarian or primary peritoneal malignancy, currently admitted for a new diagnosis of bilateral pulmonary emboli. Now s/p IR guided biopsy of metastatic lesion and paracentesis of 3.3L. Planned discharge yesterday, but new onset fever has kept her inpatient.     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 Elevated CA-125 and peritoneal fluid positive for adenocarcinoma. Imaging suggests metastatic disease with mediastinal and axillary lymphadenopathy. Now s/p IR guided biopsy and paracentesis on 6/20. Patient will follow up as an outpatient for further management of disease when final pathology results.   FEN: Regular diet. Nutrition following for poor nutritional status.   Pain: PRN tylenol   Heme: Anemia of chronic disease based on workup at OSH, s/p 1U pRBCs. Hemoglobin downtrended to 7.0 on 6/21, given 1u pRBC due to symptomatic anemia. Continue to trend.  CV: Bilateral DVTs and pulmonary embolism, likely secondary to malignancy. Now s/p heparin drip, transitioned to  therapeutic lovenox.   Resp: Newly diagnosed bilateral PEs, see above. Mild left pleural effusion.  Not requiring oxygen, though does have labored breathing.  - Cough- IS ordered, continue symptomatic management  GI: Malignancy ascites s/p therapeutic paracentesis on 6/18/18 (3.6L) and 6/21 (3.3L).    : No issues.   MSK: No issues  Neuro/Psych: No issues  Endocrine: No issues.   ID: Febrile with elevated lactate triggered sepsis protocol. UA normal again. Repeat UCx, Blood Cx ordered. C Urinalysis and UCx negative. Blood culture NGTD. Due to opacity on CXR, patient is now D#2 Ceftriaxone/Azithromycin for presumptive CAP, but in light of improved lactate and WBC after hydration, suspect non-infectious cause for fever.  PPx: SCDs, therapeutic lovenox  Disposition: Inpatient until clinically stable. PT recommends home with assist. Plan for follow up on 6/26 with Dr. Fernandez for further management of her cancer.    # Pain Assessment:  Current Pain Score 6/23/2018   Patient currently in pain? denies   Pain location -   Maria Esther khan pain level was assessed and she currently denies pain.      Argelia Brooks MD   Resident Physician, PGY2  Obstetrics, Gynecology, and Women's Health      Provider Disclosure:   I agree with above History, Review of Systems, Physical exam and Plan. I have reviewed the content of the documentation and have edited it as needed. I have seen and personally performed the services documented here and the documentation accurately represents those services and the decisions I have made.     Electronically signed by:   Geo Fernandez MD   Gynecologic Oncology   [facility]

## 2018-06-24 NOTE — PROGRESS NOTES
Gynecologic Oncology Daily Progress Note  18    HD#6 admitted for PE, now POD#3 s/p paracentesis and IR guided biopsy  Disease: pelvic mass, malignant ascites, peritoneal carcinomatosis likely gynecologic primary    24 hour events:   - Fever to 101.2F  @0415  - Echo done, showed normal RV function  - CT CAP showing bilateral pleural effusions L>R, no consolidations (PEs stable)  - Lactate 1.0>1.7 during day  - WBC downtrended (15.2>12.9)    Subjective: Reports subjective fevers overnight.  Otherwise denies significant pain.  Denies N/V, CP.  Had a BM yesterday, continues to pass flatus.  Denies any shortness of breath or cough. Tolerating a regular diet.      Objective:  Temp: 99.5  F (37.5  C) Temp  Min: 98.1  F (36.7  C)  Max: 99.5  F (37.5  C)  Resp: 18 Resp  Min: 16  Max: 20  SpO2: 95 % SpO2  Min: 95 %  Max: 100 %  Pulse: 104 Pulse  Min: 90  Max: 104  Heart Rate: 92 Heart Rate  Min: 92  Max: 101  BP: 115/49 Systolic (24hrs), Av , Min:109 , Max:137   Diastolic (24hrs), Av, Min:40, Max:51    GEN - NAD, sitting comfortably in bed  CV - RRR  PULM - CTAB, no wheezing  ABD - soft, +distended, +fluid wave, minimal tenderness  Ext - no edema, non-tender, SCDs in place  L/D - PIV x2    I/O last 3 completed shifts:  In:  [P.O.:260; I.V.:1800]  Out: 2550 [Urine:2550]    I/O (last 24h//since MN)  I: PO 260mL//NR; IV 1800mL//NR  O: Urine 2550mL//600mL; unmeasured stool x1    ROUTINE IP LABS (Last four results)  BMP    Recent Labs  Lab 18  0526 188 18  0526 18    129* 132* 138 135   POTASSIUM 3.6 4.1 4.0 3.4 3.5   CHLORIDE 101 99 100 106 104   IRON 7.4*  --  7.5* 7.7* 7.7*   CO2 25  --  22 20 22   BUN 8  --  9 11 13   CR 0.49*  --  0.52 0.49* 0.51*   * 149* 140* 103* 112*     CBC    Recent Labs  Lab 18  0526 18  2330 18  1539 18  0522   WBC 12.9* 15.2* 14.2* 12.5*   RBC 3.52* 3.65* 3.68* 3.90   HGB 7.9* 8.2* 8.4* 8.7*    HCT 25.5* 26.9* 27.1* 28.6*   MCV 72* 74* 74* 73*   MCH 22.4* 22.5* 22.8* 22.3*   MCHC 31.0* 30.5* 31.0* 30.4*   RDW 25.1* 25.0* 25.1* 24.8*    403 408 347     INR    Recent Labs  Lab 06/21/18  0536 06/20/18  0526   INR 1.24* 1.27*     LACTATE:  0.9>3.4>1.8>1.8>1.0>1.7 (6/23)    Imaging:  Echocardiogram TTE 6/23  Interpretation Summary:  Right ventricular function, chamber size, wall motion, and thickness are normal.  Right ventricular systolic pressure is 45mmHg above the right atrial pressure.  The inferior vena cava is normal.  A left pleural effusion is present.    CT C/A/P 6/23  1. Bilateral adnexal cystic masses with papillary projections. Findings are most suspicious for ovarian malignancy.  2. Diffuse metastatic disease with peritoneal carcinomatosis and ascites. Adenopathy in the iliac chains, retroperitoneum, mediastinum, inferior neck, in right axilla. Pleural metastases and increasing pleural effusions.  3. Extensive bilateral pulmonary emboli, overall similar to 6/18/2018, though there has been some shifting of clot burden from previous    Cultures:  Urine culture (6/20)-  10-50k mixed urogenital jaya  Blood culture (6/20)- NGTD  Paracentesis culture (6/21)- NGTD, final pending  Urine culture (6/22)- pending  Blood cultures (6/22)- NGTD, final pending    Assessment: Maria Esther Wang is a 58 year old with a pelvic mass, malignant ascites, and peritoneal carcinomatosis concerning for ovarian or primary peritoneal malignancy, currently HD#6 admitted for a new diagnosis of bilateral pulmonary emboli. Now POD#3 s/p IR guided biopsy of metastatic lesion and paracentesis of 3.3L. Planned discharge 6/22, but new onset fever has kept her inpatient.  She is now 24h afebrile without symptoms.  Will readdress dispo planning today.      Active Problem list:  Metastatic disease likely ovarian or primary peritoneal  Malignant ascites  Bilateral pulmonary embolism  Pulmonary effusion  Anemia of chronic  disease   Severe malnutrition  Fevers      Plan:    Disease: Newly diagnosed pelvic mass and peritoneal carcinomatosis, suspicious for ovarian malignancy. Elevated CA-125 and peritoneal fluid positive for adenocarcinoma. Imaging suggests metastatic disease with mediastinal and axillary lymphadenopathy. Now s/p IR guided biopsy and paracentesis on 6/20. Considering starting chemotherapy in patient vs follow-up outpatient.   FEN: Regular diet. Nutrition following for poor nutritional status.   Pain: PRN tylenol   Heme: Anemia of chronic disease based on workup at OSH, s/p 1U pRBCs. Hemoglobin downtrended to 7.0 on 6/21, given 1u pRBC due to symptomatic anemia. On therapeutic lovenox as below.  Hgbs have since stabilized, but will continue to trend.  CV: Bilateral DVTs and pulmonary embolism, likely secondary to malignancy. Now s/p heparin drip, transitioned to therapeutic lovenox.   Resp: Newly diagnosed bilateral PEs, see above. Mild bilateral pleural effusions L>R.  Intermittently requiring O2, but currently on RA   - Cough - IS ordered, continue symptomatic management  GI: Malignancy ascites s/p therapeutic paracentesis on 6/18/18 (3.6L) and 6/21 (3.3L).    : No issues.   MSK: No issues  Neuro/Psych: No issues  Endocrine: No issues.   ID: Febrile to 101.2 6/23.  Lactate returned 1.0, however triggered sepsis protocol 6/23 afternoon which showed lactate of 1.7. UA normal again. Repeat UCx, Blood Cx ordered and are pending but so far NGTD. CT C/A/P obtained which showed bilateral pulmonary effusions, no consolidation.   - Previous UCx and Blood Cx from 6/20 are NGTD. Due to opacity on CXR (6/22), patient was started on Ceftriaxone/Azithromycin for presumptive CAP (D#3/5)  - In light of improved lactate and WBC after hydration, suspect non-infectious cause for fever.    PPx: SCDs, therapeutic lovenox  Lines/Drains: PIV x2  Disposition: Inpatient until clinically stable. PT recommends home with assist. Plan for  follow up on 6/26 with Dr. Fernandez for further management of her cancer vs starting chemotherapy as an in-patient on this admission.     # Pain Assessment:  Current Pain Score 6/23/2018   Patient currently in pain? denies   Pain location -   - Maria Esther is experiencing pain due to abdominal pain/mass. Pain management was discussed and the plan was created in a collaborative fashion.  Maria Esther's response to the current recommendations: engaged  - Please see the plan for pain management as documented above    Kezia Cheng MD  Gynecology Oncology PGY2  Pager: [phone]  6/24/2018 8:26 AM

## 2018-06-24 NOTE — PLAN OF CARE
Problem: Patient Care Overview  Goal: Plan of Care/Patient Progress Review  Outcome: No Change    Patient denies pain, no nausea, good PO intake. Pt is [place] speaking but understand and speak some english,  phone at bedside. Lovenox teaching started. However, teaching would be more beneficial with  at bedside, please follow up. PIV infusing, pt is voiding and ambulating without difficultly, call light in reach, family at bedside.    Update: Sepsis protocol triggered, lactic acid pending

## 2018-06-24 NOTE — PLAN OF CARE
Problem: Patient Care Overview  Goal: Plan of Care/Patient Progress Review  Outcome: No Change  AVSS.  LS diminished.  Denies SOB.  Pt resting well between cares.  Denies pain and nausea.  Abd distended, firm - 2 dressings on R, c/d/i.  Up with SBA to BR to void, good vols.  SL.  Family at bedside,  phone available as well.   scheduled this morning for 10.  Cont with POC.  Please continue lovenox teaching while  here.

## 2018-06-25 PROBLEM — R18.8 ASCITES: Status: ACTIVE | Noted: 2018-01-01

## 2018-06-25 PROBLEM — Z79.899 ENCOUNTER FOR LONG-TERM (CURRENT) USE OF MEDICATIONS: Status: ACTIVE | Noted: 2018-01-01

## 2018-06-25 PROBLEM — C56.9 OVARIAN CANCER, UNSPECIFIED LATERALITY (H): Status: ACTIVE | Noted: 2018-01-01

## 2018-06-25 NOTE — NURSING NOTE
Chief Complaint   Patient presents with     Blood Draw     vitals and venipuncture done by CLARK Tapia CMA on 6/25/2018 at 8:27 AM

## 2018-06-25 NOTE — NURSING NOTE
Oncology Rooming Note    June 25, 2018 8:41 AM   Maria Esther Wang is a 58 year old female who presents for:    Chief Complaint   Patient presents with     Blood Draw     vitals and venipuncture done by [facility]     Oncology Clinic Visit     New patient; Ovarian Ca     Initial Vitals: /68 (BP Location: Left arm, Patient Position: Sitting, Cuff Size: Adult Regular)  Pulse 105  Temp 98.9  F (37.2  C) (Oral)  Resp 16  Ht 1.524 m (5')  Wt 49 kg (108 lb)  SpO2 99%  BMI 21.09 kg/m2 Estimated body mass index is 21.09 kg/(m^2) as calculated from the following:    Height as of this encounter: 1.524 m (5').    Weight as of this encounter: 49 kg (108 lb). Body surface area is 1.44 meters squared.  No Pain (0) Comment: tightness in abdomen and leg swelling   No LMP recorded. Patient is postmenopausal.  Allergies reviewed: Yes  Medications reviewed: Yes    Medications: MEDICATION REFILLS NEEDED TODAY. Provider was notified.  Pharmacy name entered into EPIC: Data Unavailable    Clinical concerns: new patient; needs refills on senna, prilosec and oxycodone; Mammogram and colonoscopy have never been done. See AVS    6 minutes for nursing intake (face to face time)     Susan Alex [facility]

## 2018-06-25 NOTE — LETTER
2018       RE: Maria Esther Wang  [address] [facility] [address]     Dear Colleague,    Thank you for referring your patient, Maria Esther Wang, to the [facility]. Please see a copy of my visit note below.                            Consult Notes on Referred Patient    Date: 2018       Dr. Richi Holloway MD  No address on file       RE: Maria Esther Wang  : 1960  DELANEY: 2018    Dear Dr. Referred Self:    I had the pleasure of seeing your patient Maria Esther Wang here at the Gynecologic Cancer Clinic at the [facility] on 2018.  As you know she is a very pleasant 58 year old woman with a recent diagnosis of ascites carcinomatosis and PE.  Given these findings she was subsequently sent to the Gynecologic Cancer Clinic for new patient consultation.   G0, P0.  Went through menopause at age 48.  Never on hormone replacement therapy.  Recently diagnosed with ascites, shortness of breath and was seen at [facility] and diagnosed with bilateral PE and transferred to the [place] here.  Treated for bilateral PE.  Significant amount of ascites, 9 liters, was taken off.  We have gotten an ultrasound-guided biopsy that is currently pending.  She is significantly malnourished and has most likely chest disease.             Past Medical History:    Past Medical History:   Diagnosis Date     NO ACTIVE PROBLEMS          Past Surgical History:    Past Surgical History:   Procedure Laterality Date     OTHER SURGICAL HISTORY      no surgical history         Health Maintenance:  Health Maintenance Due   Topic Date Due     PHQ-2 Q1 YR  1973     TETANUS IMMUNIZATION (SYSTEM ASSIGNED)  1979     HIV SCREEN (SYSTEM ASSIGNED)  1979     HEPATITIS C SCREENING  1979     PAP SCREENING Q3 YR (SYSTEM ASSIGNED)  1982     LIPID SCREEN Q5 YR FEMALE (SYSTEM ASSIGNED)  2006     MAMMO SCREEN Q2 YR (SYSTEM ASSIGNED)  2011      COLON CANCER SCREEN (SYSTEM ASSIGNED)  05/06/2011     ADVANCE DIRECTIVE PLANNING Q5 YRS  05/06/2016       She never had a Pap smear.             Current Medications:     has a current medication list which includes the following prescription(s): amoxicillin, azithromycin, enoxaparin, omeprazole, oxycodone ir, and senna-docusate.       Allergies:     [unfilled]        Social History:     Social History   Substance Use Topics     Smoking status: Never Smoker     Smokeless tobacco: Never Used     Alcohol use No       History   Drug Use No           Family History:   Family history of cancer.  The patient's mother had irregular bleeding, but no definite diagnosis.           Physical Exam:     /68 (BP Location: Left arm, Patient Position: Sitting, Cuff Size: Adult Regular)  Pulse 105  Temp 98.9  F (37.2  C) (Oral)  Resp 16  Ht 1.524 m (5')  Wt 49 kg (108 lb)  SpO2 99%  BMI 21.09 kg/m2  Body mass index is 21.09 kg/(m^2).    General Appearance: Cachectic.        and alert, no distress     Neurological: normal gait, no gross defects     Psychiatric: appropriate mood and affect                ABDOMEN:  Distended, nontender.  No organomegaly.   PELVIC:  There is a fixed pelvic mass.  Rectovaginal confirms.  Normal-appearing cervix and external genitalia as well as vaginal mucosa.  The mass is nontender.                              Assessment:    Maria Esther Wang is a 58 year old woman with a new diagnosis of ascites carcinomatosis and PE.     A total of 60 minutes was spent with the patient, 40 minutes of which were spent in counseling the patient and/or treatment planning.      1.  Suspect ovarian cancer.    2.  Poor nutritional status.      I discussed with the patient that we will await the biopsy results and then plan to start neoadjuvant chemotherapy, if this is indeed an ovarian cancer, start with neoadjuvant chemotherapy with carboplatin dose of AUC of 6 and paclitaxel to 175 mg/m2 every 3 weeks as  an outpatient.  We will also place an IV port.  Continue with therapeutic Lovenox for pulmonary embolism.  I discussed the alternatives, risks and benefits of chemotherapy and the general treatment strategy with a combination of surgery and chemotherapy. The patient was seen with her family and the .  She agrees with the plan.  They are very appreciative of her care.  All questions were answered.             Thank you for allowing us to participate in the care of your patient.         Sincerely,    Geo Fernandez MD, MS    Department of Obstetrics and Gynecology   Division of Gynecologic Oncology   [facility]  Phone: [phone]      CC  Patient Care Team:  No Ref-Primary, Physician as PCP - General  SELF, REFERRED     Result reviewed by MD    Again, thank you for allowing me to participate in the care of your patient.      Sincerely,    Geo Fernandez MD

## 2018-06-25 NOTE — PROGRESS NOTES
Consult Notes on Referred Patient    Date: 2018       Dr. Richi Holloway MD  No address on file       RE: Maria Esther Wang  : 1960  DELANEY: 2018    Dear Dr. Referred Self:    I had the pleasure of seeing your patient Maria Esther Wang here at the Gynecologic Cancer Clinic at the [facility] on 2018.  As you know she is a very pleasant 58 year old woman with a recent diagnosis of ascites carcinomatosis and PE.  Given these findings she was subsequently sent to the Gynecologic Cancer Clinic for new patient consultation.   G0, P0.  Went through menopause at age 48.  Never on hormone replacement therapy.  Recently diagnosed with ascites, shortness of breath and was seen at [facility] and diagnosed with bilateral PE and transferred to the [place] here.  Treated for bilateral PE.  Significant amount of ascites, 9 liters, was taken off.  We have gotten an ultrasound-guided biopsy that is currently pending.  She is significantly malnourished and has most likely chest disease.             Past Medical History:    Past Medical History:   Diagnosis Date     NO ACTIVE PROBLEMS          Past Surgical History:    Past Surgical History:   Procedure Laterality Date     OTHER SURGICAL HISTORY      no surgical history         Health Maintenance:  Health Maintenance Due   Topic Date Due     PHQ-2 Q1 YR  1973     TETANUS IMMUNIZATION (SYSTEM ASSIGNED)  1979     HIV SCREEN (SYSTEM ASSIGNED)  1979     HEPATITIS C SCREENING  1979     PAP SCREENING Q3 YR (SYSTEM ASSIGNED)  1982     LIPID SCREEN Q5 YR FEMALE (SYSTEM ASSIGNED)  2006     MAMMO SCREEN Q2 YR (SYSTEM ASSIGNED)  2011     COLON CANCER SCREEN (SYSTEM ASSIGNED)  2011     ADVANCE DIRECTIVE PLANNING Q5 YRS  2016       She never had a Pap smear.             Current Medications:     has a current medication list which includes the following prescription(s):  amoxicillin, azithromycin, enoxaparin, omeprazole, oxycodone ir, and senna-docusate.       Allergies:     [unfilled]        Social History:     Social History   Substance Use Topics     Smoking status: Never Smoker     Smokeless tobacco: Never Used     Alcohol use No       History   Drug Use No           Family History:   Family history of cancer.  The patient's mother had irregular bleeding, but no definite diagnosis.           Physical Exam:     /68 (BP Location: Left arm, Patient Position: Sitting, Cuff Size: Adult Regular)  Pulse 105  Temp 98.9  F (37.2  C) (Oral)  Resp 16  Ht 1.524 m (5')  Wt 49 kg (108 lb)  SpO2 99%  BMI 21.09 kg/m2  Body mass index is 21.09 kg/(m^2).    General Appearance: Cachectic.        and alert, no distress     Neurological: normal gait, no gross defects     Psychiatric: appropriate mood and affect                ABDOMEN:  Distended, nontender.  No organomegaly.   PELVIC:  There is a fixed pelvic mass.  Rectovaginal confirms.  Normal-appearing cervix and external genitalia as well as vaginal mucosa.  The mass is nontender.                              Assessment:    Maria Esther Wang is a 58 year old woman with a new diagnosis of ascites carcinomatosis and PE.     A total of 60 minutes was spent with the patient, 40 minutes of which were spent in counseling the patient and/or treatment planning.      1.  Suspect ovarian cancer.    2.  Poor nutritional status.      I discussed with the patient that we will await the biopsy results and then plan to start neoadjuvant chemotherapy, if this is indeed an ovarian cancer, start with neoadjuvant chemotherapy with carboplatin dose of AUC of 6 and paclitaxel to 175 mg/m2 every 3 weeks as an outpatient.  We will also place an IV port.  Continue with therapeutic Lovenox for pulmonary embolism.  I discussed the alternatives, risks and benefits of chemotherapy and the general treatment strategy with a combination of surgery and  chemotherapy. The patient was seen with her family and the .  She agrees with the plan.  They are very appreciative of her care.  All questions were answered.             Thank you for allowing us to participate in the care of your patient.         Sincerely,    Geo Fernandez MD, MS    Department of Obstetrics and Gynecology   Division of Gynecologic Oncology   [facility]  Phone: [phone]      CC  Patient Care Team:  No Ref-Primary, Physician as PCP - General  SELF, REFERRED

## 2018-06-25 NOTE — PROGRESS NOTES
[facility] GYN-Oncology Teaching Note    Relevant Diagnosis:  Ovarian Cancer    Teaching Topic:    Chemotherapy:  Taxol/Carbo  Implanted Port    Person(s) involved in teaching:  Patient, brother Walter and niece April    Motivation Level:   Asks Questions:   Yes  Eager to Learn:  Yes  Cooperative:  Yes  Receptive (willing/able to accept information):  Yes      Patient demonstrates understanding of the following:  Reason for the appointment, diagnosis and treatment plan:  Yes  Knowledge of proper use of medications and conditions for which they are ordered (with special attention to potential side effects or drug interactions):  Yes  Which situations necessitate calling provider and whom to contact:  Yes    Teaching Concerns:  Teaching via .  Plan paracentesis 6/29/18 and 7/6/18.  Port and chemo is planned to be scheduled for 7/9/18.  Discussed diet and scrub prep for port and to hold Lovenox evening before and morning of port.  Plan Taxol 175 mg/m2/Carbo AUC 6 every 21 days x 3 cycles, then patient to return to see Dr. Fernandez with CT scan.  Patient lives with her brother and his family.      Instructional Materials Used/Given:  Implanted Port Booklet  Chemotherapy Packet and Cards   Cancer Handbook  Care Coordinator Cards and after hour contact information      Time spent teaching with patient:  45 minutes    Lauren GONZALEZ, RN  Care Coordinator  Gynecologic Cancer   Office:  [phone]  Pager: [phone] #[phone]

## 2018-06-25 NOTE — MR AVS SNAPSHOT
After Visit Summary   6/25/2018    Maria Esther Wang    MRN: [id number]           Patient Information     Date Of Birth          1960        Visit Information        Provider Department      6/25/2018 8:15 AM Geo Fernandez MD; [place] [employer]        Today's Diagnoses     Malignant neoplasm of ovary, unspecified laterality (H)          Care Instructions    Colorectal Cancer Screening: During our visit today, we discussed that it is recommended you receive colorectal cancer screening. Please call or make an appointment with your primary care provider to discuss this. You may also call the [facility] scheduling line ([phone]) to set up a colonoscopy appointment.    Breast Cancer Screening: During our visit today, we discussed that it is recommended you receive breast cancer screening. Please call or make an appointment with your primary care provider to discuss this with them. You may also call the [facility] scheduling line ([phone]) to set up a mammography appointment at the Breast Center within the [facility].            Follow-ups after your visit        Your next 10 appointments already scheduled     Jul 06, 2018 12:00 PM CDT   Paracentesis Visit with  Spec Inf Para Provider, UC 40 ATC   [facility] Special[facility] Procedure ([facility])    [address]  [address]  [facility] [address] [phone]            Jul 09, 2018  7:00 AM CDT   (Arrive by 5:30 AM)   IR CHEST PORT PLACEMENT > 5 YRS OF AGE with [id number]   [facility] ASC Imaging ([facility])    [address]  5th Floor  [facility] [address] [phone]           1. Your doctor will need to do a history and physical within 7 days before this procedure. 2. Your doctor will which medications should not be taken the morning of the exam. 3. Laboratory tests  are to be obtained by your doctor prior to the exam (Basic Metabolic Panel, CBCP, PTT and INR) (No labs needed if you are having a tunneled catheter exchange or removal) 4. If you have allergies to x-ray contrast or iodine, contact your doctor or a Radiology nurse prior to the exam day for instructions. 5. Someone will need to drive you to and from the hospital. 6. If you are or may be pregnant, contact your doctor or a Radiology nurse prior to the day of the exam. 7. If you have diabetes, check with your doctor or a Radiology nurse to see if your insulin needs to be adjusted for the exam. 8. If you are taking a medication called Glucophage or Glucovance; these medications need to be held the day of the exam and for approximately 48 hours following. A blood sample must be drawn so your creatinine level can be checked before resuming this medication. 9. If you are taking Coumadin (to thin you blood) please contact your doctor or a Radiology nurse at least 3 days before the exam for special instructions. 10. You should not have received contrast within 48 hours of this exam. 11. The day before your exam you may eat your regular diet and are encouraged to drink at least 2 quarts of clear liquids. Drink no alcoholic beverages for 24 hours prior to the exam. 12. If you have a colostomy you will need to irrigate it with tap water at 8PM the evening before and again at 6AM the morning of the exam. 13. Do not smoke for 24 hours prior to the procedure. 14. Birth to 4 years: - Breast feeding must be stopped 4 hours prior to exam - Solid food or formula must be stopped 6 hours prior to exam - Tube feedings must be stopped 6 hours prior to exam 15. 4-10 years old: - Nothing to eat or drink 6 hours prior to exam 16. 10+ years old: - Nothing to eat or drink 8 hours prior to exam 17. The morning of the exam you may brush your teeth and take medications as directed with a sip of water. 18. When discharged, you cannot drive until  morning, and an adult must be with you until then. You should stay in the [facility] overnight. 19. Bring a list of all drugs you are taking; include supplements and over-the-counter medications. Wear comfortable clothes and leave your valuables at home.            Jul 09, 2018   Procedure with Alexandro Sharif PA-C   [facility] ([facility])    [address] [place] [address]  5th Floor  [facility] [address] [phone]           Located in the Clinics and Surgery Center at [address] [place] [address].   parking is very convenient and highly recommended.  is a $6 flat rate fee.  Both  and self parkers should enter the main arrival plaza from [facility]; parking attendants will direct you based on your parking preference.            Jul 09, 2018 10:00 AM CDT   Infusion 360 with UC ONCOLOGY INFUSION, UC 27 ATC   [facility] ([facility])    [address] [place] [address]  [address]  [facility] [address]   [phone]            Jul 30, 2018  7:00 AM CDT   [facility] Lab Draw with  MASONIC LAB DRAW   [facility] Lab Draw ([facility])    [address] [place] [address]  [address]  [facility] [address]   [phone]            Jul 30, 2018  7:40 AM CDT   (Arrive by 7:25 AM)   Return Active Treatment with KATHRYN Stiles CNP   [facility] ([facility] [place])    [address] [place] [address]  [address]  [facility] [address]   [phone]            Jul 30, 2018  9:00 AM CDT   Infusion 360 with UC ONCOLOGY INFUSION, UC 26 ATC   [facility] ([facility])    [address] [place] [address]  [address]  [facility] [address]   [phone]              Who to contact     If you have questions or need follow up information about today's clinic visit or your schedule please contact [facility]  "CLINIC directly at [phone].  Normal or non-critical lab and imaging results will be communicated to you by MyChart, letter or phone within 4 business days after the clinic has received the results. If you do not hear from us within 7 days, please contact the clinic through [facility] or phone. If you have a critical or abnormal lab result, we will notify you by phone as soon as possible.  Submit refill requests through [facility] or call your pharmacy and they will forward the refill request to us. Please allow 3 business days for your refill to be completed.          Additional Information About Your Visit        [facility] Information     [facility] lets you send messages to your doctor, view your test results, renew your prescriptions, schedule appointments and more. To sign up, go to [email or web address] . Click on \"Log in\" on the left side of the screen, which will take you to the Welcome page. Then click on \"Sign up Now\" on the right side of the page.     You will be asked to enter the access code listed below, as well as some personal information. Please follow the directions to create your username and password.     Your access code is: [email or web address]  Expires: 2018  1:27 PM     Your access code will  in 90 days. If you need help or a new code, please call your [facility] clinic or [phone].        Care EveryWhere ID     This is your Care EveryWhere ID. This could be used by other organizations to access your [facility] medical records  [id number]        Your Vitals Were     Pulse Temperature Respirations Height Pulse Oximetry BMI (Body Mass Index)    105 98.9  F (37.2  C) (Oral) 16 1.524 m (5') 99% 21.09 kg/m2       Blood Pressure from Last 3 Encounters:   18 94/41   18 128/68   18 115/49    Weight from Last 3 Encounters:   18 45.2 kg (99 lb 11.2 oz)   18 49 kg (108 lb)   18 47.4 kg (104 lb 8 oz)              We Performed the Following          CBC with " platelets differential     CMP - Comprehensive Metabolic Panel     [facility]        Primary Care Provider Fax #    Physician No Ref-Primary [phone]       No address on file        Equal Access to Services     KHADIJAH ROCHA : Hadii aad ku hadkhadra Nathan, brett raghuflorence, jackson conklin, bessie sullivan. So [facility] [phone].    ATENCIÓN: Si habla español, tiene a alford disposición servicios gratuitos de asistencia lingüística. Llame al [phone].    We comply with applicable federal civil rights laws and [place] laws. We do not discriminate on the basis of race, color, national origin, age, disability, sex, sexual orientation, or gender identity.            Thank you!     Thank you for choosing [facility] CANCER CLINIC  for your care. Our goal is always to provide you with excellent care. Hearing back from our patients is one way we can continue to improve our services. Please take a few minutes to complete the written survey that you may receive in the mail after your visit with us. Thank you!             Your Updated Medication List - Protect others around you: Learn how to safely use, store and throw away your medicines at www.disposemymeds.org.          This list is accurate as of 6/25/18 11:59 PM.  Always use your most recent med list.                   Brand Name Dispense Instructions for use Diagnosis    amoxicillin 875 MG tablet    AMOXIL    6 tablet    Take 1 tablet (875 mg) by mouth every 12 hours    Community acquired pneumonia of left lower lobe of lung (H)       azithromycin 250 MG tablet    ZITHROMAX    3 tablet    Take 1 tablet (250 mg) by mouth daily    Community acquired pneumonia of left lower lobe of lung (H)       enoxaparin 60 MG/0.6ML injection    LOVENOX    60 Syringe    Inject 0.47 mLs (47 mg) Subcutaneous every 12 hours    Other acute pulmonary embolism without acute cor pulmonale (H)       oxyCODONE IR 5 MG tablet    ROXICODONE    10 tablet     Take 1 tablet (5 mg) by mouth every 4 hours as needed for moderate to severe pain    Pelvic mass       PRILOSEC PO           senna-docusate 8.6-50 MG per tablet    SENOKOT-S;PERICOLACE    100 tablet    Take 1 tablet by mouth 2 times daily    Pelvic mass

## 2018-06-25 NOTE — PATIENT INSTRUCTIONS
Colorectal Cancer Screening: During our visit today, we discussed that it is recommended you receive colorectal cancer screening. Please call or make an appointment with your primary care provider to discuss this. You may also call the [facility] scheduling line ([phone]) to set up a colonoscopy appointment.    Breast Cancer Screening: During our visit today, we discussed that it is recommended you receive breast cancer screening. Please call or make an appointment with your primary care provider to discuss this with them. You may also call the [facility] scheduling line ([phone]) to set up a mammography appointment at the Breast Center within the [facility] [place].

## 2018-06-25 NOTE — PATIENT INSTRUCTIONS
Call your Care Coordinator if you have chills and/or temperature greater then or equal to 100.4, uncontrolled nausea and vomiting, diarrhea, constipation, dizziness, light headedness, shortness of breath, chest pain, unexplained bruising, bleeding that does not stop with 10 minutes of pressure or any new symptoms, questions/concerns  Monday- Friday.    If after hours, weekends or holidays call the [facility] hospital at [phone] and ask for the GYN ONCOLOGY resident on call.    Your  Care Coordinator is    Lauren GONZALEZ, RN  Gynecologic Cancer   Office:  [phone]  Pager: [phone] #[phone]

## 2018-06-26 NOTE — PROGRESS NOTES
Care Coordinator Note  Schedule reviewed with patient's sister-in-law, Corinna, via .  Reviewed paracentesis dates, times.  Request was made to have paracentesis on Wednesday, but not available, so plan is to continue with paracentesis on 6/28/18 and 7/6/18.  Informed if patient's symptoms increase to go to ER.  Also discussed schedule for port and chemotherapy on Monday 7/9/18, with review of diet and scrub prep.  Patient will also hold Lovenox evening before and morning of port.        Patient's sister-in-law verbalized back understanding of the above information discussed.     Lauren GONZALEZ, RN  Care Coordinator  Gynecologic Cancer   Office:  [phone]  Pager: [phone] #[phone]

## 2018-06-28 NOTE — PROGRESS NOTES
Paracentesis Nursing Note  Maria Esther Wang presents today to [facility] for a [facility]acentesis.    During today's appointment orders from Geo Waldron were completed.    Progress Note:  Patient identification verified by name and date of birth.  Assessment completed.  Vitals monitored throughout appointment and recorded in Doc Flowsheets.  See proceduralist note in ultrasound.    Date of consent or authorization: 06/28/2018  Invasive Procedure Safety Checklist was completed and sent for scanning.     Paracentesis performed by Teo Farley PA-C Radiology.    The following labs were communicated to provider performing paracentesis:  Lab Results   Component Value Date     06/25/2018       Total amount of ascites fluid drained: 4.7 liters.  Color of ascites fluid: mack.  Total amount of albumin given: 50  grams.    Patient tolerated procedure well.     Post procedure,denies pain or discomfort post paracentesis.      Discharge Plan:  Discharge instructions were reviewed with patient.  Patient/Representative verbalized understanding and all questions were answered.   Discharged from [facility] in stable condition.    Dejah Guerrero RN        /48  Pulse 100  Temp 98.2  F (36.8  C) (Oral)  Resp 16  Wt 49.9 kg (110 lb)  SpO2 96%  BMI 21.48 kg/m2

## 2018-06-28 NOTE — PATIENT INSTRUCTIONS
Discharge Instructions for Paracentesis  Paracentesis is a procedure to remove extra fluid from your belly (abdomen). This fluid buildup in the abdomen is called ascites. The procedure may have been done to take a sample of the fluid. Or, it may have been done to drain the extra fluid from your abdomen and help make you more comfortable.     Ascites is buildup of excess fluid in the abdomen.   Home care    If you have pain after the procedure, your healthcare provider can prescribe or recommend pain medicines. Take these exactly as directed. If you stopped taking other medicines before the procedure, ask your provider when you can start them again.    Take it easy for 24 hours after the procedure. Avoid physical activity until your provider says it s OK.    You will have a small bandage over the puncture site. Stitches (sutures), surgical staples, adhesive tapes, adhesive strips, or surgical glue may be used to close the incision. They also help stop bleeding and speed healing. You may take the bandage off in 24 hours.    Check the puncture site for the signs of infection listed below.  Follow-up care  Make a follow-up appointment with your healthcare provider as directed. During your follow-up visit, your provider will check your healing. Let your provider know how you are feeling. You can also discuss the cause of your ascites and if you need any further treatment.  When to seek medical advice  Call your healthcare provider if you have any of the following after the procedure:    A fever of 100.4 F (38.0 C) or higher    Trouble breathing    Pain that doesn't go away even after taking pain medicine    Belly pain not caused by having the skin punctured    Bleeding from the puncture site    More than a small amount of fluid leaking from the puncture site    Swollen belly    Signs of infection at the puncture site. These include increased pain, redness, or swelling, warmth, or bad-smelling drainage.    Blood in your  urine    Feeling dizzy or lightheaded, or fainting   Date Last Reviewed: 7/1/2016 2000-2017 [employer]. [address], [place] [address]. All rights reserved. This information is not intended as a substitute for professional medical care. Always follow your healthcare professional's instructions.

## 2018-06-28 NOTE — MR AVS SNAPSHOT
After Visit Summary   6/28/2018    Maria Esther Wang    MRN: [id number]           Patient Information     Date Of Birth          1960        Visit Information        Provider Department      6/28/2018 9:30 AM Provider, León [facility]; [place] [employer];  [address] [facility] [place] [facility]        Today's Diagnoses     Malignant ascites    -  1      Care Instructions      Discharge Instructions for Paracentesis  Paracentesis is a procedure to remove extra fluid from your belly (abdomen). This fluid buildup in the abdomen is called ascites. The procedure may have been done to take a sample of the fluid. Or, it may have been done to drain the extra fluid from your abdomen and help make you more comfortable.     Ascites is buildup of excess fluid in the abdomen.   Home care    If you have pain after the procedure, your healthcare provider can prescribe or recommend pain medicines. Take these exactly as directed. If you stopped taking other medicines before the procedure, ask your provider when you can start them again.    Take it easy for 24 hours after the procedure. Avoid physical activity until your provider says it s OK.    You will have a small bandage over the puncture site. Stitches (sutures), surgical staples, adhesive tapes, adhesive strips, or surgical glue may be used to close the incision. They also help stop bleeding and speed healing. You may take the bandage off in 24 hours.    Check the puncture site for the signs of infection listed below.  Follow-up care  Make a follow-up appointment with your healthcare provider as directed. During your follow-up visit, your provider will check your healing. Let your provider know how you are feeling. You can also discuss the cause of your ascites and if you need any further treatment.  When to seek medical advice  Call your healthcare provider if you have any of the following after the  procedure:    A fever of 100.4 F (38.0 C) or higher    Trouble breathing    Pain that doesn't go away even after taking pain medicine    Belly pain not caused by having the skin punctured    Bleeding from the puncture site    More than a small amount of fluid leaking from the puncture site    Swollen belly    Signs of infection at the puncture site. These include increased pain, redness, or swelling, warmth, or bad-smelling drainage.    Blood in your urine    Feeling dizzy or lightheaded, or fainting   Date Last Reviewed: 7/1/2016 2000-2017 [employer]. [address] [place] [address]. All rights reserved. This information is not intended as a substitute for professional medical care. Always follow your healthcare professional's instructions.                Follow-ups after your visit        Your next 10 appointments already scheduled     Jul 06, 2018 12:00 PM CDT   Paracentesis Visit with León Spec Inf Para Provider, LEÓN 40 ATC   [facility] Special[facility] ([facility] [place])    [address]  [address]  [facility] [id number] [phone]            Jul 09, 2018  7:00 AM CDT   (Arrive by 5:30 AM)   IR CHEST PORT PLACEMENT > 5 YRS OF AGE with [id number]   [facility] ASC Imaging ([facility] [place])    [address]  5th Floor  [facility] [id number] [phone]           1. Your doctor will need to do a history and physical within 7 days before this procedure. 2. Your doctor will which medications should not be taken the morning of the exam. 3. Laboratory tests are to be obtained by your doctor prior to the exam (Basic Metabolic Panel, CBCP, PTT and INR) (No labs needed if you are having a tunneled catheter exchange or removal) 4. If you have allergies to x-ray contrast or iodine, contact your doctor or a Radiology nurse prior to the exam day for instructions. 5. Someone will need to drive you to and  from the hospital. 6. If you are or may be pregnant, contact your doctor or a Radiology nurse prior to the day of the exam. 7. If you have diabetes, check with your doctor or a Radiology nurse to see if your insulin needs to be adjusted for the exam. 8. If you are taking a medication called Glucophage or Glucovance; these medications need to be held the day of the exam and for approximately 48 hours following. A blood sample must be drawn so your creatinine level can be checked before resuming this medication. 9. If you are taking Coumadin (to thin you blood) please contact your doctor or a Radiology nurse at least 3 days before the exam for special instructions. 10. You should not have received contrast within 48 hours of this exam. 11. The day before your exam you may eat your regular diet and are encouraged to drink at least 2 quarts of clear liquids. Drink no alcoholic beverages for 24 hours prior to the exam. 12. If you have a colostomy you will need to irrigate it with tap water at 8PM the evening before and again at 6AM the morning of the exam. 13. Do not smoke for 24 hours prior to the procedure. 14. Birth to 4 years: - Breast feeding must be stopped 4 hours prior to exam - Solid food or formula must be stopped 6 hours prior to exam - Tube feedings must be stopped 6 hours prior to exam 15. 4-10 years old: - Nothing to eat or drink 6 hours prior to exam 16. 10+ years old: - Nothing to eat or drink 8 hours prior to exam 17. The morning of the exam you may brush your teeth and take medications as directed with a sip of water. 18. When discharged, you cannot drive until morning, and an adult must be with you until then. You should stay in the [facility] overnight. 19. Bring a list of all drugs you are taking; include supplements and over-the-counter medications. Wear comfortable clothes and leave your valuables at home.            Jul 09, 2018   Procedure with ANNETTE Godinez [facility] ([facility])    [address] [place] [address]  5th Floor  [facility] [id number] [phone]           Located in the Clinics and Surgery Center at [address] [place] [address].   parking is very convenient and highly recommended.  is a $6 flat rate fee.  Both  and self parkers should enter the main arrival plaza from [facility]; parking attendants will direct you based on your parking preference.            Jul 09, 2018 10:00 AM CDT   Infusion 360 with UC ONCOLOGY INFUSION, UC 27 ATC   [facility] ([facility])    [address] [place] [address]  [address]  [facility] [id number] [phone]            Jul 30, 2018  7:00 AM CDT   [facility] Lab Draw with  [facility] LAB DRAW   [facility] Lab Draw ([facility])    [address] [place] [address]  [address]  [facility] [id number] [phone]            Jul 30, 2018  7:40 AM CDT   (Arrive by 7:25 AM)   Return Active Treatment with KATHRYN Stiles CNP   [facility] ([facility])    [address] [place] [address]  [address]  [facility] [id number] [phone]            Jul 30, 2018  9:00 AM CDT   Infusion 360 with UC ONCOLOGY INFUSION, UC 26 ATC   [facility] ([facility])    [address] [place] [address]  [address]  [facility] [id number] [phone]              Who to contact     If you have questions or need follow up information about today's clinic visit or your schedule please contact [facility] [place] SPECIALTY AND PROCEDURE directly at [phone].  Normal or non-critical lab and imaging results will be communicated to you by MyChart, letter or phone within 4 business days after the clinic has received the results. If you do not hear from us within 7 days, please contact the clinic through MyChart or phone. If you have a critical or  "abnormal lab result, we will notify you by phone as soon as possible.  Submit refill requests through [facility] or call your pharmacy and they will forward the refill request to us. Please allow 3 business days for your refill to be completed.          Additional Information About Your Visit        MyChart Information     [facility] lets you send messages to your doctor, view your test results, renew your prescriptions, schedule appointments and more. To sign up, go to [email or web address] . Click on \"Log in\" on the left side of the screen, which will take you to the Welcome page. Then click on \"Sign up Now\" on the right side of the page.     You will be asked to enter the access code listed below, as well as some personal information. Please follow the directions to create your username and password.     Your access code is: [email or web address]  Expires: 2018  1:27 PM     Your access code will  in 90 days. If you need help or a new code, please call your [facility] clinic or [phone].        Care EveryWhere ID     This is your Care EveryWhere ID. This could be used by other organizations to access your [facility] medical records  [id number]        Your Vitals Were     Pulse Temperature Respirations Pulse Oximetry BMI (Body Mass Index)       100 98.2  F (36.8  C) (Oral) 16 96% 21.48 kg/m2        Blood Pressure from Last 3 Encounters:   18 125/48   18 128/68   18 115/49    Weight from Last 3 Encounters:   18 49.9 kg (110 lb)   18 49 kg (108 lb)   18 47.4 kg (104 lb 8 oz)              We Performed the Following     US Paracentesis        Primary Care Provider Fax #    Physician No Ref-Primary [phone]       No address on file        Equal Access to Services     BERE ROCHA : Arthur Nathan, wadamarisda luqadaha, qaybta kaalmada katerin, bessie sullivan. So [facility] [phone].    ATENCIÓN: Si habla español, tiene a alford disposición " servicios gratuitos de asistencia lingüística. Joe yung [phone].    We comply with applicable federal civil rights laws and [place] laws. We do not discriminate on the basis of race, color, national origin, age, disability, sex, sexual orientation, or gender identity.            Thank you!     Thank you for choosing [facility] SPECIALTY AND PROCEDURE  for your care. Our goal is always to provide you with excellent care. Hearing back from our patients is one way we can continue to improve our services. Please take a few minutes to complete the written survey that you may receive in the mail after your visit with us. Thank you!             Your Updated Medication List - Protect others around you: Learn how to safely use, store and throw away your medicines at www.disposemymeds.org.          This list is accurate as of 6/28/18 10:02 AM.  Always use your most recent med list.                   Brand Name Dispense Instructions for use Diagnosis    amoxicillin 875 MG tablet    AMOXIL    6 tablet    Take 1 tablet (875 mg) by mouth every 12 hours    Community acquired pneumonia of left lower lobe of lung (H)       azithromycin 250 MG tablet    ZITHROMAX    3 tablet    Take 1 tablet (250 mg) by mouth daily    Community acquired pneumonia of left lower lobe of lung (H)       enoxaparin 60 MG/0.6ML injection    LOVENOX    60 Syringe    Inject 0.47 mLs (47 mg) Subcutaneous every 12 hours    Other acute pulmonary embolism without acute cor pulmonale (H)       oxyCODONE IR 5 MG tablet    ROXICODONE    10 tablet    Take 1 tablet (5 mg) by mouth every 4 hours as needed for moderate to severe pain    Pelvic mass       PRILOSEC PO           senna-docusate 8.6-50 MG per tablet    SENOKOT-S;PERICOLACE    100 tablet    Take 1 tablet by mouth 2 times daily    Pelvic mass

## 2018-07-06 NOTE — PROGRESS NOTES
Paracentesis Nursing Note  Maria Esther Wang presents today to [facility] for [facility]esis.    During today's appointment orders from Dr. Fernandez were completed.    Progress Note:  Patient identification verified by name and date of birth.  Assessment completed.  Vitals monitored throughout appointment and recorded in Doc Flowsheets.  See proceduralist note in ultrasound.    Date of consent or authorization: 6/28/18 - 9/28/18.  Invasive Procedure Safety Checklist was completed and sent for scanning.     Paracentesis performed by Teo Farley PA-C Radiology.    The following labs were communicated to provider performing paracentesis:  Lab Results   Component Value Date     06/25/2018       Total amount of ascites fluid drained: 2.6 liters.  Color of ascites fluid: mack.  Total amount of albumin given: 25  grams.  Patient tolerated procedure well.    Post procedure,denies pain or discomfort post paracentesis.      Discharge Plan:  Discharge instructions were reviewed with patient.  Patient/Representative verbalized understanding and all questions were answered.   Discharged from [facility] in stable condition.    Susan Ma RN    Administrations This Visit     albumin human 25 % injection 12.5 g     Admin Date Action Dose Route Administered By             07/06/2018 New Bag 25 g Intravenous Susan Ma RN                    lidocaine 1 % 20 mL     Admin Date Action Dose Route Administered By             07/06/2018 Given 20 mL Other Susan Ma RN                          /57  Pulse 104  Wt 46.5 kg (102 lb 8 oz)  BMI 20.02 kg/m2

## 2018-07-06 NOTE — MR AVS SNAPSHOT
After Visit Summary   7/6/2018    Maria Esther Wang    MRN: [id number]           Patient Information     Date Of Birth          1960        Visit Information        Provider Department      7/6/2018 12:00 PM Provider, León Spec Inf Para; [place] [employer]; LEÓN 40 ATC [facility] Specialty and Procedure        Today's Diagnoses     Malignant ascites    -  1       Follow-ups after your visit        Your next 10 appointments already scheduled     Jul 09, 2018  5:30 AM CDT   IR CHEST PORT PLACEMENT > 5 YRS OF AGE with [id number]   [facility] ([facility])    [address]  5th Floor  [facility] [address] [phone]           1. Your doctor will need to do a history and physical within 7 days before this procedure. 2. Your doctor will which medications should not be taken the morning of the exam. 3. Laboratory tests are to be obtained by your doctor prior to the exam (Basic Metabolic Panel, CBCP, PTT and INR) (No labs needed if you are having a tunneled catheter exchange or removal) 4. If you have allergies to x-ray contrast or iodine, contact your doctor or a Radiology nurse prior to the exam day for instructions. 5. Someone will need to drive you to and from the hospital. 6. If you are or may be pregnant, contact your doctor or a Radiology nurse prior to the day of the exam. 7. If you have diabetes, check with your doctor or a Radiology nurse to see if your insulin needs to be adjusted for the exam. 8. If you are taking a medication called Glucophage or Glucovance; these medications need to be held the day of the exam and for approximately 48 hours following. A blood sample must be drawn so your creatinine level can be checked before resuming this medication. 9. If you are taking Coumadin (to thin you blood) please contact your doctor or a Radiology nurse at least 3 days before the exam for special instructions. 10.  You should not have received contrast within 48 hours of this exam. 11. The day before your exam you may eat your regular diet and are encouraged to drink at least 2 quarts of clear liquids. Drink no alcoholic beverages for 24 hours prior to the exam. 12. If you have a colostomy you will need to irrigate it with tap water at 8PM the evening before and again at 6AM the morning of the exam. 13. Do not smoke for 24 hours prior to the procedure. 14. Birth to 4 years: - Breast feeding must be stopped 4 hours prior to exam - Solid food or formula must be stopped 6 hours prior to exam - Tube feedings must be stopped 6 hours prior to exam 15. 4-10 years old: - Nothing to eat or drink 6 hours prior to exam 16. 10+ years old: - Nothing to eat or drink 8 hours prior to exam 17. The morning of the exam you may brush your teeth and take medications as directed with a sip of water. 18. When discharged, you cannot drive until morning, and an adult must be with you until then. You should stay in the [facility] overnight. 19. Bring a list of all drugs you are taking; include supplements and over-the-counter medications. Wear comfortable clothes and leave your valuables at home.            Jul 09, 2018   Procedure with Alexandro Sharif PA-C   [facility] ([facility])    [address] [place], [place] [id number] [phone]           Located in the Clinics and Surgery Center at [address] [place] [address].   parking is very convenient and highly recommended.  is a $6 flat rate fee.  Both  and self parkers should enter the main arrival plaza from [facility]; parking attendants will direct you based on your parking preference.            Jul 09, 2018 10:00 AM CDT   Infusion 360 with UC ONCOLOGY INFUSION, UC 27 ATC   [facility] ([facility])    [address] [place] [address]  [address]  [facility] [address]   [phone]            Jul 16, 2018  7:30 AM CDT   Paracentesis Visit with Uc Spec Inf Para Provider, UC 40 ATC   [facility] Specialty and Procedure ([facility])    [address]  [address]  [facility] [address]   [phone]            Jul 25, 2018  7:30 AM CDT   Paracentesis Visit with Uc Spec Inf Para Provider, UC 40 ATC   [facility] Specialty and Procedure ([facility])    [address]  [address]  [facility] [address]   [phone]            Jul 30, 2018  7:00 AM CDT   [facility] Lab Draw with  M[facility] LAB DRAW   [facility] Lab Draw ([facility])    [address] [place] [address]  [address]  [facility] [address]   [phone]            Jul 30, 2018  7:40 AM CDT   (Arrive by 7:25 AM)   Return Active Treatment with KATHRYN Stiles CNP   [facility] ([facility])    [address] [place] [address]  [address]  [facility] [address]   [phone]              Who to contact     If you have questions or need follow up information about today's clinic visit or your schedule please contact [facility] SPECIALTY AND PROCEDURE directly at [phone].  Normal or non-critical lab and imaging results will be communicated to you by MyChart, letter or phone within 4 business days after the clinic has received the results. If you do not hear from us within 7 days, please contact the clinic through MyChart or phone. If you have a critical or abnormal lab result, we will notify you by phone as soon as possible.  Submit refill requests through [employer] or call your pharmacy and they will forward the refill request to us. Please allow 3 business days for your refill to be completed.          Additional Information About Your Visit        Care EveryWhere ID     This is your Care  EveryWhere ID. This could be used by other organizations to access your [place] medical records  [id number]        Your Vitals Were     Pulse BMI (Body Mass Index)                104 18.85 kg/m2           Blood Pressure from Last 3 Encounters:   07/06/18 94/46   06/28/18 94/41   06/25/18 128/68    Weight from Last 3 Encounters:   07/06/18 43.8 kg (96 lb 8 oz)   06/28/18 45.2 kg (99 lb 11.2 oz)   06/25/18 49 kg (108 lb)              We Performed the Following     US Paracentesis          Today's Medication Changes          These changes are accurate as of 7/6/18  4:48 PM.  If you have any questions, ask your nurse or doctor.               These medicines have changed or have updated prescriptions.        Dose/Directions    * oxyCODONE IR 5 MG tablet   Commonly known as:  ROXICODONE   This may have changed:  Another medication with the same name was added. Make sure you understand how and when to take each.   Used for:  Pelvic mass   Changed by:  Park Rogel MD        Dose:  5 mg   Take 1 tablet (5 mg) by mouth every 4 hours as needed for moderate to severe pain   Quantity:  20 tablet   Refills:  0       * oxyCODONE IR 5 MG tablet   Commonly known as:  ROXICODONE   This may have changed:  You were already taking a medication with the same name, and this prescription was added. Make sure you understand how and when to take each.   Used for:  Cancer (H)   Changed by:  Park Rogel MD        Dose:  5 mg   Take 1 tablet (5 mg) by mouth every 4 hours as needed for pain   Quantity:  20 tablet   Refills:  0       * Notice:  This list has 2 medication(s) that are the same as other medications prescribed for you. Read the directions carefully, and ask your doctor or other care provider to review them with you.         Where to get your medicines      Some of these will need a paper prescription and others can be bought over the counter.  Ask your nurse if you have questions.     Bring a paper prescription  for each of these medications     oxyCODONE IR 5 MG tablet                Primary Care Provider Fax #    Physician No Ref-Primary [phone]       No address on file        Equal Access to Services     KHADIJAH ROCHA : Hadii aad ku haddidierarlene Janicekristenali, brett paolaratna, jackson conklin, bessie lundberganel sullivan. So [facility] [phone].    ATENCIÓN: Si habla español, tiene a alford disposición servicios gratuitos de asistencia lingüística. Llame al [phone].    We comply with applicable federal civil rights laws and [place] laws. We do not discriminate on the basis of race, color, national origin, age, disability, sex, sexual orientation, or gender identity.            Thank you!     Thank you for choosing [facility] SPECIALTY AND PROCEDURE  for your care. Our goal is always to provide you with excellent care. Hearing back from our patients is one way we can continue to improve our services. Please take a few minutes to complete the written survey that you may receive in the mail after your visit with us. Thank you!             Your Updated Medication List - Protect others around you: Learn how to safely use, store and throw away your medicines at www.disposemymeds.org.          This list is accurate as of 7/6/18  4:48 PM.  Always use your most recent med list.                   Brand Name Dispense Instructions for use Diagnosis    amoxicillin 875 MG tablet    AMOXIL    6 tablet    Take 1 tablet (875 mg) by mouth every 12 hours    Community acquired pneumonia of left lower lobe of lung (H)       azithromycin 250 MG tablet    ZITHROMAX    3 tablet    Take 1 tablet (250 mg) by mouth daily    Community acquired pneumonia of left lower lobe of lung (H)       enoxaparin 60 MG/0.6ML injection    LOVENOX    60 Syringe    Inject 0.47 mLs (47 mg) Subcutaneous every 12 hours    Other acute pulmonary embolism without acute cor pulmonale (H)       LORazepam 1 MG tablet    ATIVAN     30 tablet    Take 1 tablet (1 mg) by mouth every 6 hours as needed (nausea/vomiting, anxiety or sleep)    Ovarian cancer, unspecified laterality (H), Encounter for long-term (current) use of medications       * oxyCODONE IR 5 MG tablet    ROXICODONE    20 tablet    Take 1 tablet (5 mg) by mouth every 4 hours as needed for moderate to severe pain    Pelvic mass       * oxyCODONE IR 5 MG tablet    ROXICODONE    20 tablet    Take 1 tablet (5 mg) by mouth every 4 hours as needed for pain    Cancer (H)       PRILOSEC PO           prochlorperazine 10 MG tablet    COMPAZINE    30 tablet    Take 1 tablet (10 mg) by mouth every 6 hours as needed (nausea/vomiting)    Ovarian cancer, unspecified laterality (H), Encounter for long-term (current) use of medications       senna-docusate 8.6-50 MG per tablet    SENOKOT-S;PERICOLACE    100 tablet    Take 1 tablet by mouth 2 times daily    Pelvic mass       * Notice:  This list has 2 medication(s) that are the same as other medications prescribed for you. Read the directions carefully, and ask your doctor or other care provider to review them with you.

## 2018-07-06 NOTE — PROGRESS NOTES
Prescription for Oxycodone delivered to discharge pharmacy.  Patient has an appt with Palliative Care in early August.    Virgen OLSEN RN, OCN  Care Coordinator   Gynecologic Cancer   Office [phone]  Pager [phone] #[phone]

## 2018-07-09 PROBLEM — R06.02 SHORTNESS OF BREATH: Status: ACTIVE | Noted: 2018-01-01

## 2018-07-09 PROBLEM — D63.0 ANEMIA IN NEOPLASTIC DISEASE: Status: ACTIVE | Noted: 2018-01-01

## 2018-07-09 NOTE — MR AVS SNAPSHOT
After Visit Summary   7/9/2018    Maria Esther Wang    MRN: [id number]           Patient Information     Date Of Birth          1960        Visit Information        Provider Department      7/9/2018 10:00 AM Amna Clancy;  27 ATC;  ONCOLOGY INFUSION  Services Department        Today's Diagnoses     Ovarian cancer, unspecified laterality (H)    -  1    Encounter for long-term (current) use of medications        Anemia in neoplastic disease          Care [facility] Main Line: [phone]    Call triage nurse with chills and/or temperature greater than or equal to 100.4, uncontrolled nausea/vomiting, diarrhea, constipation, dizziness, shortness of breath, chest pain, bleeding, unexplained bruising, or any new/concerning symptoms, questions/concerns.   If you are having any concerning symptoms or wish to speak to a provider before your next infusion visit, please call your care coordinator or triage to notify them so we can adequately serve you.   Triage Nurse Line: [phone]    If after hours, weekends, or holidays [phone]          July 2018 Sunday Monday Tuesday Wednesday Thursday Friday Saturday   1     2     3     4     5     6     UMP PARACENTESIS   12:00 PM   (120 min.)   Provider, León Spec Inf Para   [facility] [place] [facility]     US PARACENTESIS   12:05 PM   (60 min.)   [email or web address]   [facility] [place] Ultrasound 7       8     9     IR CHEST PORT PLACEMENT >5 YRS    5:30 AM   (240 min.)   [id number]   [facility]     Outpatient Visit    5:30 AM   [facility]     INSERT PORT VASCULAR ACCESS    7:00 AM   Alexandro Sharif PA-C    OR     UMP ONC INFUSION 360   10:00 AM   (360 min.)    ONCOLOGY INFUSION   [facility] 10     11     12     13     LEVEL 5    8:00 AM   (300 min.)    INFUSION CHAIR 7   [facility] and  Infusion Center 14       15     16     UMP PARACENTESIS    7:30 AM   (120 min.)   Provider,  Spec Inf Para   [facility] Specialty and Procedure 17     18     19     20     21       22     23     24     25     UMP PARACENTESIS    7:30 AM   (120 min.)   Provider,  Spec Inf Para   [facility] Specialty and Procedure 26     27     28       29     30     [facility] DRAW    7:00 AM   (15 min.)    [facility] DRAW   [facility] Lab Draw     UMP RETURN ACTIVE TREATMENT    7:25 AM   (40 min.)   Angie Whelan APRN CNP   [facility]    9:00 AM   (360 min.)    ONCOLOGY INFUSION   [facility] 31 August 2018 Sunday Monday Tuesday Wednesday Thursday Friday Saturday                  1     2     UMP NEW    7:45 AM   (60 min.)   Neda Marx MD   [facility] 3     4       5     6     7     8     9     10     11       12     13     14     15     16     17     18       19     20     [facility] DRAW    9:00 AM   (15 min.)    [facility] DRAW   [facility] Lab Draw     UMP RETURN ACTIVE TREATMENT    9:25 AM   (40 min.)   Angie Whelan APRN CNP   [facility]     UMP ONC INFUSION 360   11:30 AM   (360 min.)    ONCOLOGY INFUSION   [facility] 21     22     23     24     25       26     27     28     29     30     31                       Lab Results:  Recent Results (from the past 12 hour(s))   INR    Collection Time: 07/09/18  6:46 AM   Result Value Ref Range    INR 1.16 (H) 0.86 - 1.14   CBC with platelets differential    Collection Time: 07/09/18 10:15 AM   Result Value Ref Range    WBC 13.3 (H) 4.0 - 11.0 10e9/L    RBC Count 3.04 (L) 3.8 - 5.2 10e12/L    Hemoglobin 6.6 (LL) 11.7 - 15.7 g/dL    Hematocrit 21.8 (L) 35.0 - 47.0 %    MCV 72 (L) 78 - 100 fl    MCH 21.7 (L) 26.5 - 33.0 pg     MCHC 30.3 (L) 31.5 - 36.5 g/dL    RDW 24.9 (H) 10.0 - 15.0 %    Platelet Count 527 (H) 150 - 450 10e9/L    Diff Method Automated Method     % Neutrophils 86.1 %    % Lymphocytes 5.0 %    % Monocytes 6.0 %    % Eosinophils 0.2 %    % Basophils 0.7 %    % Immature Granulocytes 2.0 %    Nucleated RBCs 0 0 /100    Absolute Neutrophil 11.4 (H) 1.6 - 8.3 10e9/L    Absolute Lymphocytes 0.7 (L) 0.8 - 5.3 10e9/L    Absolute Monocytes 0.8 0.0 - 1.3 10e9/L    Absolute Eosinophils 0.0 0.0 - 0.7 10e9/L    Absolute Basophils 0.1 0.0 - 0.2 10e9/L    Abs Immature Granulocytes 0.3 0 - 0.4 10e9/L    Absolute Nucleated RBC 0.0    Comprehensive metabolic panel    Collection Time: 07/09/18 10:15 AM   Result Value Ref Range    Sodium 133 133 - 144 mmol/L    Potassium 3.4 3.4 - 5.3 mmol/L    Chloride 96 94 - 109 mmol/L    Carbon Dioxide 24 20 - 32 mmol/L    Anion Gap 13 3 - 14 mmol/L    Glucose 118 (H) 70 - 99 mg/dL    Urea Nitrogen 10 7 - 30 mg/dL    Creatinine 0.49 (L) 0.52 - 1.04 mg/dL    GFR Estimate >90 >60 mL/min/1.7m2    GFR Estimate If Black >90 >60 mL/min/1.7m2    Calcium 8.0 (L) 8.5 - 10.1 mg/dL    Bilirubin Total 0.3 0.2 - 1.3 mg/dL    Albumin 1.6 (L) 3.4 - 5.0 g/dL    Protein Total 6.7 (L) 6.8 - 8.8 g/dL    Alkaline Phosphatase 126 40 - 150 U/L    ALT 38 0 - 50 U/L    AST 26 0 - 45 U/L   Magnesium    Collection Time: 07/09/18 10:15 AM   Result Value Ref Range    Magnesium 2.2 1.6 - 2.3 mg/dL   ABO/Rh type and screen    Collection Time: 07/09/18 10:38 AM   Result Value Ref Range    ABO PENDING     Antibody Screen PENDING     Test Valid Only At          [facility]    Specimen Expires 07/12/2018                Follow-ups after your visit        Your next 10 appointments already scheduled     Jul 13, 2018  8:00 AM CDT   Level 5 with  INFUSION CHAIR 7   [facility] ([facility])    [facility]  [address]  [place] [address]   [phone]            Jul 16, 2018  7:30 AM CDT   Paracentesis Visit with Uc Spec Inf Para Provider, UC 40 ATC   [facility] Special[facility] Procedure ([facility])    [address]  [address]  [place] [address]   [phone]            Jul 25, 2018  7:30 AM CDT   Paracentesis Visit with Uc Spec Inf Para Provider, UC 40 ATC   [facility] ([facility])    [address]  [address]  [place] [address]   [phone]            Jul 30, 2018  7:00 AM CDT   [facility] Lab Draw with  [facility] LAB DRAW   [facility] Lab Draw ([facility])    [address] [place] [address]  [address]  [place] [address]   [phone]            Jul 30, 2018  7:40 AM CDT   (Arrive by 7:25 AM)   Return Active Treatment with KATHRYN Stiles CNP   [facility] [place] [facility] ([facility])    [address] [place] [address]  [address]  [place] [address]   [phone]            Jul 30, 2018  9:00 AM CDT   Infusion 360 with UC ONCOLOGY INFUSION, UC 26 ATC   [facility] ([facility])    [address] [place] [address]  [address]  [place] [address]   [phone]            Aug 02, 2018  8:00 AM CDT   (Arrive by 7:45 AM)   New Patient Visit with Neda Marx MD   [facility] ([facility])    [address] [place] [address]  [address]  [place] [address]   [phone]              Future tests that were ordered for you today     Open Standing Orders        Priority Remaining Interval Expires Ordered    Red blood cell prepare order unit Routine 99/100 CONDITIONAL (SPECIFY) BLOOD  7/9/2018    Transfuse red blood cell unit Routine 99/100 TRANSFUSE 1 UNIT  7/9/2018            Who to contact     If you have questions or need follow up  information about today's clinic visit or your schedule please contact [facility] directly at [phone].  Normal or non-critical lab and imaging results will be communicated to you by MyChart, letter or phone within 4 business days after the clinic has received the results. If you do not hear from us within 7 days, please contact the clinic through MyChart or phone. If you have a critical or abnormal lab result, we will notify you by phone as soon as possible.  Submit refill requests through [employer] or call your pharmacy and they will forward the refill request to us. Please allow 3 business days for your refill to be completed.          Additional Information About Your Visit        Care EveryWhere ID     This is your Care EveryWhere ID. This could be used by other organizations to access your [facility] medical records  [id number]        Your Vitals Were     Pulse Temperature Respirations Pulse Oximetry          97 98.2  F (36.8  C) (Oral) 16 97%         Blood Pressure from Last 3 Encounters:   07/09/18 103/66   07/09/18 112/68   07/06/18 94/46    Weight from Last 3 Encounters:   07/09/18 44.5 kg (98 lb)   07/06/18 43.8 kg (96 lb 8 oz)   06/28/18 45.2 kg (99 lb 11.2 oz)              We Performed the Following     ABO/Rh type and screen          CBC with platelets differential     Comprehensive metabolic panel     Magnesium          Today's Medication Changes          These changes are accurate as of 7/9/18 12:37 PM.  If you have any questions, ask your nurse or doctor.               Start taking these medicines.        Dose/Directions    LORazepam 1 MG tablet   Commonly known as:  ATIVAN   Used for:  Ovarian cancer, unspecified laterality (H), Encounter for long-term (current) use of medications        Dose:  1 mg   Take 1 tablet (1 mg) by mouth every 6 hours as needed (nausea/vomiting, anxiety or sleep)   Quantity:  30 tablet   Refills:  1       prochlorperazine 10 MG tablet    Commonly known as:  COMPAZINE   Used for:  Ovarian cancer, unspecified laterality (H), Encounter for long-term (current) use of medications        Dose:  10 mg   Take 1 tablet (10 mg) by mouth every 6 hours as needed (nausea/vomiting)   Quantity:  30 tablet   Refills:  2            Where to get your medicines      These medications were sent to [facility] [place], [place] - [address]  [address], [place] [address]    Hours:  TRANSPLANT PHONE NUMBER [phone] Phone:  [phone]     prochlorperazine 10 MG tablet         Some of these will need a paper prescription and others can be bought over the counter.  Ask your nurse if you have questions.     Bring a paper prescription for each of these medications     LORazepam 1 MG tablet                Primary Care Provider Fax #    Physician No Ref-Primary [phone]       No address on file        Equal Access to Services     KHADIJAH ROCHA : Arthur Nathan, brett de paz, jackson kaalmarazia conklin, bessie jacques . So [facility] [phone].    ATENCIÓN: Si habla español, tiene a alford disposición servicios gratuitos de asistencia lingüística. Llame al [phone].    We comply with applicable federal civil rights laws and [place] laws. We do not discriminate on the basis of race, color, national origin, age, disability, sex, sexual orientation, or gender identity.            Thank you!     Thank you for choosing [facility]CER [facility]  for your care. Our goal is always to provide you with excellent care. Hearing back from our patients is one way we can continue to improve our services. Please take a few minutes to complete the written survey that you may receive in the mail after your visit with us. Thank you!             Your Updated Medication List - Protect others around you: Learn how to safely use, store and throw away your medicines at  www.disposemymeds.org.          This list is accurate as of 7/9/18 12:37 PM.  Always use your most recent med list.                   Brand Name Dispense Instructions for use Diagnosis    amoxicillin 875 MG tablet    AMOXIL    6 tablet    Take 1 tablet (875 mg) by mouth every 12 hours    Community acquired pneumonia of left lower lobe of lung (H)       azithromycin 250 MG tablet    ZITHROMAX    3 tablet    Take 1 tablet (250 mg) by mouth daily    Community acquired pneumonia of left lower lobe of lung (H)       enoxaparin 60 MG/0.6ML injection    LOVENOX    60 Syringe    Inject 0.47 mLs (47 mg) Subcutaneous every 12 hours    Other acute pulmonary embolism without acute cor pulmonale (H)       LORazepam 1 MG tablet    ATIVAN    30 tablet    Take 1 tablet (1 mg) by mouth every 6 hours as needed (nausea/vomiting, anxiety or sleep)    Ovarian cancer, unspecified laterality (H), Encounter for long-term (current) use of medications       * oxyCODONE IR 5 MG tablet    ROXICODONE    20 tablet    Take 1 tablet (5 mg) by mouth every 4 hours as needed for moderate to severe pain    Pelvic mass       * oxyCODONE IR 5 MG tablet    ROXICODONE    20 tablet    Take 1 tablet (5 mg) by mouth every 4 hours as needed for pain    Cancer (H)       PRILOSEC PO           prochlorperazine 10 MG tablet    COMPAZINE    30 tablet    Take 1 tablet (10 mg) by mouth every 6 hours as needed (nausea/vomiting)    Ovarian cancer, unspecified laterality (H), Encounter for long-term (current) use of medications       senna-docusate 8.6-50 MG per tablet    SENOKOT-S;PERICOLACE    100 tablet    Take 1 tablet by mouth 2 times daily    Pelvic mass       * Notice:  This list has 2 medication(s) that are the same as other medications prescribed for you. Read the directions carefully, and ask your doctor or other care provider to review them with you.

## 2018-07-09 NOTE — DISCHARGE INSTRUCTIONS
A collaboration between [facility] and [facility]  Experts in minimally invasive, targeted treatments performed using imaging guidance    Venous Access Device,  Port Catheter or Tunneled or Non-Tunneled Central Line Placement    Today you had a procedure today to install a venous access device; either a tunneled central vein catheter or a subcutaneous port catheter.    One of our Radiology PAs performed this procedure for you today:  ? Teo Farley PA-C  ? YULI Rahman PA-C  ? Dami Cannon PA-C  ? Katiuska Arzola PA-C   ? Alexandro Sharif PA-C    After you go home:  - Drink plenty of fluids.  Generally 6-8 (8 ounce) glasses a day is recommended.  - Resume your regular diet unless otherwise ordered by a medical provider.  - Keep any applied tape/gauze dressings clean and dry.  Change tape/gauze dressings if they get wet or soiled.  - You may shower the following day after procedure, however cover and protect from moisture any tape/gauze dressings.  You may let water hit and run over dried skin glue, but do not scrub.  Pat the area dry after showering.  - Port placement incisions are closed with absorbable suture, meaning they do not need to be removed at a later date, and a topical skin adhesive (skin glue).  This glue will wear off in 7-14 days.  Do not remove before this time.  If 14 days have passed and residual glue is present, you may gently remove it.  - Do not apply gels, lotions, or ointments to the glue site for the first 10 days as this may cause the glue to prematurely soften and fail.  - Do not perform strenuous activities or lift greater than 10 pounds for the next three days.  - If there is bleeding or oozing from the procedure site, apply firm pressure to the area for 5-10 minutes.  If the bleeding continues seek medical advice at the numbers below.  - Mild procedure site discomfort can be treated with an ice pack and over-the-counter pain  relievers.        For 24 hours after any sedation used:  - Relax and take it easy.  No strenuous activities.  - Do not drive or operate machines at home or at work.  - No alcohol consumption.  - Do not make any important or legal decisions.    Call our Interventional Radiology (IR) service if:  - If you start bleeding from the procedure site.  If you do start to bleed from the site, lie down and hold some pressure on the site.  Our radiology provider can help you decide if you need to return to the hospital.  - If you have new or worsening pain related to the procedure.  - If you have concerning swelling at the procedure site.  - If you develop persistent nausea or vomiting.  - If you develop hives or a rash or any unexplained itching.  - If you have a fever of greater than 100.5  F and chills in the first 5 days after procedure.  - Any other concerns related to your procedure.      [facility]  Interventional Radiology (IR)  [address] [place] [address]    Contact Number:  [phone]  (IR control desk)  - Monday - Friday 8:00 am - 4:30 pm    After hours for urgent concerns:  [phone]  - After 4:30 pm Monday - Friday, Weekends and Holidays.   - Ask for Interventional Radiology on-call.  Someone is available 24 hours a day.  - [facility] toll free number:  [phone]

## 2018-07-09 NOTE — ED TRIAGE NOTES
"Triage Assessment & Note:    /41  Pulse 111  Temp 98.8  F (37.1  C) (Oral)  Resp 20  Ht 1.575 m (5' 2\")  Wt 44.5 kg (98 lb)  SpO2 95%  BMI 17.92 kg/m2    Patient presents with: from [facility] with an increased HR following 2 units of blood. PT also C/O abdominal pain. Sepsis orders placed.     Home Treatments/Remedies: None    Febrile / Afebrile? Afebrile    Duration of C/o:    Davidson Velez  July 9, 2018      "

## 2018-07-09 NOTE — ANESTHESIA POSTPROCEDURE EVALUATION
Patient: Maria Esther Wang    Procedure(s):  Single Lumen Chest Power Port Placement, Leave Access for Chemotherapy - Wound Class: I-Clean    Diagnosis:Ovarian Cancer, Unspecified Laterality  Diagnosis Additional Information: No value filed.    Anesthesia Type:  MAC    Note:  Anesthesia Post Evaluation    Patient location during evaluation: Phase 2  Patient participation: Able to fully participate in evaluation  Level of consciousness: awake and alert  Pain management: adequate  Airway patency: patent  Cardiovascular status: acceptable  Respiratory status: acceptable  Hydration status: acceptable  PONV: none     Anesthetic complications: None          Last vitals:  Vitals:    07/09/18 0553 07/09/18 0815 07/09/18 0837   BP: 105/69 102/59 112/68   Pulse: 103     Resp: 16 12 12   Temp: 37.3  C (99.1  F) 36.7  C (98.1  F) 36.7  C (98.1  F)   SpO2: 95% 95% 97%         Electronically Signed By: Rafa Moon MD  July 9, 2018  9:14 AM

## 2018-07-09 NOTE — PROGRESS NOTES
Care Coordinator Note   Reviewed CBC with Dr Fernandez he wanted to hold the chemo until 2 units of blood where given can be done today..  Chemo is arranged for Friday 7/13/18 at [facility].  Reviewed with patient the plan of care will need to reschedule future chemos.  Family members are requesting a letter stating she is not able to work at this time. This is completed and given to the patient.    They are also requesting a letter stating that her niece from [facility] needs to care the patient. This needs to be written and mailed to the patient.          Patient/family verbalized back understanding of the above information discussed.   Face to face time spent with patient 20 minutes.   Virgen OLSEN RN, OCN  Care Coordinator   Gynecologic Cancer   Office [phone]  Pager [phone] #[phone]

## 2018-07-09 NOTE — PROGRESS NOTES
Infusion Nursing Note:  Maria Esther Wang presents today for C1D1 Taxol/Carboplatin. Low HGB so held treatment and gave 2 units PRBC's.    Patient seen by provider today: No   present during visit today: Sanjiv    Note: Patient presented to clinic today feeling weak and scant bleeding vaginally. HGB 6.6   After infusion complete. Patient's RR, temperature and Systolic BP was elevated. Patient had complaint of racing heart and felt warm from neck to pelvis. Paramedics already in suite, they verbally assessed her.  Angie Whelan PA-C notified and came to unit to assess. Vitals taken every 5 minutes or so, vitals slowly returned to normal except RR. 10mg Lasix given per verbal order from Angie Whelan PA-C, and observe for 1 hour. Patient sent to ED with family   .Patient urinated 3 times large amounts after lasix given.    TORB: 7/9/18 1145 Virgen Branham RN per Dr Fernandez/Gino Arita RN  -Hold treatment   -Give PRBC's 2 units.    VORB: 7/9/18 1740 Angie Whelan PA-C/Gino Arita RN\  -Give 10 mg Lasix  -observe for one hour  -send to ED if not resolved.   -Call ANNETTE if any changes    TORB: 7/9/18 1830Angie Whelan PA-C/Gino Arita RN  -Send patient to ED  -ok to go with family  -call ed to give report  -leave in access.     Intravenous Access:  Implanted Port.    Treatment Conditions:  Lab Results   Component Value Date    HGB 6.6 07/09/2018     Lab Results   Component Value Date    WBC 13.3 07/09/2018      Lab Results   Component Value Date    ANEU 11.4 07/09/2018     Lab Results   Component Value Date     07/09/2018      Lab Results   Component Value Date     07/09/2018                   Lab Results   Component Value Date    POTASSIUM 3.4 07/09/2018           Lab Results   Component Value Date    MAG 2.2 07/09/2018            Lab Results   Component Value Date    CR 0.49 07/09/2018                   Lab Results   Component Value Date    IRON 8.0 07/09/2018                Lab  Results   Component Value Date    BILITOTAL 0.3 07/09/2018           Lab Results   Component Value Date    ALBUMIN 1.6 07/09/2018                    Lab Results   Component Value Date    ALT 38 07/09/2018           Lab Results   Component Value Date    AST 26 07/09/2018       Results reviewed, labs MET treatment parameters, not proceeding with treatment due to low HGB, new orders placed for blood transfusion.  Blood transfusion consent signed 7/9/18.      Post Infusion Assessment:  Patient tolerated infusion without incident.  Blood return noted pre and post infusion.  Site patent and intact, free from redness, edema or discomfort.  No evidence of extravasations.  Access flushed with Heparin left in and sent patient to ED. Report given to ED Charge nurse, Riri.     Discharge Plan:   Patient declined prescription refills.  Discharge instructions reviewed with: Patient, Family and .  Patient and/or family verbalized understanding of discharge instructions and all questions answered.  Copy of AVS reviewed with patient and/or family.  Patient will return 7/30/18 for next appointment.  Patient discharged in stable condition accompanied by: self, sister and .  Departure Mode: Ambulatory.  Face to Face time: 0 minutes.    Gino Arita RN

## 2018-07-09 NOTE — PATIENT INSTRUCTIONS
[facility] Main Line: [phone]    Call triage nurse with chills and/or temperature greater than or equal to 100.4, uncontrolled nausea/vomiting, diarrhea, constipation, dizziness, shortness of breath, chest pain, bleeding, unexplained bruising, or any new/concerning symptoms, questions/concerns.   If you are having any concerning symptoms or wish to speak to a provider before your next infusion visit, please call your care coordinator or triage to notify them so we can adequately serve you.   Triage Nurse Line: [phone]    If after hours, weekends, or holidays [phone]        July 2018 Sunday Monday Tuesday Wednesday Thursday Friday Saturday   1     2     3     4     5     6     UMP PARACENTESIS   12:00 PM   (120 min.)   Provider,  Spec Inf Para   [facility]     US PARACENTESIS   12:05 PM   (60 min.)   [id number]   [facility] 7       8     9     IR CHEST PORT PLACEMENT >5 YRS    5:30 AM   (240 min.)   [id number]   [facility]     Outpatient Visit    5:30 AM   [facility]     INSERT PORT VASCULAR ACCESS    7:00 AM   Alexandro Sharif PA-C   UC OR     UMP ONC INFUSION 360   10:00 AM   (360 min.)    ONCOLOGY INFUSION   [facility] 10     11     12     13     LEVEL 5    8:00 AM   (300 min.)    INFUSION CHAIR 7   [facility] 14       15     16     UMP PARACENTESIS    7:30 AM   (120 min.)   Provider, León Spec Inf Para   [facility] 17     18     19     20     21       22     23     24     25     UMP PARACENTESIS    7:30 AM   (120 min.)   Provider,  Spec Inf Para   [facility] 26     27     28       29     30     UMP [facility] DRAW    7:00 AM   (15 min.)    [facility] DRAW   [facility] Draw     UMP RETURN ACTIVE  TREATMENT    7:25 AM   (40 min.)   Angie Whelan APRN CNP   [facility]    9:00 AM   (360 min.)   UC ONCOLOGY INFUSION   [facility] 31 August 2018 Sunday Monday Tuesday Wednesday Thursday Friday Saturday                  1     2     UMP NEW    7:45 AM   (60 min.)   Neda Marx MD   [facility] 3     4       5     6     7     8     9     10     11       12     13     14     15     16     17     18       19     20     P [facility] DRAW    9:00 AM   (15 min.)    [facility] DRAW   [facility] Lab Draw     UMP RETURN ACTIVE TREATMENT    9:25 AM   (40 min.)   Angie Whelan APRN CNP   [facility]   11:30 AM   (360 min.)    ONCOLOGY INFUSION   [facility] 21     22     23     24     25       26     27     28     29     30     31                       Lab Results:  Recent Results (from the past 12 hour(s))   INR    Collection Time: 07/09/18  6:46 AM   Result Value Ref Range    INR 1.16 (H) 0.86 - 1.14   CBC with platelets differential    Collection Time: 07/09/18 10:15 AM   Result Value Ref Range    WBC 13.3 (H) 4.0 - 11.0 10e9/L    RBC Count 3.04 (L) 3.8 - 5.2 10e12/L    Hemoglobin 6.6 (LL) 11.7 - 15.7 g/dL    Hematocrit 21.8 (L) 35.0 - 47.0 %    MCV 72 (L) 78 - 100 fl    MCH 21.7 (L) 26.5 - 33.0 pg    MCHC 30.3 (L) 31.5 - 36.5 g/dL    RDW 24.9 (H) 10.0 - 15.0 %    Platelet Count 527 (H) 150 - 450 10e9/L    Diff Method Automated Method     % Neutrophils 86.1 %    % Lymphocytes 5.0 %    % Monocytes 6.0 %    % Eosinophils 0.2 %    % Basophils 0.7 %    % Immature Granulocytes 2.0 %    Nucleated RBCs 0 0 /100    Absolute Neutrophil 11.4 (H) 1.6 - 8.3 10e9/L    Absolute Lymphocytes 0.7 (L) 0.8 - 5.3 10e9/L    Absolute Monocytes 0.8 0.0 - 1.3 10e9/L    Absolute Eosinophils 0.0 0.0 - 0.7 10e9/L    Absolute  Basophils 0.1 0.0 - 0.2 10e9/L    Abs Immature Granulocytes 0.3 0 - 0.4 10e9/L    Absolute Nucleated RBC 0.0    Comprehensive metabolic panel    Collection Time: 07/09/18 10:15 AM   Result Value Ref Range    Sodium 133 133 - 144 mmol/L    Potassium 3.4 3.4 - 5.3 mmol/L    Chloride 96 94 - 109 mmol/L    Carbon Dioxide 24 20 - 32 mmol/L    Anion Gap 13 3 - 14 mmol/L    Glucose 118 (H) 70 - 99 mg/dL    Urea Nitrogen 10 7 - 30 mg/dL    Creatinine 0.49 (L) 0.52 - 1.04 mg/dL    GFR Estimate >90 >60 mL/min/1.7m2    GFR Estimate If Black >90 >60 mL/min/1.7m2    Calcium 8.0 (L) 8.5 - 10.1 mg/dL    Bilirubin Total 0.3 0.2 - 1.3 mg/dL    Albumin 1.6 (L) 3.4 - 5.0 g/dL    Protein Total 6.7 (L) 6.8 - 8.8 g/dL    Alkaline Phosphatase 126 40 - 150 U/L    ALT 38 0 - 50 U/L    AST 26 0 - 45 U/L   Magnesium    Collection Time: 07/09/18 10:15 AM   Result Value Ref Range    Magnesium 2.2 1.6 - 2.3 mg/dL   ABO/Rh type and screen    Collection Time: 07/09/18 10:38 AM   Result Value Ref Range    ABO PENDING     Antibody Screen PENDING     Test Valid Only At          [facility]    Specimen Expires 07/12/2018

## 2018-07-09 NOTE — ANESTHESIA PREPROCEDURE EVALUATION
Anesthesia Evaluation     . Pt has had prior anesthetic.     No history of anesthetic complications          ROS/MED HX    ENT/Pulmonary:  - neg pulmonary ROS     Neurologic:  - neg neurologic ROS     Cardiovascular:  - neg cardiovascular ROS       METS/Exercise Tolerance:     Hematologic:     (+) History of blood clots (held lovenox for surgery) pt is anticoagulated, -      Musculoskeletal:  - neg musculoskeletal ROS       GI/Hepatic:     (+) Other GI/Hepatic Ascites      Renal/Genitourinary:  - ROS Renal section negative       Endo:  - neg endo ROS       Psychiatric:  - neg psychiatric ROS       Infectious Disease:  - neg infectious disease ROS       Malignancy:   (+) Malignancy History of Other  Other CA Ovarian CA with ascites Active status post         Other:    - neg other ROS                 Physical Exam  Normal systems: cardiovascular and dental    Airway   Mallampati: II  TM distance: >3 FB  Neck ROM: full    Dental     Cardiovascular       Pulmonary    breath sounds clear to auscultation                    Anesthesia Plan      History & Physical Review  History and physical reviewed and following examination; no interval change.    ASA Status:  3 .    NPO Status:  > 6 hours    Plan for MAC with Intravenous and Propofol induction. Maintenance will be TIVA.  Reason for MAC:  Deep or markedly invasive procedure (G8)  PONV prophylaxis:  Ondansetron (or other 5HT-3)       Postoperative Care  Postoperative pain management:  IV analgesics, Oral pain medications and Multi-modal analgesia.      Consents  Anesthetic plan, risks, benefits and alternatives discussed with:  Patient..                  History and physical assessed; Patient examined.   Risks and alternatives presented and discussed. Patient agrees. All questions answered.      Rafa Moon MD  Staff Anesthesiologist  [phone]

## 2018-07-09 NOTE — IP AVS SNAPSHOT
[facility]    [address] [place] [address]    Phone:  [phone]    Fax:  [phone]                                       After Visit Summary   7/9/2018    Maria Esther Wang    MRN: [id number]           After Visit Summary Signature Page     I have received my discharge instructions, and my questions have been answered. I have discussed any challenges I see with this plan with the nurse or doctor.    ..........................................................................................................................................  Patient/Patient Representative Signature      ..........................................................................................................................................  Patient Representative Print Name and Relationship to Patient    ..................................................               ................................................  Date                                            Time    ..........................................................................................................................................  Reviewed by Signature/Title    ...................................................              ..............................................  Date                                                            Time

## 2018-07-09 NOTE — IP AVS SNAPSHOT
[address] [address]    Phone:  [phone]                                       After Visit Summary   7/9/2018    Maria Esther Wang    MRN: [id number]           After Visit Summary Signature Page     I have received my discharge instructions, and my questions have been answered. I have discussed any challenges I see with this plan with the nurse or doctor.    ..........................................................................................................................................  Patient/Patient Representative Signature      ..........................................................................................................................................  Patient Representative Print Name and Relationship to Patient    ..................................................               ................................................  Date                                            Time    ..........................................................................................................................................  Reviewed by Signature/Title    ...................................................              ..............................................  Date                                                            Time

## 2018-07-09 NOTE — LETTER
To Whom It May Concern:       Maria Esther Wang  was seen in our clinic on 7/9/2018.  Patient is currently under my care, and will be receiving chemotherapy every 3 weeks and is unable to work at this time.  Patient maybe able to return to work on 12/1/18 depending on her response to chemotherapy and possible surgery.     Sincerely,    Please feel free to call if there are any questions.         Geo Fernandez MD, MS    Department of Obstetrics and Gynecology   Division of Gynecologic Oncology   [facility]  Phone: [phone]      Virgen OLSEN RN, OCN  Care Coordinator   Gynecologic Cancer   Office [phone]  Pager [phone] #[phone]

## 2018-07-09 NOTE — PROGRESS NOTES
Notified by Gino Arita RN that patient became tachypneic (RR 40) with 's/70's with temperature 99.9F. SpO2 94% on RA. BP and temperature resolved, but patient remained tachypneic. She denies shortness of breath or chest pain. On assessment, she is alert and oriented, tachypneic with labored respirations, tachycardic. Fine crackles auscultated to LLL, remainder of lung fields clear to auscultation. Suspect fluid overload secondary to two units PRBCs- given Lasix 10mg IV x1. After one hour, she had voided but respirations remained 32 respirations per minute. Discussed with Dr. Fernandez- this is a large deviation from her baseline according to Dr. Fernandez and he recommended she be sent to the ED for further monitoring.   KATHRYN Stiles, FNP-C  Gynecologic Oncology  [facility]  Pager: [phone]

## 2018-07-09 NOTE — IP AVS SNAPSHOT
MRN:[id number]                      After Visit Summary   7/9/2018    Maria Esther Wang    MRN: [id number]           Thank you!     Thank you for choosing [facility] for your care. Our goal is always to provide you with excellent care. Hearing back from our patients is one way we can continue to improve our services. Please take a few minutes to complete the written survey that you may receive in the mail after you visit with us. Thank you!        Patient Information     Date Of Birth          1960        Designated Caregiver       Most Recent Value    Caregiver    Will someone help with your care after discharge? yes    Name of designated caregiver Jarret Wang [sister]    Phone number of caregiver [phone]    Caregiver address [address] [place] [facility] [address]      About your hospital stay     You were admitted on:  July 9, 2018 You last received care in the:  [address] [facility]    You were discharged on:  July 13, 2018        Reason for your hospital stay       Difficulty breathing, chemotherapy                  Who to Call     For medical emergencies, please call 911.  For non-urgent questions about your medical care, please call your primary care provider or clinic, None          Attending Provider     Provider Specialty    Rafa Sorensen, DO Emergency Medicine    Bobbi Browne MD OB/Gyn       Primary Care Provider Fax #    Physician No Ref-Primary [phone]      After Care Instructions     Activity       Your activity upon discharge: activity as tolerated            Diet       Follow this diet upon discharge: Orders Placed This Encounter      Snacks/Supplements Adult: Boost Plus; Between Meals      Regular Diet Adult            Discharge Instructions       Please call the clinic at [phone] with questions or concerns.    PATIENT INSTRUCTIONS       FOLLOW-UP:     Call Surgeon if you have:    Temperature greater than 100.4  Persistent nausea and  vomiting  Severe uncontrolled pain  Redness, tenderness, or signs of infection (pain, swelling, redness, odor or green/yellow discharge around the site)  Difficulty breathing, headache or visual disturbances  Hives  Persistent dizziness or light-headedness  Extreme fatigue    Any other questions or concerns you may have after discharge    In an emergency, call 911 or go to an Emergency Department at a nearby hospital                  Follow-up Appointments     Follow Up and recommended labs and tests       Get a Heparin Xa level when you go to clinic Monday 7/16. It should be drawn 4 hours after your last dose of lovenox.    Follow up in clinic with an Nurse Practicioner 8/3/18 for follow-up chemotherapy and labs                  Your next 10 appointments already scheduled     Jul 16, 2018  7:30 AM CDT   Paracentesis Visit with  Spec Inf Para Provider, UC 40 ATC   [facility] ([facility])    [address] [place] [address]  [address]  [facility] [address]   [phone]            Jul 25, 2018  7:30 AM CDT   Paracentesis Visit with  Spec Inf Para Provider, UC 40 ATC   [facility] ([facility])    [address] [place] [address]  [address]  [facility] [address]   [phone]            Aug 02, 2018  8:00 AM CDT   (Arrive by 7:45 AM)   New Patient Visit with Neda Marx MD   [facility] ([facility])    [address]  [address]  [facility] [address]   [phone]            Aug 03, 2018  7:15 AM CDT   [facility] Draw with  [facility] DRAW   [facility] Draw ([facility])    [address] [place] [address]  [address]  [facility] [address]   [phone]            Aug 03, 2018  7:40 AM CDT   (Arrive by 7:25 AM)   Return Active Treatment with KATHRYN Stiles CNP   [facility]  "[facility]    [address]  [address]  [facility] [address]   [phone]            Aug 03, 2018  9:00 AM CDT   Infusion 360 with  ONCOLOGY INFUSION, UC 26 ATC   [facility] ([facility])    [address]  [address]  [facility] [address]   [phone]            Aug 24, 2018  7:45 AM CDT   [facility] Draw with  [facility] DRAW   [facility]    [address]  [address]  [facility] [address]   [phone]              Future tests that were ordered for you     Heparin 10a Level       Draw 4 hours after last dose of lovenox.                  Pending Results     Date and Time Order Name Status Description    7/10/2018 0936 POC US Guide for Thorcentesis In process     7/10/2018 0837 Anaerobic bacterial culture Preliminary     7/10/2018 0818 Fluid Culture Aerobic Bacterial Preliminary     7/9/2018 2102 Blood culture Preliminary     7/9/2018 2102 Blood culture Preliminary             Statement of Approval     Ordered          07/13/18 1052  I have reviewed and agree with all the recommendations and orders detailed in this document.  EFFECTIVE NOW     Approved and electronically signed by:  Jocy Dasilva MD             Admission Information     Date & Time Provider Department Dept. Phone    7/9/2018 Bobbi Browne MD [address] [facility] [phone]      Your Vitals Were     Blood Pressure Pulse Temperature Respirations Height Weight    132/67 (BP Location: Left arm) 102 97.5  F (36.4  C) (Oral) 26 1.6 m (5' 3\") 46.7 kg (103 lb)    Pulse Oximetry BMI (Body Mass Index)                94% 18.25 kg/m2          Care EveryWhere ID     This is your Care EveryWhere ID. This could be used by other organizations to access your [facility] medical records  [id number]        Equal Access to Services     KHADIJAH ROCHA AH: Arthur Nathan, " wadamarisda lujean clauderatna, qaybta kakiley conklin, bessie hickey'aan ah. So [facility] [phone].    ATENCIÓN: Si kb avalos, tiene a alford disposición servicios gratuitos de asistencia lingüística. Joe al [phone].    We comply with applicable federal civil rights laws and [place] laws. We do not discriminate on the basis of race, color, national origin, age, disability, sex, sexual orientation, or gender identity.               Review of your medicines      START taking        Dose / Directions    ondansetron 4 MG tablet   Commonly known as:  ZOFRAN   Used for:  Nausea        Dose:  4 mg   Take 1 tablet (4 mg) by mouth every 6 hours as needed for nausea   Quantity:  20 tablet   Refills:  1         CONTINUE these medicines which may have CHANGED, or have new prescriptions. If we are uncertain of the size of tablets/capsules you have at home, strength may be listed as something that might have changed.        Dose / Directions    enoxaparin 100 MG/ML injection   Commonly known as:  LOVENOX   This may have changed:    - medication strength  - how much to take   Used for:  Other acute pulmonary embolism without acute cor pulmonale (H)        Dose:  60 mg   Inject 0.6 mLs (60 mg) Subcutaneous every 12 hours for 10 days   Quantity:  12 mL   Refills:  0       senna-docusate 8.6-50 MG per tablet   Commonly known as:  SENOKOT-S;PERICOLACE   This may have changed:    - when to take this  - reasons to take this   Used for:  Pelvic mass        Dose:  1 tablet   Take 1 tablet by mouth 2 times daily   Quantity:  100 tablet   Refills:  0         CONTINUE these medicines which have NOT CHANGED        Dose / Directions    LORazepam 1 MG tablet   Commonly known as:  ATIVAN   Used for:  Ovarian cancer, unspecified laterality (H), Encounter for long-term (current) use of medications        Dose:  1 mg   Take 1 tablet (1 mg) by mouth every 6 hours as needed (nausea/vomiting, anxiety or sleep)   Quantity:  30 tablet    Refills:  1       oxyCODONE IR 5 MG tablet   Commonly known as:  ROXICODONE   Used for:  Cancer (H)        Dose:  5 mg   Take 1 tablet (5 mg) by mouth every 4 hours as needed for pain   Quantity:  20 tablet   Refills:  0       prochlorperazine 10 MG tablet   Commonly known as:  COMPAZINE   Used for:  Ovarian cancer, unspecified laterality (H), Encounter for long-term (current) use of medications        Dose:  10 mg   Take 1 tablet (10 mg) by mouth every 6 hours as needed (nausea/vomiting)   Quantity:  30 tablet   Refills:  2            Where to get your medicines      These medications were sent to [place], [place] - [address]  [address] [address]    Hours:  TRANSPLANT PHONE NUMBER [phone] Phone:  [phone]     enoxaparin 100 MG/ML injection    ondansetron 4 MG tablet                Protect others around you: Learn how to safely use, store and throw away your medicines at www.disposemymeds.org.             Medication List: This is a list of all your medications and when to take them. Check marks below indicate your daily home schedule. Keep this list as a reference.      Medications           Morning Afternoon Evening Bedtime As Needed    enoxaparin 100 MG/ML injection   Commonly known as:  LOVENOX   Inject 0.6 mLs (60 mg) Subcutaneous every 12 hours for 10 days   Last time this was given:  60 mg on 7/13/2018  6:02 AM                                LORazepam 1 MG tablet   Commonly known as:  ATIVAN   Take 1 tablet (1 mg) by mouth every 6 hours as needed (nausea/vomiting, anxiety or sleep)                                ondansetron 4 MG tablet   Commonly known as:  ZOFRAN   Take 1 tablet (4 mg) by mouth every 6 hours as needed for nausea                                oxyCODONE IR 5 MG tablet   Commonly known as:  ROXICODONE   Take 1 tablet (5 mg) by mouth every 4 hours as needed for pain   Last time this was given:  5 mg on  7/13/2018 11:18 AM                                prochlorperazine 10 MG tablet   Commonly known as:  COMPAZINE   Take 1 tablet (10 mg) by mouth every 6 hours as needed (nausea/vomiting)                                senna-docusate 8.6-50 MG per tablet   Commonly known as:  SENOKOT-S;PERICOLACE   Take 1 tablet by mouth 2 times daily   Last time this was given:  2 tablets on 7/12/2018  7:41 AM

## 2018-07-09 NOTE — ANESTHESIA CARE TRANSFER NOTE
Patient: Maria Esther Wang    Procedure(s):  Single Lumen Chest Power Port Placement, Leave Access for Chemotherapy - Wound Class: I-Clean    Diagnosis: Ovarian Cancer, Unspecified Laterality  Diagnosis Additional Information: No value filed.    Anesthesia Type:   MAC     Note:  Airway :Room Air  Patient transferred to:Phase II  Comments: To Phase 2.   Denies discomfort.  VSS.  Awake.   Report to Fernie RNHandoff Report: Identifed the Patient, Identified the Reponsible Provider, Reviewed the pertinent medical history, Discussed the surgical course, Reviewed Intra-OP anesthesia mangement and issues during anesthesia, Set expectations for post-procedure period and Allowed opportunity for questions and acknowledgement of understanding      Vitals: (Last set prior to Anesthesia Care Transfer)    CRNA VITALS  7/9/2018 0742 - 7/9/2018 0817      7/9/2018             Pulse: 87    SpO2: 100 %    Resp Rate (observed): (!)  1    Resp Rate (set): 10                Electronically Signed By: KATHRYN Baker CRNA  July 9, 2018  8:17 AM

## 2018-07-09 NOTE — IP AVS SNAPSHOT
MRN:[id number]                      After Visit Summary   7/9/2018    Maria Esther Wang    MRN: [id number]           Thank you!     Thank you for choosing [facility] for your care. Our goal is always to provide you with excellent care. Hearing back from our patients is one way we can continue to improve our services. Please take a few minutes to complete the written survey that you may receive in the mail after you visit with us. Thank you!        Patient Information     Date Of Birth          1960        About your hospital stay     You were admitted on:  July 9, 2018 You last received care in the:  [facility]    You were discharged on:  July 9, 2018       Who to Call     For medical emergencies, please call 911.  For non-urgent questions about your medical care, please call your primary care provider or clinic, None  For questions related to your surgery, please call your surgery clinic        Attending Provider     Provider Alexandro Orozco PA-C Physician Assistant       Primary Care Provider Fax #    Physician No Ref-Primary [phone]      Your next 10 appointments already scheduled     Jul 09, 2018 10:00 AM CDT   Infusion 360 with UC ONCOLOGY INFUSION, UC 27 ATC   [facility] ([facility] [place])    [address]  [address]  [place] [address] [phone]            Jul 16, 2018  7:30 AM CDT   Paracentesis Visit with Uc Spec Inf Para Provider, UC 40 ATC   [facility] ([facility] [place])    [address]  [address]  [place] [address] [phone]            Jul 25, 2018  7:30 AM CDT   Paracentesis Visit with Uc Spec Inf Para Provider,  40 ATC   [facility] ([facility] [place])    [address]  [address]  [place] [address]    [phone]            Jul 30, 2018  7:00 AM CDT   [facility] Draw with  [facility] LAB DRAW   [facility] ([facility])    [address]  [address]  [place] [address]   [phone]            Jul 30, 2018  7:40 AM CDT   (Arrive by 7:25 AM)   Return Active Treatment with KATHRYN Stiles CNP   [facility] ([facility])    [address]  [address]  [place] [address]   [phone]            Jul 30, 2018  9:00 AM CDT   Infusion 360 with  ONCOLOGY INFUSION, UC 26 ATC   [facility] ([facility])    [address]  [address]  [place] [address]   [phone]              Further instructions from your care team         A collaboration between [facility] and [facility]  Experts in minimally invasive, targeted treatments performed using imaging guidance    Venous Access Device,  Port Catheter or Tunneled or Non-Tunneled Central Line Placement    Today you had a procedure today to install a venous access device; either a tunneled central vein catheter or a subcutaneous port catheter.    One of our Radiology PAs performed this procedure for you today:  ? Teo Farley PA-C  ? YULI Rahman PA-C  ? Dami Cannon PA-C  ? Katiuska Arzola PA-C   ? Alexandro Sharif PA-C    After you go home:  - Drink plenty of fluids.  Generally 6-8 (8 ounce) glasses a day is recommended.  - Resume your regular diet unless otherwise ordered by a medical provider.  - Keep any applied tape/gauze dressings clean and dry.  Change tape/gauze dressings if they get wet or soiled.  - You may shower the following day after procedure, however cover and protect from moisture any tape/gauze dressings.  You may let water hit and run over dried skin glue, but do not scrub.  Pat the area dry after showering.  - Port placement  incisions are closed with absorbable suture, meaning they do not need to be removed at a later date, and a topical skin adhesive (skin glue).  This glue will wear off in 7-14 days.  Do not remove before this time.  If 14 days have passed and residual glue is present, you may gently remove it.  - Do not apply gels, lotions, or ointments to the glue site for the first 10 days as this may cause the glue to prematurely soften and fail.  - Do not perform strenuous activities or lift greater than 10 pounds for the next three days.  - If there is bleeding or oozing from the procedure site, apply firm pressure to the area for 5-10 minutes.  If the bleeding continues seek medical advice at the numbers below.  - Mild procedure site discomfort can be treated with an ice pack and over-the-counter pain relievers.        For 24 hours after any sedation used:  - Relax and take it easy.  No strenuous activities.  - Do not drive or operate machines at home or at work.  - No alcohol consumption.  - Do not make any important or legal decisions.    Call our Interventional Radiology (IR) service if:  - If you start bleeding from the procedure site.  If you do start to bleed from the site, lie down and hold some pressure on the site.  Our radiology provider can help you decide if you need to return to the hospital.  - If you have new or worsening pain related to the procedure.  - If you have concerning swelling at the procedure site.  - If you develop persistent nausea or vomiting.  - If you develop hives or a rash or any unexplained itching.  - If you have a fever of greater than 100.5  F and chills in the first 5 days after procedure.  - Any other concerns related to your procedure.      [facility]  Interventional Radiology (IR)  [address] [place] [address]    Contact Number:  [phone]  (IR control desk)  - Monday - Friday 8:00 am - 4:30 pm    After hours for  "urgent concerns:  [phone]  - After 4:30 pm Monday - Friday, Weekends and Holidays.   - Ask for Interventional Radiology on-call.  Someone is available 24 hours a day.  - [facility] toll free number:  [phone]        Pending Results     Date and Time Order Name Status Description    7/9/2018 0650 IR CHEST PORT PLACEMENT > 5 YRS OF AGE In process             Admission Information     Date & Time Provider Department Dept. Phone    7/9/2018 Aelxandro Sharif PA-C [facility] [phone]      Your Vitals Were     Blood Pressure Pulse Temperature Respirations Height Weight    105/69 103 99.1  F (37.3  C) (Oral) 16 1.575 m (5' 2\") 44.5 kg (98 lb)    Pulse Oximetry BMI (Body Mass Index)                95% 17.92 kg/m2          Care EveryWhere ID     This is your Care EveryWhere ID. This could be used by other organizations to access your [facility] medical records  [id number]        Equal Access to Services     KHADIJAH ROCHA AH: Hadii kian ku hadasho Soomaali, waaxda luqadaha, qaybta kaalmada adeegyada, waxay connerin shivani sullivan. So [facility] [phone].    ATENCIÓN: Si habla español, tiene a alford disposición servicios gratuitos de asistencia lingüística. Llame al [phone].    We comply with applicable federal civil rights laws and [place] laws. We do not discriminate on the basis of race, color, national origin, age, disability, sex, sexual orientation, or gender identity.               Review of your medicines      UNREVIEWED medicines. Ask your doctor about these medicines        Dose / Directions    amoxicillin 875 MG tablet   Commonly known as:  AMOXIL   Indication:  Community Acquired Pneumonia   Used for:  Community acquired pneumonia of left lower lobe of lung (H)        Dose:  875 mg   Take 1 tablet (875 mg) by mouth every 12 hours   Quantity:  6 tablet   Refills:  0       azithromycin 250 MG tablet   Commonly known as:  ZITHROMAX   Indication:  Community Acquired " Pneumonia   Used for:  Community acquired pneumonia of left lower lobe of lung (H)        Dose:  250 mg   Take 1 tablet (250 mg) by mouth daily   Quantity:  3 tablet   Refills:  0       enoxaparin 60 MG/0.6ML injection   Commonly known as:  LOVENOX   Used for:  Other acute pulmonary embolism without acute cor pulmonale (H)        Dose:  1 mg/kg   Inject 0.47 mLs (47 mg) Subcutaneous every 12 hours   Quantity:  60 Syringe   Refills:  0       LORazepam 1 MG tablet   Commonly known as:  ATIVAN   Used for:  Ovarian cancer, unspecified laterality (H), Encounter for long-term (current) use of medications        Dose:  1 mg   Take 1 tablet (1 mg) by mouth every 6 hours as needed (nausea/vomiting, anxiety or sleep)   Quantity:  30 tablet   Refills:  1       * oxyCODONE IR 5 MG tablet   Commonly known as:  ROXICODONE   Used for:  Pelvic mass        Dose:  5 mg   Take 1 tablet (5 mg) by mouth every 4 hours as needed for moderate to severe pain   Quantity:  20 tablet   Refills:  0       * oxyCODONE IR 5 MG tablet   Commonly known as:  ROXICODONE   Used for:  Cancer (H)        Dose:  5 mg   Take 1 tablet (5 mg) by mouth every 4 hours as needed for pain   Quantity:  20 tablet   Refills:  0       PRILOSEC PO        Refills:  0       prochlorperazine 10 MG tablet   Commonly known as:  COMPAZINE   Used for:  Ovarian cancer, unspecified laterality (H), Encounter for long-term (current) use of medications        Dose:  10 mg   Take 1 tablet (10 mg) by mouth every 6 hours as needed (nausea/vomiting)   Quantity:  30 tablet   Refills:  2       senna-docusate 8.6-50 MG per tablet   Commonly known as:  SENOKOT-S;PERICOLACE   Used for:  Pelvic mass        Dose:  1 tablet   Take 1 tablet by mouth 2 times daily   Quantity:  100 tablet   Refills:  0       * Notice:  This list has 2 medication(s) that are the same as other medications prescribed for you. Read the directions carefully, and ask your doctor or other care provider to review them  with you.             Protect others around you: Learn how to safely use, store and throw away your medicines at www.disposemymeds.org.             Medication List: This is a list of all your medications and when to take them. Check marks below indicate your daily home schedule. Keep this list as a reference.      Medications           Morning Afternoon Evening Bedtime As Needed    amoxicillin 875 MG tablet   Commonly known as:  AMOXIL   Take 1 tablet (875 mg) by mouth every 12 hours                                azithromycin 250 MG tablet   Commonly known as:  ZITHROMAX   Take 1 tablet (250 mg) by mouth daily                                enoxaparin 60 MG/0.6ML injection   Commonly known as:  LOVENOX   Inject 0.47 mLs (47 mg) Subcutaneous every 12 hours                                LORazepam 1 MG tablet   Commonly known as:  ATIVAN   Take 1 tablet (1 mg) by mouth every 6 hours as needed (nausea/vomiting, anxiety or sleep)                                * oxyCODONE IR 5 MG tablet   Commonly known as:  ROXICODONE   Take 1 tablet (5 mg) by mouth every 4 hours as needed for moderate to severe pain                                * oxyCODONE IR 5 MG tablet   Commonly known as:  ROXICODONE   Take 1 tablet (5 mg) by mouth every 4 hours as needed for pain                                PRILOSEC PO                                prochlorperazine 10 MG tablet   Commonly known as:  COMPAZINE   Take 1 tablet (10 mg) by mouth every 6 hours as needed (nausea/vomiting)                                senna-docusate 8.6-50 MG per tablet   Commonly known as:  SENOKOT-S;PERICOLACE   Take 1 tablet by mouth 2 times daily                                * Notice:  This list has 2 medication(s) that are the same as other medications prescribed for you. Read the directions carefully, and ask your doctor or other care provider to review them with you.

## 2018-07-10 PROBLEM — R06.82 TACHYPNEA: Status: ACTIVE | Noted: 2018-01-01

## 2018-07-10 NOTE — DISCHARGE SUMMARY
Gynecologic Oncology Discharge Summary    Maria Esther Wang  [id number]    Admit Date: 7/9/2018  Discharge Date: 7/13/2018  Admitting Provider: Dr. Browne  Discharge Provider: Dr. Browne    Admission Dx:   - Metastatic clear cell carcinoma  - Pelvic mass  - Tachypnea  - Bilateral pulmonary emboli  - Ascities  - Anemia of chronic disease  - Bilateral pleural effusions    Discharge Dx:  -Same as above s/p below procedures     Patient Active Problem List   Diagnosis     Pulmonary emboli (H)     Ascites     Ovarian cancer, unspecified laterality (H)     Encounter for long-term (current) use of medications     Anemia in neoplastic disease     Shortness of breath     Tachypnea       Procedures: Thoracentesis           C1 Carbo/Taxol    Prior to Admission Medications:  No prescriptions prior to admission.       Discharge Medications:   Maria Esther Wang   Home Medication Instructions TL:[id number]    Printed on:07/13/18 0828   Medication Information                      enoxaparin (LOVENOX) 100 MG/ML injection  Inject 0.6 mLs (60 mg) Subcutaneous every 12 hours for 10 days             LORazepam (ATIVAN) 1 MG tablet  Take 1 tablet (1 mg) by mouth every 6 hours as needed (nausea/vomiting, anxiety or sleep)             oxyCODONE IR (ROXICODONE) 5 MG tablet  Take 1 tablet (5 mg) by mouth every 4 hours as needed for pain             prochlorperazine (COMPAZINE) 10 MG tablet  Take 1 tablet (10 mg) by mouth every 6 hours as needed (nausea/vomiting)             senna-docusate (SENOKOT-S;PERICOLACE) 8.6-50 MG per tablet  Take 1 tablet by mouth 2 times daily                 Consultations:   [facility] Medicine Procedure Team    Brief History of Illness:  Maria Esther Wang is a 58 year old with pelvic mass and biopsy-proven metastatic clear cell carcinoma who presents with shortness of breath and subjective fevers. She was at her baseline this morning and presented to the infusion center for planned chemotherapy. She  underwent chest port placement. Hgb was 6.6 so chemo was delayed, and she received 2U pRBC. During the second unit, she became tachypneic (RR 40) and temp was elevated to 99.9 with labored breathing and tachycardia. Fine crackles auscultated, and she was treated with Lasix 10 mg IV x 1 for suspected fluid overload 2/2 blood transfusions. She diuresed well, but tachypnea persisted, so she was sent to the ED for further evaluation. She reports feeling fine this morning, but became short of breath and feverish during her transfusion. She says she now feels improved but still short of breath and flushed. She has had an intermittent dry cough since being diagnosed with pulmonary emboli a few weeks ago.  This cough is stable and nonproductive.  She has not felt feverish or chills at home.  She has localized chest pain near her port site she denies lightheadedness, nausea/vomiting, worsening abdominal pain, diarrhea.  Last bowel movement was this morning.  She denies dysuria, is voiding spontaneously.  No vaginal discharge or vaginal bleeding.     She has been on therapeutic Lovenox, states that her last dose was 7/7/19 PM and has been held since due to port placement this morning.    She was admitted for further evaluation of tachypnea, treatment of symptoms, further monitoring of vital signs and labs.    Hospital Course:  Dz:   - She has a pelvic mass and metastatic clear cell carcinoma. She received C1 of neoadjuvant Carbo/Taxol this admission and tolerated it well. She has further chemotherapy appointments and management scheduled.   FEN:   - She tolerated regular diet throughout her admission. She had hyponatremia and hypokalemia which improved after supplementation. By discharge, she was tolerating a regular diet without nausea and vomiting and able to maintain her hydration without IVF supplementation.  Pain:   - Her pain was well controlled on PO pain medications throughout her admission, and she was discharged  home with these medications.  CV:   - She has no history of CV issues. She was tachycardic on admission, which improved on HD#2 then worsened on HD#3 with increased work of breathing and tachypnea. EKG with ST. TNI negative. Heart rate normalized with diuresis as her respiratory rate returned to baseline and her heart rate was baseline when she discharged.   PULM:   - She was admitted with tachypnea. Work-up was notable for CXR with bilateral pleural effusions, possible atelectasis vs pneumonia. CT PE revealed unchanged segmental and subsegmental bilateral PEs, increased moderate-large left pleural effusion and stable right pleural effusion. Thoracentesis was performed and 850ml straw colored fluid was removed from her left pleural space. Cytology was positive for adenocarcinoma, culture was NGTD at the time of discharge. On HD#3 she developed worsened tachypnea and tachycardia, CXR Increased left lower lobe opacity. She was given lasix 10 mg IV x 1, had appropriate diuresis and tachycardia and tachypnea improved. Opacities thought to be due to fluid shifting from her malignancy and not due to infection as she remained afebrile.   HEME:   - Prior to admission, her hemoglobin was 6.6 and she received 2U pRBCs. Her admission hemoglobin was 10.1 and was stable at 9.1 at time of discharge   - Direct Laurent obtained to evaluate for hemolytic transfusion reaction was negative.   - She has known bilateral pulmonary emboli. CT PE this admission revealed unchanged segmental and subsegmental PEs. She was continued on therapeutic Lovenox and was increased after Heparin 10A level was subtherapeutic. She will follow-up as an outpatient for a repeat Heparin 10A level.  GI:   -  She has significant abdominal ascites and well follow-up as an outpatient for therapeutic paracentesis. She will be discharged with a bowel regimen to prevent constipation while on narcotics.  :    -  The patient voided spontaneously without difficulty  throughout her admission.  She had no acute  issues while in house.  ID:   - The patient was AF throughout her hospitalization. She had a leukocytosis on admission, and CXR was concerning for possible pneumonia, so she was given a dose of Zosyn and Vancomycin for healthcare acquired pneumonia, which was discontinued as she was afebrile. WBC was 16.2 on admission, peaked at 18.9 and was 16.6  by the time of discharge. Lactate was 2.6 on admission and improved sponteanously to 1.0. She triggered the sepsis protocol due to tachypnea and tachycardia on HD#4 with lactate of 3.1 and WBC 18.9, though she remained asymptomatic and without signs of infection. Increased lactate thought to be due to third-spacing of fluid and mild dehydration. Lactate returned without intervention to 1.2. Repeat WBC 7/13 16.6 and lactate 1.1 prior to discharge.   ENDO:   - no issues  PSYCH/NEURO:   - no issues  PPX:    -  She was given SCDs, IS, and therapeutic lovenox during her hospital course.  She tolerated these prophylactic interventions without incident.  Therapeutic lovenox was continued at the time of her discharge with plan to follow up with Heparin 10A level on 7/16/2018 in conjunction with appointment for paracentesis with Dr. Fernandez.       Discharge Instructions and Follow up:  Ms. Maria Esther Wang was discharged from the hospital with follow up for chemotherapy as outpatient, 7/16 paracentesis.    Discharge Diet: Regular  Discharge Activity: Activity as tolerated  Discharge Follow up: 7/16/18 paracentesis, Heparin 10A level, 8/3/18 appointment prior to next cycle of chemo    Discharge Disposition:  Discharged to home    Discharge Staff: Dr Browne    # Pain Assessment:  Current Pain Score 7/13/2018   Patient currently in pain? yes   Pain score (0-10) -   Pain location Back   Pain descriptors Patient unable to describe   Maria Esther khan pain level was assessed and she currently denies pain.  She has pain medications from  previous prescriptions.     Park Rogel MD  PGY-1  Obstetrics and Gynecology and Women's Health       Provider Disclosure:   I agree with above History, Review of Systems, Physical exam and Plan. I have reviewed the content of the documentation and have edited it as needed. I have personally performed the services documented here and the documentation accurately represents those services and the decisions I have made.     Electronically signed by:   Bobbi Browne MD   Gynecologic Oncology   [facility]

## 2018-07-10 NOTE — PROCEDURES
[facility] Procedure Note         Medicine Bedside Procedure:     PROCEDURE PERFORMED:  Thoracentesis    PAUSE FOR THE CAUSE: Right patient: Yes      Right body part: Yes      Right procedure Yes    ULTRASOUND GUIDANCE:  Yes, please see saved images in the PACS system    PRIMARY INDICATION:  pleural effusion    DIAGNOSTIC DATA REVIEW:        Recent Labs  Lab 07/10/18  0506 07/09/18  1945 07/09/18  1015 07/09/18  0646    132* 133  --    CR 0.49* 0.44* 0.49*  --    HGB 9.9* 10.1* 6.6*  --     391 527*  --    INR  --  1.25*  --  1.16*       BARRIER PRECAUTIONS & STERILE TECHNIQUE  Patient was prepped and draped in the normal sterile fashion    LOCAL ANESTHESIA:  Lidocaine 1% 6 ml without epinephrine    NUMBER OF KITS USED:  1    STERILE DRESSINGS:  Applied    ESTIMATED BLOOD LOSS:  Minimal    PROCEDURE DESCRIPTION:      A time-out was completed, verifying correct patient, procedure, site, positioning, and implant(s) or special equipment if applicable. Patient positioned, prepped and draped in usual sterile fashion. Ultrasound guidance was utilized to find a pocket of ascites in the Left posterior thorax of the patient. The site was marked. A vascular probe was used to verify absence of overlying blood vessel at the site. Lidocaine was injected. An 18 gauge needle was introduced into the pleural space and 850 mL of straw colored fluid was removed. Blood loss was minimal. Patient tolerated the procedure well, and no complications were observed. A dry guaze dressing was applied. Chest x-ray to eval for pneumothorax was ordered.     SPECIMENS SENT:  Chemistry, Microbiology, Cytopathology    FOLLOW- UP IMAGING:  Indicated:  X-ray ordered    COMPLICATIONS:  none    PRIMARY PROCEDURALIST:   Osmany Bradley MD

## 2018-07-10 NOTE — ED NOTES
[facility], [place]   ED Nurse to Floor Handoff     Maria Esther Wang is a 58 year old female who speaks Occitan and lives with family members,  in a home  They arrived in the ED by ambulance from clinic    ED Chief Complaint: Fever    ED Dx;   Final diagnoses:   Healthcare-associated pneumonia   Blood transfusion reaction, initial encounter         Needed?: Yes.  [place]    Allergies: No Known Allergies.  Past Medical Hx:   Past Medical History:   Diagnosis Date     Anemia      NO ACTIVE PROBLEMS      Ovarian cancer (H)      Pulmonary embolism (H)       Baseline Mental status: WDL  Current Mental Status changes: at basesline    Infection present or suspected this encounter: cultures pending  Sepsis suspected: Yes  Isolation type: No active isolations     Activity level - Baseline/Home:  Stand with Assist  Activity Level - Current:   Stand with Assist    Bariatric equipment needed?: No    In the ED these meds were given:   Medications   lidocaine 1 % 1 mL (not administered)   lidocaine (LMX4) cream (not administered)   sodium chloride (PF) 0.9% PF flush 3 mL (not administered)   sodium chloride (PF) 0.9% PF flush 3 mL (not administered)   vancomycin (VANCOCIN) 1,250 mg in sodium chloride 0.9 % 250 mL intermittent infusion (not administered)   vancomycin (VANCOCIN) 750 mg in sodium chloride 0.9 % 250 mL intermittent infusion (not administered)   sodium chloride 0.9% infusion (not administered)   0.9% sodium chloride BOLUS (not administered)     Followed by   sodium chloride 0.9% infusion (not administered)   0.9% sodium chloride BOLUS (0 mLs Intravenous Stopped 7/9/18 2043)   oxyCODONE IR (ROXICODONE) tablet 5 mg (5 mg Oral Given 7/9/18 1942)   piperacillin-tazobactam (ZOSYN) 3.375 g vial to attach to  mL bag (3.375 g Intravenous New Bag 7/9/18 2138)       Drips running?  Yes    Home pump  No    Current LDAs  Port A Cath Single 07/09/18 Right Chest wall (Active)   Number of  days:0       Incision/Surgical Site 07/09/18 Right Chest (Active)   Number of days:0       Labs results:   Labs Ordered and Resulted from Time of ED Arrival Up to the Time of Departure from the ED   COMPREHENSIVE METABOLIC PANEL - Abnormal; Notable for the following:        Result Value    Sodium 132 (*)     Potassium 3.1 (*)     Glucose 146 (*)     Creatinine 0.44 (*)     Calcium 7.8 (*)     Albumin 1.5 (*)     All other components within normal limits   CBC WITH PLATELETS DIFFERENTIAL - Abnormal; Notable for the following:     WBC 16.5 (*)     Hemoglobin 10.1 (*)     Hematocrit 31.1 (*)     MCV 74 (*)     MCH 24.1 (*)     RDW 23.4 (*)     Absolute Neutrophil 15.1 (*)     Absolute Lymphocytes 0.4 (*)     All other components within normal limits   LACTIC ACID WHOLE BLOOD - Abnormal; Notable for the following:     Lactic Acid 2.6 (*)     All other components within normal limits   UA MACROSCOPIC WITH REFLEX TO MICRO AND CULTURE - Abnormal; Notable for the following:     Blood Urine Small (*)     RBC Urine 4 (*)     Mucous Urine Present (*)     All other components within normal limits   INR - Abnormal; Notable for the following:     INR 1.25 (*)     All other components within normal limits   TROPONIN I   LIPASE   PARTIAL THROMBOPLASTIN TIME   PERIPHERAL IV CATHETER   PULSE OXIMETRY NURSING   CARDIAC CONTINUOUS MONITORING   NURSING DRAW AND HOLD   NURSING DRAW AND HOLD   NURSING DRAW AND HOLD   ISTAT CG4 GASES LACTATE JOSE ELIAS NURSING POCT   IP ASSIGN PROVIDER TEAM TO TREATMENT TEAM   DIRECT ANTIGLOBULIN TEST   BLOOD CULTURE   BLOOD CULTURE       Imaging Studies:   Recent Results (from the past 24 hour(s))   XR Chest 2 Views    Narrative    XR CHEST 2 VW  7/9/2018 7:35 PM      HISTORY: Dyspnea;     COMPARISON: 6/23/2018    FINDINGS: Small bilateral pleural effusions. Right chest Port-A-Cath  tip projects over the high right atrium. Cardiac silhouette is not  enlarged. No pneumothorax. Bibasilar opacities.      Impression  "   IMPRESSION:   1. Bibasilar opacities, which may represent atelectasis or pneumonia.  2. Small bilateral pleural effusions.     I have personally reviewed the examination and initial interpretation  and I agree with the findings.    RADHA WIGGINS MD       Recent vital signs:   /71  Pulse 111  Temp 98.8  F (37.1  C) (Oral)  Resp (!) 38  Ht 1.575 m (5' 2\")  Wt 44.5 kg (98 lb)  SpO2 95%  BMI 17.92 kg/m2    Cardiac Rhythm: Normal Sinus  Pt needs tele? Yes  Skin/wound Issues: None    Code Status: DNR / DNI    Pain control: good    Nausea control: pt had none    Abnormal labs/tests/findings requiring intervention: Lactate 2.6    Family present during ED course? Yes   Family Comments/Social Situation comments: n/a    Tasks needing completion: None    Bárbara Mcallister RN  [facility]-- [phone]  [phone]      "

## 2018-07-10 NOTE — H&P
Gynecology/Oncology H&P    CC: shortness of breath, subjective fevers    HPI: Maria Esther Wang is a 58 year old with pelvic mass and biopsy-proven metastatic clear cell carcinoma who presents with shortness of breath and subjective fevers. She was at her baseline this morning and presented to the infusion center for planned chemotherapy. She underwent chest port placement. Hgb was 6.6 so chemo was delayed, and she received 2U pRBC. During the second unit, she became tachypneic (RR 40) and temp was elevated to 99.9 with labored breathing and tachycardia. Fine crackles auscultated, and she was treated with Lasix 10 mg IV x 1 for suspected fluid overload 2/2 blood transfusions. She diuresed well, but tachypnea persisted, so she was sent to the ED for further evaluation. She reports feeling fine this morning, but became short of breath and feverish during her transfusion. She says she now feels improved but still short of breath and flushed. She has had an intermittent dry cough since being diagnosed with pulmonary emboli a few weeks ago.  This cough is stable and nonproductive.  She has not felt feverish or chills at home.  She has localized chest pain near her port site she denies lightheadedness, nausea/vomiting, worsening abdominal pain, diarrhea.  Last bowel movement was this morning.  She denies dysuria, is voiding spontaneously.  No vaginal discharge or vaginal bleeding.    She has been on therapeutic Lovenox, states that her last dose was 7/7/19 PM and has been held since due to port placement this morning.    ROS:  Gen: +subjective fevers/chills  HEENT: no changes in vision  Neuro: No dizziness.  CV: +CP  Pulm: +SOB, +dry cough  GI: No N/V.  No constipation/diarrhea  : No dysuria, frequency, urgency.  No bothersome vaginal discharge  Skin: No rashes or itching  MSK: No joint problems.  Endocrine: No DM  Allergy/Immunology: No autoimmune diseases  Psych: No anxiety/depression    Cancer History:    6/1/2018: presented to PCP for abdominal swelling    CT A/P:  1. Complex pelvic masses, highly suspicious for primary ovarian   malignancy (e.g. cystadenocarcinoma) with possible invasion of the   uterine fundus.  2. Diffuse peritoneal carcinomatosis and metastatic lymphadenopathy.  3. Large volume ascites.  6/18-6/19/2018: Admission to [facility] with bilateral pulmonary emboli    6/19/2018-6/24/2018: Admission to [facility]. IR Biopsy of peritoneal nodule 6/21/18: mestastatic clear cell carcinoma. Treated for pulmonary emboli, anemia community-acquired pneumonia, ascites.     6/25/18: Visit with Dr. Fernandez. Plan for neoadjuvant Carbo/Taxol.     6/28/2018:Therapeutic paracentesis   7/6/2018: Therapeutic paracentesis   7/9/2018: Presented for C1D1 carbo/taxol, Hgb 6.6, transfusion of 2U pRBC. Tachypnea, tachycardia, elevated temp.      7/9/2018: Admitted to [facility] for shortness of breath     PMH:  Past Medical History:   Diagnosis Date     Anemia      NO ACTIVE PROBLEMS      Ovarian cancer (H)      Pulmonary embolism (H)        PSHx:  Past Surgical History:   Procedure Laterality Date     OTHER SURGICAL HISTORY      no surgical history       Meds:    Current Outpatient Prescriptions on File Prior to Encounter:  amoxicillin (AMOXIL) 875 MG tablet Take 1 tablet (875 mg) by mouth every 12 hours   azithromycin (ZITHROMAX) 250 MG tablet Take 1 tablet (250 mg) by mouth daily   enoxaparin (LOVENOX) 60 MG/0.6ML injection Inject 0.47 mLs (47 mg) Subcutaneous every 12 hours   LORazepam (ATIVAN) 1 MG tablet Take 1 tablet (1 mg) by mouth every 6 hours as needed (nausea/vomiting, anxiety or sleep)   Omeprazole (PRILOSEC PO)    oxyCODONE IR (ROXICODONE) 5 MG tablet Take 1 tablet (5 mg) by mouth every 4 hours as needed for pain   oxyCODONE IR (ROXICODONE) 5 MG tablet Take 1 tablet (5 mg) by mouth every 4 hours as needed for moderate to severe pain   prochlorperazine (COMPAZINE) 10 MG tablet Take 1 tablet (10 mg) by  "mouth every 6 hours as needed (nausea/vomiting)   senna-docusate (SENOKOT-S;PERICOLACE) 8.6-50 MG per tablet Take 1 tablet by mouth 2 times daily        Allergies:   No Known Allergies     FamHx:  No family history on file.    SocialHx:  Social History     Social History     Marital status: Single     Spouse name: N/A     Number of children: N/A     Years of education: N/A     Social History Main Topics     Smoking status: Never Smoker     Smokeless tobacco: Never Used     Alcohol use No     Drug use: No     Sexual activity: No     Other Topics Concern     None     Social History Narrative       Vitals:  Vitals:    07/09/18 2200 07/09/18 2215 07/09/18 2230 07/09/18 2300   BP: 144/69 132/69 133/70 126/60   BP Location:    Right arm   Pulse:       Resp: (!) 40 (!) 35 (!) 37 28   Temp:    99.2  F (37.3  C)   TempSrc:    Oral   SpO2: 94% 94% 94%    Weight:    46.7 kg (102 lb 14.4 oz)   Height:    1.575 m (5' 2\")       PEx:  Gen: No distress, comfortable; lines present include chest port, PIV  HEENT: Normocephalic, atraumatic; supple neck  Neuro: PERRL, EOMI; grossly normal motor movement bilaterally  CV: mild tachycardia, regular rhythm, nl S1/S2, no murmurs/clicks/gallops, peripheral pulses 2+ bilaterally  Pulm: Increased work of breath with use of accessory muscles; CTAB, no wheezing/rhonchi/crackles   Abd: Significant abdominal distension, Soft, non-tender  Ext: Non-tender, no erythema; 1+ edema bilaterally  Skin: No rashes appreciated  Psych: Appropriate insight/judgment    Labs:  Result Value   Interpretation ECG Sinus tachycardia   Result Value   Sodium 132 (L)   Potassium 3.1 (L)   Chloride 97   Carbon Dioxide 23   Anion Gap 12   Glucose 146 (H)   Urea Nitrogen 9   Creatinine 0.44 (L)   GFR Estimate >90   GFR Estimate If Black >90   Calcium 7.8 (L)   Bilirubin Total 0.7   Albumin 1.5 (L)   Protein Total 6.9   Alkaline Phosphatase 129   ALT 39   AST 24   Result Value   WBC 16.5 (H)   RBC Count 4.19   Hemoglobin " 10.1 (L)   Hematocrit 31.1 (L)   MCV 74 (L)   MCH 24.1 (L)   MCHC 32.5   RDW 23.4 (H)   Platelet Count 391   Diff Method Automated Method   % Neutrophils 91.5   % Lymphocytes 2.2   % Monocytes 4.8   % Eosinophils 0.2   % Basophils 0.2   % Immature Granulocytes 1.1   Nucleated RBCs 0   Absolute Neutrophil 15.1 (H)   Absolute Lymphocytes 0.4 (L)   Absolute Monocytes 0.8   Absolute Eosinophils 0.0   Absolute Basophils 0.0   Abs Immature Granulocytes 0.2   Absolute Nucleated RBC 0.0   Result Value   Lactic Acid 2.6 (H)   Result Value   Troponin I ES <0.015   Result Value   Lipase 98   Result Value   INR 1.25 (H)   Result Value   PTT 37   Result Value   EDNA  Broad Spectrum Neg   Result Value   Color Urine Light Yellow   Appearance Urine Clear   Glucose Urine Negative   Bilirubin Urine Negative   Ketones Urine Negative   Specific Gravity Urine 1.007   Blood Urine Small (A)   pH Urine 7.0   Protein Albumin Urine Negative   Urobilinogen mg/dL Normal   Nitrite Urine Negative   Leukocyte Esterase Urine Negative   Source Clean catch urine   RBC Urine 4 (H)   WBC Urine 1   Mucous Urine Present (A)       Imaging:  XR Chest 2 Views    Narrative    XR CHEST 2 VW  7/9/2018 7:35 PM      HISTORY: Dyspnea;     COMPARISON: 6/23/2018    FINDINGS: Small bilateral pleural effusions. Right chest Port-A-Cath  tip projects over the high right atrium. Cardiac silhouette is not  enlarged. No pneumothorax. Bibasilar opacities.      Impression    IMPRESSION:   1. Bibasilar opacities, which may represent atelectasis or pneumonia.  2. Small bilateral pleural effusions.     I have personally reviewed the examination and initial interpretation  and I agree with the findings.    RADHA WIGGINS MD       Assessment: Maria Esther Wang is a 57 y/o with metastatic clear cell carcinoma and pelvic mass here with shortness of breath, subjective fevers, elevated WBC and lactate after blood transfusion.    Active Problem list:  Metastatic clear cell  carcinoma  Pelvic mass  Tachypnea  Bilateral pulmonary emboli  Ascities  Anemia of chronic disease    Plan:    Disease: Pelvic mass, metastatic clear cell carcinoma. Neoadjuvant chemotherapy. C1D1 Carbo/Taxol planned for 7/13/18.  FEN: Regular diet. Hyponatremia, s/p NS bolus in the ED. Hypokalemia, ERP ordered. Will limit fluids for now due to concern for volume overload with blood transfusion. Strict Is/Os.  Pain: Tylenol, Oxycodone prn.  Heme: - Hgb 6.6>2UpRBC>10.1. Anemia of chronic disease. Will recheck AM CBC.  - Direct Laurent test obtained for hemolytic transfusion reaction negative.  - Bilateral pulmonary emboli, on therapeutic Lovenox, though has missed several doses due to port placement this morning. Will reevaluate PEs with CT PE now and resume Lovenox following.  CV: Tachycardia. EKG sinus rhythm. Troponins negative. Suspect pulmonary etiology vs hypovolemia vs infection.  Resp: Tachypnea. Ddx includes volume overload, pulmonary emboli, pneumonia. CXR with small bilateral pleural effusions. CT PE pending. Continuous pulse ox, supplemental O2 as needed. Strict Is/Os.  GI: Regular diet. Scheduled bowel regimen, prn antiemetics.  : Voiding spontaneously, NI  MSK: NI  Neuro/Psych: NI  Endocrine: NI  ID: Tmax 99.9. WBC 16.5. Lactate 2.6. S/p Vanc/Zosyn in the ED. CXR with bibasilar opacities, concerning for atelectasis or pneumonia. Will evaluate for possible PNA on CT PE and resume antibiotics if necessary, held for now as she has not been febrile, elevated WBC/lactate possible due to hemoconcentration.   PPx: SCDs. Will resume therapeutic Lovenox after CT PE.  Disposition: Admitted to observation pending improvement of tachypnea, WBC, lactate.    Discussed with Dr. Price, Gyn Onc fellow who discussed with Dr. Browne, Gyn Onc Attending.     Argelia Brooks MD   OB/GYN PGY-2  7/9/2018 10:21 PM     Provider Disclosure:   I agree with above History, Review of Systems, Physical exam and Plan. I have reviewed  the content of the documentation and have edited it as needed. I have personally performed the services documented here and the documentation accurately represents those services and the decisions I have made.     Electronically signed by:   Bobbi Browne MD   Gynecologic Oncology   [facility]

## 2018-07-10 NOTE — ED PROVIDER NOTES
[place] EMERGENCY DEPARTMENT ([facility])  7/09/18   History     Chief Complaint   Patient presents with     Fever     HPI  Maria Esther Wang is a 58 year old female who presents to the Emergency Department for evaluation of elevated temperatures and shortness of breath while receiving a PRBC infusion today.  A quick overview of patient's recent history.  On 5/30/2018, patient presented to urgent care with complaints of abdominal bloating, 11 pound weight loss in the span of 2-3 weeks, patient had x-ray done there that showed ascites.  Patient followed up with her PCP on 6/1/2018 MRI.  PCP obtained CT abdomen/pelvis which showed a complex pelvic masses highly suspicious for primary ovarian malignancy, possible invasin of uterus, diffuse peritoneal carcinomatosis, metastatic LAD and large volume ascites.  She was then referred to IR at [facility] for paracentesis, during this appointment she was incidentally found to have bilateral PE and was admitted directly to [facility] ICU and started on heparin gtt. she was also found to be anemic to 6.5 and received 1 unit PRBCs with appropriate rise to 7.6 on discharge.  Her anemia was evaluated with peripheral smear and iron studies, which were consistent with anemia of chronic disease.  She was transferred to [facility] for management of PE, she was admitted at [facility] from 6/19/2018 to 6/24/2018.    Patient was at her infusion center today receiving PRBCs for a hemoglobin of 6.6.  Prior to receiving the PRBCs, she was feeling at her baseline of health.  Patient received her first unit with no difficulties; however, once the second unit began, she began having hot flashes and was feeling short of breath.  Patient's temperature was taken and it was elevated to 99.9 F.  Patient was also becoming diaphoretic.  Patient was sent here for evaluation.  Patient states that currently she is feeling improved, but does still feel short of breath and hot.  She denies  any nausea or vomiting.  She also denies any new pain, stating that she is only having her baseline pain currently    I have reviewed the Medications, Allergies, Past Medical and Surgical History, and Social History in the [email or web address] system.    Past Medical History:   Diagnosis Date     Anemia      NO ACTIVE PROBLEMS      Ovarian cancer (H)      Pulmonary embolism (H)        Past Surgical History:   Procedure Laterality Date     OTHER SURGICAL HISTORY      no surgical history       No family history on file.    Social History   Substance Use Topics     Smoking status: Never Smoker     Smokeless tobacco: Never Used     Alcohol use No       Current Facility-Administered Medications   Medication     0.9% sodium chloride BOLUS    Followed by     sodium chloride 0.9% infusion     lidocaine (LMX4) cream     lidocaine 1 % 1 mL     sodium chloride (PF) 0.9% PF flush 3 mL     sodium chloride (PF) 0.9% PF flush 3 mL     sodium chloride 0.9% infusion     vancomycin (VANCOCIN) 1,250 mg in sodium chloride 0.9 % 250 mL intermittent infusion     [START ON 7/10/2018] vancomycin (VANCOCIN) 750 mg in sodium chloride 0.9 % 250 mL intermittent infusion     Current Outpatient Prescriptions   Medication     amoxicillin (AMOXIL) 875 MG tablet     azithromycin (ZITHROMAX) 250 MG tablet     enoxaparin (LOVENOX) 60 MG/0.6ML injection     LORazepam (ATIVAN) 1 MG tablet     Omeprazole (PRILOSEC PO)     oxyCODONE IR (ROXICODONE) 5 MG tablet     oxyCODONE IR (ROXICODONE) 5 MG tablet     prochlorperazine (COMPAZINE) 10 MG tablet     senna-docusate (SENOKOT-S;PERICOLACE) 8.6-50 MG per tablet     Facility-Administered Medications Ordered in Other Encounters   Medication     glucose gel 15-30 g    Or     dextrose 50 % injection 25-50 mL    Or     glucagon injection 1 mg     fentaNYL (PF) (SUBLIMAZE) injection 25-50 mcg     heparin 100 UNIT/ML injection 5 mL     heparin 5,000 units in 0.9% sodium chloride 1000 mL     heparin lock flush 10 UNIT/ML injection  "5 mL     HOLD: AM insulin or oral hypoglycemics pre-procedure (See Admin Instructions)     HOLD: apixaban (ELIQUIS) 48 pre-procedure     HOLD: argatroban (ACOVA) 4 hours pre-procedure     HOLD: bivalirudin (ANGIOMAX) pre-procedure     HOLD: dabigatran (PRADAXA) pre-procedure     HOLD: dipyridamole (PERSANTINE), aspirin/dipyridamole (AGGRENOX), cilostazol (PLETAL), clopidogrel (PLAVIX), ticlopidine (TICLID), ticagrelor (BRILINTA) or prasugrel (EFFIENT)     HOLD: edoxaban (SAVAYSA)  24 hours pre procedure     HOLD: enoxaparin (LOVENOX) pre-procedure OUTPATIENT     HOLD: rivaroxaban (XARELTO) 24 hours pre-procedure     HOLD: warfarin (COUMADIN) pre-procedure     HYDROmorphone (PF) (DILAUDID) injection 0.3-0.5 mg     lactated ringers infusion     lidocaine (LMX4) kit     lidocaine (LMX4) kit     lidocaine (LMX4) kit     lidocaine BUFFERED 1 % injection 1 mL     lidocaine BUFFERED 1 % injection 1 mL     medication instruction     meperidine (DEMEROL) injection 12.5 mg     naloxone (NARCAN) injection 0.1-0.4 mg     ondansetron (ZOFRAN-ODT) ODT tab 4 mg    Or     ondansetron (ZOFRAN) injection 4 mg     ORAL Pain Medications -  may administer as ordered by surgeon for take home use     oxyCODONE IR (ROXICODONE) tablet 5 mg     prochlorperazine (COMPAZINE) injection 10 mg     sodium chloride (PF) 0.9% PF flush 10-20 mL     sodium chloride (PF) 0.9% PF flush 3 mL     sodium chloride (PF) 0.9% PF flush 3 mL     sodium chloride (PF) 0.9% PF flush 3 mL     sodium chloride (PF) 0.9% PF flush 3 mL     sodium chloride 0.9% infusion      No Known Allergies      Review of Systems   Constitutional: Positive for diaphoresis and fever (elevated temperatures).   Respiratory: Positive for shortness of breath.    All other systems reviewed and are negative.      Physical Exam   BP: 115/41  Pulse: 111  Heart Rate: 102  Temp: 98.8  F (37.1  C)  Resp: 20  Height: 157.5 cm (5' 2\")  Weight: 44.5 kg (98 lb)  SpO2: 95 %      Physical Exam "   Constitutional: No distress.   HENT:   Head: Atraumatic.   Mouth/Throat: Oropharynx is clear and moist. No oropharyngeal exudate.   Eyes: EOM are normal. No scleral icterus.   Neck: Neck supple.   Cardiovascular: Regular rhythm and normal heart sounds.    Tachycardia   Pulmonary/Chest: Breath sounds normal. No respiratory distress.   Abdominal: Soft. She exhibits no distension.   Moderate distention, diffuse mild tenderness   Musculoskeletal: She exhibits no edema or deformity.   Neurological: She is alert.   Skin: Skin is warm and dry. She is not diaphoretic.   Psychiatric: She has a normal mood and affect. Her behavior is normal.       ED Course   7:05 PM  The patient was seen and examined by Rafa Sorensen MD in Room ED21.     ED Course     Procedures             EKG Interpretation:      Interpreted by Rafa Sorensen  Time reviewed: 1915  Symptoms at time of EKG: dyspnea  Rhythm: normal sinus   Rate: 107  Axis: normal  Ectopy: none  Conduction: normal  ST Segments/ T Waves: No ST-T wave changes  Q Waves: none    Clinical Impression: normal EKG    The patient has signs of Severe Sepsis as evidenced by:    1. 2 SIRS criteria, AND  2. Suspected infection, AND   3. Organ dysfunction: Lactic Acid > 1.9    Time severe sepsis diagnosis confirmed = 2029 as this was the time when Lactate resulted, and the level was > 1.9      3 Hour Severe Sepsis Bundle Completion:  1. Initial Lactic Acid Result:   Recent Labs   Lab Test  07/09/18   1945  06/23/18   0526  06/22/18   1828   LACT  2.6*  1.0  1.8     2. Blood Cultures before Antibiotics: Yes  3. Broad Spectrum Antibiotics Administered: Yes     Anti-infectives (Future)    Start     Dose/Rate Route Frequency Ordered Stop    07/10/18 1000  vancomycin (VANCOCIN) 750 mg in sodium chloride 0.9 % 250 mL intermittent infusion      750 mg  over 90 Minutes Intravenous EVERY 12 HOURS 07/09/18 2054 07/09/18 2054  vancomycin (VANCOCIN) 1,250 mg in sodium chloride 0.9 % 250  mL intermittent infusion      1,250 mg  over 90 Minutes Intravenous ONCE 07/09/18 2053          4. Full 30mL/kg bolus not administered due to CHF and acute Pulmonary Edema  Female patients must weigh at least 45.5 kg to calculate ideal body weight              Labs Ordered and Resulted from Time of ED Arrival Up to the Time of Departure from the ED   COMPREHENSIVE METABOLIC PANEL - Abnormal; Notable for the following:        Result Value    Sodium 132 (*)     Potassium 3.1 (*)     Glucose 146 (*)     Creatinine 0.44 (*)     Calcium 7.8 (*)     Albumin 1.5 (*)     All other components within normal limits   CBC WITH PLATELETS DIFFERENTIAL - Abnormal; Notable for the following:     WBC 16.5 (*)     Hemoglobin 10.1 (*)     Hematocrit 31.1 (*)     MCV 74 (*)     MCH 24.1 (*)     RDW 23.4 (*)     Absolute Neutrophil 15.1 (*)     Absolute Lymphocytes 0.4 (*)     All other components within normal limits   LACTIC ACID WHOLE BLOOD - Abnormal; Notable for the following:     Lactic Acid 2.6 (*)     All other components within normal limits   UA MACROSCOPIC WITH REFLEX TO MICRO AND CULTURE - Abnormal; Notable for the following:     Blood Urine Small (*)     RBC Urine 4 (*)     Mucous Urine Present (*)     All other components within normal limits   INR - Abnormal; Notable for the following:     INR 1.25 (*)     All other components within normal limits   TROPONIN I   LIPASE   PARTIAL THROMBOPLASTIN TIME   PERIPHERAL IV CATHETER   PULSE OXIMETRY NURSING   CARDIAC CONTINUOUS MONITORING   NURSING DRAW AND HOLD   NURSING DRAW AND HOLD   NURSING DRAW AND HOLD   ISTAT CG4 GASES LACTATE JOSE ELIAS NURSING POCT   IP ASSIGN PROVIDER TEAM TO TREATMENT TEAM   DIRECT ANTIGLOBULIN TEST   BLOOD CULTURE   BLOOD CULTURE     Results for orders placed or performed during the hospital encounter of 07/09/18   XR Chest 2 Views    Narrative    XR CHEST 2 VW  7/9/2018 7:35 PM      HISTORY: Dyspnea;     COMPARISON: 6/23/2018    FINDINGS: Small bilateral  pleural effusions. Right chest Port-A-Cath  tip projects over the high right atrium. Cardiac silhouette is not  enlarged. No pneumothorax. Bibasilar opacities.      Impression    IMPRESSION:   1. Bibasilar opacities, which may represent atelectasis or pneumonia.  2. Small bilateral pleural effusions.     I have personally reviewed the examination and initial interpretation  and I agree with the findings.    RADHA WIGGINS MD   Comprehensive metabolic panel   Result Value Ref Range    Sodium 132 (L) 133 - 144 mmol/L    Potassium 3.1 (L) 3.4 - 5.3 mmol/L    Chloride 97 94 - 109 mmol/L    Carbon Dioxide 23 20 - 32 mmol/L    Anion Gap 12 3 - 14 mmol/L    Glucose 146 (H) 70 - 99 mg/dL    Urea Nitrogen 9 7 - 30 mg/dL    Creatinine 0.44 (L) 0.52 - 1.04 mg/dL    GFR Estimate >90 >60 mL/min/1.7m2    GFR Estimate If Black >90 >60 mL/min/1.7m2    Calcium 7.8 (L) 8.5 - 10.1 mg/dL    Bilirubin Total 0.7 0.2 - 1.3 mg/dL    Albumin 1.5 (L) 3.4 - 5.0 g/dL    Protein Total 6.9 6.8 - 8.8 g/dL    Alkaline Phosphatase 129 40 - 150 U/L    ALT 39 0 - 50 U/L    AST 24 0 - 45 U/L   CBC with platelets differential   Result Value Ref Range    WBC 16.5 (H) 4.0 - 11.0 10e9/L    RBC Count 4.19 3.8 - 5.2 10e12/L    Hemoglobin 10.1 (L) 11.7 - 15.7 g/dL    Hematocrit 31.1 (L) 35.0 - 47.0 %    MCV 74 (L) 78 - 100 fl    MCH 24.1 (L) 26.5 - 33.0 pg    MCHC 32.5 31.5 - 36.5 g/dL    RDW 23.4 (H) 10.0 - 15.0 %    Platelet Count 391 150 - 450 10e9/L    Diff Method Automated Method     % Neutrophils 91.5 %    % Lymphocytes 2.2 %    % Monocytes 4.8 %    % Eosinophils 0.2 %    % Basophils 0.2 %    % Immature Granulocytes 1.1 %    Nucleated RBCs 0 0 /100    Absolute Neutrophil 15.1 (H) 1.6 - 8.3 10e9/L    Absolute Lymphocytes 0.4 (L) 0.8 - 5.3 10e9/L    Absolute Monocytes 0.8 0.0 - 1.3 10e9/L    Absolute Eosinophils 0.0 0.0 - 0.7 10e9/L    Absolute Basophils 0.0 0.0 - 0.2 10e9/L    Abs Immature Granulocytes 0.2 0 - 0.4 10e9/L    Absolute Nucleated RBC  0.0    Lactic acid whole blood   Result Value Ref Range    Lactic Acid 2.6 (H) 0.7 - 2.0 mmol/L   Troponin I   Result Value Ref Range    Troponin I ES <0.015 0.000 - 0.045 ug/L   UA reflex to Microscopic and Culture   Result Value Ref Range    Color Urine Light Yellow     Appearance Urine Clear     Glucose Urine Negative NEG^Negative mg/dL    Bilirubin Urine Negative NEG^Negative    Ketones Urine Negative NEG^Negative mg/dL    Specific Gravity Urine 1.007 1.003 - 1.035    Blood Urine Small (A) NEG^Negative    pH Urine 7.0 5.0 - 7.0 pH    Protein Albumin Urine Negative NEG^Negative mg/dL    Urobilinogen mg/dL Normal 0.0 - 2.0 mg/dL    Nitrite Urine Negative NEG^Negative    Leukocyte Esterase Urine Negative NEG^Negative    Source Clean catch urine     RBC Urine 4 (H) 0 - 2 /HPF    WBC Urine 1 0 - 5 /HPF    Mucous Urine Present (A) NEG^Negative /LPF   Lipase   Result Value Ref Range    Lipase 98 73 - 393 U/L   INR   Result Value Ref Range    INR 1.25 (H) 0.86 - 1.14   Partial thromboplastin time   Result Value Ref Range    PTT 37 22 - 37 sec   EKG 12 lead   Result Value Ref Range    Interpretation ECG Click View Image link to view waveform and result    Direct antiglobulin test   Result Value Ref Range    EDNA  Broad Spectrum Neg             Assessments & Plan (with Medical Decision Making)   58-year-old female presents from [facility] center with a chief complaint of shortness of breath elevated temperature and tachycardia.  Staff during transfusion noted worsening should shortness of breath and possible fluid overload secondary to transfusion.  Lasix was given which somewhat helped the symptoms however patient was still short of breath and so was transferred to the emergency department for evaluation.  Patient was found to have elevated WBC as well as lactic acid at 2.6.  Chest x-ray was consistent with a possible pneumonia.  Patient was given broad-spectrum antibiotics since she had recently been admitted to the  hospital.  Her care was discussed with the Gyn oncology team who accepted admission.  She remains slightly tachycardic but afebrile in the department with a normal blood pressure.  Due to the concern for fluid overload and transfusion we did not aggressively rehydrated her here.  In regard to possible hemolytic transfusion reaction direct Laurent test was negative.  I have reviewed the nursing notes.    I have reviewed the findings, diagnosis, plan and need for follow up with the patient.    New Prescriptions    No medications on file       Final diagnoses:   Healthcare-associated pneumonia   Blood transfusion reaction, initial encounter     I, Bon Langley, am serving as a trained medical scribe to document services personally performed by Rafa Sorensen MD, based on the provider's statements to me.   I, Rafa Sorensen MD, was physically present and have reviewed and verified the accuracy of this note documented by Bon Langley.    7/9/2018   [facility], [place], EMERGENCY DEPARTMENT     Rafa Sorensen DO  07/09/18 0653

## 2018-07-10 NOTE — PLAN OF CARE
Cared for pt from 2300 to 0045.  Alert/oriented, family member and  at bedside- Omani speaking.   Denies pain, nausea, SOB.  AVSS, HR low 100s.  Voided 1x.  NPO for CT tonight.  Received Vanco, newly placed port with +blood return, saline locked after vanco.  Left unit via WC at 0045, will go to CT and then being transferred to  for tele monitoring.

## 2018-07-10 NOTE — PLAN OF CARE
Problem: Patient Care Overview  Goal: Plan of Care/Patient Progress Review  Outcome: Improving  Transferred from unit 6B. Report received, pt arrived in stable condition with  present. Patient reports abdominal pain improved - declines interventions. Up with assistance of 1. Tolerating regular diet with decreased appetite, intermittent nausea. Plan for thoracentesis at 3pm,  scheduled. Port locked. Family at bedside, supportive with cares.   Continue with POC.

## 2018-07-10 NOTE — PROGRESS NOTES
"Brief Progress Note  7/10/2018 4:59 PM    57 y/o HD#2 admitted with tachypnea and found to have large pleural effusion. Now s/p thoracentesis of 850 ml.     Subjective: Reports some pain from the thoracentesis. Just received a dose of oxycodone. Feels as though her breathing is easier.     Objective:  /63 (BP Location: Left arm)  Pulse 105  Temp 97.1  F (36.2  C) (Oral)  Resp 20  Ht 1.575 m (5' 2\")  Wt 47.3 kg (104 lb 3.2 oz)  SpO2 98%  BMI 19.06 kg/m2    General: Resting in bed.  Resp: Tachypneic, on 2LPM  Abdomen: Distended, non-tender    A/P: Continue to monitor breathing. Would expect her breathing to continue to improve. Pending cytology, and cultures from thoracentesis. Will plan to give chemotherapy tomorrow.     Park Rogel MD  PGY-1  Obstetrics and Gynecology and Women's Health   Pgr: [phone]    Provider Disclosure:   I agree with above History, Review of Systems, Physical exam and Plan. I have reviewed the content of the documentation and have edited it as needed. I have personally performed the services documented here and the documentation accurately represents those services and the decisions I have made.     Electronically signed by:   Bobbi Browne MD   Gynecologic Oncology   [facility]      "

## 2018-07-10 NOTE — PROGRESS NOTES
Gynecology Oncology Progress Note  07/10/18    HD#2 shortness of breath    Disease: Pelvic mass, metastatic clear cell carcinoma      24 hour events:   - admitted to the hospital  - CT PE showed stable bilateral pulmonary emboli, increased size large left pleural effusion, no pneumonia.    Subjective: Patient is feeling ok this morning, still finding it difficult to take a deep breath. No chest pain Some abdominal pain, but tolerable. Has been drinking overnight and wants to order breakfast.  Passing Flatus, Had BM this morning.  Voiding spontaneously.     Objective:   Vitals:    07/10/18 0100 07/10/18 0351 07/10/18 0500 07/10/18 0548   BP: 115/71 140/72  140/74   BP Location:       Pulse: 96      Resp: 22 24 26   Temp: 98.4  F (36.9  C) 98.8  F (37.1  C)  97.8  F (36.6  C)   TempSrc:  Oral  Oral   SpO2:   99% 96%   Weight:       Height:       HR: 100-105    General: alert, appears slightly uncomfortable  CV: Tachycardia, , no m/r/g  Resp: CTAB, faint bibasilar crackles  Abdomen: distension increased since last night, minimally tender,   Extremities: warm, well-perfused, nontender, no edema    Since MN: IN - 250 ml IVF // OUT - 200 ml UOP, 1x unmeasred ml stool    Lines/Drains:   Chest port     New labs/imaging-     7/10/2018 05:06   Sodium 136   Potassium 3.6   Chloride 101   Carbon Dioxide 26   Urea Nitrogen 8   Creatinine 0.49 (L)   GFR Estimate >90   GFR Estimate If Black >90   Calcium 7.8 (L)   Anion Gap 9   Lactic Acid 1.0   Glucose 119 (H)   WBC 13.8 (H)   Hemoglobin 9.9 (L)   Hematocrit 31.6 (L)   Platelet Count 354   RBC Count 4.19   MCV 75 (L)   MCH 23.6 (L)   MCHC 31.3 (L)   RDW 23.2 (H)   Diff Method Automated Method   % Neutrophils 88.9   % Lymphocytes 4.4   % Monocytes 4.4   % Eosinophils 0.3   % Basophils 0.4   % Immature Granulocytes 1.6   Nucleated RBCs 0   Absolute Neutrophil 12.3 (H)   Absolute Lymphocytes 0.6 (L)   Absolute Monocytes 0.6   Absolute Eosinophils 0.0   Absolute Basophils 0.1   Abs  Immature Granulocytes 0.2   Absolute Nucleated RBC 0.0        Maria Esther Wang is a 57 y/o with metastatic clear cell carcinoma and pelvic mass here with shortness of breath, subjective fevers, elevated WBC and lactate after blood transfusion.     Active Problem list:  Metastatic clear cell carcinoma  Pelvic mass  Tachypnea  Bilateral pulmonary emboli  Ascities  Anemia of chronic disease     Plan:    Disease: Pelvic mass, metastatic clear cell carcinoma. Neoadjuvant chemotherapy. C1D1 Carbo/Taxol planned for 7/13/18.  FEN: Regular diet. Hyponatremia. Hypokalemia, improved this morning. Will limit fluids for now due to concern for volume overload with blood transfusion. Strict Is/Os.  Pain: Tylenol, Oxycodone prn.  Heme: - Hgb 6.6>2UpRBC>10.1>9.9. Anemia of chronic disease.   - Direct Laurent test obtained for hemolytic transfusion reaction negative.  - Bilateral pulmonary emboli, CT PE demonstrates stable size. PTA Lovenox ordered  CV: Tachycardia. EKG sinus rhythm. Troponins negative. Suspect pulmonary etiology.   Resp: Tachypnea, likely due to fluid overload,. CXR with small bilateral pleural effusions. CT Chest with large left pleural effusion. Continuous pulse ox, supplemental O2 as needed. Strict Is/Os. Consider Lasix, thoracentesis today.  GI: Regular diet. Scheduled bowel regimen, prn antiemetics.  : Voiding spontaneously, NI  MSK: NI  Neuro/Psych: NI  Endocrine: NI  ID: Tmax 99.9. WBC 16.5>13.8. Lactate 2.6>1.0. S/p Vanc/Zosyn in the ED. CXR with bibasilar opacities, concerning for atelectasis or pneumonia. Chest CT negative for pneumonia.  PPx: SCDs. Therapeutic Lovenox.   Disposition: Admitted to observation pending improvement of tachypnea, WBC, lactate.    # Pain Assessment:  Current Pain Score 7/10/2018   Patient currently in pain? sleeping: patient not able to self report   Pain score (0-10) -   Pain location -   Pain descriptors -   - Maria Esther is experiencing pain due to cancer. Pain  management was discussed with Maria Esther and her sister and the plan was created in a collaborative fashion.  Maria Esther's response to the current recommendations: engaged  - Please see the plan for pain management as documented above    Argelia Brooks MD  OBGYN PGY-2  Pager [phone]      Provider Disclosure:   I agree with above History, Review of Systems, Physical exam and Plan. I have reviewed the content of the documentation and have edited it as needed. I have personally performed the services documented here and the documentation accurately represents those services and the decisions I have made.     Electronically signed by:   Bobbi Browne MD   Gynecologic Oncology   [facility]

## 2018-07-10 NOTE — PHARMACY-VANCOMYCIN DOSING SERVICE
Pharmacy Vancomycin Initial Note  Date of Service 2018  Patient's  1960  58 year old, female    Indication: Healthcare-Associated Pneumonia    Current estimated CrCl = Estimated Creatinine Clearance: 99.1 mL/min (based on Cr of 0.44).    Creatinine for last 3 days  2018: 10:15 AM Creatinine 0.49 mg/dL;  7:45 PM Creatinine 0.44 mg/dL    Recent Vancomycin Level(s) for last 3 days  No results found for requested labs within last 72 hours.      Vancomycin IV Administrations (past 72 hours)      No vancomycin orders with administrations in past 72 hours.                Nephrotoxins and other renal medications (Future)    Start     Dose/Rate Route Frequency Ordered Stop    07/10/18 1000  vancomycin (VANCOCIN) 750 mg in sodium chloride 0.9 % 250 mL intermittent infusion      750 mg  over 90 Minutes Intravenous EVERY 12 HOURS 18  vancomycin (VANCOCIN) 1,250 mg in sodium chloride 0.9 % 250 mL intermittent infusion      1,250 mg  over 90 Minutes Intravenous ONCE 18  piperacillin-tazobactam (ZOSYN) 3.375 g vial to attach to  mL bag      3.375 g  over 30 Minutes Intravenous ONCE 18            Contrast Orders - past 72 hours     None                Plan:  1.  Give vancomycin 1250mg (~28mg/kg) x1 now, then continue vancomycin 750mg IV q12 hours..   2.  Goal Trough Level: 15-20 mg/L   3.  Pharmacy will check trough levels as appropriate in 1-3 Days.    4. Serum creatinine levels will be ordered daily for the first week of therapy and at least twice weekly for subsequent weeks.    5. [place] method utilized to dose vancomycin therapy: Method 2    Gino Pearce, PharmD, BCPS

## 2018-07-10 NOTE — PLAN OF CARE
Problem: Patient Care Overview  Goal: Plan of Care/Patient Progress Review  Outcome: Improving  Pt arrived from 7C before 01:00. Oriented to room. Skin CDI. A/O x 4 via . No questions at this time.

## 2018-07-10 NOTE — PROGRESS NOTES
Transfer  Transferred to:   Via:bed  Reason for transfer:Pt no longer appropriate for 6B- improved patient condition  Family: Aware of transfer  Belongings: Packed and sent with pt  Chart: Delivered with pt to next unit  Medications: Meds sent to new unit with pt  Report given to: Daphne  Pt status: Patient in stable condition. Assessment, plan, and transfer explained to with  at bedside.

## 2018-07-10 NOTE — ED NOTES
Bed: IN02  Expected date:   Expected time:   Means of arrival: Car  Comments:  Naldo Wang  MRN [id number]    hgb was 6.6. Pt received 2 units of blood  Post infusion pts RR increased to 40 from 16 but O2 sats remained between 95%-97%. Gave 10 mg lasix pt urinated x3   Port placed this morning and is flushed with heparin.

## 2018-07-11 NOTE — PROGRESS NOTES
DATE/TIME  (DOT-TD, DOT-NOW) CHEMO CHECK ACTIVITY (REGIMEN & DOSE CHECK, DAY, DOSE #, NAME OF CHEMO #1)  CHEMO DRUG #2  CHEMO DRUG #3 NAME OF RN #1 (USE DOT-ME HERE) NAME OF RN#2 (2ND RN TO LOG IN SEPARATELY)   7/11/2018  11:04 AM   Regimen and dose check, paclitaxel carboplatin  JOSE GUADALUPE Quiroga     7/12/2018  9:01 AM     Regimen and dose check, Paclitaxel Carboplatin  Radha TRINH

## 2018-07-11 NOTE — PLAN OF CARE
Problem: Patient Care Overview  Goal: Plan of Care/Patient Progress Review  Outcome: Improving  OVSS, slightly tachycardic. 5mg Oxycodone x1 for pain. Thoracentesis performed at bedside this afternoon. Fluid sent to lab. Pt reports feeling better, and it's easier to breathe. sats stable on 2L O2. Port heparin locked. Little appetite. Up with SBA. Family at bedside. Pt resting comfortably. Continue POC.

## 2018-07-11 NOTE — PROGRESS NOTES
Gynecology Oncology Progress Note  07/10/18    HD#2 shortness of breath    Disease: Pelvic mass, metastatic clear cell carcinoma      24 hour events:   - underwent left thoracentesis with 850 mL out    Subjective: Patient is feeling ok this morning, still finding it difficult to take a deep breath. No chest pain Some abdominal pain, but tolerable. Has been drinking overnight and wants to order breakfast.  Passing Flatus, Had BM this morning.  Voiding spontaneously.     Objective:   Vitals:    07/10/18 2233 07/11/18 0337 07/11/18 0519 07/11/18 0548   BP: 119/67 130/68 138/71 135/68   BP Location: Left arm Left arm  Left arm   Pulse:   102 103   Resp:  17 18 18   Temp:  97.6  F (36.4  C) 97.7  F (36.5  C) 97  F (36.1  C)   TempSrc:  Oral Oral Oral   SpO2:  98% 98% 97%   Weight:       Height:       On 2L O2  HR: 100-105    General: alert, appears slightly uncomfortable  CV: Tachycardia, , no m/r/g  Resp: CTAB  Abdomen: distetended, minimally tender,   Extremities: warm, well-perfused, nontender, no edema    24 hours: IN - 360 ml PO, 250 ml IVF //  ml  mL thoracentesis  Since MN: IN - 40 ml IVF // OUT - 200 ml UOP,    Lines/Drains:   Chest port     New labs/imaging-     7/11/2018 05:06   WBC 16.4 (H)   Hemoglobin 9.5 (L)   Hematocrit 29.4 (L)   Platelet Count 382   RBC Count 3.95   MCV 74 (L)   MCH 24.1 (L)   MCHC 32.3   RDW 23.7 (H)   Diff Method Automated Method   % Neutrophils 88.3   % Lymphocytes 3.4   % Monocytes 6.3   % Eosinophils 0.6   % Basophils 0.3   % Immature Granulocytes 1.1   Nucleated RBCs 0   Absolute Neutrophil 14.5 (H)   Absolute Lymphocytes 0.6 (L)   Absolute Monocytes 1.0   Absolute Eosinophils 0.1   Absolute Basophils 0.1   Abs Immature Granulocytes 0.2   Absolute Nucleated RBC 0.0   Pleural fluid gram stain negative  Culture pending   Lactate pending    Thedomo Wang is a 57 y/o with metastatic clear cell carcinoma and pelvic mass HD#3 admitted with shortness of breath,  subjective fevers, elevated WBC and lactate after blood transfusion.     Active Problem list:  Metastatic clear cell carcinoma  Pelvic mass  Tachypnea  Bilateral pulmonary emboli  Ascities  Anemia of chronic disease     Plan:    Disease: Pelvic mass, metastatic clear cell carcinoma. Neoadjuvant chemotherapy. C1D1 Carbo/Taxol planned today, will discuss on rounds in light of elevated WBC today.   FEN: Regular diet. Hyponatremia. Hypokalemia, improved this morning. Will limit fluids for now due to concern for volume overload with blood transfusion. Strict Is/Os. Severe malnutrition. Albumin 1.5. Encourage protein supplement intake.  Pain: Tylenol, Oxycodone prn.  Heme: - Hgb 6.6>2UpRBC>10.1>9.9>9.5. Anemia of chronic disease.   - Direct Laurent test obtained for hemolytic transfusion reaction negative.  - Bilateral pulmonary emboli, CT PE demonstrates stable size. PTA Lovenox ordered  CV: Tachycardia, improved.  Resp: Tachypnea improved after thoracentesis yesterday. Still requiring Strict Is/Os.  GI: Regular diet. Scheduled bowel regimen, prn antiemetics.  : Voiding spontaneously, NI  MSK: NI  Neuro/Psych: NI  Endocrine: NI  ID: Tmax 99.9. WBC 16.5>13.8>16.4. Lactate 2.6>1.0. S/p Vanc/Zosyn in the ED. CXR with bibasilar opacities, concerning for atelectasis or pneumonia. Chest CT negative for pneumonia.  PPx: SCDs. Therapeutic Lovenox.   Disposition: Discharge today after chemotherapy.    # Pain Assessment:  Current Pain Score 7/11/2018   Patient currently in pain? sleeping: patient not able to self report   Pain score (0-10) -   Pain location -   Pain descriptors -   - Maria Esther is experiencing pain due to cancer. Pain management was discussed with Maria Esther and her sister and the plan was created in a collaborative fashion.  Maria Esther's response to the current recommendations: engaged  - Please see the plan for pain management as documented above    Argelia Brooks MD  OBGYN PGY-2  Pager  [phone]      Provider Disclosure:   I agree with above History, Review of Systems, Physical exam and Plan. I have reviewed the content of the documentation and have edited it as needed. I have personally performed the services documented here and the documentation accurately represents those services and the decisions I have made.     Electronically signed by:   Bobbi Browne MD   Gynecologic Oncology   [facility]

## 2018-07-11 NOTE — PROGRESS NOTES
Gynecology Oncology Interim Progress Note     Over the course of the afternoon, the patient has been noted to become more tachycardic (to 120s/min) and tachypnic (to RR 35/min). Her abdominal ascites was also noted to increase from prior exams, she had decreased left lower lung sounds, and she was noted to have +2 LE edema. She was evaluated with an ECG which revealed sinus tachycardia and a chest x-ray which revealed interval increase in left lower lobe opacity. Fluid is third spacing into her abdomen and thoracic cavity as a result of her ovarian cancer. Therefore she was given Lasix 10mg IV x1, with good diuresis (total output of 400mL over 1 hour). She reported subjective improvement in tachypnea. On exam she appeared more comfortable, HR improved to 110, and RR improved to 20s.    Continue to monitor clinical status, consider additional dose of Lasix 10mg at 2100 if she appears symptomatic. Due to her tachycardia and tachypnea, will hold on chemotherapy until 7/12 and if she remains clinically stable.      Vitals:    07/11/18 1434 07/11/18 1436 07/11/18 1535 07/11/18 1541   BP: 135/60      BP Location: Left arm      Pulse: 108      Resp: (!) 32 (!) 35     Temp: 97.2  F (36.2  C)      TempSrc: Oral      SpO2: 99% 98% (!) 89% 95%   Weight:       Height:         I/O last 3 completed shifts:  In: 570 [P.O.:530; I.V.:40]  Out: 1025 [Urine:1025]      Jocy Dasilva MD   Resident Physician, PGY3  Obstetrics, Gynecology, and Women's Health

## 2018-07-11 NOTE — PLAN OF CARE
Problem: Patient Care Overview  Goal: Plan of Care/Patient Progress Review  Outcome: No Change  Tachycardic 100s, on 1-2L nasal cannula. Triggered SIRS protocol, lactic acid 1.0. Thoracentesis site covered with bandage, CDI. Lung sounds with fine crackles. Oxycodone given for abdominal pain. On a regular diet, denied nausea. Up with SBA. Voiding spontaneously. Chest port heparin locked. Sanjiv speaking,  scheduled for 0800 today. Continue with plan of care - monitor VS, respiratory status, plan for chemotherapy today. MD ordered a walking O2 sats.

## 2018-07-11 NOTE — PHARMACY-ADMISSION MEDICATION HISTORY
Admission medication history interview status for the 7/9/2018 admission is complete. See Epic admission navigator for allergy information, pharmacy, prior to admission medications and immunization status.     Medication history interview sources:  Patient, Patient's Aunt, [facility]    Changes made to PTA medication list (reason)  Added: None  Deleted:   -azithromycin - therapy complete  -amoxicillin - therapy complete  -omeprazole - not taking   Changed:   -senna-docusate: take 1 tab PO BID --> take 1 tab PO BID PRN    Additional medication history information (including reliability of information, actions taken by pharmacist):  -Patient is a poor historian who knew medication classes and some medication names. Patient reports having medication for nausea (exact names unknown but likely lorazepam and prochlorperazine per [facility]), for bowel movements (likely senna-docusate per [facility]), for pain (likely oxycodone per [facility]), and a twice daily injection (likely enoxaparin per [facility]). Patient denies other vitamins/supplements and reports she received all prescription medications from the [place] pharmacy.    Prior to Admission medications    Medication Sig Last Dose Taking? Auth Provider   enoxaparin (LOVENOX) 60 MG/0.6ML injection Inject 0.47 mLs (47 mg) Subcutaneous every 12 hours 7/9/2018 at AM Yes Geo Fernandez MD   LORazepam (ATIVAN) 1 MG tablet Take 1 tablet (1 mg) by mouth every 6 hours as needed (nausea/vomiting, anxiety or sleep)  at Unknown Time Yes Geo Fernandez MD   oxyCODONE IR (ROXICODONE) 5 MG tablet Take 1 tablet (5 mg) by mouth every 4 hours as needed for pain 7/9/2018 at Unknown Time Yes Park Rogel MD   prochlorperazine (COMPAZINE) 10 MG tablet Take 1 tablet (10 mg) by mouth every 6 hours as needed (nausea/vomiting)  at Unknown Time Yes Geo Fernandez MD   senna-docusate (SENOKOT-S;PERICOLACE) 8.6-50 MG per tablet Take 1 tablet by mouth 2  times daily as needed  Past Month at Unknown time Yes Geo Fernandez MD     Medication history completed by: Galina Lew, PharmD IV Student

## 2018-07-12 NOTE — PLAN OF CARE
Problem: Patient Care Overview  Goal: Plan of Care/Patient Progress Review  Outcome: No Change  1900-0730:   HR , AOVSS on 1L NC. MD MARLENE aware, continuing to monitor closely and pulse ox on continuous. Pt c/o pain in mid, right abdomen but declines interventions this shift. Denies nausea. Regular diet, encouraging supplements/boosts, pt cachexic. Voids spont in large amts (had lasix earlier). Thoracentesis site CDI. Port hep locked. Up with SBA. Reports occasional flatus, no BM. Family at bedside and supportive.  ordered for 7/12 am, pt makes needs known & calls appropriately. Planning to have chemo 7/12 if pt status continues to improve. Cont POC.

## 2018-07-12 NOTE — PROGRESS NOTES
Brief Progress Note  7/12/2018 5:07 PM    Patient was listed as DNR/DNI at outside hospital. Discussed code status.   was present for conversation    Patient would like to be full code now. She remembers discussing code status at the outside hospital and wanting to be DNR/DNI at that time but now she feels much better and would like any possible medical interventions in the event that her heart stops or she stops breathing.    Park Rogel MD  PGY-1  Obstetrics and Gynecology and Women's Health   Pgr: [phone]

## 2018-07-12 NOTE — PROGRESS NOTES
Brief Progress Note    Called to see patient by RN for dyspnea on exertion and audible wheezing when going to the bathroom. SpO2 96-99% on 1L NC when back in bed.    S: Patient still feeling intermittently short of breath. Says that it has been happening at home. Voiding well tonight.    O:   Patient Vitals for the past 24 hrs:   BP Temp Temp src Pulse Heart Rate Resp SpO2 Weight   07/12/18 0033 122/64 95.6  F (35.3  C) Oral - 103 22 97 % -   07/11/18 2130 - - - - - - 96 % -   07/11/18 2116 123/55 97.1  F (36.2  C) Oral 105 - 20 97 % -   07/11/18 1541 - - - - - - 95 % -   07/11/18 1535 - - - - - - (!) 89 % -   07/11/18 1436 - - - - 120 (!) 35 98 % -   07/11/18 1434 135/60 97.2  F (36.2  C) Oral 108 - (!) 32 99 % -   07/11/18 1248 - - - 118 - - 98 % -   07/11/18 1246 - - - - - - (!) 86 % -   07/11/18 1133 138/41 98.9  F (37.2  C) Oral 114 - 18 96 % -   07/11/18 1048 - - - - - - - 46.9 kg (103 lb 4.8 oz)   07/11/18 0650 129/59 96.8  F (36  C) Oral 108 - 18 93 % -   07/11/18 0548 135/68 97  F (36.1  C) Oral 103 - 18 97 % -   07/11/18 0519 138/71 97.7  F (36.5  C) Oral 102 - 18 98 % -   07/11/18 0337 130/68 97.6  F (36.4  C) Oral - 104 17 98 % -     General: Appears tired, cachetic  Resp: bibasilar crackles, no wheezing  Abd: taut and distended, appears stable from yesterday morning's exam      Intake/Output Summary (Last 24 hours) at 07/12/18 0053  Last data filed at 07/12/18 0020   Gross per 24 hour   Intake              820 ml   Output             2325 ml   Net            -1505 ml       A/P:  - will hold off on repeat lasix for now as she is making very robust UOP, especially for her size  - VS are improved, will continue to monitor closely    Argelia Brooks MD  PGY-2 Department of Obstetrics, Gynecology, and Women's Health  07/12/18

## 2018-07-12 NOTE — PROVIDER NOTIFICATION
Lab called for critical value of lactic acid 3.1.    Called #[id number] and spoke with gyn-onc resident.    Orders: no orders received from the team. Provider at the bedside currently assessing pt's status.

## 2018-07-12 NOTE — PROVIDER NOTIFICATION
Paged gyn/onc resident Argelia Brooks re: c/o ROSARIO when pt getting out and back into bed when going to the bathroom; audible wheezing. Pt & pt's family state that this is normal for patient at home as well.   Satting 96-99% on 1L O2 once back into bed. -110, AOVSS. Pulse ox on continuous. Denies chest pain or other s/s.     MD came to assess pt; discussed that patient is voiding large amts and although it sounds like more fluid is in pt's lungs at this time, will re-assess throughout shift; if pt's status worsens, to notify MD immediately.

## 2018-07-12 NOTE — PROGRESS NOTES
[facility], [place]    Sepsis Evaluation Progress Note    Date of Service: 07/12/2018    I was called to see Maria Esther Wang due to abnormal vital signs triggering the Sepsis SIRS screening alert. She is not known to have an infection.     Physical Exam    Vital Signs:  Temp: 96.2  F (35.7  C) Temp src: Oral BP: 133/70 Pulse: 99 Heart Rate: 104 Resp: 27 SpO2: 95 % O2 Device: Nasal cannula Oxygen Delivery: 2 LPM    Lab:  Lactic Acid   Date Value Ref Range Status   07/10/2018 1.0 0.7 - 2.0 mmol/L Final     Lactate for Sepsis Protocol   Date Value Ref Range Status   07/12/2018 3.1 (HH) 0.4 - 1.9 mmol/L Final     Comment:     Critical Value called to and read back by  CORINNE SANTANARN 7C, 7/12/18 12:41 MS2         The patient is at baseline mental status.    The rest of their physical exam is significant for abdominal distention, 2+ edema bilaterally, tachycardia, tachypnea.     Assessment and Plan    The SIRS and exam findings are likely due to volume overload, there is no sign of sepsis at this time.    Disposition: The patient has an underlying condition of volume overload 2/2 advanced cancer with pleural effusion that will continue to affect their vital signs and should not have further labs drawn to assess sepsis unless their clinical appearance changes.  We will draw lactate and CBC with diff and continue to monitor closely for signs of sepsis. No indication for antibiotics at this point.     Park Rogel MD

## 2018-07-12 NOTE — PLAN OF CARE
"Problem: Patient Care Overview  Goal: Plan of Care/Patient Progress Review  Outcome: Therapy, progress towards functional goals is fair  Up with SBA of 1, to the BR. Voiding with adequate urine volume. Pt denies pain, O2 sats above 92% at RA. But during the start of chemo, pt sleepy will need a couple of O2 to keep her sats above 92%. Pt denies nausea, vomiting and significant SOB. Still tachy and tachypneic, otherwise the rest of the VSS, afebrile. Tolerating regular diet, fair appetite. Fluids encouraged, IS encouraged to use. Port a cath with good blood return. Chemotherapy Paclitaxel and will be followed by carbo given. Pre-medication given per treatment plan. Pt is tolerating thus far the chemotherapy. SIRS protocol triggered with lactic acid of 3.1 (see other notes).  scheduled at 1600H this afternoon per team request. Family at bedside and attentive to pt's cares and need. PLAN: Chemotherapy, monitor pt after this afternoon for any reaction, possible discharge later tonight if stable.     ADDENDUM: Carboplatin completed at 1500H, no signs and symptoms of reaction. Last sets of VS as follows: Blood pressure 129/65, pulse 97, temperature 97  F (36.1  C), temperature source Oral, resp. rate 28, height 1.6 m (5' 3\"), weight 46.7 kg (103 lb), SpO2 96 %.      "

## 2018-07-12 NOTE — PROGRESS NOTES
Gynecology Oncology Progress Note  07/12/18    HD#4 shortness of breath    Disease: Pelvic mass, metastatic clear cell carcinoma    24 hour events:   - Worsened tachypnea, tachycardia.   - CXR showed worsened LLL opacity, EKG sinus tachycarcrdia  - Lasix 10 mg IV x 1 given with good diuresis   - Heparin 10A 0.31>increased Lovenox to 60 mg BID     Subjective: Feeling better. Breathing improved. Passing gas and voiding. Denies pain. Ate well yesterday. Voiding spontaneously.    Objective:   Vitals:    07/12/18 0033 07/12/18 0324 07/12/18 0455 07/12/18 0752   BP: 122/64 116/63  129/51   BP Location: Left arm Left arm  Left arm   Pulse:    95   Resp: 22 20 18   Temp: 95.6  F (35.3  C) 97.7  F (36.5  C)  96.9  F (36.1  C)   TempSrc: Oral Oral  Oral   SpO2: 97% 97% 97% 99%   Weight:       Height:       On 2L O2  HR:     General: alert, appears slightly uncomfortable  CV: Tachycardia , no m/r/g  Resp: bibasilar crackles  Abdomen: distetended, minimally tender,   Extremities: warm, well-perfused, nontender, 1+ edema    24 hours: IN -780 ml PO, 40 ml IVF // OUT 1925 ml  Since MN: IN - 240 ml IVF // OUT - 800 ml UOP    Lines/Drains:   Chest port     New labs/imaging-  Heparin 10a 0.31    CXR  Single portable upright view of chest.  Right IJ Port-A-Cath tip projects over the high right atrium.  Increased opacity located in the left lower lobe. No radiographic evidence of pneumothorax bilaterally. Unchanged elevation of the right hemidiaphragm. Cardiac silhouette and pulmonary vessels are distinct. Unchanged right paratracheal haziness. Prominent aortic knob with calcifications. The osseous thorax and visualized abdomen is unremarkable.     EKG sinus tachycardia    Thoracentesis culture pending     Assessment: Maria Esther Wang is a 59 y/o with metastatic clear cell carcinoma and pelvic mass HD#4 admitted with shortness of breath, subjective fevers, elevated WBC and lactate after blood transfusion.     Active Problem  list:  Metastatic clear cell carcinoma  Pelvic mass  Tachypnea  Bilateral pulmonary emboli  Ascities  Anemia of chronic disease     Plan:    Disease: Pelvic mass, metastatic clear cell carcinoma. Neoadjuvant chemotherapy. C1D1 Carbo/Taxol planned today.  FEN: Regular diet. Hyponatremia. Hypokalemia, improved this morning. Will limit fluids for now due to concern for volume overload with blood transfusion. Strict Is/Os. Severe malnutrition. Albumin 1.5. Encourage protein supplement intake.  Pain: Tylenol, Oxycodone prn.  Heme: - Hgb 6.6>2UpRBC>10.1>9.9>9.5. Anemia of chronic disease.   - Direct Laurent test obtained for hemolytic transfusion reaction negative.  - Bilateral pulmonary emboli, CT PE demonstrates stable size. PTA Lovenox ordered. Heparain 10a subtherapeutic>Lovenox increased to 60 mg BID.  CV: Tachycardia, improved.  Resp: Tachypnea improving. Still requiring O2. Continue Strict Is/Os.  GI: Ascites. Regular diet. Scheduled bowel regimen, prn antiemetics.  : Voiding spontaneously, NI  MSK: NI  Neuro/Psych: NI  Endocrine: NI  ID: Tmax 99.9. WBC 16.5>13.8>16.4. Lactate 2.6>1.0. S/p Vanc/Zosyn in the ED. CXR with bibasilar opacities, concerning for atelectasis or pneumonia. Chest CT negative for pneumonia. Repeat CXR with worsening LLL pneumonia, symptoms improved with Lasix  PPx: SCDs. Therapeutic Lovenox.   Disposition: Possible discharge today vs tomorrow pending symptoms after chemo.    # Pain Assessment:  Current Pain Score 7/11/2018   Patient currently in pain? yes   Pain score (0-10) -   Pain location Abdomen   Pain descriptors Discomfort   - Maria Esther is experiencing pain due to cancer. Pain management was discussed with Maria Esther and her sister and the plan was created in a collaborative fashion.  Maria Esther's response to the current recommendations: engaged  - Please see the plan for pain management as documented above    Argelia Brooks MD  OBGYN PGY-2  Pager [phone]    Provider Disclosure:    I agree with above History, Review of Systems, Physical exam and Plan. I have reviewed the content of the documentation and have edited it as needed. I have seen and personally performed the services documented here and the documentation accurately represents those services and the decisions I have made.     Electronically signed by:   Geo Fernandez MD   Gynecologic Oncology   [facility]

## 2018-07-12 NOTE — PLAN OF CARE
Problem: Patient Care Overview  Goal: Plan of Care/Patient Progress Review  Writer cared for pt from [id number]. AVSS in morning, on 1LPM NC in mid-90s. Chemo checked, pre-meds given, pt's RR increased into 30s and HR into 120s. MDs paged. EKG ordered. Chest xray ordered and lasix given. Since lasix, RR now in 20s and HR maintaining at 108 (closer to baseline). Per MDs: hold chemo for today, possibly give it tomorrow depending on pt status. Pt sleeping between cares. Voiding spontaneously, good amounts. Denies pain or nausea. Tolerating regular diet, eating food from home. Pt had paracentesis yesterday, site c/d/i. Port HL. Pt up with SBA to bathroom. No BM, no flatus, pt received senna this morning. Family at bedside and supportive. Continue POC.

## 2018-07-12 NOTE — PROGRESS NOTES
Brief Gyn Onc Progress Note    Late entry due to ongoing patient care. Patient seen at ~2100.    Patient feeling well, breathing has improved. Eating a meal from home and plans to drink Boost after. She is glad she feels better but is worried regarding how she will fill tomorrow.    Patient Vitals for the past 24 hrs:   BP Temp Temp src Pulse Heart Rate Resp SpO2 Weight   07/11/18 2130 - - - - - - 96 % -   07/11/18 2116 123/55 97.1  F (36.2  C) Oral 105 - 20 97 % -   07/11/18 1541 - - - - - - 95 % -   07/11/18 1535 - - - - - - (!) 89 % -   07/11/18 1436 - - - - 120 (!) 35 98 % -   07/11/18 1434 135/60 97.2  F (36.2  C) Oral 108 - (!) 32 99 % -   07/11/18 1248 - - - 118 - - 98 % -   07/11/18 1246 - - - - - - (!) 86 % -   07/11/18 1133 138/41 98.9  F (37.2  C) Oral 114 - 18 96 % -   07/11/18 1048 - - - - - - - 46.9 kg (103 lb 4.8 oz)   07/11/18 0650 129/59 96.8  F (36  C) Oral 108 - 18 93 % -   07/11/18 0548 135/68 97  F (36.1  C) Oral 103 - 18 97 % -   07/11/18 0519 138/71 97.7  F (36.5  C) Oral 102 - 18 98 % -   07/11/18 0337 130/68 97.6  F (36.4  C) Oral - 104 17 98 % -   General: comfortable, sitting up eating    A/P:  -We will not give a repeat dose of Lasix at this time as she is asymptomatic  -Planned chemotherapy tomorrow.  Discussed with patient that we are hoping for a good window where symptoms and vitals are stable to give chemo which will ultimately help with all of her symptoms.    Argelia Brooks MD  PGY-2 Department of Obstetrics, Gynecology, and Women's Health  07/11/18

## 2018-07-13 NOTE — PROGRESS NOTES
Gynecology Oncology Progress Note  07/13/18    HD#5 shortness of breath    Disease: Pelvic mass, metastatic clear cell carcinoma    24 hour events:   - Received C1 of Carbo/Taxol, tolerated well  - Triggered SIRS protocol, attributed to volume overload; no indication for abx  - Established code status as Full Code yesterday    Subjective: Feeling better. Breathing improved. Passing gas and voiding. Denies pain other than belly discomfort; stable. Ate well yesterday. Voiding spontaneously. Wants to go home today.    Objective:   Vitals:    07/12/18 1422 07/12/18 1439 07/12/18 1456 07/12/18 2241   BP: 129/71 139/74 129/65 123/65   BP Location:    Right arm   Pulse: 100 100 97 102   Resp: 28 28 28 26   Temp: 98.8  F (37.1  C) 97.4  F (36.3  C) 97  F (36.1  C) 98.6  F (37  C)   TempSrc: Oral  Oral Oral   SpO2: 95% 95% 96% 95%   Weight:       Height:       On RA  HR: 95 -107    General: alert, appears slightly uncomfortable  CV: Tachycardia , no m/r/g  Resp: bibasilar crackles  Abdomen: distetended, minimally tender, no rebound or guarding  Extremities: warm, well-perfused, nontender, 1+ edema    24 hours: IN - 597 ml PO, 1234 ml IVF // OUT 1250 ml  Since MN: IN - 20 ml IVF // OUT - 600 ml UOP    Lines/Drains:   Chest port     New labs/imaging-  CBC RESULTS:   Recent Labs   Lab Test  07/13/18   0438   WBC  16.6*   RBC  3.86   HGB  9.1*   HCT  29.1*   MCV  75*   MCH  23.6*   MCHC  31.3*   RDW  24.4*   PLT  439   Lactate 1.2    Thoracentesis culture NTD, cytology showed malignant effusion c/w adenocarcinoma    Assessment: Maria Esther Wang is a 57 y/o with metastatic clear cell carcinoma and pelvic mass HD#5 admitted with shortness of breath, subjective fevers, elevated WBC and lactate after blood transfusion. Now improved. Tolerated chemotherapy well.     Active Problem list:  Metastatic clear cell carcinoma  Pelvic mass  Tachypnea  Bilateral pulmonary emboli  Ascities  Anemia of chronic disease     Plan:    Disease:  Pelvic mass, metastatic clear cell carcinoma. Neoadjuvant chemotherapy. Received chemo yesterday, now C1D2 Carbo/Taxol planned today.  FEN: Regular diet. Hyponatremia, hypokalemia, resolved. Not on IV fluids for now due to concern for volume overload. Strict Is/Os. Severe malnutrition. Albumin 1.5. Encourage protein supplement intake.  Pain: Tylenol, Oxycodone prn.  Heme: - Hgb 6.6>2UpRBC>10.1>9.9>9.5>9.1. Anemia of chronic disease.   - Direct Laurent test obtained for hemolytic transfusion reaction negative.  - Bilateral pulmonary emboli, CT PE demonstrates stable size. Lovenox dose increased yesterday. Will need Heparin 10A checked on 7/14 or later.   CV: Tachycardia, improved.  Resp: Tachypnea improving. Still requiring O2. Continue Strict Is/Os.  GI: Ascites. Regular diet. Scheduled bowel regimen, prn antiemetics.  : Voiding spontaneously, NI  MSK: NI  Neuro/Psych: NI  Endocrine: NI  ID: Tmax 99.9. WBC 16.5>13.8>>18.9>16.6. Lactate 2.6>1.0>3.1>3.0>3.1. S/p Vanc/Zosyn in the ED. CXR with bibasilar opacities, concerning for atelectasis or pneumonia. Chest CT negative for pneumonia. Repeat CXR with worsening LLL pneumonia, symptoms improved with Lasix. Afebrile, suspect tumor burden, PEs as source of elevated WBC and lactate.  PPx: SCDs. Therapeutic Lovenox. Schedule Heparin 10A level check Monday at paracentesis appointment. Lovenox teaching prior to discharge.   Disposition: Discharge today.    # Pain Assessment:  Current Pain Score 7/13/2018   Patient currently in pain? sleeping: patient not able to self report   Pain score (0-10) -   Pain location -   Pain descriptors -   - Maria Esther is experiencing pain due to cancer. Pain management was discussed with Maria Esther and her sister and the plan was created in a collaborative fashion.  Maria Esther's response to the current recommendations: engaged  - Please see the plan for pain management as documented above    Ryanne Miranda, MS4, documenting for, and in the  presence of, Dr. Argelia Brooks, PGY-2  July 13, 2018    I was present with the medical student who participated in the service and in the documentation of this note.  I have verified the history and personally performed the physical exam and medical decision making, and have verified the content of the note, which accurately reflect my assessment of the patient and the plan of care.    Argelia Brooks MD  OBGYN PGY-2  Pager [phone]      Provider Disclosure:   I agree with above History, Review of Systems, Physical exam and Plan. I have reviewed the content of the documentation and have edited it as needed. I have seen and personally performed the services documented here and the documentation accurately represents those services and the decisions I have made.     Electronically signed by:   Geo Fernandez MD   Gynecologic Oncology   [facility]

## 2018-07-13 NOTE — PLAN OF CARE
Problem: Patient Care Overview  Goal: Plan of Care/Patient Progress Review  Outcome: No Change    Patient slept most the night, denies pain, no nausea. Pt received chemotherapy yesteday and tolerated well. Lactic acid 3.0 MD notified and will redraw at 05 am. Patient is voiding and ambulating without difficulty, Port in place, call light in reach. Plan is to discharge home today.

## 2018-07-13 NOTE — PLAN OF CARE
Problem: Patient Care Overview  Goal: Plan of Care/Patient Progress Review  Outcome: Adequate for Discharge Date Met: 07/13/18  AVSS. Pt denies n/v. States lower back pain, administered PRN oxycodone and heat packs. Provided reinforcement teaching with medication management and lovenox injections. Discussed discharge instructions and intended plan of care, pt was receptive and agreed. Deaccessed and flushed port with heparin. Transport arrived with wheelchair, took pt and her sister to main entrance to discharge home via car.

## 2018-07-13 NOTE — PLAN OF CARE
Problem: Patient Care Overview  Goal: Plan of Care/Patient Progress Review  Outcome: No Change    Patient denies pain, no nausea. Pt received chemotherapy on previous shift and tolerated well. Lactic acid 3.0 MD notified and will redraw at 05 am. Patient is voiding and ambulating without difficulty, Port in place, call light in reach. Plan is to discharge home tomorrow.

## 2018-07-16 NOTE — PATIENT INSTRUCTIONS
Discharge Instructions for Paracentesis  Paracentesis is a procedure to remove extra fluid from your belly (abdomen). This fluid buildup in the abdomen is called ascites. The procedure may have been done to take a sample of the fluid. Or, it may have been done to drain the extra fluid from your abdomen and help make you more comfortable.     Ascites is buildup of excess fluid in the abdomen.   Home care    If you have pain after the procedure, your healthcare provider can prescribe or recommend pain medicines. Take these exactly as directed. If you stopped taking other medicines before the procedure, ask your provider when you can start them again.    Take it easy for 24 hours after the procedure. Avoid physical activity until your provider says it s OK.    You will have a small bandage over the puncture site. Stitches (sutures), surgical staples, adhesive tapes, adhesive strips, or surgical glue may be used to close the incision. They also help stop bleeding and speed healing. You may take the bandage off in 24 hours.    Check the puncture site for the signs of infection listed below.  Follow-up care  Make a follow-up appointment with your healthcare provider as directed. During your follow-up visit, your provider will check your healing. Let your provider know how you are feeling. You can also discuss the cause of your ascites and if you need any further treatment.  When to seek medical advice  Call your healthcare provider if you have any of the following after the procedure:    A fever of 100.4 F (38.0 C) or higher    Trouble breathing    Pain that doesn't go away even after taking pain medicine    Belly pain not caused by having the skin punctured    Bleeding from the puncture site    More than a small amount of fluid leaking from the puncture site    Swollen belly    Signs of infection at the puncture site. These include increased pain, redness, or swelling, warmth, or bad-smelling drainage.    Blood in your  urine    Feeling dizzy or lightheaded, or fainting   Date Last Reviewed: 7/1/2016 2000-2017 [employer] [address] [facility], [place] [address]. All rights reserved. This information is not intended as a substitute for professional medical care. Always follow your healthcare professional's instructions.

## 2018-07-16 NOTE — PROGRESS NOTES
Paracentesis Nursing Note  Maria Esther Wang presents [facility] for [facility].    During today's appointment orders from Geo Fernandez were completed.    Progress Note:  Patient identification verified by name and date of birth.  Assessment completed.  Vitals monitored throughout appointment and recorded in Doc Flowsheets.  See proceduralist note in ultrasound.    Date of consent or authorization: 6/28.  Invasive Procedure Safety Checklist was completed and sent for scanning.     Paracentesis performed by Teo Farley PA-C Radiology.    The following labs were communicated to provider performing paracentesis:  Lab Results   Component Value Date     07/13/2018       Total amount of ascites fluid drained: 1.6 liters.  Color of ascites fluid: mack and maroon.  Total amount of albumin given: 25  grams.    Patient tolerated procedure well.    Post procedure,denies pain or discomfort post paracentesis.      Discharge Plan:  Discharge instructions were reviewed with patient.  Patient/Representative verbalized understanding and all questions were answered.   Discharged from [facility] in stable condition.    Glenna Ralph RN       Administrations This Visit     albumin human 25 % injection 12.5 g     Admin Date Action Dose Route Administered By             07/16/2018 New Bag 25 g Intravenous Glenna Ralph RN                    lidocaine 1 % 20 mL     Admin Date Action Dose Route Administered By             07/16/2018 Given by Other Clinician 20 mL Other Glenna Ralph RN                            /59  Pulse 116  Temp 98.5  F (36.9  C)  Wt 47.3 kg (104 lb 3.2 oz)  SpO2 96%  BMI 18.46 kg/m2

## 2018-07-16 NOTE — MR AVS SNAPSHOT
After Visit Summary   7/16/2018    Maria Esther Wang    MRN: [id number]           Patient Information     Date Of Birth          1960        Visit Information        Provider Department      7/16/2018 7:30 AM Provider, León [facility] Inf Para; [place] [employer];  40 ATC [facility]        Today's Diagnoses     Malignant ascites    -  1      Care Instructions      Discharge Instructions for Paracentesis  Paracentesis is a procedure to remove extra fluid from your belly (abdomen). This fluid buildup in the abdomen is called ascites. The procedure may have been done to take a sample of the fluid. Or, it may have been done to drain the extra fluid from your abdomen and help make you more comfortable.     Ascites is buildup of excess fluid in the abdomen.   Home care    If you have pain after the procedure, your healthcare provider can prescribe or recommend pain medicines. Take these exactly as directed. If you stopped taking other medicines before the procedure, ask your provider when you can start them again.    Take it easy for 24 hours after the procedure. Avoid physical activity until your provider says it s OK.    You will have a small bandage over the puncture site. Stitches (sutures), surgical staples, adhesive tapes, adhesive strips, or surgical glue may be used to close the incision. They also help stop bleeding and speed healing. You may take the bandage off in 24 hours.    Check the puncture site for the signs of infection listed below.  Follow-up care  Make a follow-up appointment with your healthcare provider as directed. During your follow-up visit, your provider will check your healing. Let your provider know how you are feeling. You can also discuss the cause of your ascites and if you need any further treatment.  When to seek medical advice  Call your healthcare provider if you have any of the following after the  procedure:    A fever of 100.4 F (38.0 C) or higher    Trouble breathing    Pain that doesn't go away even after taking pain medicine    Belly pain not caused by having the skin punctured    Bleeding from the puncture site    More than a small amount of fluid leaking from the puncture site    Swollen belly    Signs of infection at the puncture site. These include increased pain, redness, or swelling, warmth, or bad-smelling drainage.    Blood in your urine    Feeling dizzy or lightheaded, or fainting   Date Last Reviewed: 7/1/2016 2000-2017 [employer]. [address] [place] [address]. All rights reserved. This information is not intended as a substitute for professional medical care. Always follow your healthcare professional's instructions.                Follow-ups after your visit        Your next 10 appointments already scheduled     Jul 25, 2018  7:30 AM CDT   Paracentesis Visit with  Spec Inf Para Provider, VASU 40 ATC   [facility] [place] [facility] ([facility])    [address] [place] [address]  [address]  [facility] [address] [phone]            Aug 02, 2018  8:00 AM CDT   (Arrive by 7:45 AM)   New Patient Visit with Neda Marx MD   [facility] ([facility])    [address] [place] [address]  [address]  [facility] [address]   [phone]            Aug 03, 2018  7:15 AM CDT   [facility] Lab Draw with [facility] LAB DRAW   [facility] Lab Draw ([facility])    [address] [place] [address]  [address]  [facility] [address]   [phone]            Aug 03, 2018  7:40 AM CDT   (Arrive by 7:25 AM)   Return Active Treatment with KATHRYN Stiles CNP   [facility] ([facility])    [address] [place] [address]  [address]  [facility] [address]   [phone]            Aug 03, 2018  9:00 AM CDT   Infusion 360 with  UC ONCOLOGY INFUSION, UC 26 ATC   [facility] ([facility])    [address]  [address]  [facility] [address] [phone]            Aug 24, 2018  7:45 AM CDT   [facility] Lab Draw with UC MASONIC LAB DRAW   [facility] Draw ([facility])    [address]  [address]  [facility] [id number] [phone]            Aug 24, 2018  8:20 AM CDT   (Arrive by 8:05 AM)   Return Active Treatment with KATHRYN Stiles CNP   [facility] ([facility])    [address]  [address]  [facility] [id number] [phone]            Aug 24, 2018  9:00 AM CDT   Infusion 360 with UC ONCOLOGY INFUSION   [facility] ([facility])    [address]  [address]  [facility] [id number] [phone]              Who to contact     If you have questions or need follow up information about today's clinic visit or your schedule please contact [facility] directly at [phone].  Normal or non-critical lab and imaging results will be communicated to you by MyChart, letter or phone within 4 business days after the clinic has received the results. If you do not hear from us within 7 days, please contact the clinic through MyChart or phone. If you have a critical or abnormal lab result, we will notify you by phone as soon as possible.  Submit refill requests through mLED or call your pharmacy and they will forward the refill request to us. Please allow 3 business days for your refill to be completed.          Additional Information About Your Visit        Care EveryWhere ID     This is your Care EveryWhere ID. This could be used by other organizations to access your [facility] medical records  [id number]        Your Vitals Were     Pulse Temperature Pulse Oximetry BMI (Body Mass Index)           110 98.5  F (36.9  C) 96% 17.79 kg/m2         Blood Pressure from Last 3 Encounters:   07/16/18 102/45   07/13/18 132/67   07/09/18 (P) 117/71    Weight from Last 3 Encounters:   07/16/18 45.5 kg (100 lb 6.4 oz)   07/12/18 46.7 kg (103 lb)   07/09/18 44.5 kg (98 lb)              We Performed the Following     US Paracentesis          Today's Medication Changes          These changes are accurate as of 7/16/18  9:28 AM.  If you have any questions, ask your nurse or doctor.               These medicines have changed or have updated prescriptions.        Dose/Directions    senna-docusate 8.6-50 MG per tablet   Commonly known as:  SENOKOT-S;PERICOLACE   This may have changed:    - when to take this  - reasons to take this   Used for:  Pelvic mass        Dose:  1 tablet   Take 1 tablet by mouth 2 times daily   Quantity:  100 tablet   Refills:  0                Primary Care Provider Fax #    Physician No Ref-Primary [phone]       No address on file        Equal Access to Services     [facility]: Arthur Nathan, wagamal de paz, qaybnikki kaalirina conklin, bessie jacques . So [facility] [phone].    ATENCIÓN: Si habla español, tiene a alford disposición servicios gratuitos de asistencia lingüística. Llame al [phone].    We comply with applicable federal civil rights laws and [place] laws. We do not discriminate on the basis of race, color, national origin, age, disability, sex, sexual orientation, or gender identity.            Thank you!     Thank you for choosing [facility]  for your care. Our goal is always to provide you with excellent care. Hearing back from our patients is one way we can continue to improve our services. Please take a few minutes to complete the written survey that you may receive in the mail after your visit with us. Thank you!             Your Updated Medication List - Protect others around you:  Learn how to safely use, store and throw away your medicines at www.disposemymeds.org.          This list is accurate as of 7/16/18  9:28 AM.  Always use your most recent med list.                   Brand Name Dispense Instructions for use Diagnosis    enoxaparin 100 MG/ML injection    LOVENOX    12 mL    Inject 0.6 mLs (60 mg) Subcutaneous every 12 hours for 10 days    Other acute pulmonary embolism without acute cor pulmonale (H)       LORazepam 1 MG tablet    ATIVAN    30 tablet    Take 1 tablet (1 mg) by mouth every 6 hours as needed (nausea/vomiting, anxiety or sleep)    Ovarian cancer, unspecified laterality (H), Encounter for long-term (current) use of medications       ondansetron 4 MG tablet    ZOFRAN    20 tablet    Take 1 tablet (4 mg) by mouth every 6 hours as needed for nausea    Nausea       oxyCODONE IR 5 MG tablet    ROXICODONE    20 tablet    Take 1 tablet (5 mg) by mouth every 4 hours as needed for pain    Cancer (H)       prochlorperazine 10 MG tablet    COMPAZINE    30 tablet    Take 1 tablet (10 mg) by mouth every 6 hours as needed (nausea/vomiting)    Ovarian cancer, unspecified laterality (H), Encounter for long-term (current) use of medications       senna-docusate 8.6-50 MG per tablet    SENOKOT-S;PERICOLACE    100 tablet    Take 1 tablet by mouth 2 times daily    Pelvic mass

## 2018-07-17 NOTE — PROGRESS NOTES
Care Coordinator Note  Patient assessed at paracentesis appointment on 7/16/18: see note.    Lauren GONZALEZ, RN  Care Coordinator  Gynecologic Cancer   Office:  [phone]  Pager: [phone] #[phone]

## 2018-07-20 NOTE — NURSING NOTE
Chief Complaint   Patient presents with     Port Draw     Patient here for heparin level - took Lovenox dose at 0400, which was 7.5 hours ago. Appointment made for patient to have level checked tomorrow AM at 0830 4-6 hours after her 0400 scheduled dose in order for it to fall into appropriate window.      Demetria Campbell RN

## 2018-07-21 NOTE — NURSING NOTE
Chief Complaint   Patient presents with     Blood Draw     labs drawn by venipuncture by rn.       Lab only appointment.  Labs drawn by venipuncture by maximino.    Danuta Holcomb RN

## 2018-07-25 NOTE — PROGRESS NOTES
Paracentesis Nursing Note  Maria Esther Wang presents today to [facility] for [facility].    During today's appointment orders from Dr. Fernandez were completed.    Progress Note:  Patient identification verified by name and date of birth.  Assessment completed.  Vitals monitored throughout appointment and recorded in Doc Flowsheets.  See proceduralist note in ultrasound.     present.     Date of consent or authorization: 6/28/2018.  Invasive Procedure Safety Checklist was completed and sent for scanning.     Paracentesis performed by Rajinder Cannon PA-C Radiology.    The following labs were communicated to provider performing paracentesis:  Lab Results   Component Value Date     07/13/2018     Total amount of ascites fluid drained: 1.6 liters.  Color of ascites fluid: clear, mack.  Total amount of albumin given: 25  grams.    Patient tolerated procedure well.    Post procedure,denies pain or discomfort post paracentesis.    Discharge Plan:  Discharge instructions were reviewed with patient.  Patient/Representative verbalized understanding and all questions were answered.   Discharged from [facility] in stable condition.    Yesica Roldan RN    Administrations This Visit     albumin human 25 % injection 12.5 g     Admin Date Action Dose Route Administered By             07/25/2018 New Bag 12.5 g Intravenous Yesica Roldan RN              Admin Date Action Dose Route Administered By             07/25/2018 New Bag 12.5 g Intravenous Yesica Roldan, JULIANNE                    lidocaine 1 % 20 mL     Admin Date Action Dose Route Administered By             07/25/2018 Given by Other 15 mL Other Yesica Roldan RN                         /53  Pulse 109  Temp 98.3  F (36.8  C) (Oral)  Resp 18  Wt 44.8 kg (98 lb 11.2 oz)  SpO2 96%  BMI 17.48 kg/m2

## 2018-07-25 NOTE — PATIENT INSTRUCTIONS
Dear Maria Esther Wang    Thank you for choosing [facility] ([facility]) for your paracentesis.  The following information is a summary of our appointment as well as important reminders.        Paracentesis  Your healthcare provider recommends that you have paracentesis. This is a procedure to remove extra fluid from your belly (abdomen). A needle is used to drain the fluid. A small sample of fluid may be taken and tested for problems. If the fluid buildup is causing discomfort or pain, all of the fluid may be drained. To do this, a tube is attached to the needle. The fluid is drained into a container that sits outside of the body. If symptoms are severe, paracentesis may be done as an emergency procedure. Otherwise, it will be scheduled ahead of time. Read on to learn more about paracentesis and how it works.     Understanding ascites  Many of the body s organs, including the liver and intestines, are inside the belly (abdomen). The organs are covered in a thin membrane called the peritoneum. The peritoneum has 2 layers. It makes a fluid that allows the layers to glide smoothly past each other. If this fluid builds up in the belly, the condition is called ascites. Ascites causes pain and discomfort. It can also make it hard to breathe. Fluid can build up for a number of reasons. These include chronic liver disease (cirrhosis), heart or kidney failure, and cancer. Your provider can tell you more about the cause of your ascites.  How paracentesis works  The goal of paracentesis may be to help diagnose the cause of the excess fluid. Or, the goal may be to drain excess fluid from the abdomen. In some cases, fluid returns and the procedure needs to be repeated.  Before the procedure    Tell your provider about any medicines you are taking. This includes all prescription medicines, over-the-counter medicines, street drugs, herbs, vitamins, and other  supplements.    Tell your provider about any allergies you have.    Before the procedure begins, you ll be asked to empty your bladder. This helps prevent injury to the bladder during the procedure. If needed, a thin tube (Blanco catheter) may be placed into your bladder to drain urine during the procedure. This tube is removed after the procedure.    An IV (intravenous) line may be put into a vein in your arm or hand. This line supplies fluids and medicines.  During the procedure    You are awake during the procedure.    An imaging method called ultrasound may be used to guide the procedure. It shows live images of the inside of your belly on a video screen. This helps the provider find the site of the excess fluid inside your belly and decide where to insert the needle.    A numbing medicine (local anesthesia) is injected into your belly where the needle will be inserted.    Once the skin is numb, the provider carefully inserts the needle into the belly. This causes the needle to fill with fluid.    The needle may be removed with only a small sample of fluid. This sample is sent to a lab for testing. Getting a sample takes about 10 to 15 minutes.    Or, a tube may be attached to the needle so that more of the excess fluid can be drained. The tube may be taped or stitched into place. This keeps it from pulling the needle out of your belly.    How long it takes to drain all of the fluid varies for each person. In most cases, it takes about 30 minutes. Your provider will let you know if the procedure is expected to take longer than usual.    Once all of the fluid is drained, the needle and tube are removed.    Pressure is put on the puncture site to stop any fluid leakage or bleeding.    A small bandage is placed over the puncture site. Albumin may be given during or after the procedure to prevent low blood pressure or kidney problems.  After the procedure  You may be taken to a recovery room to rest after the  procedure. If you are in pain, you will be given medicine as needed. You will likely be sent home 1 to 2 hours after the procedure is done. When you leave the hospital, have an adult family member or friend drive you home. If you are staying in the hospital, you will return to your hospital room.  Risks and possible complications of paracentesis  This procedure is considered safe. But like all procedures, it carries some risks. These include the following:    Bleeding    Infection    Injury to structures in the belly    Fast drop in blood pressure   Recovering at home    If needed, your provider can prescribe or recommend pain medicines for you to take at home. Take these exactly as directed. If you stopped taking other medicines before the procedure, ask your provider when you can start them again.    You may remove the bandage 24 hours after the procedure.    Take it easy for 24 hours after the procedure. Avoid physical activity until your provider says it s OK.  Follow-up care  Make a follow-up appointment with your provider as directed. During your follow-up visit, your provider will check your healing. Let your provider know how you are feeling. You can also discuss the cause of your ascites and if any more treatment is needed.   When to seek medical care  Call your healthcare provider if you notice any of the following after the procedure:    A fever of 100.4 F (38.0 C) or higher    Trouble breathing    Pain that does not go away even after taking pain medicine    Belly pain not caused by having the skin punctured    Bleeding from the puncture site    More than a small amount of fluid leakage from the puncture site    Swollen belly    Signs of infection at the puncture site. These include increased pain, redness, or swelling, as well as warmth or bad-smelling drainage.    Blood in your urine    Dizziness, lightheadedness, or fainting   Date Last Reviewed: 7/1/2016 2000-2017 [employer] [address]  [place] [address]. All rights reserved. This information is not intended as a substitute for professional medical care. Always follow your healthcare professional's instructions.          We look forward in seeing you on your next appointment here at [facility].  Please don t hesitate to call us at [phone] to reschedule any of your appointments or to speak with one of the [facility] registered nurses.  It was a pleasure taking care of you today.    Sincerely,    [facility]  Specialty Infusion & Procedure Center  [address] [place] [address]  [address]  Phone:  [phone]

## 2018-07-25 NOTE — MR AVS SNAPSHOT
After Visit Summary   7/25/2018    Maria Esther Wang    MRN: [id number]           Patient Information     Date Of Birth          1960        Visit Information        Provider Department      7/25/2018 7:30 AM Provider, [place] Spec Inf Para; MULTILINGUAL WORD; LEÓN 40 ATC [facility] Specialty and Procedure        Today's Diagnoses     Malignant ascites    -  1      Care Instructions    Dear Maria Esther Wang    Thank you for choosing [facility] ([facility]) for your paracentesis.  The following information is a summary of our appointment as well as important reminders.        Paracentesis  Your healthcare provider recommends that you have paracentesis. This is a procedure to remove extra fluid from your belly (abdomen). A needle is used to drain the fluid. A small sample of fluid may be taken and tested for problems. If the fluid buildup is causing discomfort or pain, all of the fluid may be drained. To do this, a tube is attached to the needle. The fluid is drained into a container that sits outside of the body. If symptoms are severe, paracentesis may be done as an emergency procedure. Otherwise, it will be scheduled ahead of time. Read on to learn more about paracentesis and how it works.     Understanding ascites  Many of the body s organs, including the liver and intestines, are inside the belly (abdomen). The organs are covered in a thin membrane called the peritoneum. The peritoneum has 2 layers. It makes a fluid that allows the layers to glide smoothly past each other. If this fluid builds up in the belly, the condition is called ascites. Ascites causes pain and discomfort. It can also make it hard to breathe. Fluid can build up for a number of reasons. These include chronic liver disease (cirrhosis), heart or kidney failure, and cancer. Your provider can tell you more about the cause of your ascites.  How  paracentesis works  The goal of paracentesis may be to help diagnose the cause of the excess fluid. Or, the goal may be to drain excess fluid from the abdomen. In some cases, fluid returns and the procedure needs to be repeated.  Before the procedure    Tell your provider about any medicines you are taking. This includes all prescription medicines, over-the-counter medicines, street drugs, herbs, vitamins, and other supplements.    Tell your provider about any allergies you have.    Before the procedure begins, you ll be asked to empty your bladder. This helps prevent injury to the bladder during the procedure. If needed, a thin tube (Blanco catheter) may be placed into your bladder to drain urine during the procedure. This tube is removed after the procedure.    An IV (intravenous) line may be put into a vein in your arm or hand. This line supplies fluids and medicines.  During the procedure    You are awake during the procedure.    An imaging method called ultrasound may be used to guide the procedure. It shows live images of the inside of your belly on a video screen. This helps the provider find the site of the excess fluid inside your belly and decide where to insert the needle.    A numbing medicine (local anesthesia) is injected into your belly where the needle will be inserted.    Once the skin is numb, the provider carefully inserts the needle into the belly. This causes the needle to fill with fluid.    The needle may be removed with only a small sample of fluid. This sample is sent to a lab for testing. Getting a sample takes about 10 to 15 minutes.    Or, a tube may be attached to the needle so that more of the excess fluid can be drained. The tube may be taped or stitched into place. This keeps it from pulling the needle out of your belly.    How long it takes to drain all of the fluid varies for each person. In most cases, it takes about 30 minutes. Your provider will let you know if the procedure is  expected to take longer than usual.    Once all of the fluid is drained, the needle and tube are removed.    Pressure is put on the puncture site to stop any fluid leakage or bleeding.    A small bandage is placed over the puncture site. Albumin may be given during or after the procedure to prevent low blood pressure or kidney problems.  After the procedure  You may be taken to a recovery room to rest after the procedure. If you are in pain, you will be given medicine as needed. You will likely be sent home 1 to 2 hours after the procedure is done. When you leave the hospital, have an adult family member or friend drive you home. If you are staying in the hospital, you will return to your hospital room.  Risks and possible complications of paracentesis  This procedure is considered safe. But like all procedures, it carries some risks. These include the following:    Bleeding    Infection    Injury to structures in the belly    Fast drop in blood pressure   Recovering at home    If needed, your provider can prescribe or recommend pain medicines for you to take at home. Take these exactly as directed. If you stopped taking other medicines before the procedure, ask your provider when you can start them again.    You may remove the bandage 24 hours after the procedure.    Take it easy for 24 hours after the procedure. Avoid physical activity until your provider says it s OK.  Follow-up care  Make a follow-up appointment with your provider as directed. During your follow-up visit, your provider will check your healing. Let your provider know how you are feeling. You can also discuss the cause of your ascites and if any more treatment is needed.   When to seek medical care  Call your healthcare provider if you notice any of the following after the procedure:    A fever of 100.4 F (38.0 C) or higher    Trouble breathing    Pain that does not go away even after taking pain medicine    Belly pain not caused by having the  skin punctured    Bleeding from the puncture site    More than a small amount of fluid leakage from the puncture site    Swollen belly    Signs of infection at the puncture site. These include increased pain, redness, or swelling, as well as warmth or bad-smelling drainage.    Blood in your urine    Dizziness, lightheadedness, or fainting   Date Last Reviewed: 7/1/2016 2000-2017 [employer]. [address] [place] [address]. All rights reserved. This information is not intended as a substitute for professional medical care. Always follow your healthcare professional's instructions.          We look forward in seeing you on your next appointment here at [facility].  Please don t hesitate to call us at [phone] to reschedule any of your appointments or to speak with one of the [facility] registered nurses.  It was a pleasure taking care of you today.    Sincerely,    [facility]  Specialty Infusion & Procedure Center  [address] [place] [address]  [address]  Phone:  [phone]            Follow-ups after your visit        Your next 10 appointments already scheduled     Aug 02, 2018  8:00 AM CDT   (Arrive by 7:45 AM)   New Patient Visit with Neda Marx MD   [facility] ([facility])    [address]  [address]  [facility] [id number] [phone]            Aug 03, 2018  7:15 AM CDT   [facility] Lab Draw with [facility] LAB DRAW   [facility] Lab Draw ([facility])    [address]  [address]  [place] [facility] [address] [phone]            Aug 03, 2018  7:40 AM CDT   (Arrive by 7:25 AM)   Return Active Treatment with KATHRYN Stiles CNP   [facility] ([facility])    [address]  [address]  [facility] [facility] [address] [phone]            Aug 03, 2018  9:00 AM CDT   Infusion 360 with  ONCOLOGY  INFUSION, UC 26 ATC   [facility] ([facility])    [address]  [address]  [facility] [facility] [address] [phone]            Aug 24, 2018  7:45 AM CDT   [facility] Lab Draw with [facility] LAB DRAW   [facility] Lab Draw ([facility])    [address]  [address]  [facility] [id number] [phone]            Aug 24, 2018  8:20 AM CDT   (Arrive by 8:05 AM)   Return Active Treatment with KATHRYN Stiles CNP   [facility] ([facility])    [address]  [address]  [place] [id number] [phone]            Aug 24, 2018  9:00 AM CDT   Infusion 360 with  ONCOLOGY INFUSION, UC 26 ATC   [facility] ([facility])    [address]  [address]  [facility] [id number] [phone]              Who to contact     If you have questions or need follow up information about today's clinic visit or your schedule please contact [facility] SPECIALTY AND PROCEDURE directly at [phone].  Normal or non-critical lab and imaging results will be communicated to you by MyChart, letter or phone within 4 business days after the clinic has received the results. If you do not hear from us within 7 days, please contact the clinic through MyChart or phone. If you have a critical or abnormal lab result, we will notify you by phone as soon as possible.  Submit refill requests through [employer] or call your pharmacy and they will forward the refill request to us. Please allow 3 business days for your refill to be completed.          Additional Information About Your Visit        Care EveryWhere ID     This is your Care EveryWhere ID. This could be used by other organizations to access your [facility] medical records  [id number]        Your Vitals Were     Pulse Temperature Respirations Pulse Oximetry BMI (Body  Mass Index)       109 98.3  F (36.8  C) (Oral) 18 96% 17.48 kg/m2        Blood Pressure from Last 3 Encounters:   07/25/18 109/53   07/16/18 102/45   07/13/18 132/67    Weight from Last 3 Encounters:   07/25/18 44.8 kg (98 lb 11.2 oz)   07/16/18 45.5 kg (100 lb 6.4 oz)   07/12/18 46.7 kg (103 lb)              We Performed the Following     US Paracentesis          Today's Medication Changes          These changes are accurate as of 7/25/18  7:54 AM.  If you have any questions, ask your nurse or doctor.               These medicines have changed or have updated prescriptions.        Dose/Directions    senna-docusate 8.6-50 MG per tablet   Commonly known as:  SENOKOT-S;PERICOLACE   This may have changed:    - when to take this  - reasons to take this   Used for:  Pelvic mass        Dose:  1 tablet   Take 1 tablet by mouth 2 times daily   Quantity:  100 tablet   Refills:  0                Primary Care Provider Fax #    Physician No Ref-Primary [phone]       No address on file        Equal Access to Services     [facility] : Arthur Nathan, brett de paz, bessie feng. [facility] [phone].    ATENCIÓN: Si habla español, tiene a alford disposición servicios gratuitos de asistencia lingüística. [facility] [phone].    We comply with applicable federal civil rights laws and [place] laws. We do not discriminate on the basis of race, color, national origin, age, disability, sex, sexual orientation, or gender identity.            Thank you!     Thank you for choosing [facility] SPECIALTY AND PROCEDURE  for your care. Our goal is always to provide you with excellent care. Hearing back from our patients is one way we can continue to improve our services. Please take a few minutes to complete the written survey that you may receive in the mail after your visit with us. Thank you!             Your Updated Medication List -  Protect others around you: Learn how to safely use, store and throw away your medicines at www.disposemymeds.org.          This list is accurate as of 7/25/18  7:54 AM.  Always use your most recent med list.                   Brand Name Dispense Instructions for use Diagnosis    enoxaparin 60 MG/0.6ML injection    LOVENOX    12 mL    INJECT THE CONTENTS OF ONE SYRINGE (60 MG) UNDER THE SKIN EVERY 12 HOURS FOR 10 DAYS    Other acute pulmonary embolism without acute cor pulmonale (H)       LORazepam 1 MG tablet    ATIVAN    30 tablet    Take 1 tablet (1 mg) by mouth every 6 hours as needed (nausea/vomiting, anxiety or sleep)    Ovarian cancer, unspecified laterality (H), Encounter for long-term (current) use of medications       ondansetron 4 MG tablet    ZOFRAN    20 tablet    Take 1 tablet (4 mg) by mouth every 6 hours as needed for nausea    Nausea       oxyCODONE IR 5 MG tablet    ROXICODONE    30 tablet    Take 1 tablet (5 mg) by mouth every 6 hours as needed for pain    Neoplasm related pain       prochlorperazine 10 MG tablet    COMPAZINE    30 tablet    Take 1 tablet (10 mg) by mouth every 6 hours as needed (nausea/vomiting)    Ovarian cancer, unspecified laterality (H), Encounter for long-term (current) use of medications       senna-docusate 8.6-50 MG per tablet    SENOKOT-S;PERICOLACE    100 tablet    Take 1 tablet by mouth 2 times daily    Pelvic mass

## 2018-07-31 NOTE — TELEPHONE ENCOUNTER
NP reviewed refill request. This was completed and brought to PharmD downstairs.    Patient's Heparin 10A level reviewed by PharmD and anticoagulation RN. Patient Heparin 10A last drawn 7/21 and it is not clear if it was drawn in the appropriate time frame. Patient will have this re-drawn on 8-2 and lab will verify lovenox injection time.     Patient called with update and plan via interpretor services. Patient unavailable. Left voicemail with details.     Tere Teran RN

## 2018-07-31 NOTE — TELEPHONE ENCOUNTER
Per Triage RN note:   Requesting Oxycodone IR last filled on 7/20 #30, takes 2-4 per day. Has 3-4 tabs left. Needs refill today.     FYI - she also mentioned her lovenox as in wanting a larger quantity prescribed, however script says to only take for 10 days so I assumed this is not going to be a long term therapy for her?     Patient has an appt with palliative care on Aug. 3.     Patient has history of:  - Metastatic clear cell carcinoma      oxyCODONE IR (ROXICODONE) 5 MG tablet 30 tablet 0 7/20/2018  No   Sig - Route: Take 1 tablet (5 mg) by mouth every 6 hours as needed for pain - Oral         Patient had Heparin 10A  Heparin 10A Level 0.53   IU/mL Final 07/21/2018  8:40 AM 51     Will route to NP for review.    Tere Teran RN

## 2018-08-02 NOTE — MR AVS SNAPSHOT
After Visit Summary   8/2/2018    Maria Esther Wang    MRN: [id number]           Patient Information     Date Of Birth          1960        Visit Information        Provider Department      8/2/2018 2:00 PM Amna Clancy; Provider, [facility];  [facility] Specialty and Procedure        Today's Diagnoses     Malignant ascites    -  1      Care Instructions      Discharge Instructions for Paracentesis  Paracentesis is a procedure to remove extra fluid from your belly (abdomen). This fluid buildup in the abdomen is called ascites. The procedure may have been done to take a sample of the fluid. Or, it may have been done to drain the extra fluid from your abdomen and help make you more comfortable.     Ascites is buildup of excess fluid in the abdomen.   Home care    If you have pain after the procedure, your healthcare provider can prescribe or recommend pain medicines. Take these exactly as directed. If you stopped taking other medicines before the procedure, ask your provider when you can start them again.    Take it easy for 24 hours after the procedure. Avoid physical activity until your provider says it s OK.    You will have a small bandage over the puncture site. Stitches (sutures), surgical staples, adhesive tapes, adhesive strips, or surgical glue may be used to close the incision. They also help stop bleeding and speed healing. You may take the bandage off in 24 hours.    Check the puncture site for the signs of infection listed below.  Follow-up care  Make a follow-up appointment with your healthcare provider as directed. During your follow-up visit, your provider will check your healing. Let your provider know how you are feeling. You can also discuss the cause of your ascites and if you need any further treatment.  When to seek medical advice  Call your healthcare provider if you have any of the following after the procedure:    A fever of  100.4 F (38.0 C) or higher    Trouble breathing    Pain that doesn't go away even after taking pain medicine    Belly pain not caused by having the skin punctured    Bleeding from the puncture site    More than a small amount of fluid leaking from the puncture site    Swollen belly    Signs of infection at the puncture site. These include increased pain, redness, or swelling, warmth, or bad-smelling drainage.    Blood in your urine    Feeling dizzy or lightheaded, or fainting   Date Last Reviewed: 7/1/2016 2000-2017 [employer]. [address] [place] [address]. All rights reserved. This information is not intended as a substitute for professional medical care. Always follow your healthcare professional's instructions.                  Follow-ups after your visit        Your next 10 appointments already scheduled     Aug 03, 2018  7:15 AM CDT   [facility] Draw with [facility] DRAW   [facility] ([facility])    [address] [place] [address]  [address]  [facility] [address]   [phone]            Aug 03, 2018  7:25 AM CDT   Return Active Treatment with KATHRYN Stiles CNP   [facility] ([facility])    [address] [place] [address]  [address]  [facility] [address]   [phone]            Aug 03, 2018  9:00 AM CDT   Infusion 360 with [facility] ONCOLOGY INFUSION, UC 26 ATC   [facility] ([facility])    [address] [place] [address]  [address]  [facility] [address]   [phone]            Aug 24, 2018  7:45 AM CDT   [facility] Lab Draw with [facility] LAB DRAW   [facility] Draw ([facility])    [address] [place] [address]  [address]  [facility] [address]   [phone]            Aug 24, 2018  8:20 AM CDT   (Arrive by 8:05 AM)   Return Active Treatment with KATHRYN Stiles CNP   [facility] ([facility])    [address]  [address]  [facility] [address] [phone]            Aug 24, 2018  9:00 AM CDT   Infusion 360 with UC ONCOLOGY INFUSION, UC 26 ATC   [facility] ([facility])    [address] [place] [address]  [address]  [facility] [id number] [phone]            Aug 30, 2018  1:00 PM CDT   (Arrive by 12:45 PM)   Return Visit with Evelyn Marinelli MD   [facility] ([facility])    [address] [place] [address]  [address]  [facility] [id number] [phone]              Who to contact     If you have questions or need follow up information about today's clinic visit or your schedule please contact [facility] SPECIALTY AND PROCEDURE directly at [phone].  Normal or non-critical lab and imaging results will be communicated to you by MyChart, letter or phone within 4 business days after the clinic has received the results. If you do not hear from us within 7 days, please contact the clinic through MyChart or phone. If you have a critical or abnormal lab result, we will notify you by phone as soon as possible.  Submit refill requests through [employer] or call your pharmacy and they will forward the refill request to us. Please allow 3 business days for your refill to be completed.          Additional Information About Your Visit        Care EveryWhere ID     This is your Care EveryWhere ID. This could be used by other organizations to access your [place] medical records  [id number]        Your Vitals Were     Pulse Temperature Respirations             101 98.5  F (36.9  C) (Oral) 16          Blood Pressure from Last 3 Encounters:   08/02/18 116/57   08/02/18 108/66   07/25/18 108/59    Weight from Last 3 Encounters:   08/02/18 46 kg (101 lb 6 oz)   07/25/18 43 kg (94 lb 12.8 oz)   07/16/18 45.5 kg (100 lb 6.4 oz)              We Performed the Following     US Paracentesis           Today's Medication Changes          These changes are accurate as of 8/2/18  2:44 PM.  If you have any questions, ask your nurse or doctor.               These medicines have changed or have updated prescriptions.        Dose/Directions    oxyCODONE IR 5 MG tablet   Commonly known as:  ROXICODONE   This may have changed:  when to take this   Used for:  Neoplasm related pain   Changed by:  Neda Marx MD        Dose:  5 mg   Take 1 tablet (5 mg) by mouth every 4 hours as needed for pain   Quantity:  180 tablet   Refills:  0       senna-docusate 8.6-50 MG per tablet   Commonly known as:  SENOKOT-S;PERICOLACE   This may have changed:    - when to take this  - reasons to take this   Used for:  Pelvic mass        Dose:  1 tablet   Take 1 tablet by mouth 2 times daily   Quantity:  100 tablet   Refills:  0            Where to get your medicines      Some of these will need a paper prescription and others can be bought over the counter.  Ask your nurse if you have questions.     Bring a paper prescription for each of these medications     oxyCODONE IR 5 MG tablet                Primary Care Provider Fax #    Physician No Ref-Primary [phone]       No address on file        Equal Access to Services     [facility] : Arthur Nathan, brett de paz, bessie feng. [facility] [phone].    ATENCIÓN: Si habla español, tiene a alford disposición servicios gratuitos de asistencia lingüística. [facility] [phone].    We comply with applicable federal civil rights laws and [place] laws. We do not discriminate on the basis of race, color, national origin, age, disability, sex, sexual orientation, or gender identity.            Thank you!     Thank you for choosing [facility] SPECIALTY AND PROCEDURE  for your care. Our goal is always to provide you with excellent care. Hearing back from our patients is one way we can  continue to improve our services. Please take a few minutes to complete the written survey that you may receive in the mail after your visit with us. Thank you!             Your Updated Medication List - Protect others around you: Learn how to safely use, store and throw away your medicines at www.disposemymeds.org.          This list is accurate as of 8/2/18  2:44 PM.  Always use your most recent med list.                   Brand Name Dispense Instructions for use Diagnosis    enoxaparin 60 MG/0.6ML injection    LOVENOX    12 mL    INJECT THE CONTENTS OF 1 SYRINGE (0.6 ML) UNDER THE SKIN EVERY 12 HOURS FOR 10 DAYS    Other acute pulmonary embolism without acute cor pulmonale (H)       LORazepam 1 MG tablet    ATIVAN    30 tablet    Take 1 tablet (1 mg) by mouth every 6 hours as needed (nausea/vomiting, anxiety or sleep)    Ovarian cancer, unspecified laterality (H), Encounter for long-term (current) use of medications       ondansetron 4 MG tablet    ZOFRAN    20 tablet    Take 1 tablet (4 mg) by mouth every 6 hours as needed for nausea    Nausea       oxyCODONE IR 5 MG tablet    ROXICODONE    180 tablet    Take 1 tablet (5 mg) by mouth every 4 hours as needed for pain    Neoplasm related pain       prochlorperazine 10 MG tablet    COMPAZINE    30 tablet    Take 1 tablet (10 mg) by mouth every 6 hours as needed (nausea/vomiting)    Ovarian cancer, unspecified laterality (H), Encounter for long-term (current) use of medications       senna-docusate 8.6-50 MG per tablet    SENOKOT-S;PERICOLACE    100 tablet    Take 1 tablet by mouth 2 times daily    Pelvic mass

## 2018-08-02 NOTE — PATIENT INSTRUCTIONS
Thank you for coming into the Palliative Care Clinic today.     1. We have not changed any of your medication doses.   2. Try using ondansetron first for your nausea.   3. Try to increase the amount of Ensure you drink to 3 cans per day.     Return to clinic 4-6 weeks for a follow up     You can reach the Palliative Care Team during business hours at the following numbers:   -For the [facility] and [facility] Center, call [phone]  -For the [facility], call [phone]  -For the [facility], call [phone]    To reach the Palliative Care Provider on-call After-hours or on holidays and weekends, call: [phone].  Please note that we are not able to provide pain medication refills on evenings or weekends.     ==================================================================    [facility] Palliative Care [facility]   The [facility] Palliative Care Team is committed to treating your pain and other symptoms. This handout is for all patients treated with opioid medications in our clinics.     What are opioid medicines?   Opioid medications are used to ease some types of pain and shortness of breath. They are sometimes called narcotics. They include: Morphine (MS Contin, Roxanol), Oxycodone (OxyContin, OxyFast, Percocet), Hydrocodone (Vicodin, Norco), Hydromorphone (Dilaudid), Fentanyl (Duragesic), Methadone (Dolophine).   Are opioid medicines safe to take?   Opioid medicines are safe when you follow the directions from your doctor or medical provider.  Opioids can cause serious side effects and become unsafe if you do not follow your instructions.   To make sure you are taking opioid medicines safely, we may ask you to bring in your pill bottles or to allow us to test your urine. Our goal is to keep you safe and to improve your ability to function & be active. If we don t think opioids are safely doing that, we will work with you to taper you off of  them.   Do not drive unless your medical provider tells you it is safe.   Do not take opioids with alcohol or anxiety/sleep medicines unless your doctor tells you it is ok to do so.   Are opioids addictive?   Addiction means you crave a drug and have trouble limiting the amount you use.   Addiction is more likely to happen if you take opioids to relieve stress or to feel altered. If you or your loved one feels this way when taking opioid medicines, let your medical provider know. We may refer you for additional assessment or services if this is a concern.   Your body will get used to the opioid medicines if you take them for more than two weeks in a row. This means if you stop them suddenly, you may have withdrawal. If this occurs, you will feel uncomfortable (nausea, diarrhea, increased pain). This does not mean you are addicted. Never stop taking your pain medicines all at once unless your medical provider tells you to. If you think you need less medicine, your medical provider will help you to safely lower your dose.   What is the safest way to store opioids?   Keep your medicines in a safe place away from children, teens, pets, and visitors. Be careful about who knows that you have these medicines.   How do I get rid of my old opioids?   Opioids should be brought to your [facility] drop-off site, or you can purchase a disposal kit from your local pharmacy. If neither of these options is available, the Food and Drug Administration recommends that you flush unused opioids down the toilet.   Do not share or sell your pain medicines. This is illegal and very dangerous. We cannot prescribe opioid pain medicines to you if do this.   How do I get refills?   Opioid medicines need signed paper prescriptions. This means it may take longer to refill than other medicines. Our clinic cannot prescribe them on weekends or at night.     Give us one week s notice when requesting a refill. This gives us the time we need to get it  signed and back to you. It may be possible to mail prescriptions to you.

## 2018-08-02 NOTE — LETTER
"8/2/2018       RE: Maria Esther Wang  [address] [facility] [address]     Dear Colleague,    Thank you for referring your patient, Maria Esther Wang, to the [facility] at [facility]. Please see a copy of my visit note below.    Palliative Care Outpatient Clinic Consultation Note    Patient:  Maria Esther Wang    Chief Complaint:   Maria Esther Wang 58 year old female who is presenting to the palliative medicine clinic today accompanied by her nephew Tito at the request of Dr. Fernandez for a palliative care consultation secondary to metastatic clear cell carcinoma.   The patient's primary care provider is:  No Ref-Primary, Physician.     History of Present Illness:  This patient's cancer history was reviewed as per the chart.  History obtained with the assistance of a professional .  In brief, \"Kd\"was admitted to [facility] this summer with ascites and dyspnea.  She was diagnosed with carcinomatosis.  She was ultimately found to have metastatic clear cell carcinoma and was started on Carbo/Taxol.  She was admitted to [facility] from 7/9/18 to 7/13/18 with dyspnea.  Since discharge, Kd notes that she has lost her appetite, has increased nausea over the past day, vomited last night. Nausea comes and goes. Tried lorazepam for this, which was helpful.      Has been drinking Ensure as she has struggled to eat much.  Drank 2 bottles yesterday.  Also had some rice. When she eats, she feels \"tightness\" in the abdomen. Has significant abdominal distension, last paracentesis estimated to be two weeks ago. Has an appointment for a repeat paracentesis today.       Patient's Disease Understanding: Kd understands that she has ovarian cancer which is causing \"water buildup\" in the abdomen.  She knows that it has spread and that she is not a surgical candidate.     Social History  Living Situation: Brother, sister in law, Helps her " nephew/nieces, helped to raise them. Speaks [place].   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   Hobbies: Loves to watch movies.   Spiritual Background: Muslim  Spiritual Concerns/Needs: Connected to a Jew.   Social History   Substance Use Topics     Smoking status: Never Smoker     Smokeless tobacco: Never Used     Alcohol use No       Opioid Risk Tool (ORT):    Family History of Substance Abuse:        Alcohol = 0 pt (no)       Illegal Drugs = 0 pt (no)       Prescription Drugs = 0 pt (no)      Personal History of Substance Abuse:       Alcohol = 0 pt (no)       Illegal Drugs = 0 pt (no)       Prescription Drugs = 0 pt (no)        Age: 0 pt (age < 16; age > 45)      History of Pre-adolescent Sexual Abuse: Not asked.       Psychological Disease: 0 pt (none)      ORT Score = <3       0-3: Low risk for opioid abuse       4-7: Moderate risk for opioid abuse       >/= 8: High risk for opioid abuse      Family History  Patient's Involvement with Prior History of Serious Illness in Family: No one else in the family has had cancer before.       REVIEW OF SYSTEMS:   ROS: 10 point ROS neg other than the symptoms noted above in the HPI and here:  Palliative Symptom Review (0=no symptom/no concern, 1=mild, 2=moderate, 3=severe):      Pain: Has pain across the abdomen, radiates towards the back, mild today.  Oxycodone helps.  Taking 5mg every 6 hours.  No side effects from this.       Fatigue: 2      Nausea: see above      Constipation: 1 - controlled with stool softener      Diarrhea: 0      Depressive Symptoms: 0      Anxiety: 0      Drowsiness: Taking one nap per day.       Poor Appetite: See above.       Shortness of Breath: Occasionally feels short of breath.       Insomnia: Has a hard time sleeping.            Physical Exam:   Vitals were reviewed  /66  Pulse 109  Temp 98  F (36.7  C) (Oral)  Resp 16  Ht 1.575  "m (5' 2\")  Wt 46 kg (101 lb 6 oz)  SpO2 98%  BMI 18.54 kg/m2  General: Alert, cachectic, chronically ill, but comfortable appearing female in no acute distress.   Eyes: Pupils equal, sclera clear.   ENT: MMM.   Cardiac: Tachycardic, regular rhythm, no murmurs.  Hyperdynamic precordium.   Resp: CTAB, unlabored on room air.   Abd: Soft, tensely distended, non-tender to palpation, active bowel sounds.   Ext: Warm and well perfused.  Bilateral pedal edema to the knees.   Neuro: No facial asymmetry.  Spontaneous movements grossly non-focal.  Using a wheelchair for transport.   Neuropsych: Alert, appropriately interactive, full affect, normal sensorium.         Data Reviewed:  LABS:   Lab Results   Component Value Date    WBC 16.6 (H) 07/13/2018    HGB 9.1 (L) 07/13/2018    HCT 29.1 (L) 07/13/2018     07/13/2018     07/10/2018    POTASSIUM 3.6 07/10/2018    CHLORIDE 101 07/10/2018    CO2 26 07/10/2018    BUN 8 07/10/2018    CR 0.49 (L) 07/10/2018     (H) 07/10/2018    TROPI <0.015 07/09/2018    AST 24 07/09/2018    ALT 39 07/09/2018    ALKPHOS 129 07/09/2018    BILITOTAL 0.7 07/09/2018    INR 1.25 (H) 07/09/2018     IMAGING:   CT Chest 7/9/18  \"Impression:  1. Numerous segmental and subsegmental pulmonary emboli bilaterally  are unchanged. No evidence of right heart strain.  2. Increased moderate to large left pleural effusion and stable small  right pleural effusion. Unchanged left basilar atelectasis.  3. Worsening mediastinal, left supraclavicular, and right axillary  metastatic lymphadenopathy.  4. Grossly stable peritoneal carcinomatosis in the included upper  abdomen, and unchanged upper abdominal ascites which is presumably  Malignant.\"    [place]  - Use of controlled substances consistent with history.     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  She has significant abdominal distension today which will be treated with a paracentesis.     Recommendations & Counseling:  Symptoms: Cancer pain, adequately controlled.   -Oxycodone refilled at current dosing.   -Increase Ensure to 3 cans/day.   - Try ondansetron first for nausea (less sedating)    Disease Understanding:  Kd has limited disease understanding.  She will be meeting with her GynOnc team tomorrow and plans to ask additional questions about her health at that appointment.  Today, we discussed that she might continue to be considered for chemotherapy as long as it was felt to not harm more than help her.      Advance Care Planning:  Still needed.     [facility] Outpatient Palliative Care Opioid Prescribing Safety Plan    Opioid Safety Education: Reviewed by Cintia Norman on 08/02/18  Opioid Risk Assessment: Performed by Neda Marx on 08/02/18.  ORT score of <3.   Mood Disorder Assessment: Performed by Neda Marx on 08/02/18.     reviewed with every prescription, last reviewed by Neda Marx on 08/02/2018     Additional recommendations based on patient's prognosis and indication for opioids include the following:      Expected prognosis: <1 year  Risk: Low or Medium (ORT 0-7)  No further recommendations.     RTC 4-6 weeks for a follow up.     Thank you for involving me in the care of this patient.     Neda Marx MD / Palliative Medicine / Pager [phone] / After-Hours Answering Service [phone] / Main Palliative Clinic - [facility] [phone] / [facility] Inpatient Team Consult Pager [phone] (answered 8am-430pm M-F)    Additional History Reviewed:   No Known Allergies  Current Outpatient Prescriptions   Medication Sig Dispense Refill     enoxaparin (LOVENOX) 60 MG/0.6ML injection INJECT THE CONTENTS OF 1 SYRINGE (0.6 ML) UNDER THE SKIN EVERY 12 HOURS FOR 10 DAYS 12 mL 0     LORazepam (ATIVAN) 1 MG tablet Take 1 tablet  (1 mg) by mouth every 6 hours as needed (nausea/vomiting, anxiety or sleep) 30 tablet 1     ondansetron (ZOFRAN) 4 MG tablet Take 1 tablet (4 mg) by mouth every 6 hours as needed for nausea 20 tablet 1     oxyCODONE IR (ROXICODONE) 5 MG tablet Take 1 tablet (5 mg) by mouth every 6 hours as needed for pain 10 tablet 0     senna-docusate (SENOKOT-S;PERICOLACE) 8.6-50 MG per tablet Take 1 tablet by mouth 2 times daily (Patient taking differently: Take 1 tablet by mouth 2 times daily as needed ) 100 tablet 0     prochlorperazine (COMPAZINE) 10 MG tablet Take 1 tablet (10 mg) by mouth every 6 hours as needed (nausea/vomiting) (Patient not taking: Reported on 8/2/2018) 30 tablet 2     Past Medical History:   Diagnosis Date     Anemia      NO ACTIVE PROBLEMS      Ovarian cancer (H)      Pulmonary embolism (H)      Past Surgical History:   Procedure Laterality Date     INSERT PORT VASCULAR ACCESS N/A 7/9/2018    Procedure: INSERT PORT VASCULAR ACCESS;  Single Lumen Chest Power Port Placement, Leave Access for Chemotherapy;  Surgeon: Alexandro Sharif PA-C;  [facility]     OTHER SURGICAL HISTORY      no surgical history     No family history on file.      Again, thank you for allowing me to participate in the care of your patient.      Sincerely,    Neda Marx MD

## 2018-08-02 NOTE — MR AVS SNAPSHOT
After Visit Summary   8/2/2018    Maria Esther Wang    MRN: [id number]           Patient Information     Date Of Birth          1960        Visit Information        Provider Department      8/2/2018 7:45 AM Neda Marx MD; [facility]        Today's Diagnoses     Neoplasm related pain          Care Instructions    Thank you for coming into the Palliative Care Clinic today.     1. We have not changed any of your medication doses.   2. Try using ondansetron first for your nausea.   3. Try to increase the amount of Ensure you drink to 3 cans per day.     Return to clinic 4-6 weeks for a follow up     You can reach the Palliative Care Team during business hours at the following numbers:   -For the [facility], call [phone]  -For the [facility], call [phone]  -For the [facility], call [phone]    To reach the Palliative Care Provider on-call After-hours or on holidays and weekends, call: [phone].  Please note that we are not able to provide pain medication refills on evenings or weekends.     ==================================================================    [facility] Palliative Care Clinics   The [facility] Palliative Care Team is committed to treating your pain and other symptoms. This handout is for all patients treated with opioid medications in our clinics.     What are opioid medicines?   Opioid medications are used to ease some types of pain and shortness of breath. They are sometimes called narcotics. They include: Morphine (MS Contin, Roxanol), Oxycodone (OxyContin, OxyFast, Percocet), Hydrocodone (Vicodin, Norco), Hydromorphone (Dilaudid), Fentanyl (Duragesic), Methadone (Dolophine).   Are opioid medicines safe to take?   Opioid medicines are safe when you follow the directions from your doctor or medical provider.   Opioids can cause serious side effects and become unsafe if you do not follow your instructions.   To make sure you are taking opioid medicines safely, we may ask you to bring in your pill bottles or to allow us to test your urine. Our goal is to keep you safe and to improve your ability to function & be active. If we don t think opioids are safely doing that, we will work with you to taper you off of them.   Do not drive unless your medical provider tells you it is safe.   Do not take opioids with alcohol or anxiety/sleep medicines unless your doctor tells you it is ok to do so.   Are opioids addictive?   Addiction means you crave a drug and have trouble limiting the amount you use.   Addiction is more likely to happen if you take opioids to relieve stress or to feel altered. If you or your loved one feels this way when taking opioid medicines, let your medical provider know. We may refer you for additional assessment or services if this is a concern.   Your body will get used to the opioid medicines if you take them for more than two weeks in a row. This means if you stop them suddenly, you may have withdrawal. If this occurs, you will feel uncomfortable (nausea, diarrhea, increased pain). This does not mean you are addicted. Never stop taking your pain medicines all at once unless your medical provider tells you to. If you think you need less medicine, your medical provider will help you to safely lower your dose.   What is the safest way to store opioids?   Keep your medicines in a safe place away from children, teens, pets, and visitors. Be careful about who knows that you have these medicines.   How do I get rid of my old opioids?   Opioids should be brought to your [facility] drop-off site, or you can purchase a disposal kit from your local pharmacy. If neither of these options is available, the Food and Drug Administration recommends that you flush unused opioids down the toilet.   Do not share or sell your pain  medicines. This is illegal and very dangerous. We cannot prescribe opioid pain medicines to you if do this.   How do I get refills?   Opioid medicines need signed paper prescriptions. This means it may take longer to refill than other medicines. Our clinic cannot prescribe them on weekends or at night.     Give us one week s notice when requesting a refill. This gives us the time we need to get it signed and back to you. It may be possible to mail prescriptions to you.            Follow-ups after your visit        Your next 10 appointments already scheduled     Aug 02, 2018 11:45 AM CDT   Masonic Lab Draw with  MASONIC LAB DRAW   [facility]    [address]  [address]  [facility] [place] [facility] [address]   [phone]            Aug 02, 2018  2:00 PM CDT   Paracentesis Visit with  Spec Inf Para Provider, UC 39 ATC   [facility] ([facility])    [address] [place] [address]  [address]  [facility] [address]   [phone]            Aug 03, 2018  7:15 AM CDT   Masonic Lab Draw with  MASONIC LAB DRAW   [facility] Lab Draw ([facility])    [address]  [address]  [place] [place] [facility] [address]   [phone]            Aug 03, 2018  7:25 AM CDT   Return Active Treatment with KATHRYN Stiles CNP   [facility] ([facility])    [address]  [address]  [place] [address]   [phone]            Aug 03, 2018  9:00 AM CDT   Infusion 360 with  ONCOLOGY INFUSION, UC 26 ATC   [facility] ([facility])    [address] [place] [address]  [address]  [facility] [address]   [phone]            Aug 24, 2018  7:45 AM CDT   Masonic Lab Draw with  MASONIC LAB DRAW   [facility] Lab Draw ([facility])    [address] [place] [address]  [address]  [facility] [address] [phone]            Aug 24, 2018  8:20 AM CDT   (Arrive by 8:05 AM)   Return Active Treatment with KATHRYN Stiles CNP   [facility] ([facility])    [address]  [address]  [facility] [address] [phone]            Aug 24, 2018  9:00 AM CDT   Infusion 360 with UC ONCOLOGY INFUSION   [facility] ([facility])    [address]  [address]  [facility] [id number] [phone]              Who to contact     If you have questions or need follow up information about today's clinic visit or your schedule please contact [facility] directly at [phone].  Normal or non-critical lab and imaging results will be communicated to you by MyChart, letter or phone within 4 business days after the clinic has received the results. If you do not hear from us within 7 days, please contact the clinic through MyChart or phone. If you have a critical or abnormal lab result, we will notify you by phone as soon as possible.  Submit refill requests through [employer] or call your pharmacy and they will forward the refill request to us. Please allow 3 business days for your refill to be completed.          Additional Information About Your Visit        Care EveryWhere ID     This is your Care EveryWhere ID. This could be used by other organizations to access your [facility] medical records  [id number]        Your Vitals Were     Pulse Temperature Respirations Height Pulse Oximetry BMI (Body Mass Index)    109 98  F (36.7  C) (Oral) 16 1.575 m (5' 2\") 98% 18.54 kg/m2       Blood Pressure from Last 3 Encounters:   08/02/18 108/66   07/25/18 108/59   07/16/18 102/45    Weight from Last 3 Encounters:   08/02/18 46 kg (101 lb 6 oz)   07/25/18 43 kg (94 lb 12.8 oz)   07/16/18 45.5 kg (100 lb 6.4 oz)              Today, you had the following     No orders found for " display         Today's Medication Changes          These changes are accurate as of 8/2/18  9:07 AM.  If you have any questions, ask your nurse or doctor.               These medicines have changed or have updated prescriptions.        Dose/Directions    oxyCODONE IR 5 MG tablet   Commonly known as:  ROXICODONE   This may have changed:  when to take this   Used for:  Neoplasm related pain   Changed by:  Neda Marx MD        Dose:  5 mg   Take 1 tablet (5 mg) by mouth every 4 hours as needed for pain   Quantity:  180 tablet   Refills:  0       senna-docusate 8.6-50 MG per tablet   Commonly known as:  SENOKOT-S;PERICOLACE   This may have changed:    - when to take this  - reasons to take this   Used for:  Pelvic mass        Dose:  1 tablet   Take 1 tablet by mouth 2 times daily   Quantity:  100 tablet   Refills:  0            Where to get your medicines      Some of these will need a paper prescription and others can be bought over the counter.  Ask your nurse if you have questions.     Bring a paper prescription for each of these medications     oxyCODONE IR 5 MG tablet               Information about OPIOIDS     PRESCRIPTION OPIOIDS: WHAT YOU NEED TO KNOW   We gave you an opioid (narcotic) pain medicine. It is important to manage your pain, but opioids are not always the best choice. You should first try all the other options your care team gave you. Take this medicine for as short a time (and as few doses) as possible.     These medicines have risks:    DO NOT drive when on new or higher doses of pain medicine. These medicines can affect your alertness and reaction times, and you could be arrested for driving under the influence (DUI). If you need to use opioids long-term, talk to your care team about driving.    DO NOT operate heave machinery    DO NOT do any other dangerous activities while taking these medicines.     DO NOT drink any alcohol while taking these medicines.      If the opioid  prescribed includes acetaminophen, DO NOT take with any other medicines that contain acetaminophen. Read all labels carefully. Look for the word  acetaminophen  or  Tylenol.  Ask your pharmacist if you have questions or are unsure.    You can get addicted to pain medicines, especially if you have a history of addiction (chemical, alcohol or substance dependence). Talk to your care team about ways to reduce this risk.    Store your pills in a secure place, locked if possible. We will not replace any lost or stolen medicine. If you don t finish your medicine, please throw away (dispose) as directed by your pharmacist. The [place] Pollution Control Agency has more information about safe disposal: https://www.pca.[facility].[place].us/living-green/managing-unwanted-medications.     All opioids tend to cause constipation. Drink plenty of water and eat foods that have a lot of fiber, such as fruits, vegetables, prune juice, apple juice and high-fiber cereal. Take a laxative (Miralax, milk of magnesia, Colace, Senna) if you don t move your bowels at least every other day.          Primary Care Provider Fax #    Physician No Ref-Primary [phone]       No address on file        Equal Access to Services     KHADIJAH ROCHA : Arthur Nathan, brett de paz, jackson conklin, bessie jacques . So [facility] [phone].    ATENCIÓN: Si habla español, tiene a alford disposición servicios gratuitos de asistencia lingüística. Llame al [phone].    We comply with applicable federal civil rights laws and [place] laws. We do not discriminate on the basis of race, color, national origin, age, disability, sex, sexual orientation, or gender identity.            Thank you!     Thank you for choosing [facility]  for your care. Our goal is always to provide you with excellent care. Hearing back from our patients is one way we can continue to improve our services. Please take a few  minutes to complete the written survey that you may receive in the mail after your visit with us. Thank you!             Your Updated Medication List - Protect others around you: Learn how to safely use, store and throw away your medicines at www.disposemymeds.org.          This list is accurate as of 8/2/18  9:07 AM.  Always use your most recent med list.                   Brand Name Dispense Instructions for use Diagnosis    enoxaparin 60 MG/0.6ML injection    LOVENOX    12 mL    INJECT THE CONTENTS OF 1 SYRINGE (0.6 ML) UNDER THE SKIN EVERY 12 HOURS FOR 10 DAYS    Other acute pulmonary embolism without acute cor pulmonale (H)       LORazepam 1 MG tablet    ATIVAN    30 tablet    Take 1 tablet (1 mg) by mouth every 6 hours as needed (nausea/vomiting, anxiety or sleep)    Ovarian cancer, unspecified laterality (H), Encounter for long-term (current) use of medications       ondansetron 4 MG tablet    ZOFRAN    20 tablet    Take 1 tablet (4 mg) by mouth every 6 hours as needed for nausea    Nausea       oxyCODONE IR 5 MG tablet    ROXICODONE    180 tablet    Take 1 tablet (5 mg) by mouth every 4 hours as needed for pain    Neoplasm related pain       prochlorperazine 10 MG tablet    COMPAZINE    30 tablet    Take 1 tablet (10 mg) by mouth every 6 hours as needed (nausea/vomiting)    Ovarian cancer, unspecified laterality (H), Encounter for long-term (current) use of medications       senna-docusate 8.6-50 MG per tablet    SENOKOT-S;PERICOLACE    100 tablet    Take 1 tablet by mouth 2 times daily    Pelvic mass

## 2018-08-02 NOTE — PROGRESS NOTES
"Palliative Care Outpatient Clinic Consultation Note    Patient:  Maria Esther Wang    Chief Complaint:   Maria Esther Wang 58 year old female who is presenting to the palliative medicine clinic today accompanied by her nephew Tito at the request of Dr. Fernandez for a palliative care consultation secondary to metastatic clear cell carcinoma.   The patient's primary care provider is:  No Ref-Primary, Physician.     History of Present Illness:  This patient's cancer history was reviewed as per the chart.  History obtained with the assistance of a professional .  In brief, \"Kd\"was admitted to [facility] this summer with ascites and dyspnea.  She was diagnosed with carcinomatosis.  She was ultimately found to have metastatic clear cell carcinoma and was started on Carbo/Taxol.  She was admitted to [facility] from 7/9/18 to 7/13/18 with dyspnea.  Since discharge, Kd notes that she has lost her appetite, has increased nausea over the past day, vomited last night. Nausea comes and goes. Tried lorazepam for this, which was helpful.      Has been drinking Ensure as she has struggled to eat much.  Drank 2 bottles yesterday.  Also had some rice. When she eats, she feels \"tightness\" in the abdomen. Has significant abdominal distension, last paracentesis estimated to be two weeks ago. Has an appointment for a repeat paracentesis today.       Patient's Disease Understanding: Kd understands that she has ovarian cancer which is causing \"water buildup\" in the abdomen.  She knows that it has spread and that she is not a surgical candidate.     Social History  Living Situation: Brother, sister in law, Helps her nephew/nieces, helped to raise them. Speaks [place].   Children: None  Actual/Potential Caregiver(s): Family  Support System: Family  Occupation: Also worked at a small [employer] making [email or web address].   Hobbies: Loves to watch movies.   Spiritual Background: Yazdanism  Spiritual Concerns/Needs: Connected to a " "Amish.   Social History   Substance Use Topics     Smoking status: Never Smoker     Smokeless tobacco: Never Used     Alcohol use No       Opioid Risk Tool (ORT):    Family History of Substance Abuse:        Alcohol = 0 pt (no)       Illegal Drugs = 0 pt (no)       Prescription Drugs = 0 pt (no)      Personal History of Substance Abuse:       Alcohol = 0 pt (no)       Illegal Drugs = 0 pt (no)       Prescription Drugs = 0 pt (no)        Age: 0 pt (age < 16; age > 45)      History of Pre-adolescent Sexual Abuse: Not asked.       Psychological Disease: 0 pt (none)      ORT Score = <3       0-3: Low risk for opioid abuse       4-7: Moderate risk for opioid abuse       >/= 8: High risk for opioid abuse      Family History  Patient's Involvement with Prior History of Serious Illness in Family: No one else in the family has had cancer before.       REVIEW OF SYSTEMS:   ROS: 10 point ROS neg other than the symptoms noted above in the HPI and here:  Palliative Symptom Review (0=no symptom/no concern, 1=mild, 2=moderate, 3=severe):      Pain: Has pain across the abdomen, radiates towards the back, mild today.  Oxycodone helps.  Taking 5mg every 6 hours.  No side effects from this.       Fatigue: 2      Nausea: see above      Constipation: 1 - controlled with stool softener      Diarrhea: 0      Depressive Symptoms: 0      Anxiety: 0      Drowsiness: Taking one nap per day.       Poor Appetite: See above.       Shortness of Breath: Occasionally feels short of breath.       Insomnia: Has a hard time sleeping.            Physical Exam:   Vitals were reviewed  /66  Pulse 109  Temp 98  F (36.7  C) (Oral)  Resp 16  Ht 1.575 m (5' 2\")  Wt 46 kg (101 lb 6 oz)  SpO2 98%  BMI 18.54 kg/m2  General: Alert, cachectic, chronically ill, but comfortable appearing female in no acute distress.   Eyes: Pupils equal, sclera clear.   ENT: MMM.   Cardiac: Tachycardic, regular rhythm, no murmurs.  Hyperdynamic precordium.   Resp: " "CTAB, unlabored on room air.   Abd: Soft, tensely distended, non-tender to palpation, active bowel sounds.   Ext: Warm and well perfused.  Bilateral pedal edema to the knees.   Neuro: No facial asymmetry.  Spontaneous movements grossly non-focal.  Using a wheelchair for transport.   Neuropsych: Alert, appropriately interactive, full affect, normal sensorium.         Data Reviewed:  LABS:   Lab Results   Component Value Date    WBC 16.6 (H) 07/13/2018    HGB 9.1 (L) 07/13/2018    HCT 29.1 (L) 07/13/2018     07/13/2018     07/10/2018    POTASSIUM 3.6 07/10/2018    CHLORIDE 101 07/10/2018    CO2 26 07/10/2018    BUN 8 07/10/2018    CR 0.49 (L) 07/10/2018     (H) 07/10/2018    TROPI <0.015 07/09/2018    AST 24 07/09/2018    ALT 39 07/09/2018    ALKPHOS 129 07/09/2018    BILITOTAL 0.7 07/09/2018    INR 1.25 (H) 07/09/2018     IMAGING:   CT Chest 7/9/18  \"Impression:  1. Numerous segmental and subsegmental pulmonary emboli bilaterally  are unchanged. No evidence of right heart strain.  2. Increased moderate to large left pleural effusion and stable small  right pleural effusion. Unchanged left basilar atelectasis.  3. Worsening mediastinal, left supraclavicular, and right axillary  metastatic lymphadenopathy.  4. Grossly stable peritoneal carcinomatosis in the included upper  abdomen, and unchanged upper abdominal ascites which is presumably  Malignant.\"    [place]  - Use of controlled substances consistent with history.     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  She has significant abdominal distension today which will be treated with a paracentesis.     Recommendations & Counseling:  Symptoms: Cancer pain, adequately controlled.   -Oxycodone refilled at current dosing.   -Increase Ensure to 3 cans/day.   - Try ondansetron first for nausea " (less sedating)    Disease Understanding:  Kd has limited disease understanding.  She will be meeting with her GynOnc team tomorrow and plans to ask additional questions about her health at that appointment.  Today, we discussed that she might continue to be considered for chemotherapy as long as it was felt to not harm more than help her.      Advance Care Planning:  Still needed.     [facility] Palliative Care Opioid Prescribing Safety Plan    Opioid Safety Education: Reviewed by Cintia Norman on 08/02/18  Opioid Risk Assessment: Performed by Neda Marx on 08/02/18.  ORT score of <3.   Mood Disorder Assessment: Performed by Neda Marx on 08/02/18.     reviewed with every prescription, last reviewed by Neda Marx on 08/02/2018     Additional recommendations based on patient's prognosis and indication for opioids include the following:      Expected prognosis: <1 year  Risk: Low or Medium (ORT 0-7)  No further recommendations.     RTC 4-6 weeks for a follow up.     Thank you for involving me in the care of this patient.     Neda Marx MD / Palliative Medicine / Pager [phone] / After-Hours Answering Service [phone] / Main Palliative Clinic - [facility] [phone] / [facility] Inpatient Team Consult Pager [phone] (answered 8am-430pm M-F)    Additional History Reviewed:   No Known Allergies  Current Outpatient Prescriptions   Medication Sig Dispense Refill     enoxaparin (LOVENOX) 60 MG/0.6ML injection INJECT THE CONTENTS OF 1 SYRINGE (0.6 ML) UNDER THE SKIN EVERY 12 HOURS FOR 10 DAYS 12 mL 0     LORazepam (ATIVAN) 1 MG tablet Take 1 tablet (1 mg) by mouth every 6 hours as needed (nausea/vomiting, anxiety or sleep) 30 tablet 1     ondansetron (ZOFRAN) 4 MG tablet Take 1 tablet (4 mg) by mouth every 6 hours as needed for nausea 20 tablet 1     oxyCODONE IR (ROXICODONE) 5 MG tablet Take 1 tablet (5 mg) by mouth every 6 hours as needed  for pain 10 tablet 0     senna-docusate (SENOKOT-S;PERICOLACE) 8.6-50 MG per tablet Take 1 tablet by mouth 2 times daily (Patient taking differently: Take 1 tablet by mouth 2 times daily as needed ) 100 tablet 0     prochlorperazine (COMPAZINE) 10 MG tablet Take 1 tablet (10 mg) by mouth every 6 hours as needed (nausea/vomiting) (Patient not taking: Reported on 8/2/2018) 30 tablet 2     Past Medical History:   Diagnosis Date     Anemia      NO ACTIVE PROBLEMS      Ovarian cancer (H)      Pulmonary embolism (H)      Past Surgical History:   Procedure Laterality Date     INSERT PORT VASCULAR ACCESS N/A 7/9/2018    Procedure: INSERT PORT VASCULAR ACCESS;  Single Lumen Chest Power Port Placement, Leave Access for Chemotherapy;  Surgeon: Alexandro Sharif PA-C;  [facility]     OTHER SURGICAL HISTORY      no surgical history     No family history on file.

## 2018-08-02 NOTE — PROGRESS NOTES
Paracentesis Nursing Note  Maria Esther Wang [facility].    During today's appointment orders from Dr. Fernandez were completed.    Progress Note:  Patient identification verified by name and date of birth.  Assessment completed.  Vitals monitored throughout appointment and recorded in Doc Flowsheets.  See proceduralist note in ultrasound.    Date of consent or authorization: 6/28/18.  Invasive Procedure Safety Checklist was completed and sent for scanning.     Paracentesis performed by Syed Lima PA-C Radiology.    The following labs were communicated to provider performing paracentesis:  Lab Results   Component Value Date     07/13/2018       Total amount of ascites fluid drained: 2.3 liters.  Color of ascites fluid: mack.  Total amount of albumin given: 25  grams.    Patient tolerated procedure well.    Post procedure,denies pain or discomfort post paracentesis.      Discharge Plan:  Discharge instructions were reviewed with patient.  Patient/Representative verbalized understanding and all questions were answered.   Discharged from [facility] in stable condition.    Kayleigh Chapin RN    Administrations This Visit     albumin human 25 % injection 12.5 g     Admin Date Action Dose Route Administered By             08/02/2018 New Bag 12.5 g Intravenous Kayleigh Chapin, JULIANNE                    lidocaine 1 % 20 mL     Admin Date Action Dose Route Administered By             08/02/2018 Given by Other 15 mL Other Kayleigh Chapin RN                          /57  Pulse 101  Temp 98.5  F (36.9  C) (Oral)  Resp 16

## 2018-08-02 NOTE — NURSING NOTE
"Oncology Rooming Note    August 2, 2018 8:18 AM   Maria Esther Wang is a 58 year old female who presents for:    Chief Complaint   Patient presents with     Oncology Clinic Visit     New Pt. Palliative for Pain Management     Initial Vitals: /66  Pulse 109  Temp 98  F (36.7  C) (Oral)  Resp 16  Ht 1.575 m (5' 2\")  Wt 46 kg (101 lb 6 oz)  SpO2 98%  BMI 18.54 kg/m2 Estimated body mass index is 18.54 kg/(m^2) as calculated from the following:    Height as of this encounter: 1.575 m (5' 2\").    Weight as of this encounter: 46 kg (101 lb 6 oz). Body surface area is 1.42 meters squared.  Moderate Pain (4) Comment: Data Unavailable   No LMP recorded. Patient is postmenopausal.  Allergies reviewed: Yes  Medications reviewed: Yes    Medications: MEDICATION REFILLS NEEDED TODAY. Provider was notified. Oxy    Pharmacy name entered into EPIC:    [facility] - [place], [place] - [id number]  [facility] - [place], [place] - [address]    Clinical concerns: new patient here for pain management consult patient is very tired and she vomitted last night one time. Dr. Marx was notified.    10 minutes for nursing intake (face to face time)     Ileana Rosenberg CMA              "

## 2018-08-02 NOTE — PROGRESS NOTES
Gynecologic Oncology Follow-Up Note    RE: Maria Esther Wang  MRN: [id number]  : 1960  Date of Visit: 2018    CC: Maria Esther Wang is a 58 year old year old female with metastatic clear cell carcinoma who presents today for follow up regarding disease management.    HPI: Maria Esther comes to the clinic accompanied by her brother Walter and a Laotian . She is lying on the table because she is having severe back pain which she relates to sitting for too long- has brought her pain medication but has not taken any yet today. Reports her neoplastic pelvic/abdominal pain is currently well controlled. She is breathing quickly but states this is her baseline and she does not feel more short of breath than normal. States she is feeling weak and very fatigued, feels unable to carry her body weight and is in a wheelchair today. She has been taking Zofran for nausea with some relief. States her feet are swollen when she sits but improve with lying down. Attempting to eat and drink but feels she can only tolerate cereal for the most part. Light vaginal spotting which has improved. She denies any other concerns with receiving chemotherapy- no fevers, no neuropathy, no arthralgias, no constipation. She saw Dr. Marx of palliative medicine yesterday. She had a paracentesis yesterday which yielded 2.3L.    Oncology History:  Disease: Biopsy proven metastatic clear cell carcinoma, pelvic mass  18: Reaction to blood products as well as fluid overload, admitted for tachypnea, and received C1D1 neoadjuvant carboplatin/paclitaxel. Thoracentesis for pleural effusions, cytology positive for adenocarcinoma.        Past Medical History:   Diagnosis Date     Anemia      NO ACTIVE PROBLEMS      Ovarian cancer (H)      Pulmonary embolism (H)        Past Surgical History:   Procedure Laterality Date     INSERT PORT VASCULAR ACCESS N/A 2018    Procedure: INSERT PORT VASCULAR ACCESS;  Single Lumen Chest  Power Port Placement, Leave Access for Chemotherapy;  Surgeon: Alexandro Sharif PA-C;  Location: [facility] OR     OTHER SURGICAL HISTORY      no surgical history       Current Outpatient Prescriptions   Medication     enoxaparin (LOVENOX) 60 MG/0.6ML injection     LORazepam (ATIVAN) 1 MG tablet     ondansetron (ZOFRAN) 4 MG tablet     oxyCODONE IR (ROXICODONE) 5 MG tablet     prochlorperazine (COMPAZINE) 10 MG tablet     senna-docusate (SENOKOT-S;PERICOLACE) 8.6-50 MG per tablet     No current facility-administered medications for this visit.      Facility-Administered Medications Ordered in Other Visits   Medication     albumin human 25 % injection 12.5 g     lidocaine 1 % 20 mL       No Known Allergies    No family history on file.    Social History     Social History     Marital status: Single     Spouse name: N/A     Number of children: N/A     Years of education: N/A     Occupational History     Not on file.     Social History Main Topics     Smoking status: Never Smoker     Smokeless tobacco: Never Used     Alcohol use No     Drug use: No     Sexual activity: No     Other Topics Concern     Not on file     Social History Narrative           ROS  General: + fatigue, weakness, appetite changes. Denies changes in weight, night sweats, hot flashes, fever, chills, or difficulty sleeping  HEENT: + hair loss. Denies headaches, visual difficulty or disturbances, masses, head injury, tinnitus, hearing loss, epistaxis, congestion, problems with teeth or gums, dysphonia, or dysphagia  Pulmonary: + dyspnea on exertion. Denies cough, sputum, hemoptysis, shortness of breath, wheezing, or allergies  Cardiovascular: + edema. Denies chest pain, fainting, palpitations, murmurs, activity intolerance, swelling in legs  Gastrointestinal: + nausea, abdominal pain. Denies vomiting, constipation, diarrhea, bloating, heartburn, melena, hematochezia, or jaundice  Genitourinary: + vaginal spotting. Denies dysuria, urinary urgency or  "frequency, hematuria, cloudy or malodorous urine, incontinence, repeat urinary tract infections, flank pain, pelvic pain,  vaginal discharge, or vaginal dryness  Integumentary: Denies rashes, sores, changing moles, or scarring  Hematologic: + swollen lymph nodes. Denies easy bruising, or easy bleeding  Musculoskeletal: + weakness. Denies falls, back pain, myalgias, arthralgias, stiffness, or muscle cramps  Neurologic: + difficulty with walking. Denies numbness or tingling, changes in memory, dizziness, seizures, or tremors  Psychiatric: Denies anxiety, depression, mood changes, suicidal thoughts, or difficulty concentrating  Endocrine: Denies polydipsia, polyuria, temperature intolerance, or history of thyroid disease    Physical Exam:    /57 (BP Location: Left arm, Patient Position: Sitting, Cuff Size: Adult Small)  Pulse 98  Temp 98.9  F (37.2  C) (Oral)  Resp (!) 36  Ht 1.575 m (5' 2\")  Wt 43.9 kg (96 lb 11.2 oz)  SpO2 97%  BMI 17.69 kg/m2    CONSTITUTIONAL: Alert non-toxic appearing female, appears chronically ill and acutely uncomfortable- unable to sit in chair, lying on her side on the exam table  HEAD: Normocephalic, atraumatic, temporal wasting noted  EYES: PERRLA; no scleral icterus  ENT: Oropharynx pink without lesions  NECK: Neck supple, enlarged firm fixed left supraclavicular node  RESPIRATORY: Tachypneic, respirations appear somewhat labored, lungs clear to auscultation   CV: Regular rate and rhythm, S1S2, no clicks, murmurs, rubs, or gallops; bilateral feet with 2+ pitting edema- equal bilaterally, dorsalis pedis pulses 2+ bilaterally  GASTROINTESTINAL: Normoactive bowel sounds x4 quadrants, abdomen soft, distended, and non-tender to palpation  GENITOURINARY: Not performed  LYMPHATIC: Enlarged L supraclavicular node  MUSCULOSKELETAL: Moves all extremities, significant proximal muscle wasting to bilateral upper and lower extermities  NEUROLOGIC: No gross deficits, unsteady gait- nearly " fell while getting off of the table due to weakness but was able to steady herself on her wheelchair  SKIN: Pale, warm and dry, no rashes or lesions to unclothed skin  PSYCHIATRIC: Pleasant and interactive, affect euthymic, makes appropriate eye contact, thought process linear    Labs:      8/3/2018  Day 1 (Planned)   Hemoglobin 11.7 - 15.7 g/dL 6.7 (A) (Comments: This result has been called to TIFFANIE CROWELL RN by Luis Collazo on 08 03 2018 at 0817, and has been read back.  )   Hematocrit 35.0 - 47.0 % 21.6 (A)   Platelet Count 150 - 450 10e9/L 359   Absolute Neutrophil 1.6 - 8.3 10e9/L 12.2 (A)   Sodium 133 - 144 mmol/L 126 (A)   Potassium 3.4 - 5.3 mmol/L 3.5   Chloride 94 - 109 mmol/L 92 (A)   Carbon Dioxide 20 - 32 mmol/L 26   Urea Nitrogen 7 - 30 mg/dL 9   Creatinine 0.52 - 1.04 mg/dL 0.35 (A)   Calcium 8.5 - 10.1 mg/dL 7.9 (A)   Magnesium 1.6 - 2.3 mg/dL 2.0   Bilirubin Total 0.2 - 1.3 mg/dL 0.8   ALT 0 - 50 U/L 13   AST 0 - 45 U/L 14   Alkaline Phosphatase 40 - 150 U/L 94   Albumin 3.4 - 5.0 g/dL 1.9 (A)   Protein Total 6.8 - 8.8 g/dL 7.0   WBC 4.0 - 11.0 10e9/L 13.4 (A)       Assessment/Plan:  1) Metastatic clear cell carcinoma: Multiple concerns regarding her overall clinical picture today including her tachypnea, weakness/near fall during her visit today, significant back pain preventing her from sitting during our exam, hyponatremia, and symptomatic anemia. I am concerned that she needs saline to help correct her hyponatremia, however, this may worsen her anemia and lead to potential fluid overload and worsening of her respiratory status. She does need a blood transfusion, however, she experienced fluid overload and a mildly febrile reaction during her past transfusion outpatient. Given her existing tachypnea and her history, I am recommending she be admitted to the hospital for correction of anemia and hyponatremia as well as monitoring of her fluid/electrolyte and respiratory status during this  time. Defer chemotherapy until there has been an improvement in her clinical picture and function- consider inpatient administration of second cycle of carboplatin/paclitaxel if patient is well enough during her hospital stay. Spoke with Amy Ferro MD (gynecologic oncology fellow) who agreed to direct admission. Will administer NS 1L IV over four hours in infusion while waiting for a bed. Type and screen has been processed. IV pain medication if needed while in infusion, however, she took her oral pain medication after our visit and will let our team know if she requires further treatment. She verbalized understanding of and agreement with plan. Angela  present throughout visit.      KATHRYN Stiles, CHARU-C  Division of Gynecologic Oncology  [facility]  Pager: [phone]      ADDENDUM:  O2 applied in infusion for comfort. Given morphine 1mg IV x1 for back pain. To be admitted to unit 7C. Report to 7C nursing staff called by Tere Teran RN. I called report to Modesta Ruiz CNP. Heparin Xa level unlikely to have been drawn 4h post-dose- suggest this is redrawn tomorrow 4-6h post-administration. Recommend compression stockings for her edema.    KATHRYN Stiles, CHARU-C  Gynecologic Oncology  [facility]  Pager: [phone]

## 2018-08-02 NOTE — PATIENT INSTRUCTIONS
Discharge Instructions for Paracentesis  Paracentesis is a procedure to remove extra fluid from your belly (abdomen). This fluid buildup in the abdomen is called ascites. The procedure may have been done to take a sample of the fluid. Or, it may have been done to drain the extra fluid from your abdomen and help make you more comfortable.     Ascites is buildup of excess fluid in the abdomen.   Home care    If you have pain after the procedure, your healthcare provider can prescribe or recommend pain medicines. Take these exactly as directed. If you stopped taking other medicines before the procedure, ask your provider when you can start them again.    Take it easy for 24 hours after the procedure. Avoid physical activity until your provider says it s OK.    You will have a small bandage over the puncture site. Stitches (sutures), surgical staples, adhesive tapes, adhesive strips, or surgical glue may be used to close the incision. They also help stop bleeding and speed healing. You may take the bandage off in 24 hours.    Check the puncture site for the signs of infection listed below.  Follow-up care  Make a follow-up appointment with your healthcare provider as directed. During your follow-up visit, your provider will check your healing. Let your provider know how you are feeling. You can also discuss the cause of your ascites and if you need any further treatment.  When to seek medical advice  Call your healthcare provider if you have any of the following after the procedure:    A fever of 100.4 F (38.0 C) or higher    Trouble breathing    Pain that doesn't go away even after taking pain medicine    Belly pain not caused by having the skin punctured    Bleeding from the puncture site    More than a small amount of fluid leaking from the puncture site    Swollen belly    Signs of infection at the puncture site. These include increased pain, redness, or swelling, warmth, or bad-smelling drainage.    Blood in your  urine    Feeling dizzy or lightheaded, or fainting   Date Last Reviewed: 7/1/2016 2000-2017 [employer]. [address], [place] [address]. All rights reserved. This information is not intended as a substitute for professional medical care. Always follow your healthcare professional's instructions.

## 2018-08-03 PROBLEM — D64.9 ANEMIA: Status: ACTIVE | Noted: 2018-01-01

## 2018-08-03 PROBLEM — C56.9 OVARIAN CANCER (H): Status: ACTIVE | Noted: 2018-01-01

## 2018-08-03 NOTE — PROGRESS NOTES
Infusion Nursing Note:  Maria Esther Wang presents today for IVF.    Patient seen by provider today: Yes: Angie Whelan NP  Pt arrived with interpretor who remained presented throughout infusion visit and will accompany pt to hospital for admission.    Note: Pt arrived to clinic via wheelchair w/ continued c/o dyspnea.  1L O2 via NC placed for comfort, NP notified.  RR remains in 30s.  Back pain improved with home PO PRN, no intervention for pain needed at this time.    TORB 8/3/2018 0912 HAYLEY Whelan NP/JIGNA Gaston RN: Administer 1L NS IV bolus ONCE at 250cc/hr    Pt reported good control of pain upon arrival to infusion room, but reported increased pain and discomfort in back once again around 1230.  NP notified and 1 mg morphine administered at 1240.  Pt was picked up by transport at 1250.  Notified 7C (spoke with Amparo), of morphine administration.     Intravenous Access:  Peripheral IV placed.    Treatment Conditions:  Lab Results   Component Value Date    HGB 6.7 08/03/2018     Lab Results   Component Value Date    WBC 13.4 08/03/2018      Lab Results   Component Value Date    ANEU 12.2 08/03/2018     Lab Results   Component Value Date     08/03/2018      Lab Results   Component Value Date     08/03/2018                   Lab Results   Component Value Date    POTASSIUM 3.5 08/03/2018           Lab Results   Component Value Date    MAG 2.0 08/03/2018            Lab Results   Component Value Date    CR 0.35 08/03/2018                   Lab Results   Component Value Date    IRON 7.9 08/03/2018                Lab Results   Component Value Date    BILITOTAL 0.8 08/03/2018           Lab Results   Component Value Date    ALBUMIN 1.9 08/03/2018                    Lab Results   Component Value Date    ALT 13 08/03/2018           Lab Results   Component Value Date    AST 14 08/03/2018     Results reviewed, labs did NOT meet treatment parameters: pt being admitted.    Post Infusion  Assessment:  Patient tolerated infusion without incident.  Blood return noted pre and post infusion.  Site patent and intact, free from redness, edema or discomfort.  No evidence of extravasations.  Access discontinued per protocol.    Discharge Plan:   AVS to patient via MYCHART.  Patient will return 8/24/2018 for next appointment or as determined while inpatient.   Patient discharged in stable condition accompanied by: daughter.  Departure Mode: Cart.  Face to Face time: 5 minutes.    Ernestine Gaston RN

## 2018-08-03 NOTE — NURSING NOTE
Chief Complaint   Patient presents with     Blood Draw     IV placed by RN, vitals completed by MA, pt checked in for appt.      Alessandra Woodward MA

## 2018-08-03 NOTE — H&P
Gynecology/Oncology H&P    CC: Dyspnea, anemia, back pain    HPI: Maria Esther Wang is a 58 year old with pelvic mass and biopsy-proven metastatic clear cell carcinoma who presents from clinic with severe back pain, lab abnormalities, and  tachypnea. She was at her baseline yesterday but this morning she felt extremely fatigued and noted low back pain. She presented to clinic for planned chemotherapy. In clinic, she complained of severe back pain, which improved upon taking her PTA oxycodone, and dyspnea.   Presently, she continues to fatigue but her pain has resolved with medication.  She denies shortness of breath.  She has been getting paracentesis weekly with relief in her abdominal discomfort.   Improved yesterday with paracentesis however she is feeling bloated already again today. Complains of having no appetite and intermittent nausea. Had emesis yesterday.  She is trying to drink about 2 boost supplements per day and eats cereal and milk.  Drinks about one 16 ounce bottle of water today.  Her last BM was this morning. NO issues with urination. She notes that when she ambulates she can feel her heart beat fast.  Reports significant edema bilateral ankles and requests compression stockings.  ROS:  Gen: +fatigue, No fevers/chills  HEENT: no changes in vision  Neuro: No dizziness.  CV: No CP  Pulm: No SOB   GI: +decreased appetite. Occasional N/V.  No constipation/diarrhea.   : No dysuria, or urinary symptoms.   Skin: No rashes   MSK: +back pain, LE edema  Endocrine: No DM      Onc History  6/1/2018: presented to PCP for abdominal swelling     CT A/P:  1. Complex pelvic masses, highly suspicious for primary ovarian   malignancy (e.g. cystadenocarcinoma) with possible invasion of the   uterine fundus.  2. Diffuse peritoneal carcinomatosis and metastatic lymphadenopathy.  3. Large volume ascites.  6/18-6/19/2018: Admission to [facility] with bilateral pulmonary emboli     6/19/2018-6/24/2018:  Admission to [facility]. IR Biopsy of peritoneal nodule 6/21/18: mestastatic clear cell carcinoma. Treated for pulmonary emboli, anemia community-acquired pneumonia, ascites.      6/25/18: Visit with Dr. Fernandez. Plan for neoadjuvant Carbo/Taxol.      6/28/2018:Therapeutic paracentesis   7/6/2018: Therapeutic paracentesis   7/9/2018: Presented for C1D1 carbo/taxol, Hgb 6.6, transfusion of 2U pRBC. Tachypnea, tachycardia, elevated temp.       7/9/2018-7/13/2018: Admitted to [facility] for shortness of breath, anemia    7/12/2018 C1D1 Carbo/Taxol      PMH:  Past Medical History:   Diagnosis Date     Anemia      NO ACTIVE PROBLEMS      Ovarian cancer (H)      Pulmonary embolism (H)        PSHx:  Past Surgical History:   Procedure Laterality Date     INSERT PORT VASCULAR ACCESS N/A 7/9/2018    Procedure: INSERT PORT VASCULAR ACCESS;  Single Lumen Chest Power Port Placement, Leave Access for Chemotherapy;  Surgeon: Alexandro Sharif PA-C;  Location:  OR     OTHER SURGICAL HISTORY      no surgical history       Meds:    Current Facility-Administered Medications on File Prior to Encounter:  0.9% sodium chloride BOLUS   [COMPLETED] lidocaine 1 % 20 mL   morphine (PF) injection 1 mg   [DISCONTINUED] albumin human 25 % injection 12.5 g   [DISCONTINUED] sodium chloride 0.9 % 1,000 mL with sodium chloride *See DOSE to administer in MAR details (order question)     Current Outpatient Prescriptions on File Prior to Encounter:  COMPRESSION STOCKINGS 1 each daily   enoxaparin (LOVENOX) 60 MG/0.6ML injection INJECT THE CONTENTS OF 1 SYRINGE (0.6 ML) UNDER THE SKIN EVERY 12 HOURS FOR 10 DAYS   LORazepam (ATIVAN) 1 MG tablet Take 1 tablet (1 mg) by mouth every 6 hours as needed (nausea/vomiting, anxiety or sleep)   ondansetron (ZOFRAN) 4 MG tablet Take 1 tablet (4 mg) by mouth every 6 hours as needed for nausea   oxyCODONE IR (ROXICODONE) 5 MG tablet Take 1 tablet (5 mg) by mouth every 4 hours as needed for pain   prochlorperazine  (COMPAZINE) 10 MG tablet Take 1 tablet (10 mg) by mouth every 6 hours as needed (nausea/vomiting) (Patient not taking: Reported on 8/2/2018)   senna-docusate (SENOKOT-S;PERICOLACE) 8.6-50 MG per tablet Take 1 tablet by mouth 2 times daily (Patient taking differently: Take 1 tablet by mouth 2 times daily as needed )        Allergies:   No Known Allergies     FamHx:  No family history on file.    SocialHx: Here with sister in law. Has good family support system  Social History     Social History     Marital status: Single     Spouse name: N/A     Number of children: N/A     Years of education: N/A       Vitals:  Vitals:    08/03/18 1314 08/03/18 1344 08/03/18 1413   BP: 124/57 113/58 115/59   BP Location:  Left arm    Pulse: 91 95 101   Resp: (!) 32 30 (!) 32   Temp: 96  F (35.6  C) 97.7  F (36.5  C) 97  F (36.1  C)   TempSrc: Oral Oral Oral   SpO2: 99% 100% 98%       PEx:  Gen: Lying in bed, cachectic, port in right chest wall  HEENT: Normocephalic, atraumatic  Neuro: PERRL, EOMI; grossly normal motor movement bilaterally  CV: Tachycardia, regular rhythm  Pulm: Tachypneic, but no respiratory distress, CTAB, no wheezing/rhonchi/crackles  Back: No tenderness to palpation  Abd: Soft, non-tender, non-distended; BS present  Ext: Non-tender, no erythema; 3+ lower extremity edema to ankles bilaterally   Skin: No rashes appreciated  Psych: Appropriate insight/judgment    Labs:   8/3/2018 07:43   Sodium 126 (L)   Potassium 3.5   Chloride 92 (L)   Carbon Dioxide 26   Urea Nitrogen 9   Creatinine 0.35 (L)   GFR Estimate >90   GFR Estimate If Black >90   Calcium 7.9 (L)   Anion Gap 8   Magnesium 2.0   Albumin 1.9 (L)   Protein Total 7.0   Bilirubin Total 0.8   Alkaline Phosphatase 94   ALT 13   AST 14    592 (H)   Glucose 110 (H)   WBC 13.4 (H)   Hemoglobin 6.7 (LL)   Hematocrit 21.6 (L)   Platelet Count 359   RBC Count 2.77 (L)   MCV 78   MCH 24.2 (L)   MCHC 31.0 (L)   RDW 23.8 (H)   Diff Method Automated Method   %  Neutrophils 90.7   % Lymphocytes 5.1   % Monocytes 3.2   % Eosinophils 0.1   % Basophils 0.1   % Immature Granulocytes 0.8   Nucleated RBCs 0   Absolute Neutrophil 12.2 (H)   Absolute Lymphocytes 0.7 (L)   Absolute Monocytes 0.4   Absolute Eosinophils 0.0   Absolute Basophils 0.0   Abs Immature Granulocytes 0.1   Absolute Nucleated RBC 0.0      8/3/2018 07:44   Heparin 10A Level 0.55     Imaging:  CXR:  IMPRESSION: Stable low lung volumes, bilateral pleural effusions, and  bibasilar compressive atelectasis.    Assessment: Maria Esther Wang is a 58 year old female with metastatic clear cell carcinoma and pelvic mass who presented from clinic with fatigue, tachypnea, and lower back pain admitted for failure to thrive, anemia, and hyponatremia.     Active Problem list:  Metastatic clear cell carcinoma  Pelvic mass  Tachypnea  Bilateral pulmonary emboli  Ascities  Anemia of chronic disease    Disease: Pelvic mass, metastatic clear cell carcinoma. Neoadjuvant chemotherapy. S/p C1D1 Carbo/Taxol  7/12/18.  increasing, 592 8/3. Tentative plan for next cycle of Carbo/Taxol prior to discharge pending clinical status. Palliative care team consulted.  FEN: Patient severely malnourished and cachectic. Regular diet, boost supplements, nutrition consult ordered. Hyponatremia, s/p 1L NS bolus in clinic. Will recheck BMP now. Will limit fluids for now due to concern for volume overload with blood transfusion. Strict Is/Os. May give a dose of lasix when Na normalizes  Pain: Tylenol, lidocaine patches, oxycodone prn.   Heme: - Hgb 6.7. Anemia of chronic disease. Will transfuse 2U pRBC.  Recheck CBC 4 hours after transfusion completed  - Bilateral pulmonary emboli, on therapeutic Lovenox, sub therapeutic per Xa level obtained today. Unsure if drawn correctly (4-6 hours after dose) so will recheck after dose tonight.  CV: Tachycardia, likely 2/2 anemia. Continue to montior  Resp: Tachypnea. CXR shows continued bilateral  pleural effusions. May consider thoracentesis if respiratory status worsens. Patient likely 3rd spacing with low albumin and lower extremity edema. Her respiratory status may benefit from diuresis.  GI: Regular diet. Scheduled bowel regimen, prn antiemetics.   : Voiding spontaneously, NI  MSK/Extremities: Lower back pain, improved with current pain medications. Will also add lidocaine patches.  3+ pitting edema in bilateral lower extremities. Yoshi Hose and Lymphedema consult ordered.  Neuro/Psych: NI  Endocrine: NI  ID: Patient triggered sepsis protocol with tachypnea and tachycardia. Low suspicion for infection. Lactate 1.2, continue to monitor.  PPx: SCDs. Therapeutic Lovenox  Disposition: discharge in 2-3 days with home assist vs long term care     CODE STATUS: Discussed code status with the patient in detail using an an . She clearly states that she does not want to  and would not want to be kept alive with extraordinary measures. She states that she does not want chest compressions but would like medication in the event of a cardiac arrest.  She would not like to be intubated long-term, but would like to be intubated short-term if her respiratory status worsens.    Staffed with Dr. Matilda Li MD   OB/GYN PGY-1  8/3/2018 12:44 PM     IEd personally examined and evaluated this patient on 08/03/18.  I discussed the patient with the resident and care team, and agree with the assessment and plan of care as documented in the residents note above.    I personally reviewed vital signs, laboratory values and imaging results.    Pt with ovarian clear cell.  Admit with anemia, hyponatremia, back pain.  Will transfuse and improve hyponatremia.  Palliative care consult.    Yeni Grey MD  Gynecologic Oncology  [facility]

## 2018-08-03 NOTE — NURSING NOTE
"Oncology Rooming Note    August 3, 2018 8:05 AM   Maria Esther Wang is a 58 year old female who presents for:    Chief Complaint   Patient presents with     Blood Draw     IV placed by RN, vitals completed by carol MAGANA checked in for appt.      RECHECK     Active Treatment chemo     Initial Vitals: /57 (BP Location: Left arm, Patient Position: Sitting, Cuff Size: Adult Small)  Pulse 98  Temp 98.9  F (37.2  C) (Oral)  Resp 18  Ht 1.575 m (5' 2\")  Wt 43.9 kg (96 lb 11.2 oz)  SpO2 97%  BMI 17.69 kg/m2 Estimated body mass index is 17.69 kg/(m^2) as calculated from the following:    Height as of this encounter: 1.575 m (5' 2\").    Weight as of this encounter: 43.9 kg (96 lb 11.2 oz). Body surface area is 1.39 meters squared.  Moderate Pain (4) Comment: Data Unavailable   No LMP recorded. Patient is postmenopausal.  Allergies reviewed: Yes  Medications reviewed: Yes    Medications: Medication refills not needed today.  Pharmacy name entered into EPIC:    [facility] - [place], [place] - [phone]  [facility] - [place], [place] - [phone]    Clinical concerns: none      6 minutes for nursing intake (face to face time)     Beti CLARK Wilson              "

## 2018-08-03 NOTE — IP AVS SNAPSHOT
MRN:[id number]                      After Visit Summary   8/3/2018    Maria Esther Wang    MRN: [id number]           Thank you!     Thank you for choosing [facility] for your care. Our goal is always to provide you with excellent care. Hearing back from our patients is one way we can continue to improve our services. Please take a few minutes to complete the written survey that you may receive in the mail after you visit with us. Thank you!        Patient Information     Date Of Birth          1960        Designated Caregiver       Most Recent Value    Caregiver    Will someone help with your care after discharge? yes    Name of designated caregiver Frederick    Phone number of caregiver [phone]    Caregiver address Home      About your hospital stay     You were admitted on:  August 3, 2018 You last received care in the:  [address] [facility]    You were discharged on:  August 11, 2018        Reason for your hospital stay       Weakness  Pleural effusions  Ascites                  Who to Call     For medical emergencies, please call 911.  For non-urgent questions about your medical care, please call your primary care provider or clinic, None          Attending Provider     Provider Specialty    Geo Fernandez MD Obstetrics & Gynecology, Maternal & Fetal Medicine    Yeni Haas MD Gyn-Onc       Primary Care Provider Fax #    Physician No Ref-Primary [phone]       When to contact your care team       PATIENT INSTRUCTIONS FOLLOW-UP:    Call [phone] if you have:  Temperature greater than 100.4  Persistent nausea and vomiting  Severe uncontrolled pain  Difficulty breathing, headache or visual disturbances  Hives  Persistent dizziness or light-headedness  Extreme fatigue  Any other questions or concerns you may have after discharge    In an emergency, call 911 or go to an Emergency Department at a nearby hospital    OTHER:  Patients are often constipated.  The  patient should continue to take stool softeners (for example, Senokot-S) (unless diarrhea develops) and consume adequate amounts of water.  If the patient remains constipated or unable to pass stool, please try one or all of the following measures:  1.  Milk of Magnesia 30cc twice a day as needed by mouth  2.  Metamucil 2 tablespoons in 12 ounces of fluid  3.  Dulcolax oral or suppositories  4.  Prunes or prune juice  5.  Miralax daily      QUESTIONS:  Please feel free to call your physician or the hospital  if you have any questions, and they will be glad to assist you.                  After Care Instructions     Activity       Your activity upon discharge: activity as tolerated            Diet       Follow this diet upon discharge: Boost Shake; Between Meals      Regular Diet Adult                  Your next 10 appointments already scheduled     Aug 17, 2018  9:30 AM CDT   Paracentesis Visit with  Spec Inf Para Provider, UC 40 ATC   [facility] ([facility])    [address]  [address]  [facility] [address] [phone]            Aug 22, 2018  7:30 AM CDT   Paracentesis Visit with  Spec Inf Para Provider, UC 39 ATC   [facility] ([facility])    [address]  [address]  [facility] [address] [phone]            Aug 24, 2018  7:45 AM CDT   Masonic Lab Draw with  MASONIC LAB DRAW   [facility] Lab Draw ([facility])    [address]  [address]  [facility] [address]   [phone]            Aug 24, 2018  8:20 AM CDT   (Arrive by 8:05 AM)   Return Active Treatment with KATHRYN Stiles CNP   [facility] ([facility])    [address]  [address]  [facility] [address] [phone]            Aug 24, 2018  9:00 AM CDT   Infusion  360 with  ONCOLOGY INFUSION, UC 26 ATC   [facility] ([facility])    [address]  [address]  [facility] [address] [phone]            Aug 30, 2018  7:30 AM CDT   Paracentesis Visit with  Spec Inf Para Provider,  39 ATC   [facility] ([facility])    [address]  [address]  [facility] [address] [phone]              Additional Services     Home care nursing referral       Please fax discharge orders to [facility]    Ph:  [phone]    Fax: [phone]    Skilled home care RN for initial home safety evaluation and 1-3 times a week to evaluate medication management, nutrition and hydration evaluation, endurance evaluation, and general status evaluation after discharge from the acute care hospital setting.    Skilled home care RN to assist with admission to the Palliative Home Care Team.    Skilled home care Physical Therapist and Occupational Therapist to evaluate and treat in home setting.    Skilled home care  to evaluate and assist with community services that patient may be eligible fore in home community.    Home Health Aide to assist with bathing and grooming 3 times a week for 2 weeks after discharge.                  Pending Results     Date and Time Order Name Status Description    8/7/2018 1337 IR Thoracentesis Preliminary     8/7/2018 1241 IR Paracentesis Preliminary     8/7/2018 0200 Anaerobic bacterial culture Preliminary     8/6/2018 1630 Fluid Culture Aerobic Bacterial Preliminary     8/6/2018 1621 IR IVC Filter Placement Preliminary     8/6/2018 1611 POC US Guide for Paracentesis In process             Statement of Approval     Ordered          08/11/18 0930  I have reviewed and agree with all the recommendations and orders detailed in this document.  EFFECTIVE NOW     Approved and electronically signed by:   "Rosa Isela Pedraza MD             Admission Information     Date & Time Provider Department Dept. Phone    8/3/2018 Yeni Haas MD [address] [facility] [phone]      Your Vitals Were     Blood Pressure Pulse Temperature Respirations Height Weight    131/58 (BP Location: Left arm) 83 97.6  F (36.4  C) (Oral) 20 1.575 m (5' 2\") 44.9 kg (99 lb)    Pulse Oximetry BMI (Body Mass Index)                95% 18.11 kg/m2          MyCharmSnap Information     [email or web address] lets you send messages to your doctor, view your test results, renew your prescriptions, schedule appointments and more. To sign up, go to [email or web address] . Click on \"Log in\" on the left side of the screen, which will take you to the Welcome page. Then click on \"Sign up Now\" on the right side of the page.     You will be asked to enter the access code listed below, as well as some personal information. Please follow the directions to create your username and password.     Your access code is: [email or web address]  Expires: 2018  8:06 AM     Your access code will  in 90 days. If you need help or a new code, please call your [facility] clinic or [phone].        Care EveryWhere ID     This is your Care EveryWhere ID. This could be used by other organizations to access your [facility] medical records  [id number]        Equal Access to Services     KHADIJAH ROCHA AH: Hadii kian cotoo Sokristenali, waaxda luqadaha, qaybta kaalmada yanetegyada, bessie sullivan. So [facility] [phone].    ATENCIÓN: Si habla español, tiene a alford disposición servicios gratuitos de asistencia lingüística. Llame al [phone].    We comply with applicable federal civil rights laws and [place] laws. We do not discriminate on the basis of race, color, national origin, age, disability, sex, sexual orientation, or gender identity.               Review of your medicines      START taking        Dose / Directions    acetaminophen 325 MG tablet "   Commonly known as:  TYLENOL   Used for:  Ovarian cancer, unspecified laterality (H)        Dose:  650 mg   Take 2 tablets (650 mg) by mouth every 4 hours as needed for mild pain   Quantity:  100 tablet   Refills:  0       mirtazapine 15 MG tablet   Commonly known as:  REMERON   Used for:  Ovarian cancer, unspecified laterality (H)        Dose:  15 mg   Take 1 tablet (15 mg) by mouth At Bedtime For appetite stimulant   Quantity:  30 tablet   Refills:  3       order for DME   Used for:  Ovarian cancer, unspecified laterality (H)        Equipment being ordered: Other: Four wheeled walker with seat due to poor activity tolerance, high fall risk Treatment Diagnosis: gait instability   Quantity:  1 each   Refills:  0       polyethylene glycol Packet   Commonly known as:  MIRALAX/GLYCOLAX   Used for:  Ovarian cancer, unspecified laterality (H)        Dose:  17 g   Take 17 g by mouth daily Hold for loose stools   Quantity:  30 packet   Refills:  1         CONTINUE these medicines which may have CHANGED, or have new prescriptions. If we are uncertain of the size of tablets/capsules you have at home, strength may be listed as something that might have changed.        Dose / Directions    enoxaparin 60 MG/0.6ML injection   Commonly known as:  LOVENOX   This may have changed:  See the new instructions.   Used for:  Other acute pulmonary embolism without acute cor pulmonale (H)        Dose:  60 mg   Inject 0.6 mLs (60 mg) Subcutaneous every 12 hours   Quantity:  60 Syringe   Refills:  11       senna-docusate 8.6-50 MG per tablet   Commonly known as:  SENOKOT-S;PERICOLACE   This may have changed:    - how much to take  - when to take this  - reasons to take this   Used for:  Pelvic mass        Dose:  1-2 tablet   Take 1-2 tablets by mouth 2 times daily as needed for constipation   Quantity:  100 tablet   Refills:  0         CONTINUE these medicines which have NOT CHANGED        Dose / Directions    COMPRESSION STOCKINGS   Used  for:  Bilateral lower extremity edema        Dose:  1 each   1 each daily   Quantity:  1 each   Refills:  0       LORazepam 1 MG tablet   Commonly known as:  ATIVAN   Used for:  Ovarian cancer, unspecified laterality (H), Encounter for long-term (current) use of medications        Dose:  1 mg   Take 1 tablet (1 mg) by mouth every 6 hours as needed (nausea/vomiting, anxiety or sleep)   Quantity:  30 tablet   Refills:  1       ondansetron 4 MG tablet   Commonly known as:  ZOFRAN   Used for:  Nausea        Dose:  4 mg   Take 1 tablet (4 mg) by mouth every 6 hours as needed for nausea   Quantity:  20 tablet   Refills:  1       oxyCODONE IR 5 MG tablet   Commonly known as:  ROXICODONE   Used for:  Neoplasm related pain        Dose:  5 mg   Take 1 tablet (5 mg) by mouth every 4 hours as needed for pain   Quantity:  180 tablet   Refills:  0       prochlorperazine 10 MG tablet   Commonly known as:  COMPAZINE   Used for:  Ovarian cancer, unspecified laterality (H), Encounter for long-term (current) use of medications        Dose:  10 mg   Take 1 tablet (10 mg) by mouth every 6 hours as needed (nausea/vomiting)   Quantity:  30 tablet   Refills:  2            Where to get your medicines      These medications were sent to [facility] [place], [place] - [address]  [address] [address]     Phone:  [phone]     enoxaparin 60 MG/0.6ML injection    mirtazapine 15 MG tablet    polyethylene glycol Packet    senna-docusate 8.6-50 MG per tablet         Some of these will need a paper prescription and others can be bought over the counter. Ask your nurse if you have questions.     Bring a paper prescription for each of these medications     order for DME       You don't need a prescription for these medications     acetaminophen 325 MG tablet                Protect others around you: Learn how to safely use, store and throw away your medicines at www.disposemymeds.org.              Medication List: This is a list of all your medications and when to take them. Check marks below indicate your daily home schedule. Keep this list as a reference.      Medications           Morning Afternoon Evening Bedtime As Needed    acetaminophen 325 MG tablet   Commonly known as:  TYLENOL   Take 2 tablets (650 mg) by mouth every 4 hours as needed for mild pain                                COMPRESSION STOCKINGS   1 each daily                                enoxaparin 60 MG/0.6ML injection   Commonly known as:  LOVENOX   Inject 0.6 mLs (60 mg) Subcutaneous every 12 hours   Last time this was given:  60 mg on 8/11/2018  8:42 AM                                LORazepam 1 MG tablet   Commonly known as:  ATIVAN   Take 1 tablet (1 mg) by mouth every 6 hours as needed (nausea/vomiting, anxiety or sleep)                                mirtazapine 15 MG tablet   Commonly known as:  REMERON   Take 1 tablet (15 mg) by mouth At Bedtime For appetite stimulant                                ondansetron 4 MG tablet   Commonly known as:  ZOFRAN   Take 1 tablet (4 mg) by mouth every 6 hours as needed for nausea                                order for DME   Equipment being ordered: Other: Four wheeled walker with seat due to poor activity tolerance, high fall risk Treatment Diagnosis: gait instability                                oxyCODONE IR 5 MG tablet   Commonly known as:  ROXICODONE   Take 1 tablet (5 mg) by mouth every 4 hours as needed for pain   Last time this was given:  10 mg on 8/11/2018  8:47 AM                                polyethylene glycol Packet   Commonly known as:  MIRALAX/GLYCOLAX   Take 17 g by mouth daily Hold for loose stools   Last time this was given:  17 g on 8/9/2018  9:33 AM                                prochlorperazine 10 MG tablet   Commonly known as:  COMPAZINE   Take 1 tablet (10 mg) by mouth every 6 hours as needed (nausea/vomiting)                                senna-docusate 8.6-50 MG  per tablet   Commonly known as:  SENOKOT-S;PERICOLACE   Take 1-2 tablets by mouth 2 times daily as needed for constipation   Last time this was given:  2 tablets on 8/11/2018  8:42 AM

## 2018-08-03 NOTE — IP AVS SNAPSHOT
[address] [address]    Phone:  [phone]                                       After Visit Summary   8/3/2018    Maria Esther Wang    MRN: [id number]           After Visit Summary Signature Page     I have received my discharge instructions, and my questions have been answered. I have discussed any challenges I see with this plan with the nurse or doctor.    ..........................................................................................................................................  Patient/Patient Representative Signature      ..........................................................................................................................................  Patient Representative Print Name and Relationship to Patient    ..................................................               ................................................  Date                                            Time    ..........................................................................................................................................  Reviewed by Signature/Title    ...................................................              ..............................................  Date                                                            Time

## 2018-08-03 NOTE — MR AVS SNAPSHOT
After Visit Summary   8/3/2018    Maria Esther Wang    MRN: [id number]           Patient Information     Date Of Birth          1960        Visit Information        Provider Department      8/3/2018 7:25 AM Corinna Chacko; Angie Whelan APRN CNP [facility]        Today's Diagnoses     Ovarian cancer, unspecified laterality (H)    -  1    Encounter for antineoplastic chemotherapy        Anemia in neoplastic disease        Hyponatremia        Neoplasm related pain        Other pulmonary embolism without acute cor pulmonale (H)        Bilateral lower extremity edema           Follow-ups after your visit        Your next 10 appointments already scheduled     Aug 09, 2018  7:30 AM CDT   Paracentesis Visit with Uc Spec Inf Para Provider, UC 39 ATC   [facility] ([facility])    [address] [place] [address]  [address]  [facility] [address] [phone]            Aug 17, 2018  9:30 AM CDT   Paracentesis Visit with Uc Spec Inf Para Provider, UC 40 ATC   [facility] ([facility])    [address] [place] [address]  [address]  [facility] [address] [phone]            Aug 22, 2018  7:30 AM CDT   Paracentesis Visit with Uc Spec Inf Para Provider, UC 39 ATC   [facility] ([facility])    [address]  [address]  [facility] [address] [phone]            Aug 24, 2018  7:45 AM CDT   Masonic Lab Draw with UC MASONIC LAB DRAW   [facility]    [address] [place] [address]  [address]  [facility] [address] [phone]            Aug 24, 2018  8:20 AM CDT   (Arrive by 8:05 AM)   Return Active Treatment with KATHRYN Stiles CNP   [facility] ([facility])    [address] [place] [address]  [address]  [address]  [facility] [address] [phone]            Aug 24, 2018  9:00 AM CDT   Infusion 360 with UC ONCOLOGY INFUSION, UC 26 ATC   [facility] ([facility])    [address]  [address]  [facility] [address] [phone]              Future tests that were ordered for you today     Open Standing Orders        Priority Remaining Interval Expires Ordered    Red blood cell prepare order unit Routine 99/100 CONDITIONAL (SPECIFY) BLOOD  8/3/2018            Who to contact     If you have questions or need follow up information about today's clinic visit or your schedule please contact [facility] directly at [phone].  Normal or non-critical lab and imaging results will be communicated to you by TravelLinehart, letter or phone within 4 business days after the clinic has received the results. If you do not hear from us within 7 days, please contact the clinic through Phylogyt or phone. If you have a critical or abnormal lab result, we will notify you by phone as soon as possible.  Submit refill requests through Cinario or call your pharmacy and they will forward the refill request to us. Please allow 3 business days for your refill to be completed.          Additional Information About Your Visit        Cinario Information     Cinario lets you send messages to your doctor, view your test results, renew your prescriptions, schedule appointments and more. To sign up, go to [email or web address] . Click on \"Log in\" on the left side of the screen, which will take you to the Welcome page. Then click on \"Sign up Now\" on the right side of the page.     You will be asked to enter the access code listed below, as well as some personal information. Please follow the directions to create your username and password.     Your access code is: [email or web address]  Expires: 2018  8:06 AM     Your access code will  in 90 days. If you need " "help or a new code, please call your [facility] clinic or [phone].        Care EveryWhere ID     This is your Care EveryWhere ID. This could be used by other organizations to access your [facility] medical records  [id number]        Your Vitals Were     Pulse Temperature Respirations Height Pulse Oximetry BMI (Body Mass Index)    98 98.9  F (37.2  C) (Oral) 36 1.575 m (5' 2\") 97% 17.69 kg/m2       Blood Pressure from Last 3 Encounters:   08/03/18 113/78   08/03/18 107/57   08/02/18 116/57    Weight from Last 3 Encounters:   08/03/18 43.9 kg (96 lb 11.2 oz)   08/02/18 46 kg (101 lb 6 oz)   07/25/18 43 kg (94 lb 12.8 oz)              We Performed the Following     Heparin 10a Level          Today's Medication Changes          These changes are accurate as of 8/3/18 12:41 PM.  If you have any questions, ask your nurse or doctor.               Start taking these medicines.        Dose/Directions    COMPRESSION STOCKINGS   Used for:  Bilateral lower extremity edema   Started by:  Angie Whelan APRN CNP        Dose:  1 each   1 each daily   Quantity:  1 each   Refills:  0         These medicines have changed or have updated prescriptions.        Dose/Directions    senna-docusate 8.6-50 MG per tablet   Commonly known as:  SENOKOT-S;PERICOLACE   This may have changed:    - when to take this  - reasons to take this   Used for:  Pelvic mass        Dose:  1 tablet   Take 1 tablet by mouth 2 times daily   Quantity:  100 tablet   Refills:  0            Where to get your medicines      These medications were sent to [facility] [employer] - [place], [place] - [address]  [address], [facility] [address]     Phone:  [phone]     COMPRESSION STOCKINGS                Primary Care Provider Fax #    Physician No Ref-Primary [phone]       No address on file        Equal Access to Services     KHADIJAH ROCHA AH: Arthur chandler Solisseth, waaxda luqadaha, qaybta kaalmada adecarlos manuel, bessie victor " shivani brennankishorjillian hickeyMadieniels ah. Janice [facility] [phone].    ATENCIÓN: Si timbola bailey, tiene a alford disposición servicios gratuitos de asistencia lingüística. Joe yung [phone].    We comply with applicable federal civil rights laws and [place] laws. We do not discriminate on the basis of race, color, national origin, age, disability, sex, sexual orientation, or gender identity.            Thank you!     Thank you for choosing [facility]  for your care. Our goal is always to provide you with excellent care. Hearing back from our patients is one way we can continue to improve our services. Please take a few minutes to complete the written survey that you may receive in the mail after your visit with us. Thank you!             Your Updated Medication List - Protect others around you: Learn how to safely use, store and throw away your medicines at www.disposemymeds.org.          This list is accurate as of 8/3/18 12:41 PM.  Always use your most recent med list.                   Brand Name Dispense Instructions for use Diagnosis    COMPRESSION STOCKINGS     1 each    1 each daily    Bilateral lower extremity edema       enoxaparin 60 MG/0.6ML injection    LOVENOX    12 mL    INJECT THE CONTENTS OF 1 SYRINGE (0.6 ML) UNDER THE SKIN EVERY 12 HOURS FOR 10 DAYS    Other acute pulmonary embolism without acute cor pulmonale (H)       LORazepam 1 MG tablet    ATIVAN    30 tablet    Take 1 tablet (1 mg) by mouth every 6 hours as needed (nausea/vomiting, anxiety or sleep)    Ovarian cancer, unspecified laterality (H), Encounter for long-term (current) use of medications       ondansetron 4 MG tablet    ZOFRAN    20 tablet    Take 1 tablet (4 mg) by mouth every 6 hours as needed for nausea    Nausea       oxyCODONE IR 5 MG tablet    ROXICODONE    180 tablet    Take 1 tablet (5 mg) by mouth every 4 hours as needed for pain    Neoplasm related pain       prochlorperazine 10 MG tablet    COMPAZINE    30 tablet     Take 1 tablet (10 mg) by mouth every 6 hours as needed (nausea/vomiting)    Ovarian cancer, unspecified laterality (H), Encounter for long-term (current) use of medications       senna-docusate 8.6-50 MG per tablet    SENOKOT-S;PERICOLACE    100 tablet    Take 1 tablet by mouth 2 times daily    Pelvic mass

## 2018-08-03 NOTE — LETTER
8/3/2018       RE: Maria Esther Wang  [address] [facility] [address]     Dear Colleague,    Thank you for referring your patient, Maria Esther Wang, to the [facility]. Please see a copy of my visit note below.    Gynecologic Oncology Follow-Up Note    RE: Maria Esther Wang  MRN: [id number]  : 1960  Date of Visit: 2018    CC: Maria Esther Wang is a 58 year old year old female with metastatic clear cell carcinoma who presents today for follow up regarding disease management.    HPI: Maria Esther comes to the clinic accompanied by her brother Walter and a Laotian . She is lying on the table because she is having severe back pain which she relates to sitting for too long- has brought her pain medication but has not taken any yet today. Reports her neoplastic pelvic/abdominal pain is currently well controlled. She is breathing quickly but states this is her baseline and she does not feel more short of breath than normal. States she is feeling weak and very fatigued, feels unable to carry her body weight and is in a wheelchair today. She has been taking Zofran for nausea with some relief. States her feet are swollen when she sits but improve with lying down. Attempting to eat and drink but feels she can only tolerate cereal for the most part. Light vaginal spotting which has improved. She denies any other concerns with receiving chemotherapy- no fevers, no neuropathy, no arthralgias, no constipation. She saw Dr. Marx of palliative medicine yesterday. She had a paracentesis yesterday which yielded 2.3L.    Oncology History:  Disease: Biopsy proven metastatic clear cell carcinoma, pelvic mass  18: Reaction to blood products as well as fluid overload, admitted for tachypnea, and received C1D1 neoadjuvant carboplatin/paclitaxel. Thoracentesis for pleural effusions, cytology positive for adenocarcinoma.        Past Medical History:   Diagnosis Date      Anemia      NO ACTIVE PROBLEMS      Ovarian cancer (H)      Pulmonary embolism (H)        Past Surgical History:   Procedure Laterality Date     INSERT PORT VASCULAR ACCESS N/A 7/9/2018    Procedure: INSERT PORT VASCULAR ACCESS;  Single Lumen Chest Power Port Placement, Leave Access for Chemotherapy;  Surgeon: Alexandro Sharif PA-C;  Location: [facility] OR     OTHER SURGICAL HISTORY      no surgical history       Current Outpatient Prescriptions   Medication     enoxaparin (LOVENOX) 60 MG/0.6ML injection     LORazepam (ATIVAN) 1 MG tablet     ondansetron (ZOFRAN) 4 MG tablet     oxyCODONE IR (ROXICODONE) 5 MG tablet     prochlorperazine (COMPAZINE) 10 MG tablet     senna-docusate (SENOKOT-S;PERICOLACE) 8.6-50 MG per tablet     No current facility-administered medications for this visit.      Facility-Administered Medications Ordered in Other Visits   Medication     albumin human 25 % injection 12.5 g     lidocaine 1 % 20 mL       No Known Allergies    No family history on file.    Social History     Social History     Marital status: Single     Spouse name: N/A     Number of children: N/A     Years of education: N/A     Occupational History     Not on file.     Social History Main Topics     Smoking status: Never Smoker     Smokeless tobacco: Never Used     Alcohol use No     Drug use: No     Sexual activity: No     Other Topics Concern     Not on file     Social History Narrative           ROS  General: + fatigue, weakness, appetite changes. Denies changes in weight, night sweats, hot flashes, fever, chills, or difficulty sleeping  HEENT: + hair loss. Denies headaches, visual difficulty or disturbances, masses, head injury, tinnitus, hearing loss, epistaxis, congestion, problems with teeth or gums, dysphonia, or dysphagia  Pulmonary: + dyspnea on exertion. Denies cough, sputum, hemoptysis, shortness of breath, wheezing, or allergies  Cardiovascular: + edema. Denies chest pain, fainting, palpitations, murmurs,  "activity intolerance, swelling in legs  Gastrointestinal: + nausea, abdominal pain. Denies vomiting, constipation, diarrhea, bloating, heartburn, melena, hematochezia, or jaundice  Genitourinary: + vaginal spotting. Denies dysuria, urinary urgency or frequency, hematuria, cloudy or malodorous urine, incontinence, repeat urinary tract infections, flank pain, pelvic pain,  vaginal discharge, or vaginal dryness  Integumentary: Denies rashes, sores, changing moles, or scarring  Hematologic: + swollen lymph nodes. Denies easy bruising, or easy bleeding  Musculoskeletal: + weakness. Denies falls, back pain, myalgias, arthralgias, stiffness, or muscle cramps  Neurologic: + difficulty with walking. Denies numbness or tingling, changes in memory, dizziness, seizures, or tremors  Psychiatric: Denies anxiety, depression, mood changes, suicidal thoughts, or difficulty concentrating  Endocrine: Denies polydipsia, polyuria, temperature intolerance, or history of thyroid disease    Physical Exam:    /57 (BP Location: Left arm, Patient Position: Sitting, Cuff Size: Adult Small)  Pulse 98  Temp 98.9  F (37.2  C) (Oral)  Resp (!) 36  Ht 1.575 m (5' 2\")  Wt 43.9 kg (96 lb 11.2 oz)  SpO2 97%  BMI 17.69 kg/m2    CONSTITUTIONAL: Alert non-toxic appearing female, appears chronically ill and acutely uncomfortable- unable to sit in chair, lying on her side on the exam table  HEAD: Normocephalic, atraumatic, temporal wasting noted  EYES: PERRLA; no scleral icterus  ENT: Oropharynx pink without lesions  NECK: Neck supple, enlarged firm fixed left supraclavicular node  RESPIRATORY: Tachypneic, respirations appear somewhat labored, lungs clear to auscultation   CV: Regular rate and rhythm, S1S2, no clicks, murmurs, rubs, or gallops; bilateral feet with 2+ pitting edema- equal bilaterally, dorsalis pedis pulses 2+ bilaterally  GASTROINTESTINAL: Normoactive bowel sounds x4 quadrants, abdomen soft, distended, and non-tender to " palpation  GENITOURINARY: Not performed  LYMPHATIC: Enlarged L supraclavicular node  MUSCULOSKELETAL: Moves all extremities, significant proximal muscle wasting to bilateral upper and lower extermities  NEUROLOGIC: No gross deficits, unsteady gait- nearly fell while getting off of the table due to weakness but was able to steady herself on her wheelchair  SKIN: Pale, warm and dry, no rashes or lesions to unclothed skin  PSYCHIATRIC: Pleasant and interactive, affect euthymic, makes appropriate eye contact, thought process linear    Labs:      8/3/2018  Day 1 (Planned)   Hemoglobin 11.7 - 15.7 g/dL 6.7 (A) (Comments: This result has been called to TIFFANIE CROWELL RN by Luis Collazo on 08 03 2018 at 0817, and has been read back.  )   Hematocrit 35.0 - 47.0 % 21.6 (A)   Platelet Count 150 - 450 10e9/L 359   Absolute Neutrophil 1.6 - 8.3 10e9/L 12.2 (A)   Sodium 133 - 144 mmol/L 126 (A)   Potassium 3.4 - 5.3 mmol/L 3.5   Chloride 94 - 109 mmol/L 92 (A)   Carbon Dioxide 20 - 32 mmol/L 26   Urea Nitrogen 7 - 30 mg/dL 9   Creatinine 0.52 - 1.04 mg/dL 0.35 (A)   Calcium 8.5 - 10.1 mg/dL 7.9 (A)   Magnesium 1.6 - 2.3 mg/dL 2.0   Bilirubin Total 0.2 - 1.3 mg/dL 0.8   ALT 0 - 50 U/L 13   AST 0 - 45 U/L 14   Alkaline Phosphatase 40 - 150 U/L 94   Albumin 3.4 - 5.0 g/dL 1.9 (A)   Protein Total 6.8 - 8.8 g/dL 7.0   WBC 4.0 - 11.0 10e9/L 13.4 (A)       Assessment/Plan:  1) Metastatic clear cell carcinoma: Multiple concerns regarding her overall clinical picture today including her tachypnea, weakness/near fall during her visit today, significant back pain preventing her from sitting during our exam, hyponatremia, and symptomatic anemia. I am concerned that she needs saline to help correct her hyponatremia, however, this may worsen her anemia and lead to potential fluid overload and worsening of her respiratory status. She does need a blood transfusion, however, she experienced fluid overload and a mildly febrile reaction  during her past transfusion outpatient. Given her existing tachypnea and her history, I am recommending she be admitted to the hospital for correction of anemia and hyponatremia as well as monitoring of her fluid/electrolyte and respiratory status during this time. Defer chemotherapy until there has been an improvement in her clinical picture and function- consider inpatient administration of second cycle of carboplatin/paclitaxel if patient is well enough during her hospital stay. Spoke with Amy Ferro MD (gynecologic oncology fellow) who agreed to direct admission. Will administer NS 1L IV over four hours in infusion while waiting for a bed. Type and screen has been processed. IV pain medication if needed while in infusion, however, she took her oral pain medication after our visit and will let our team know if she requires further treatment. She verbalized understanding of and agreement with plan. Laotian  present throughout visit.      KATHRYN Stiles, CHARU-C  Division of Gynecologic Oncology  [facility]  Pager: [phone]      ADDENDUM:  O2 applied in infusion for comfort. Given morphine 1mg IV x1 for back pain. To be admitted to unit 7C. Report to 7C nursing staff called by Tere Teran RN. I called report to Modesta Ruiz CNP. Heparin Xa level unlikely to have been drawn 4h post-dose- suggest this is redrawn tomorrow 4-6h post-administration. Recommend compression stockings for her edema.    KATHRYN Stiles, CHARU-C  Gynecologic Oncology  [facility]  Pager: [phone]         Result reviewed and will be discussed with in-patient team.    Again, thank you for allowing me to participate in the care of your patient.      Sincerely,    KATHRYN Stiles CNP

## 2018-08-03 NOTE — PATIENT INSTRUCTIONS
Contact Numbers    [facility] Main Line: [phone]  [facility] Triage:  [phone]    Call triage with chills and/or temperature greater than or equal to 100.5, uncontrolled nausea/vomiting, diarrhea, constipation, dizziness, shortness of breath, chest pain, bleeding, unexplained bruising, or any new/concerning symptoms, questions/concerns.     If you are having any concerning symptoms or wish to speak to a provider before your next infusion visit, please call your care coordinator or triage to notify them so we can adequately serve you.       After Hours: [phone]    If after hours, weekends, or holidays, call main hospital  and ask for Oncology doctor on call.         August 2018 Sunday Monday Tuesday Wednesday Thursday Friday Saturday                  1     PHONE    2:00 PM   (15 min.)   Corinna Chacko    Services Department 2     [facility] NEW    7:45 AM   (135 min.)   Neda Marx MD   [facility] DRAW   11:45 AM   (15 min.)    [facility] DRAW   [facility] Lab Draw     [facility] PARACENTESIS    2:00 PM   (120 min.)   Provider,  Spec Inf Para   [facility] Specialty and Procedure     US PARACENTESIS    2:05 PM   (60 min.)   [id number]   [facility] Ultrasound 3     [facility] DRAW    7:15 AM   (15 min.)    [facility] DRAW   [facility] Lab Draw     [facility] RETURN ACTIVE TREATMENT    7:25 AM   (90 min.)   Angie Whelan APRN CNP   [facility] ONC INFUSION 360    9:00 AM   (360 min.)   [facility]   [facility]     4       5     6     7     8     9     [facility] PARACENTESIS    7:30 AM   (120 min.)   Provider,  Spec Inf Para   [facility] Specialty and Procedure 10     11       12     13     14     15     16     17     [facility] PARACENTESIS    9:30 AM   (120 min.)   Provider,  Spec Inf Para   [facility]  Specialty and Procedure 18       19     20     21     22     [facility] PARACENTESIS    7:30 AM   (120 min.)   Provider,  Spec Inf Para   [facility] Specialty and Procedure 23     24     [facility] DRAW    7:45 AM   (15 min.)    [facility] DRAW   [facility] Lab Draw     [facility] RETURN ACTIVE TREATMENT    8:05 AM   (40 min.)   Angie Whelan APRN CNP   [facility]     [facility] ONC INFUSION 360    9:00 AM   (360 min.)   [facility] ONCOL[facility]Y INFUSION   [facility] 25       26     27     28     29     30     [facility] PARACENTESIS    7:30 AM   (120 min.)   Provider,  Spec Inf Para   [facility] Specialty and Procedure     [facility] RETURN   12:45 PM   (75 min.)   Evelyn Marinelli MD   [facility] 31 September 2018 Sunday Monday Tuesday Wednesday Thursday Friday Saturday                                 1       2     3     4     5     6     [facility] PARACENTESIS    7:30 AM   (120 min.)   Provider,  Spec Inf Para   [facility] Specialty and Procedure 7     8       9     10     11     12     13     [facility] PARACENTESIS    7:30 AM   (120 min.)   Provider,  Spec Inf Para   [facility] Specialty and Procedure 14     15       16     17     18     19     20     [facility] PARACENTESIS    7:30 AM   (120 min.)   Provider,  Spec Inf Para   [facility] Specialty and Procedure 21     22       23     24     25     26     27     [facility] PARACENTESIS    7:30 AM   (120 min.)   Provider,  Spec Inf Para   [facility] Specialty and Procedure 28     29       30                                                Lab Results:  Recent Results (from the past 12 hour(s))   CBC with platelets differential    Collection Time: 08/03/18  7:43 AM   Result Value Ref Range    WBC 13.4 (H) 4.0 - 11.0 10e9/L    RBC Count 2.77 (L) 3.8 - 5.2 10e12/L    Hemoglobin 6.7 (LL)  11.7 - 15.7 g/dL    Hematocrit 21.6 (L) 35.0 - 47.0 %    MCV 78 78 - 100 fl    MCH 24.2 (L) 26.5 - 33.0 pg    MCHC 31.0 (L) 31.5 - 36.5 g/dL    RDW 23.8 (H) 10.0 - 15.0 %    Platelet Count 359 150 - 450 10e9/L    Diff Method Automated Method     % Neutrophils 90.7 %    % Lymphocytes 5.1 %    % Monocytes 3.2 %    % Eosinophils 0.1 %    % Basophils 0.1 %    % Immature Granulocytes 0.8 %    Nucleated RBCs 0 0 /100    Absolute Neutrophil 12.2 (H) 1.6 - 8.3 10e9/L    Absolute Lymphocytes 0.7 (L) 0.8 - 5.3 10e9/L    Absolute Monocytes 0.4 0.0 - 1.3 10e9/L    Absolute Eosinophils 0.0 0.0 - 0.7 10e9/L    Absolute Basophils 0.0 0.0 - 0.2 10e9/L    Abs Immature Granulocytes 0.1 0 - 0.4 10e9/L    Absolute Nucleated RBC 0.0    Comprehensive metabolic panel    Collection Time: 08/03/18  7:43 AM   Result Value Ref Range    Sodium 126 (L) 133 - 144 mmol/L    Potassium 3.5 3.4 - 5.3 mmol/L    Chloride 92 (L) 94 - 109 mmol/L    Carbon Dioxide 26 20 - 32 mmol/L    Anion Gap 8 3 - 14 mmol/L    Glucose 110 (H) 70 - 99 mg/dL    Urea Nitrogen 9 7 - 30 mg/dL    Creatinine 0.35 (L) 0.52 - 1.04 mg/dL    GFR Estimate >90 >60 mL/min/1.7m2    GFR Estimate If Black >90 >60 mL/min/1.7m2    Calcium 7.9 (L) 8.5 - 10.1 mg/dL    Bilirubin Total 0.8 0.2 - 1.3 mg/dL    Albumin 1.9 (L) 3.4 - 5.0 g/dL    Protein Total 7.0 6.8 - 8.8 g/dL    Alkaline Phosphatase 94 40 - 150 U/L    ALT 13 0 - 50 U/L    AST 14 0 - 45 U/L   Magnesium    Collection Time: 08/03/18  7:43 AM   Result Value Ref Range    Magnesium 2.0 1.6 - 2.3 mg/dL   ABO/Rh type and screen    Collection Time: 08/03/18  7:43 AM   Result Value Ref Range    ABO PENDING     Antibody Screen PENDING     Test Valid Only At          [facility]    Specimen Expires 08/06/2018    Heparin 10a Level    Collection Time: 08/03/18  7:44 AM   Result Value Ref Range    Heparin 10A Level 0.55 IU/mL

## 2018-08-03 NOTE — MR AVS SNAPSHOT
After Visit Summary   8/3/2018    Maria Esther Wang    MRN: [id number]           Patient Information     Date Of Birth          1960        Visit Information        Provider Department      8/3/2018 9:00 AM Corinna Chacko; VASU 26 ATC;  ONCOLOGY INFUSION  Services Department        Today's Diagnoses     Ovarian cancer, unspecified laterality (H)    -  1    Encounter for long-term (current) use of medications        Anemia in neoplastic disease          Care Instructions    Contact Numbers    [facility] [place] [phone]  [facility] [place]:  [phone]    Call triage with chills and/or temperature greater than or equal to 100.5, uncontrolled nausea/vomiting, diarrhea, constipation, dizziness, shortness of breath, chest pain, bleeding, unexplained bruising, or any new/concerning symptoms, questions/concerns.     If you are having any concerning symptoms or wish to speak to a provider before your next infusion visit, please call your care coordinator or triage to notify them so we can adequately serve you.       After Hours: [phone]    If after hours, weekends, or holidays, call main hospital  and ask for Oncology doctor on call.         August 2018 Sunday Monday Tuesday Wednesday Thursday Friday Saturday                  1     PHONE    2:00 PM   (15 min.)   Corinna Chacko    Services Department 2     UMP NEW    7:45 AM   (135 min.)   Neda Marx MD   [facility]     [facility] LAB DRAW   11:45 AM   (15 min.)    [facility] LAB DRAW   [facility] Lab Draw     [facility] PARACENTESIS    2:00 PM   (120 min.)   Provider, Vasu Spec Inf Para   [facility] Specialty and Procedure     US PARACENTESIS    2:05 PM   (60 min.)   [id number]   [facility] Ultrasound 3     [facility] LAB DRAW    7:15 AM   (15 min.)    [facility]   [facility]     [facility] RETURN ACTIVE TREATMENT    7:25 AM   (90  min.)   Angie Whelan APRN CNP   [facility]     [facility] ONC INFUSION 360    9:00 AM   (360 min.)   UC ONCOLOGY INFUSION   [facility]     4       5     6     7     8     9     UMP PARACENTESIS    7:30 AM   (120 min.)   Provider,  Spec Inf Para   [facility] Specialty and Procedure 10     11       12     13     14     15     16     17     [facility] PARACENTESIS    9:30 AM   (120 min.)   Provider,  Spec Inf Para   [facility] Specialty and Procedure 18       19     20     21     22     [facility] PARACENTESIS    7:30 AM   (120 min.)   Provider,  Spec Inf Para   [facility] Specialty and Procedure 23     24     [facility] LAB DRAW    7:45 AM   (15 min.)    [facility] LAB DRAW   [facility] Lab Draw     [facility] RETURN ACTIVE TREATMENT    8:05 AM   (40 min.)   Angie Whelan APRN [facility]     [facility] ONC INFUSION 360    9:00 AM   (360 min.)    ONCOLOGY INFUSION   [facility] 25       26     27     28     29     30     [facility] PARACENTESIS    7:30 AM   (120 min.)   Provider,  Spec Inf [facility] Specialty and Procedure     UMP RETURN   12:45 PM   (75 min.)   Evelyn Marinelli MD   [facility] 31 September 2018 Sunday Monday Tuesday Wednesday Thursday Friday Saturday                                 1       2     3     4     5     6     [facility] PARACENTESIS    7:30 AM   (120 min.)   Provider, Uc Spec Inf Para   [facility] Specialty and Procedure 7     8       9     10     11     12     13     [facility] PARACENTESIS    7:30 AM   (120 min.)   Provider,  Spec Inf Para   [facility] Specialty and Procedure 14     15       16     17     18     19     20     [facility] PARACENTESIS    7:30 AM   (120 min.)   Provider,  Spec Inf Para   [facility] Specialty  and Procedure 21     22       23     24     25     26     27     [facility] PARACENTESIS    7:30 AM   (120 min.)   Provider, [facility] Specialty and Procedure 28     29       30                                                Lab Results:  Recent Results (from the past 12 hour(s))   CBC with platelets differential    Collection Time: 08/03/18  7:43 AM   Result Value Ref Range    WBC 13.4 (H) 4.0 - 11.0 10e9/L    RBC Count 2.77 (L) 3.8 - 5.2 10e12/L    Hemoglobin 6.7 (LL) 11.7 - 15.7 g/dL    Hematocrit 21.6 (L) 35.0 - 47.0 %    MCV 78 78 - 100 fl    MCH 24.2 (L) 26.5 - 33.0 pg    MCHC 31.0 (L) 31.5 - 36.5 g/dL    RDW 23.8 (H) 10.0 - 15.0 %    Platelet Count 359 150 - 450 10e9/L    Diff Method Automated Method     % Neutrophils 90.7 %    % Lymphocytes 5.1 %    % Monocytes 3.2 %    % Eosinophils 0.1 %    % Basophils 0.1 %    % Immature Granulocytes 0.8 %    Nucleated RBCs 0 0 /100    Absolute Neutrophil 12.2 (H) 1.6 - 8.3 10e9/L    Absolute Lymphocytes 0.7 (L) 0.8 - 5.3 10e9/L    Absolute Monocytes 0.4 0.0 - 1.3 10e9/L    Absolute Eosinophils 0.0 0.0 - 0.7 10e9/L    Absolute Basophils 0.0 0.0 - 0.2 10e9/L    Abs Immature Granulocytes 0.1 0 - 0.4 10e9/L    Absolute Nucleated RBC 0.0    Comprehensive metabolic panel    Collection Time: 08/03/18  7:43 AM   Result Value Ref Range    Sodium 126 (L) 133 - 144 mmol/L    Potassium 3.5 3.4 - 5.3 mmol/L    Chloride 92 (L) 94 - 109 mmol/L    Carbon Dioxide 26 20 - 32 mmol/L    Anion Gap 8 3 - 14 mmol/L    Glucose 110 (H) 70 - 99 mg/dL    Urea Nitrogen 9 7 - 30 mg/dL    Creatinine 0.35 (L) 0.52 - 1.04 mg/dL    GFR Estimate >90 >60 mL/min/1.7m2    GFR Estimate If Black >90 >60 mL/min/1.7m2    Calcium 7.9 (L) 8.5 - 10.1 mg/dL    Bilirubin Total 0.8 0.2 - 1.3 mg/dL    Albumin 1.9 (L) 3.4 - 5.0 g/dL    Protein Total 7.0 6.8 - 8.8 g/dL    Alkaline Phosphatase 94 40 - 150 U/L    ALT 13 0 - 50 U/L    AST 14 0 - 45 U/L   Magnesium    Collection Time:  08/03/18  7:43 AM   Result Value Ref Range    Magnesium 2.0 1.6 - 2.3 mg/dL   ABO/Rh type and screen    Collection Time: 08/03/18  7:43 AM   Result Value Ref Range    ABO PENDING     Antibody Screen PENDING     Test Valid Only At          [facility]    Specimen Expires 08/06/2018    Heparin 10a Level    Collection Time: 08/03/18  7:44 AM   Result Value Ref Range    Heparin 10A Level 0.55 IU/mL               Follow-ups after your visit        Your next 10 appointments already scheduled     Aug 09, 2018  7:30 AM CDT   Paracentesis Visit with Uc Spec Inf Para Provider, UC 39 ATC   [facility] Specialty and Procedure ([facility])    [address]  [address]  [facility] [address] [phone]            Aug 17, 2018  9:30 AM CDT   Paracentesis Visit with Uc Spec Inf Para Provider, UC 40 ATC   [facility] Specialty and Procedure ([facility])    [address]  [address]  [facility] [address] [phone]            Aug 22, 2018  7:30 AM CDT   Paracentesis Visit with Uc Spec Inf Para Provider, UC 39 ATC   [facility] Specialty and Procedure ([facility])    [address]  [address]  [facility] [address] [phone]            Aug 24, 2018  7:45 AM CDT   [facility] Lab Draw with  [facility] LAB DRAW   [facility] ([facility])    [address]  [address]  [facility] [address] [phone]            Aug 24, 2018  8:20 AM CDT   (Arrive by 8:05 AM)   Return Active Treatment with KATHRYN Stiles CNP   [facility] ([facility])    [address]  [address]  [facility] [address] [phone]            Aug 24, 2018  9:00 AM CDT   Infusion 360 with UC ONCOLOGY INFUSION, UC 26 ATC   [facility]  "[facility])    [address]  [address]  [facility] [address] [phone]              Future tests that were ordered for you today     Open Standing Orders        Priority Remaining Interval Expires Ordered    Red blood cell prepare order unit Routine 99/100 CONDITIONAL (SPECIFY) BLOOD  8/3/2018            Who to contact     If you have questions or need follow up information about today's clinic visit or your schedule please contact [employer] [facility] directly at [phone].  Normal or non-critical lab and imaging results will be communicated to you by [facility] [employer], letter or phone within 4 business days after the clinic has received the results. If you do not hear from us within 7 days, please contact the clinic through [employer] or phone. If you have a critical or abnormal lab result, we will notify you by phone as soon as possible.  Submit refill requests through [employer] or call your pharmacy and they will forward the refill request to us. Please allow 3 business days for your refill to be completed.          Additional Information About Your Visit        [employer] Information     [employer] lets you send messages to your doctor, view your test results, renew your prescriptions, schedule appointments and more. To sign up, go to [email or web address] . Click on \"Log in\" on the left side of the screen, which will take you to the Welcome page. Then click on \"Sign up Now\" on the right side of the page.     You will be asked to enter the access code listed below, as well as some personal information. Please follow the directions to create your username and password.     Your access code is: [email or web address]  Expires: 2018  8:06 AM     Your access code will  in 90 days. If you need help or a new code, please call your [facility] or [phone].        Care EveryWhere ID     This is your Care EveryWhere ID. This could be used by other " organizations to access your [place] medical records  [id number]        Your Vitals Were     Respirations Pulse Oximetry                36 99%           Blood Pressure from Last 3 Encounters:   08/03/18 113/78   08/03/18 107/57   08/02/18 116/57    Weight from Last 3 Encounters:   08/03/18 43.9 kg (96 lb 11.2 oz)   08/02/18 46 kg (101 lb 6 oz)   07/25/18 43 kg (94 lb 12.8 oz)              We Performed the Following     ABO/Rh type and screen     Blood component     Blood component          CBC with platelets differential     Comprehensive metabolic panel     Magnesium          Today's Medication Changes          These changes are accurate as of 8/3/18  1:02 PM.  If you have any questions, ask your nurse or doctor.               Start taking these medicines.        Dose/Directions    COMPRESSION STOCKINGS   Used for:  Bilateral lower extremity edema   Started by:  Angie Whelan APRN CNP        Dose:  1 each   1 each daily   Quantity:  1 each   Refills:  0         These medicines have changed or have updated prescriptions.        Dose/Directions    senna-docusate 8.6-50 MG per tablet   Commonly known as:  SENOKOT-S;PERICOLACE   This may have changed:    - when to take this  - reasons to take this   Used for:  Pelvic mass        Dose:  1 tablet   Take 1 tablet by mouth 2 times daily   Quantity:  100 tablet   Refills:  0            Where to get your medicines      These medications were sent to [facility] - [place], [place] - [address]  [address], [facility] [address]     Phone:  [phone]     COMPRESSION STOCKINGS                Primary Care Provider Fax #    Physician No Ref-Primary [phone]       No address on file        Equal Access to Services     [facility]: Arthur Nathan, wagamal luqadaha, qaybta bessie garcia. [facility] [phone].    ATENCIÓN: Si habla español, tiene a alford disposición  servicios gratuitos de asistencia lingüística. Joe yung [phone].    We comply with applicable federal civil rights laws and [place] laws. We do not discriminate on the basis of race, color, national origin, age, disability, sex, sexual orientation, or gender identity.            Thank you!     Thank you for choosing [facility]  for your care. Our goal is always to provide you with excellent care. Hearing back from our patients is one way we can continue to improve our services. Please take a few minutes to complete the written survey that you may receive in the mail after your visit with us. Thank you!             Your Updated Medication List - Protect others around you: Learn how to safely use, store and throw away your medicines at www.disposemymeds.org.          This list is accurate as of 8/3/18  1:02 PM.  Always use your most recent med list.                   Brand Name Dispense Instructions for use Diagnosis    COMPRESSION STOCKINGS     1 each    1 each daily    Bilateral lower extremity edema       enoxaparin 60 MG/0.6ML injection    LOVENOX    12 mL    INJECT THE CONTENTS OF 1 SYRINGE (0.6 ML) UNDER THE SKIN EVERY 12 HOURS FOR 10 DAYS    Other acute pulmonary embolism without acute cor pulmonale (H)       LORazepam 1 MG tablet    ATIVAN    30 tablet    Take 1 tablet (1 mg) by mouth every 6 hours as needed (nausea/vomiting, anxiety or sleep)    Ovarian cancer, unspecified laterality (H), Encounter for long-term (current) use of medications       ondansetron 4 MG tablet    ZOFRAN    20 tablet    Take 1 tablet (4 mg) by mouth every 6 hours as needed for nausea    Nausea       oxyCODONE IR 5 MG tablet    ROXICODONE    180 tablet    Take 1 tablet (5 mg) by mouth every 4 hours as needed for pain    Neoplasm related pain       prochlorperazine 10 MG tablet    COMPAZINE    30 tablet    Take 1 tablet (10 mg) by mouth every 6 hours as needed (nausea/vomiting)    Ovarian cancer, unspecified  laterality (H), Encounter for long-term (current) use of medications       senna-docusate 8.6-50 MG per tablet    SENOKOT-S;PERICOLACE    100 tablet    Take 1 tablet by mouth 2 times daily    Pelvic mass

## 2018-08-03 NOTE — DISCHARGE SUMMARY
Gynecologic Oncology Discharge Summary    Maria Esther Wang  [id number]    Admit Date: 8/3/2018  Discharge Date: 8/11/2018   Admitting Provider: Dr Grey  Discharge Provider: Dr Maurice    Admission Dx:   - Metastatic clear cell carcinoma  - Pelvic mass  - Tachypnea  - Bilateral pulmonary emboli  - Ascites  - Hyponatremia  - Anemia of chronic disease  - Bilateral pleural effusions    Discharge Dx:  - Metastatic clear cell carcinoma  - Pelvic mass  - Tachypnea, improving  - Bilateral pulmonary emboli, new segmental/subsegmental PE in RLL  - Ascites  - Hyponatremia   - Anemia of chronic disease  - Bilateral pleural effusions  - Occlusive deep vein thrombus in the left tibial peroneal trunk found while on therapeutic lovenox    Patient Active Problem List   Diagnosis     Pulmonary emboli (H)     Ascites     Ovarian cancer, unspecified laterality (H)     Encounter for long-term (current) use of medications     Anemia in neoplastic disease     Shortness of breath     Tachypnea     Anemia     Ovarian cancer (H)       Procedures:  Paracentesis on 8/6  IVC filter placement on 8/6  Thoracentesis on 8/8      Prior to Admission Medications:    No current facility-administered medications on file prior to encounter.   Current Outpatient Prescriptions on File Prior to Encounter:  COMPRESSION STOCKINGS 1 each daily   LORazepam (ATIVAN) 1 MG tablet Take 1 tablet (1 mg) by mouth every 6 hours as needed (nausea/vomiting, anxiety or sleep)   ondansetron (ZOFRAN) 4 MG tablet Take 1 tablet (4 mg) by mouth every 6 hours as needed for nausea   oxyCODONE IR (ROXICODONE) 5 MG tablet Take 1 tablet (5 mg) by mouth every 4 hours as needed for pain   prochlorperazine (COMPAZINE) 10 MG tablet Take 1 tablet (10 mg) by mouth every 6 hours as needed (nausea/vomiting) (Patient not taking: Reported on 8/2/2018)     Discharge Medications:     Review of your medicines      START taking       Dose / Directions    acetaminophen 325 MG tablet    Commonly known as:  TYLENOL   Used for:  Ovarian cancer, unspecified laterality (H)        Dose:  650 mg   Take 2 tablets (650 mg) by mouth every 4 hours as needed for mild pain   Quantity:  100 tablet   Refills:  0       mirtazapine 15 MG tablet   Commonly known as:  REMERON   Used for:  Ovarian cancer, unspecified laterality (H)        Dose:  15 mg   Take 1 tablet (15 mg) by mouth At Bedtime For appetite stimulant   Quantity:  30 tablet   Refills:  3       order for DME   Used for:  Ovarian cancer, unspecified laterality (H)        Equipment being ordered: Other: Four wheeled walker with seat due to poor activity tolerance, high fall risk Treatment Diagnosis: gait instability   Quantity:  1 each   Refills:  0       polyethylene glycol Packet   Commonly known as:  MIRALAX/GLYCOLAX   Used for:  Ovarian cancer, unspecified laterality (H)        Dose:  17 g   Take 17 g by mouth daily Hold for loose stools   Quantity:  30 packet   Refills:  1         CONTINUE these medicines which may have CHANGED, or have new prescriptions. If we are uncertain of the size of tablets/capsules you have at home, strength may be listed as something that might have changed.       Dose / Directions    enoxaparin 60 MG/0.6ML injection   Commonly known as:  LOVENOX   This may have changed:  See the new instructions.   Used for:  Other acute pulmonary embolism without acute cor pulmonale (H)        Dose:  60 mg   Inject 0.6 mLs (60 mg) Subcutaneous every 12 hours   Quantity:  60 Syringe   Refills:  11       senna-docusate 8.6-50 MG per tablet   Commonly known as:  SENOKOT-S;PERICOLACE   This may have changed:    - how much to take  - when to take this  - reasons to take this   Used for:  Pelvic mass        Dose:  1-2 tablet   Take 1-2 tablets by mouth 2 times daily as needed for constipation   Quantity:  100 tablet   Refills:  0         CONTINUE these medicines which have NOT CHANGED       Dose / Directions    COMPRESSION STOCKINGS   Used  for:  Bilateral lower extremity edema        Dose:  1 each   1 each daily   Quantity:  1 each   Refills:  0       LORazepam 1 MG tablet   Commonly known as:  ATIVAN   Used for:  Ovarian cancer, unspecified laterality (H), Encounter for long-term (current) use of medications        Dose:  1 mg   Take 1 tablet (1 mg) by mouth every 6 hours as needed (nausea/vomiting, anxiety or sleep)   Quantity:  30 tablet   Refills:  1       ondansetron 4 MG tablet   Commonly known as:  ZOFRAN   Used for:  Nausea        Dose:  4 mg   Take 1 tablet (4 mg) by mouth every 6 hours as needed for nausea   Quantity:  20 tablet   Refills:  1       oxyCODONE IR 5 MG tablet   Commonly known as:  ROXICODONE   Used for:  Neoplasm related pain        Dose:  5 mg   Take 1 tablet (5 mg) by mouth every 4 hours as needed for pain   Quantity:  180 tablet   Refills:  0       prochlorperazine 10 MG tablet   Commonly known as:  COMPAZINE   Used for:  Ovarian cancer, unspecified laterality (H), Encounter for long-term (current) use of medications        Dose:  10 mg   Take 1 tablet (10 mg) by mouth every 6 hours as needed (nausea/vomiting)   Quantity:  30 tablet   Refills:  2            Where to get your medicines      These medications were sent to [facility] [place], [place] - [address]  [address] [address]     Phone:  [phone]      enoxaparin 60 MG/0.6ML injection     mirtazapine 15 MG tablet     polyethylene glycol Packet     senna-docusate 8.6-50 MG per tablet         Some of these will need a paper prescription and others can be bought over the counter. Ask your nurse if you have questions.     Bring a paper prescription for each of these medications      order for DME       You don't need a prescription for these medications      acetaminophen 325 MG tablet           Consultations:  Internal Medicine Procedure Team  Palliative Medicine  Physical Therapy  Occupational Therapy  Social  Work  Lymphedema    Brief History of Illness:  Maria Esther Wang is a 58 year old with pelvic mass and biopsy-proven metastatic clear cell carcinoma who presents from clinic with severe back pain, lab abnormalities, and tachypnea. She was at her baseline the morning of admission and presented to the infusion center for planned chemotherapy. In clinic, she complained of severe back pain, which improved upon taking her PTA oxycodone and was noted to have tachypnea, hyponatremia, anemia, and failure to thrive.     Hospital Course:  Dz:   - She has a pelvic mass and metastatic clear cell carcinoma. She has complete C1 of neoadjuvant Carbo/Taxol and received C2 carbo/taxol on 8/9 without difficulty. She has further chemotherapy appointments and management scheduled.    FEN:   - She tolerated small amounts of a regular diet throughout her admission. She had hyponatremia which was treated with IVF supplementation, which included small bolus of hypertonic saline (100mL x2 on HD#2 and HD#3) and continuous NS IVF at 50mL/hr. On HD#3 she had increased work of breathing and CXR showed new pulmonary edema. The IVF were discontinued at this time. By discharge, she was tolerating small amount of a regular diet without nausea and vomiting and able to maintain her hydration without IVF supplementation. Remeron was started per palliative recommendation to help with appetite.  Pain:   - The patient continued to have abdominal pain throughout the admission that was well controlled on PO pain medications, and she was discharged home with these medications.  CV:   - Pt was intermittently tachycardic to 110s. EKG on HD#2 and HD#3 were largely unremarkable and stable over the course of admission.  PULM:   - She was admitted with tachypnea. She has known bilateral malignant pleural effusions and bilateral pulmonary emboli. Work-up was notable for CXR with stable low lung volumes, bilateral pleural effusions, and bibasilar compressive  atelectasis. She was given O2 supplementation during her admission. On HD#3 she had increased work of breathing and CXR was obtained that showed new interstitial pulmonary edema. A rapid response was called by nursing staff due to increased work of breathing. IV lasix was given with good response and improvement in her symptoms. IR was consulted for a thoracentesis which was completed on 8/8 with drainage of 850 mL of right pleural effusion and 550 of left pleural effusion. On HD#7, the patient developed tachypnea and shortness of breath while receiving 1U pRBC prior to chemo; she received 10 mg IV Lasix which relieved her symptoms.     By the time of discharge, she was maintaining oxygen saturation >94% on room air    HEME:  - Prior to admission hemoglobin was 6.7. She received 2U pRBCs. Her hemoglobin marilin to 9.4 and was stable at 9.0 at time of discharge.   - She has known bilateral pulmonary emboli. She was continued on therapeutic Lovenox and Heparin 10A level was 0.93.   -Lovenox was held temporarily due to several procedures, so patient was placed on a heparin drip on 8/6; it was discontinued on 8/8 and lovenox was resumed.    GI:   -  She has recurrent abdominal ascites and will follow-up as an outpatient for therapeutic paracentesis PRN. Over the course of her hospital stay her abdominal distension increased and on HD#3 the medicine procedure team was contacted for therapeutic paracentesis to aide in work of breathing; the procedure was aborted, but completed by IR on 8/7 with removal of 1700 mL. Nutrition was consulted during her stay. She will be discharged with a bowel regimen to prevent constipation while on narcotics.    :    -  The patient voided spontaneously without difficulty throughout her admission.  She had no acute  issues while in house.    ID:   - The patient was AF throughout her hospitalization. She had a leukocytosis on admission WBC 13.4 thought to be secondary to malignancy.  By time  of discharge her leukocytosis had resolved.    ENDO:   - no issues    PSYCH/NEURO:   - no issues    PPX:    -  She was given SCDs, IS, and therapeutic lovenox during her hospital course.  She tolerated these prophylactic interventions without incident.  Therapeutic lovenox was continued at the time of her discharge.        Discharge Instructions and Follow up:  Ms. Maria Esther Wang was discharged from the hospital with follow up    Discharge Diet: Regular  Discharge Activity: Activity as tolerated  Discharge Follow up:    -Oncology 8/24   -Palliative Care 8/30    Discharge Disposition:  Discharged to home with plan for home PT/OT    Discharge Staff: Dr Kaylene Pedraza MD  Obstetrics and Gyncology, PGY-2  August 11, 2018 , 9:28 AM        I have examined this patient personally with the team and agree with the above findings and plan.    Darrick Maurice

## 2018-08-04 NOTE — PLAN OF CARE
Problem: Patient Care Overview  Goal: Plan of Care/Patient Progress Review  OT 7C: OT orders acknowledged. Pt limited by strength and fatigue and currently being addressed by PT. Pt has assist for LB dressing and has shower chair at home. Pt likely to discharge Monday and will likely only require one discipline to facilitate safe discharge planning. PT to continue to screen for OT needs and OT will initiate or defer as appropriate.

## 2018-08-04 NOTE — PLAN OF CARE
Problem: Anemia (Adult)  Goal: Identify Related Risk Factors and Signs and Symptoms  Related risk factors and signs and symptoms are identified upon initiation of Human [employer] Clinical Practice Guideline (CPG).   Outcome: No Change  Tachycardic, OVSS. Pt set of SIRS protocol, ;actic acid 2.3. MD notified. No nursing interventions ordered, labs pending. MIV infusing through port. Voids spont with adequate UOP. No BM. Pt tolerating small amounts of regular diet, states she feels very full after small meals. Pain controlled with PO oxycodone X2. Plan for IV chemo tomorrow if labs WNL. Cont. POC.

## 2018-08-04 NOTE — PLAN OF CARE
Problem: Patient Care Overview  Goal: Plan of Care/Patient Progress Review  Outcome: No Change  2nd unit of blood administered via port. Pt had episode of tachypnea d/t pain and being uncomfortable. Soon subsided. Oxy administered. Up with SBA, pt feels weak and deconditioned. NS @50cc/hr. Blue phone used for interpreting. Regular diet. Continue to monitor.

## 2018-08-04 NOTE — PROGRESS NOTES
Gynecologic Oncology Progress Note  8/4/2018     HD#:2    24 hour events:   - Hep 10A level drawn at 6h instead of 4h  - Hyponatremia - now on 50cc NS maintenance fluid  - s/p 2u pRBC    Subjective: Patient is feeling ok this morning. Having lower back pain, but pain medication is helping this. Still endorses dyspnea and notes her heart feels like it's racing. She is tolerating little PO but trying. Voiding spontaneously. No BM since admission    Objective:   Vitals:    08/03/18 2248 08/03/18 2333 08/04/18 0001 08/04/18 0145   BP: 125/62 110/54 123/56 132/59   BP Location: Left arm Left arm Left arm    Pulse:  91 96 104   Resp: 24 20 20 20   Temp: 96.3  F (35.7  C) 96.6  F (35.9  C)  96.7  F (35.9  C)   TempSrc: Oral Oral  Oral   SpO2: 100% 100% 99% 96%   2L NC oxygen    GEN - mild discomfort, sitting up in bed holding back  CV - Tachycardic, regular rhythm. Possible soft systolic murmur heard at left sternal border  PULM - CTAB, no wheezing. Decreased breath sounds in bilateral lower lobes, faint crackles on left  ABD - firm abdomen, distended, slight tenderness to palpation  Ext - warm and well perfused, 2+ edema bilaterally. Non-tender      I/Os      unmeasured urine output x1. No BM    New Labs/Imaging-   Results for orders placed or performed during the hospital encounter of 08/03/18 (from the past 24 hour(s))   Lactic acid level STAT for sepsis protocol   Result Value Ref Range    Lactate for Sepsis Protocol 1.2 0.7 - 2.0 mmol/L   Palliative Care Adult IP Consult: Patient to be seen: Routine within 24 hrs; Call back #: [phone]; metastatic ovarian cancer; Consultant may enter orders: Yes    Narrative    Osmany Coreas, KATHRYN CNP     8/3/2018  9:32 PM  [facility]  Palliative Care Consultation         Maria Esther Wang MRN# [id number]   Age: 58 year old YOB: 1960   Date of Admission: 8/3/2018    Reason for consult: Symptom management  Goals of care  Decisional  "support  Patient and family support       Requesting physician/service: Gyn/Onc       Recommendations        - Palliative will continue to follow with you  - Will have palliative social work coordinate with hospital   social work to help identify options for additional support at   home (PCA vs other)  - Consider OT consult to evaluate mobility/home   safety/recommendations for assistive devices that could help with   mobility (pt requesting a wheelchair)  - Agree with continuing acetaminophen for pain. Could consider   scheduling (650mg 6h or 1000mg TID) if pain escalates  - Agree with continuing oxycodone 5mg prn. Reports abdominopelvic   pain well-controlled at home q6h, but would be reasonable to   shorten interval to q4h given new poorly-controlled back pain  - Agree with ondansetron/lorazepam for nausea; reports that   lorazepam has been effective at home  - Agree with current bowel regimen; consider escalating regimen   if increase opioids  - Consider continuing with weekly paracenteses if remains   inpatient for extended period of time  - POLST not completed. Should be addressed prior to discharge     Disease Process/es & Symptoms     Metastatic clear cell ovarian carcinoma   Pelvic mass  Ascites (paracentesis once weekly)  Hyponatremia   Leukocytosis  Anemia  Tachypnea  Pulmonary emboli on therapeutic lovenox  Severe protein-calorie malnutrition (notes 20 lb weight loss in 4   months)     Support/Coping     Has support from family, with whom she lives (nieces and nephews,   whom she largely raised). However, niece present at bedside noted   that all of the family works every day, and would not be in a   position to provide around-the-clock care to Kd.     When asked \"what do you worry about,\" she answered \"I don't know   what to say. What can I do? I don't know what this is.\" As part   of our interview, we attempted to find, but did not see an   opening for any deeper discussion of her larger concerns, and "   whether she has any existential distress from her diagnosis. It's   not completely clear whether this is due to a language barrier   (as mediated through an ), the presence of family,   cultural differences, or a lack of understanding of her disease   process.     Per recent palliative outpatient note, Kd identifies as   Jewish, and is connected to a Taoist locally.     We will continue to offer support through social work and    services, and will attempt to revisit some of the larger   questions going forward, if she is willing.     Decision-Making & Goals of Care     Discussion/counseling today about prognosis/goals of   care/decisions:   Reviewed basic outlines of her disease process. Discussed that it   is possible that its progress may be slowed, but addressed the   high likelihood that her cancer could not be cured despite   planned interventions. She expressed basic understanding that her   disease was incurable, that chemotherapy might help prevent   progression, and that the cancer was causing her symptoms.   Patient has a completed health care directive available in the   chart (Y/N): N  There is no POLST (Physician orders for life-sustaining   treatment) form on file for this patient  Code Status: Do not resuscitate   Patient has decision-making capacity (Y/N): Y    Coordination of Care     Findings & plan of care discussed with: patient and family at   bedside, primary team  Follow-up plan from palliative team: we will continue to follow  Thank you for involving us in the patient's care.     Osmany PERALTA NP ACHEVENS  Nurse Practitioner- Lead Advanced Practice Provider  [facility] Palliative Medicine Consult Service   [phone]      Total time spent: 45 minutes of which 35 minutes were spent in   direct face to face contact with patient/family. Greater than 50%   of time spent counseling and/or coordinating care.           Chief Complaint     Back pain- described as new  "        History of Present Illness     History obtained from: chart, patient and niece at bedside via       Maria Esther Wang (\"Leidy") is a 58-year old Laotian-speaking   woman with a pelvic mass and biopsy-proven metastatic clear cell   carcinoma, who presented to gyn-onc clinic today for   chemotherapy. During her visit, she reported severe back pain,   which she related to sitting for too long. In clinic, she was   noted to be tachypneic. Reported weakness, swollen feet, light   vaginal spotting in last few days. Otherwise felt her symptoms   (including her abdominopelvic pain) were well-controlled. Labs   were obtained, revealing hyponatremia and anemia. Due to dyspnea   during her last blood transfusion, and in the setting of her   ongoing tachypnea and hyponatremia, she was admitted directly to   [facility] for further management.     HPI from recent palliative care outpatient clinic consult note   (8/2/2018):    \"This patient's cancer history was reviewed as per the chart.    History obtained with the assistance of a professional   . In brief, \"Leidy"was admitted to [facility] this   summer with ascites and dyspnea. She was diagnosed with   carcinomatosis. She was ultimately found to have metastatic clear   cell carcinoma and was started on Carbo/Taxol. She was admitted   to [facility] from 7/9/18 to 7/13/18 with dyspnea. Since discharge, Kd   notes that she has lost her appetite, has increased nausea over   the past day, vomited last night. Nausea comes and goes. Tried   lorazepam for this, which was helpful. Has been drinking Ensure   as she has struggled to eat much. Drank 2 bottles yesterday. Also   had some rice. When she eats, she feels \"tightness\" in the   abdomen. Has significant abdominal distension, last paracentesis   estimated to be two weeks ago. Has an appointment for a repeat   paracentesis today. Patient's Disease Understanding: Kd   understands that she has ovarian " "cancer which is causing \"water   buildup\" in the abdomen.  She knows that it has spread and that   she is not a surgical candidate.\"    As of the time of examination, she reports that she has lost   around 20% of her body weight in the last 6 months. She lives at   home with family members, all of whom work most if not all days   of the week. She spends a significant portion of each day alone.   She reports that she \"does nothing\" during the day, walking   around the house, finding something to eat (family cooks for her,   and she only has to warm food up), and sitting.     She has had some nausea in the last week, and had repeated emesis   two days ago that was responsive to ondansetron and lorazepam.   She reports that the nausea is better today. She has also had   more difficulty breathing over the last few days.     As far as her pain is concerned, she reports that she is taking   5mg oxycodone q6h at home, and that this keeps her pain generally   well-controlled (\"It's enough for now. I don't need stronger or   more\"). Her pain does wake her up, but taking oxycodone when it   does allows her to return to sleep. Her abdominal pain/pressure   improved with a 2.3L therapeutic paracentesis yesterday, though   she notes she has already had a fair amount of reaccumulation.             Past Medical History:   I have reviewed this patient's past medical history  Past Medical History:   Diagnosis Date     Anemia      NO ACTIVE PROBLEMS      Ovarian cancer (H)      Pulmonary embolism (H)               Past Surgical History:   I have reviewed this patient's past surgical history  Past Surgical History:   Procedure Laterality Date     INSERT PORT VASCULAR ACCESS N/A 7/9/2018    Procedure: INSERT PORT VASCULAR ACCESS;  Single Lumen Chest   Power Port Placement, Leave Access for Chemotherapy;  Surgeon:   Alexandro Sharif PA-C;  Location: [facility] OR     OTHER SURGICAL HISTORY      no surgical history               " Social/Spiritual History:     Living situation: brother, sister in law, helps her   nephew/nieces, helped to raise them  Children: none  Functional status (needs help with ADLs or IADLs):   Employment/education: worked at a small [employer] making [employer]  Activities/interests: loves to watch movies  History of substance use/abuse: none            Family History:   I have reviewed this patient's family history           Allergies:   All allergies reviewed and addressed  No Known Allergies           Medications:   I have reviewed this patient's medication profile and medications   during this hospitalization  Acetaminophen 650mg PO/OK q4h prn pain  Lidocaine patches   Lorazepam 0.5mg 16h prn anxiety/nausea  Ondansetron 4mg q6h prn nausea  Oxycodone 5mg q4h prn pain  Prochlorperazine 10mg q6h prn nausea             Review of Systems:   The comprehensive review of systems is negative other than noted   here and in the HPI.    Palliative Symptom Review (0=no symptom/no concern, 1=mild,   2=moderate, 3=severe):      Pain: 1      Fatigue: 2      Nausea: 0      Constipation: 0      Diarrhea: 0      Depressive Symptoms: 0      Anxiety: 0      Drowsiness: 1      Poor Appetite: 1      Shortness of Breath: 2      Insomnia: 0      Overall (0 good/no concerns, 3 very poor):  2            Physical Exam:   Vitals were reviewed and notable for the following (last 24   hours): afebrile. Borderline to mildly tachycardic ().   Tachypneic (18-36). Normotensive. O2 sats % on RA to 1LPM   via NC.     General: cachectic, seated leaning forward in bed, arms resting   on pillows in lap  HEENT: NC/AT, EOMI, non-icteric sclerae. Trachea midline. L   supraclavicular node noted.   CV: Tachycardic, regular rhythm. No MRG. No JVD noted.   Resp: Tachypneic. CTAB, no wheezing, rhonchi, crackles. Port   noted in upper right chest wall.  Abd: Soft, NT/ND. BS present and normoactive.  Extrem: WWP. 3+ pedal edema bilaterally to ankles.  Skin:  no rashes, ecchymoses noted.  Neuro: moving all extremities purposefully and equally. No facial   asymmetry detected.            Data Reviewed:   CBC notable for leukocytosis (13.4, neutrophilic predominance),   anemia (6.7), no thrombocytopenia/thrombocytosis    BMP with hyponatremia (126, unclear chronicity), hypochloremia   (92).     LFTs wnl, other than hypoalbuminemia (1.9).     CA-125 rising from previous (592 from 220 in 6/2018)               XR Chest 2 Views    Narrative    XR CHEST 2 VW  8/3/2018 4:03 PM      HISTORY: tachypnea;     COMPARISON: 7/11/2018    FINDINGS: PA and lateral views of the chest upright. Right IJ  Port-A-Cath tip projects over approximately the superior cavoatrial  junction. The cardiomediastinal silhouette and pulmonary vasculature  are stable and within normal limits. Low lung volumes,  small-to-moderate bilateral pleural effusions, and bibasilar  compressive atelectasis unchanged from prior. The visualized upper  abdomen, osseous structures, and soft tissues are unremarkable.      Impression    IMPRESSION: Stable low lung volumes, bilateral pleural effusions, and  bibasilar compressive atelectasis.    I have personally reviewed the examination and initial interpretation  and I agree with the findings.    ROWENA ADAMSON MD   Basic metabolic panel   Result Value Ref Range    Sodium 127 (L) 133 - 144 mmol/L    Potassium 3.5 3.4 - 5.3 mmol/L    Chloride 94 94 - 109 mmol/L    Carbon Dioxide 26 20 - 32 mmol/L    Anion Gap 7 3 - 14 mmol/L    Glucose 111 (H) 70 - 99 mg/dL    Urea Nitrogen 8 7 - 30 mg/dL    Creatinine 0.38 (L) 0.52 - 1.04 mg/dL    GFR Estimate >90 >60 mL/min/1.7m2    GFR Estimate If Black >90 >60 mL/min/1.7m2    Calcium 7.7 (L) 8.5 - 10.1 mg/dL   Heparin 10a Level   Result Value Ref Range    Heparin 10A Level 0.44 IU/mL   Hemoglobin   Result Value Ref Range    Hemoglobin 9.4 (L) 11.7 - 15.7 g/dL   Sodium   Result Value Ref Range    Sodium 128 (L) 133 - 144 mmol/L        Assessment:  58 year old female with metastatic clear cell carcinoma. HD#2 admitted from clinic with tachypnea, hyponatremia, anemia and failure to thrive    Disease: Pelvic mass, metastatic clear cell carcinoma. Neoadj. chemo. S/p C1D1 Carbo/Taxol  7/12/18.  inc 592.  Plan chemotherapy in house tomorrow if continues to improve    FEN: Severe malnutrition and cachectic. Regular diet, boost supplements, nutrition consult. Hyponatremia-Cont NS     Pain: Tylenol, lido patches, oxycodone prn.     Heme: severe anemia on admissio, s/p 2U pRBCs with appropriate rise.    - Bilateral pulmonary emboli, on therapeutic Lovenox, Xa 0.55. Plan repeat XA after AM dose.     CV: No issues    Resp: Bilateral pleural effusions and requiring O2.  Plan thoracentesis today.    GI: Regular diet. Scheduled bowel regimen, prn antiemetics.     :  NI    MSK/Extremities: Lower back pain, improved with current pain medications. Will also add lidocaine patches.  3+ pitting edema BLE Yoshi Hose and Lymphedema consult ordered. Will f/u with lymphedema today    Neuro/Psych: NI    Endocrine: NI    ID: Triggered sepsis protocol with tachypnea and tachycardia on 8/3 WBC 12.4. Lactate 1.2.     PPx: SCDs. Therapeutic Lovenox    Dispo: Home after chemotherapy.  Pt may need TCU placement pending further PT evaluation      Rosa Isela Pedraza MD  Obstetrics and Gyncology, PGY-2  August 4, 2018 , 7:34 AM     IEd personally examined and evaluated this patient on 08/04/18.  I discussed the patient with the resident and care team, and agree with the assessment and plan of care as documented in the residents note above.    I personally reviewed vital signs, laboratory values and imaging results.    Pt admitted from clinic with symptomatic anemia, hyponatremia and FTT.  Anemia improved s/p transfusion.  Hyponatremia slowly improving with NS replacement.  Plan thoracentesis today given O2 requirements and bilateral pleural  effusions.  Plan for chemo tomorrow and then home.    Yeni Grey MD  Gynecologic Oncology  [facility]

## 2018-08-04 NOTE — PLAN OF CARE
Problem: Patient Care Overview  Goal: Plan of Care/Patient Progress Review  Outcome: No Change  Assumed care of pt from [id number]. VSS, tachypneic and tachycardic with exertion. A&Ox4. Apical pulse regular. Lungs CTA. Bowel sounds active in all quadrants. Tolerating regular diet, pt has little appetite per previous RN. Voiding spontaneously with adequate output, not saving. MIVF running at 50cc/hr into Port. Pain controlled with Oxycodone. Currently awaiting HGB recheck after 1U PRBC completed around 0145. Continue with POC.  Observation Goals  -diagnostic tests and consults completed and resulted; goal not met, awaiting HGB after 1U PRBCs administered  -vital signs normal or at patient baseline; goal not met, pt tachypneic and has SOB with exertion  -tolerating oral intake to maintain hydration; goal met  -safe disposition plan has been identified; goal not met, pt sleeping and unable to verbalize plan    Addendum: pt to begin daily weights and strict I/O.

## 2018-08-04 NOTE — PROGRESS NOTES
Gynecologic Oncology Progress Note  8/5/2018     HD#3    24 hour events:   - Hep 10A level therapeutic  - hyponatremia not significantly improved despite maintenance NS and 3% saline bolus  - discussed possible thoracentesis, but deemed not worth the risk in the setting of anticoagulation  - triggered sepsis protocol with lactic acid 2.3, afebrile, blood cultures obtained, improved  - indigestion and diarrhea associated with increased work of breathing. EKG obtained which showed low voltage, questionable changes in lateral leads, not significant per review with ED MD.    Subjective: Patient is feeling ok this morning. Having abdominal pain, but pain medication is helping this. Says breathing is improved, denies chest pain.  She is eating small amount. Voiding spontaneously. No further episode     Objective:   Vitals:    08/05/18 0055 08/05/18 0259 08/05/18 0522 08/05/18 0615   BP: 127/64 129/63 136/68 120/65   BP Location: Left arm Left arm Left arm Left arm   Pulse: 100   101   Resp: 16 18 19 18   Temp: 95.5  F (35.3  C) 96.1  F (35.6  C) 96.8  F (36  C) 96  F (35.6  C)   TempSrc: Axillary Oral Oral Oral   SpO2: 100% 97% 94% 99%   Weight:       2L NC oxygen  Weight:     GEN - mild discomfort, sitting up in bed holding back  CV - Tachycardic, regular rhythm. Possible soft systolic murmur heard at left sternal border  PULM - CTAB, no wheezing. Decreased breath sounds in bilateral lower lobes, faint crackles on left  ABD - firm abdomen, distended, slight tenderness to palpation  Ext - warm and well perfused, 2+ edema left, 1+ on the right, Non-tender       Intake/Output Summary (Last 24 hours) at 08/05/18 0654  Last data filed at 08/05/18 0608   Gross per 24 hour   Intake             1015 ml   Output              650 ml   Net              365 ml     I/Os  24 hr: IN: 725 ml  ml PO // OUT: 650 ml UOP  Since midnight:  ml  ml PO    New Labs/Imaging-      8/5/2018 06:13   Sodium 128 (L)   Potassium 3.3  (L)   Chloride 95   Carbon Dioxide 26   Urea Nitrogen 8   Creatinine 0.30 (L)   GFR Estimate >90   GFR Estimate If Black >90   Calcium 7.7 (L)   Anion Gap 7   Lactic Acid 1.1      8/5/2018 06:13   WBC 14.1 (H)   Hemoglobin 9.2 (L)   Hematocrit 28.5 (L)   Platelet Count 269   RBC Count 3.54 (L)   MCV 81   MCH 26.0 (L)   MCHC 32.3   RDW 20.5 (H)       Assessment:  58 year old female with metastatic clear cell carcinoma. HD#3 admitted from clinic with tachypnea, hyponatremia, anemia and failure to thrive    Disease: Pelvic mass, metastatic clear cell carcinoma. Neoadj. chemo. S/p C1D1 Carbo/Taxol  7/12/18.  inc 592.  Plan chemotherapy in house tomorrow if continues to improve  FEN: Severe malnutrition and cachectic. Regular diet, boost supplements, nutrition consult. Hyponatremia-Cont NS.  Pain: Tylenol, lido patches, oxycodone prn.   Heme: severe anemia on admission, s/p 2U pRBCs with appropriate rise.    - Bilateral pulmonary emboli, on therapeutic Lovenox, Xa 0.93  CV: No issues  Resp: Bilateral pleural effusions and requiring O2. Discussed possible thoracentesis with CAPS team yesterday; thoracentesis deemed not worth the risk as she is anticoagulated and effusions are small, thoracentesis unlikely to provide significant relief  GI: Regular diet. Scheduled bowel regimen, prn antiemetics.   :  NI  MSK/Extremities: Lower back pain, improved with current pain medications.  3+ pitting edema BLE Yoshi Hose and Lymphedema consult ordered. Will f/u with lymphedema today  Neuro/Psych: NI  Endocrine: NI  ID: Triggered sepsis protocol x 2, lactate peak at 2.3. Leukocytosis stable No further issues, Has been afebrile this admission.  PPx: SCDs. Therapeutic Lovenox  Dispo: discharge to home w/family assist, home PTO/OT following inpatient chemotherapy.     # Pain Assessment:  Current Pain Score 8/5/2018   Patient currently in pain? denies   Pain score (0-10) -   Pain location -   Pain descriptors -   - Theuangkeo is  experiencing pain due to cancer. Pain management was discussed and the plan was created in a collaborative fashion.  Maria Esther's response to the current recommendations: engaged  - Please see the plan for pain management as documented above        Argelia Brooks MD  Obstetrics and Gyncology, PGY-2  August 5, 2018

## 2018-08-04 NOTE — PROGRESS NOTES
Observation Goals  -diagnostic tests and consults completed and resulted; goal not met, awaiting HGB after 1U PRBCs administered  -vital signs normal or at patient baseline; goal not met, pt tachypneic and has SOB with exertion  -tolerating oral intake to maintain hydration; goal met  -safe disposition plan has been identified; goal not met, pt sleeping and unable to verbalize plan

## 2018-08-04 NOTE — PROGRESS NOTES
Brief Progress Note    In to see patient after she triggered sepsis protocol and had lactate of 2.3.    S: Patient feeling well. Denies chest pain, shortness of breath. Having some abdominal pain, but just took some medication.    O:   Patient Vitals for the past 24 hrs:   BP Temp Temp src Pulse Heart Rate Resp SpO2 Weight   08/04/18 1609 136/72 98.1  F (36.7  C) Oral - 109 24 98 % -   08/04/18 1449 140/66 97.6  F (36.4  C) Oral 104 - 22 97 % -   08/04/18 1015 - - - - - - - 46 kg (101 lb 8 oz)   08/04/18 0755 135/63 97.1  F (36.2  C) Oral 103 - 24 95 % -   08/04/18 0145 132/59 96.7  F (35.9  C) Oral 104 - 20 96 % -   08/04/18 0001 123/56 - - 96 - 20 99 % -   08/03/18 2333 110/54 96.6  F (35.9  C) Oral 91 - 20 100 % -   08/03/18 2248 125/62 96.3  F (35.7  C) Oral - 92 24 100 % -   08/03/18 2236 119/53 97.5  F (36.4  C) Oral - 98 18 100 % -   08/03/18 2222 126/65 97.4  F (36.3  C) Oral 94 - 18 100 % -   08/03/18 2113 141/62 96.3  F (35.7  C) Oral 95 - 18 100 % -   08/03/18 1931 123/57 97.4  F (36.3  C) Oral - 97 18 100 % -   08/03/18 1817 129/60 97.6  F (36.4  C) - 100 - 22 100 % -   08/03/18 1800 125/42 97.7  F (36.5  C) - - 101 18 - -     Exam:  General: Appears comfortable, NAD  Lungs: fine left basilar crackles, otherwise clear  Abd: distended and tense, non-tender  Extremities: 2+ edema, non tender    A/P:   - sepsis not suspected at this time, as VS, sx are stable, and she is afebrile. Will obtain CBC and blood cx. Recent CXR yesterday.   - suspect intravascular depletion 2/2 third spacing leading to elevated lactate. Hesitant to give fluid bolus now as she has evidence of significant volume overload on exam as well as hyponatremia that we are treating    Argelia Brooks MD  PGY-2 Department of Obstetrics, Gynecology, and Women's Health  08/04/18

## 2018-08-04 NOTE — PROGRESS NOTES
"CLINICAL NUTRITION SERVICES - ASSESSMENT NOTE     Nutrition Prescription    RECOMMENDATIONS FOR MDs/PROVIDERS TO ORDER:  None at this time, see future recommendations     Malnutrition Status:    Unable to determine as unable to obtain wt history or conduct nutrition-focused physical assessment today. Will follow-up as able.     Future/Additional Recommendations:  - Pending LOS, consider ordering calorie counts to quantify caloric intake   - Pending intake and goals of care, may consider need for nutrition support        REASON FOR ASSESSMENT  Maria Esther Wang is a 58 year old female assessed by the dietitian for Provider Order - \"malnutrition\"    NUTRITION HISTORY  Unable to obtain today, pt busy with other providers during this writers attempts to assess.     Pt recently admitted to [facility] (7/9/18 - 7/13/18). At that time, pt reported poor appetite and intermittent n/v. Reportedly consumes [place] dishes or cereal and milk, additionally 2 Boost supplements/day (~700 kcal, 28 g PRO).     CURRENT NUTRITION ORDERS   Diet: Regular + Boost Shake (btw meals)   Intake/Tolerance: Tolerating little PO but trying per chart review     LABS  Na 128 (L)   Cr 0.38 (L)   BUN 8     MEDICATIONS  Medications reviewed    ANTHROPOMETRICS  Height: 0 cm (Data Unavailable)   Ht Readings from Last 2 Encounters:   08/03/18 1.575 m (5' 2\")   08/02/18 1.575 m (5' 2\")     Most Recent Weight: 46 kg (101 lb 8 oz)    IBW: 50 kg  BMI: Underweight BMI <18.5  Weight History:  Fluctuates between ~ lbs over the past month.   Wt Readings from Last 30 Encounters:   08/04/18 46 kg (101 lb 8 oz)   08/03/18 43.9 kg (96 lb 11.2 oz)   08/02/18 46 kg (101 lb 6 oz)   07/25/18 43 kg (94 lb 12.8 oz)   07/16/18 45.5 kg (100 lb 6.4 oz)   07/12/18 46.7 kg (103 lb)   07/09/18 44.5 kg (98 lb)   07/06/18 43.8 kg (96 lb 8 oz)   06/28/18 45.2 kg (99 lb 11.2 oz)   06/25/18 49 kg (108 lb)   06/23/18 47.4 kg (104 lb 8 oz)   05/30/18 47.4 kg (104 lb 9.6 oz)     Dosing " Weight: 46 kg (actual, based on admit wt of 46 kg on 8/4/18)     ASSESSED NUTRITION NEEDS  Estimated Energy Needs: 1380 - 1610+ kcals/day (30 - 35+ kcals/kg )  Justification: Increased needs, Repletion and Underweight  Estimated Protein Needs: 69 - 92 grams protein/day (1.5 - 2 grams of pro/kg)  Justification: Increased needs and Repletion  Estimated Fluid Needs: 1 mL/kcal  Justification: Maintenance    PHYSICAL FINDINGS  See malnutrition section below.    MALNUTRITION  % Intake: < 75 % for >/= 1 month (severe)  % Weight Loss: Unable to assess   Subcutaneous Fat Loss: Unable to assess  Muscle Loss: Unable to assess  Fluid Accumulation/Edema: Unable to assess  Malnutrition Diagnosis: Unable to assess d/t lack of nutrition-focused physical assessment     NUTRITION DIAGNOSIS  Inadequate oral intake related to lack of appetite as evidenced by suspect meeting < 75% nutrition needs, BMI of 18.56.         INTERVENTIONS  Implementation  Nutrition Education: Unable to complete due to unable to see pt today. Will follow-up as able     Goals  Patient to consume % of nutritionally adequate meal trays TID, or the equivalent with supplements/snacks.     Monitoring/Evaluation  Progress toward goals will be monitored and evaluated per protocol.    Esther Stevenson RD, MAXX   7C Pager: [phone]  Weekend Pager: [phone]

## 2018-08-04 NOTE — PLAN OF CARE
Problem: Anemia (Adult)  Goal: Identify Related Risk Factors and Signs and Symptoms  Related risk factors and signs and symptoms are identified upon initiation of Human Response Clinical Practice Guideline (CPG).   Outcome: No Change  VSS. Increased respirations. Pt arrived to unit from clinic. SIRS protocol enacted. Lactic acid. 1.6. Pt up with SBA. Pt feels weak and deconditioned. 1 unit PRBC's currently infusing through port. Pt tolerating well. Tolerating regular diet. Voids spont, did not save. Probably DC tomorrow once PRBC's infused and HepXa level drawn. Cont. POC.

## 2018-08-05 NOTE — PROGRESS NOTES
08/05/18 1605   General Information   Discipline PT   Onset of Edema (acute on chronic)   Affected Body Part(s) Left LE;Right LE   Edema Etiology Cancer with lymph node dissection;Chemo   Etiology Comments hypoalbuminemia, decreased muscle pump, fluid overload   Pertinent history of current problem (PT: include personal factors and/or comorbidities that impact the POC; OT: include additional occupational profile info) metastatic clear cell carcinoma. HD#3 admitted from clinic with tachypnea, hyponatremia, anemia and failure to thrive   Pain   Patient currently in pain No   Edema Examination / Assessment   Skin Condition Comments ~ 4 cm diameter darkened area of skin R anterior shin, just above high ankle, otherwise skin normal color and warmth, intact.  2+ soft pitting edema B feet to knees, moderate non-pitting thigh edema with abdominal ascites.     Dorsal Pedal Pulse Symmetrical   Assessment/Plan   Patient presents with Stage 2 Lymphedema   Assessment BLE edema interferes with comfort and mobility   Clinical Presentation Evolving/Changing   Clinical Presentation Rationale PMH and clinical judgment   Clinical Decision Making (Complexity) Moderate complexity   Planned Edema Interventions Gradient compression bandaging;Fit for compression garment;Exercises;Precautions to prevent infection/exacerbation;Education;Manual therapy;ADL training   Treatment Frequency daily   Treatment Duration 8/9/18   Patient, Family and/or Staff in agreement with plan of care. Yes   Risks and benefits of treatment have been explained. Yes   Total Evaluation Time   Total Evaluation Time (Minutes) 2

## 2018-08-05 NOTE — PLAN OF CARE
Problem: Patient Care Overview  Goal: Plan of Care/Patient Progress Review  Outcome: No Change  Assumed care of pt from 1651-5130. Q2H VSS with 2L NC. A&Ox4. Apical pulse regular. Lungs CTA. Bowel sounds hypoactive in all quadrants. Tolerating regular diet. Voiding spontaneously with adequate output. MIVF running into Port. Pain controlled with Oxycodone and Lidocaine patches on back (removed at 0400). Continue with POC. Pt would like bedside report if awake.

## 2018-08-05 NOTE — PROGRESS NOTES
GYN ONC Fellow Code Discussion Note    Code status reviewed with patient with in-person . Previously DNR but willing to be intubated.  Reviewed with patient that the circumstances that likely would lead to her needing a code/intubation would be due to progressive disease and be irreversible. The patient stated that she would not want invasive measures if they would be unlikely to work. After further discussion, she decided that she wishes to be DNR DNI.  Code status changed in Epic.    Dennis Price MD

## 2018-08-05 NOTE — PLAN OF CARE
Problem: Patient Care Overview  Goal: Plan of Care/Patient Progress Review  Discharge Planner PT   Patient plan for discharge: home with assist from family  Current status: Edema assessment completed. Pt to wear her own knee high JOBST 20-30 mmHg compression stockings ON during the day, OFF at night.  When removing compression stockings, cleanse skin, apply lotion, and elevate LE. Entered pt care orders for this and educated pt and her brother on easier donning technique and importance of continuing with LE compression and that increased thigh edema expected when initially wearing compression stocking.  Fully remove compression if painful, increased SOB, unable to communicate pain, change in sensation, change in skin color, or wet/soiled compression stockings.    Pt transfers with SBA. Ambulated 200 ft with FWW, CGA-SBA. Too SOB, increased WOB to perform stairs (O2 sats 97% on room air but over 3 min seated rest to recover) Please encourage pt to use walker consistently in room.    Barriers to return to prior living situation: medical status, decreased activity tolerance, impaired balance, weakness  Recommendations for discharge: home with assist from family, home PT/OT for safety evaluation  Rationale for recommendations: Pt has family support in the morning/night, home alone ~3 hours/day with pt denying any concerns regarding her mobility during this time.. Brother plans to call [employer] insurance to determine allowed DME providers and take pt to showroom to test drive 4 wheel walker prior to purchase.  Due to poor activity tolerance, pt would benefit from 4 wheel walker with seat, rather than traditional front wheeled walker (order is entered in disch orders).  Pt would benefit from home PT/OT evaluation for additional equipment at home, progress strength and activity tolerance.

## 2018-08-05 NOTE — PROGRESS NOTES
"Brief Progress Note    Pt seen with inhouse     Went in to see Kd as nurse noted increasingly labored breathing. She feels like recently she started to have trouble breathing, is worse when she's lying down. Associated with racing heart and feeling on palpitations. Denies overt pain when asked just says \"very hard to breathe\".    Vitals:    08/05/18 0615 08/05/18 0845 08/05/18 1200 08/05/18 1437   BP: 120/65 125/59 120/64 134/60   BP Location: Left arm Left arm Left arm    Pulse: 101   102   Resp: 18 18 16 26   Temp: 96  F (35.6  C) 97  F (36.1  C) 97.4  F (36.3  C) 96.1  F (35.6  C)   TempSrc: Oral Oral Oral Axillary   SpO2: 99% 99% 98% 100%   Weight:         Currently on 3L NC with sat 100%    PE:   General - labored breathing, patient bracing herself and sitting up. Mask O2 in place  Card - tachycardic, regular rhythm, no murmurs, no rubs  Pulm - tachypnea, bilateral decreased breath sounds in lower lobes, crackles bilaterally L>R  Abd - tense, distended abdomen, but with little pain to palpation  LE - bilateral LE 2-3+ edema in feet. 2+ in shins    A/P  Concern that increased work of breathing is due to increase pleural effusions vs pericardial effusion vs secondary to increasing ascites. Will order stat EKG and CXR now. Also concern for fluid overload as she is receiving NS to help with hyponatremia. LE edema has increased from yesterday. Will discontinue NS at this time.    Rosa Isela Pedraza MD  Obstetrics and Gyncology, PGY-2  August 5, 2018 , 2:54 PM    "

## 2018-08-05 NOTE — PLAN OF CARE
"Problem: Patient Care Overview  Goal: Plan of Care/Patient Progress Review  Outcome: Declining  /60  Pulse 102  Temp 96.1  F (35.6  C) (Axillary)  Resp 26  Wt 46 kg (101 lb 8 oz)  SpO2 100%  BMI 18.56 kg/m2    Pain managed with oxycodone. Denies nausea and tolerating regular diet. Port infusing MIVF. Gave pt a \"bolus\" of 100cc of 3% saline. Pt having tachypnea. O2 stable. She stated that she felt like her heart was skipping a beat. EKG and chest xray ordered. Pt most comfortable in the tripod position. MD assessed pt.  at the bedside. Voiding not saving, however I passed on to the next nurse that we should start saving her urine. SBA.        "

## 2018-08-05 NOTE — PROGRESS NOTES
Brief Progress Note    In to see patient about one hour ago due to complaints if indigestion and diarrhea. Kd reports 4x diarrhea and upset stomach. Also reports feeling tired and having a little bit of trouble breathing.     Patient Vitals for the past 24 hrs:   BP Temp Temp src Pulse Heart Rate Resp SpO2 Weight   08/04/18 2045 133/63 97.9  F (36.6  C) Oral 117 - 15 95 % -   08/04/18 1926 - 100  F (37.8  C) Oral - - - - -   08/04/18 1840 130/57 95.6  F (35.3  C) Axillary - 114 18 97 % -   08/04/18 1609 136/72 98.1  F (36.7  C) Oral - 109 24 98 % -   08/04/18 1449 140/66 97.6  F (36.4  C) Oral 104 - 22 97 % -   08/04/18 1015 - - - - - - - 46 kg (101 lb 8 oz)   08/04/18 0755 135/63 97.1  F (36.2  C) Oral 103 - 24 95 % -   08/04/18 0145 132/59 96.7  F (35.9  C) Oral 104 - 20 96 % -   08/04/18 0001 123/56 - - 96 - 20 99 % -   08/03/18 2333 110/54 96.6  F (35.9  C) Oral 91 - 20 100 % -   08/03/18 2248 125/62 96.3  F (35.7  C) Oral - 92 24 100 % -   08/03/18 2236 119/53 97.5  F (36.4  C) Oral - 98 18 100 % -   08/03/18 2222 126/65 97.4  F (36.3  C) Oral 94 - 18 100 % -     -Zantac given, O2 by NC placed  - EKG ordered    Patient now resting well and reports feeling improved after the above. Will follow-up EKG.    Argelia Brooks MD  PGY-2 Department of Obstetrics, Gynecology, and Women's Health  08/04/18

## 2018-08-05 NOTE — PROGRESS NOTES
Brief Progress Note    Pt seen with inhouse     Went in to see Kd again as rapid response was called due to concern for increased work of breathing. Kd continues to endorse trouble breathing and noting that she is getting very tired. It feels like her heart is skipping beats    Vitals:    08/05/18 0845 08/05/18 1200 08/05/18 1437 08/05/18 1556   BP: 125/59 120/64 134/60 116/50   BP Location: Left arm Left arm  Left arm   Pulse:   102 101   Resp: 18 16 26 28   Temp: 97  F (36.1  C) 97.4  F (36.3  C) 96.1  F (35.6  C)    TempSrc: Oral Oral Axillary    SpO2: 99% 98% 100% 100%   Weight:         Still on 3L NC with sat 100%    PE:   General - labored breathing, patient with eyes closed and bracing herself and sitting up. NC O2 in place  Card - tachycardic, regular rhythm  Pulm - tachypnea, increased work of breathing similar to hour ago  Abd - tense, distended abdomen  LE - bilateral LE 2-3+ edema in feet. 2+ in shins    A/P  CXR shows interval small increase in bilateral pleural effusions and new interstitial edema - IV fluid discontinued. STAT 10mg IV lasix. Concern that this will worsen hyponatremia, however at this time patient is unstable to continue without some amount of diuresis  EKG stable from previous last night    - IV lasix now, f/u with BMP at 2000  - No need for Blood gas at this time, will consider if clinical picture worsens  - Will monitor closely  - Pt still agrees that she would like to remain DNR, but ok with DNI if only temporary. No prolonged treatment    Rosa Isela Pedraza MD  Obstetrics and Gyncology, PGY-2  August 5, 2018 , 4:20 PM

## 2018-08-05 NOTE — PROGRESS NOTES
" 08/04/18 1030   Quick Adds   Type of Visit Initial PT Evaluation   Living Environment   Lives With sibling(s)   Living Arrangements house   Home Accessibility stairs to enter home;stairs within home;bed and bath on same level   Number of Stairs to Enter Home 3   Stair Railings at Home inside, present at both sides   Living Environment Comment Pt lives with her brother and multiple neices and nephews. Bedroom/bathroom on main level. Pt's brother present throughout evaluation and assisting with providing information about home environment-pt states she is too fatigued to talk. Pt's brother states nieces/nephews are available to assist some during the day, but will return to school in the fall. During the summer, pt will be home alone ~3 hours/day.    Self-Care   Dominant Hand left   Usual Activity Tolerance moderate   Current Activity Tolerance fair   Regular Exercise no   Equipment Currently Used at Home shower chair   Activity/Exercise/Self-Care Comment Interested in obtaining a 4WW   Functional Level Prior   Ambulation 0-->independent   Transferring 0-->independent   Toileting 0-->independent   Bathing 1-->assistive equipment   Fall history within last six months no   Which of the above functional risks had a recent onset or change? ambulation   Prior Functional Level Comment Decreased mobility over the past few months 2/2 fatigue. Pt states prior to admission, pt is able to walk ~20-30 feet without needing to sit down 2/2 fatigue. Pt states she is able to complete transfers and ADLs without additional assist, just moves \"more slowly\". Also using a shower chair.    General Information   Onset of Illness/Injury or Date of Surgery - Date 08/03/18   Referring Physician Tere Li MD   Patient/Family Goals Statement Get a 4WW   Pertinent History of Current Problem (include personal factors and/or comorbidities that impact the POC) Pt is a 58 year old female with metastatic clear cell carcinoma. HD#2 admitted " from clinic with tachypnea, hyponatremia, anemia and failure to thrive.    Precautions/Limitations fall precautions;seizure precautions  (metastatic cancer)   General Observations Pt sitting up in bed upon entry,  and pt's brother present at start of session.    General Info Comments Activity: ambulate with assist 4x/day   Cognitive Status Examination   Orientation orientation to person, place and time   Level of Consciousness alert   Follows Commands and Answers Questions 100% of the time;able to follow single-step instructions   Personal Safety and Judgment intact   Pain Assessment   Patient Currently in Pain (abdominal/low back pain-managed with meds)   Integumentary/Edema   Integumentary/Edema Comments Pt has LE edema, has medium JOBST stockings donned at start of session-donned by RN.    Posture    Posture Protracted shoulders   Range of Motion (ROM)   ROM Comment BLE ROM WFL   Strength   Strength Comments BLE strength >3/5, no MMT 2/2 metastatic disease/back and abdominal pain.    Bed Mobility   Bed Mobility Comments Supine>sitting via logroll with min A. Pt prefers to roll onto right side.    Transfer Skills   Transfer Comments Sit>stand with SBA   Gait   Gait Comments Pt ambulated 10 ft with HHA-min A. Pt ambulates with slow francisco j, decreased step length. Noted in ROSARIO.    Balance   Balance Comments Reaches for BUE support of PT/wall/objects in room to ambulate to hallway.    Sensory Examination   Sensory Perception no deficits were identified   General Therapy Interventions   Planned Therapy Interventions bed mobility training;gait training;transfer training;progressive activity/exercise;home program guidelines   Clinical Impression   Criteria for Skilled Therapeutic Intervention yes, treatment indicated   PT Diagnosis impaired functional mobility   Influenced by the following impairments decreased strength, impaired balance, decreased activity tolerance, metastatic cancer/abdominal and back  "pain.    Functional limitations due to impairments Difficulty with bed mobility, transfers, ambulation, stairs   Clinical Presentation Stable/Uncomplicated   Clinical Presentation Rationale medical status, anticipated short LOS, family assist at home.    Clinical Decision Making (Complexity) Low complexity   Therapy Frequency` daily   Predicted Duration of Therapy Intervention (days/wks) 5 days   Anticipated Equipment Needs at Discharge (4WW, bed railing vs. hospital bed)   Anticipated Discharge Disposition Home with Home Therapy   Risk & Benefits of therapy have been explained Yes   Patient, Family & other staff in agreement with plan of care Yes   [facility] TM \"6 Clicks\"   2016, Trustees of [facility], under license to [employer].  All rights reserved.   6 Clicks Short Forms Basic Mobility Inpatient Short Form   [facility]  \"6 Clicks\" V.2 Basic Mobility Inpatient Short Form   1. Turning from your back to your side while in a flat bed without using bedrails? 4 - None   2. Moving from lying on your back to sitting on the side of a flat bed without using bedrails? 3 - A Little   3. Moving to and from a bed to a chair (including a wheelchair)? 4 - None   4. Standing up from a chair using your arms (e.g., wheelchair, or bedside chair)? 4 - None   5. To walk in hospital room? 3 - A Little   6. Climbing 3-5 steps with a railing? 4 - None   Basic Mobility Raw Score (Score out of 24.Lower scores equate to lower levels of function) 22   Total Evaluation Time   Total Evaluation Time (Minutes) 8     "

## 2018-08-06 NOTE — PROGRESS NOTES
Gynecologic Oncology Progress Note    24 hour events:   - Rapid response called for tachypnea - 10mg IV lasix given x2  - Discussed code status - chose DNR/DNI  - CTA w/ below findings    Subjective: Patient is feeling ok this morning. Having abdominal pain, but pain medication is helping this. Says breathing is improved, denies chest pain.  She is eating small amount. Voiding spontaneously. No further episode     Objective:   Vitals:    08/05/18 1959 08/05/18 2209 08/05/18 2212 08/06/18 0030   BP: 119/57 123/62 124/68    BP Location: Left arm Left arm Left arm    Pulse: 107  105 118   Resp: 16 23 (!) 32 30   Temp: 97.4  F (36.3  C) 96.7  F (35.9  C)     TempSrc: Axillary Axillary     SpO2: 100% 98% 98% 98%   Weight:       2L NC oxygen  Weight:     GEN - mild discomfort, sitting up in bed holding back  CV - Tachycardic, regular rhythm. Possible soft systolic murmur heard at left sternal border  PULM - CTAB, no wheezing. Decreased breath sounds in bilateral lower lobes, faint crackles on left  ABD - firm abdomen, distended, slight tenderness to palpation  Ext - warm and well perfused, 2+ edema left, 1+ on the right, Non-tender     I/O last 3 completed shifts:  In: 1830 [P.O.:480; I.V.:1350]  Out: 750 [Urine:750]  Since MN: IN 0 [ PO 0, IV 0]   OUT 0 [urine 0]    New Labs/Imaging-   Na 132  K 3.0    CT CAP:  Impression:   1. New segmental/subsegmental pulmonary emboli in the superior right  lower lobe. Numerous additional bilateral pulmonary emboli not  significantly changed compared to 7/10/2018. No CT evidence of heart  strain.  2. Large bilateral pleural effusions with adjacent atelectasis have  increased.  3. Extensive metastatic disease in the chest and upper abdomen has  slightly increased.      Assessment:  58 year old female with metastatic clear cell carcinoma. HD#4 admitted from clinic with tachypnea, hyponatremia, anemia and failure to thrive in the setting of progressive disease with extensive ascites and  bilateral pleural effusions    Disease: Stage IVB clear cell carcinoma undergoing neoadjuvant chemo, C1D1 Carbo/Taxol on 7/12/18.  increased to 592 (8/3).  Plan chemotherapy while admitted if able to improve hyponatremia further  FEN: Severe malnutrition and cachexia, albumin 1.9. Regular diet, boost supplements, nutrition consulted  - Hypervolemic hyponatremia, likely due to extensive disease with third spacing of fluids with physiologic response in ADH as well as repeated paracenteses. Encouraging increased salt intake and limited free water intake. S/p 1g282af hypertonic saline (3%) with improvement to 132. Recheck BMP 0800 this AM  - Hypokalemia, cautious IV repletion to avoid worsening respiratory status w/ excess fluids. S/p 60mEq overnight  Pain: Tylenol, lidocaine patches, oxycodone prn.   Heme: Severe anemia on admission likely due to chemotherapy and chronic disease, s/p 2U pRBCs with appropriate rise.    - Bilateral pulmonary emboli, with new subsegmental disease in the superior right lower lobe. On therapeutic Lovenox, Xa 0.93  CV: Tachycardia w/ known b/l PE, no right heart strain on CT 8/5/18  Resp: Significant bilateral pleural effusions--medicine procedure team consulted for thoracentesis in AM, preferably before 0800 lovenox to decrease time off anticoagulation in this patient at high risk of clots (increase in PE clot burden while on therapeutic lovenox)  GI: Regular diet. Scheduled bowel regimen, prn antiemetics.   :  NI  MSK/Extremities: Lower back pain, improved. 3+ pitting edema BLE Yoshi Hose and Lymphedema consult  Neuro/Psych: NI  Endocrine: NI  ID: Triggered sepsis protocol x 2 due to tachycardia, lactate peak at 2.3 (8/4, 1530), now normalized. Mild leukocytosis, stable. Afebrile. No localizing signs of infection.  PPx: SCDs. Therapeutic Lovenox  Dispo: Discharge to home w/ family assist, home PTO/OT following inpatient mgmt of pleural effusions, hyponatremia, and possibly inpatient  chemotherapy.     # Pain Assessment:  Current Pain Score 8/6/2018   Patient currently in pain? sleeping: patient not able to self report   Pain score (0-10) -   Pain location -   Pain descriptors -   - Maria Esther is experiencing pain due to cancer. Pain management was discussed and the plan was created in a collaborative fashion.  Maria Esther's response to the current recommendations: engaged  - Please see the plan for pain management as documented above      María Simpson MD PGY3  Gyn Onc [phone]  08/06/18    I have examined this patient personally with the team and agree with the above findings and plan.  Patient is acutely worrisome.  Would advocate for thoracentesis ASAP and possible placement of IVC filter to follow (or before).  Her DNI status is noted and will plan to try to optimize her medically as she recovers from acute PEs.    Darrick Maurice

## 2018-08-06 NOTE — PLAN OF CARE
Problem: Patient Care Overview  Goal: Plan of Care/Patient Progress Review  HR max 110s, RR max 23, AOVSS. O2 on RA is 95%. Slovenian speaking. Pt c/o lower abdominal pain r/t ascites, relieved with 5mg oxycodone PRN. Pt denies nausea, is NPO for possibly IVC filter placement today. Pt weak, is SBA. Lasix given, pt voiding frequently. K+ replaced per one time order. No BM. Thoracentesis at 1600. Ultrasound done this morning. Paracentesis most likely tomorrow. Continue POC.     Addendum: paracenthesis unsuccessful. Pt back on regular diet. Is NPO at 0000 for thoracenthesis tomorrow.  scheduled for between 1910-4053 for IVC filter procedure. Heparin gtt to be started tonight d/t pt's PE, to be stopped 2 hours prior to procedure. Hep 10A level pending. K+ recheck was 3.3, to be replaced this shift through port.

## 2018-08-06 NOTE — PROGRESS NOTES
Brief Note    Increased tachypnea.   Patient Vitals for the past 2 hrs:   BP Temp Temp src Pulse Resp SpO2   08/05/18 1959 119/57 97.4  F (36.3  C) Axillary 107 16 100 %     8/5/18 2150 - RR 28 w/ O2 sat 100% on 2L/min O2 via NC;   Gen: leaning back in bed,  Pulm: Increased work of breathing, no accessory muscle use. Absent breath sounds b/l to mid lung posteriorly and decreased in RUL. Clear breath sounds in left upper lobe.    Sat patients bed more upright, near 90 degrees.  Will continue K repletion & 3% saline as total vol remaining <100cc. Give second dose 10mg IV lasix now.    D/w GynOnc fellow, Dr. Price.    María Simpson MD PGY3

## 2018-08-06 NOTE — PROGRESS NOTES
Gynecologic Oncology Progress Note    24 hour events:   - 10mg IV lasix x3  - Paracentesis with 1occ return  - LE doppler positive for LLE DVT, see imaging  - Heparin drip initiated    Subjective: Patient is feeling better this AM. Breathing is improving but does still feel SOB especially when lying down. Abdominal pain not present, got pain meds. Continues to eat small amount. Voiding spontaneously.    Objective:   Patient Vitals for the past 24 hrs:   BP Temp Temp src Pulse Heart Rate Resp SpO2   08/07/18 0313 117/52 97.8  F (36.6  C) Oral - 100 24 96 %   08/06/18 2218 110/49 97.3  F (36.3  C) Oral - 105 20 97 %   08/06/18 1708 121/69 99.3  F (37.4  C) Oral - 109 20 97 %   08/06/18 1105 131/68 97.5  F (36.4  C) Oral - 103 23 100 %   08/06/18 1032 137/63 96.4  F (35.8  C) Oral 102 - 22 100 %   08/06/18 0930 - - - - - 22 -   08/06/18 0813 127/56 96.3  F (35.7  C) Oral 92 - 22 99 %   on RA    GEN - sitting up straight, appears comfortable.  CV - Tachycardic, regular rhythm, systolic murmur @ left sternal border  PULM - NO increased work of breathing, no accessory muscle use. Absent breath sounds b/l to in b/l lung bases posteriorly. Clear breath sounds from mid lung b/l to upper lobes.  ABD - firm abdomen (abdominal mass throughout), moderately distended, slight tenderness to palpation  Ext - 3+ edema b/l, nontender    I/O last 3 completed shifts:  In: 340 [P.O.:140; I.V.:200]  Out: 2075 [Urine:2075]  Since MN: IN 0 [ PO 0, IV 30]    [urine 250]    New Labs/Imaging-   WBC 10.3  Hgb 8.0  Na 132  K 4.0  Mg 1.8  Hep Xa - 0.45    Ascitic fluid cytology: Slightly bloody, cloudy, WBC 1508. Gram stain neg. Cultures pending    LE doppler US (8/6/18):   IMPRESSION:    1.  Occlusive deep vein thrombus in the left tibial peroneal trunk,  with normal blood flow distally in the left peroneal vein.  2.  No right lower extremity DVT    Assessment:  58 year old female with metastatic clear cell carcinoma. HD#5 admitted from  clinic with tachypnea, hyponatremia, anemia and failure to thrive in the setting of progressive disease with extensive ascites and bilateral pleural effusions.    Disease: Stage IVB clear cell carcinoma undergoing neoadjuvant chemo, C1D1 Carbo/Taxol on 7/12/18.  increased to 592 (8/3).  Plan chemotherapy while admitted if able to improve hyponatremia further  FEN: Severe malnutrition and cachexia, albumin 1.9, nutrition consulted. NPO for IVC filter today, no mIVF due to propensity to third space  - Hypervolemic hyponatremia, likely due to extensive disease with third spacing of fluids with physiologic response in ADH as well as weekly paracenteses. Encouraging increased salt intake and limited free water intake. S/p 5s118ow hypertonic saline (3%) with improvement to 132 (8/6), stable since  - Hypokalemia, cautious central line repletion to avoid worsening respiratory status w/ excess fluids  Pain: Tylenol, lidocaine patches, oxycodone prn.   Heme: Severe anemia on admission likely due to chemotherapy and chronic disease, s/p 2U pRBCs with appropriate rise.    - Bilateral pulmonary emboli, with new subsegmental disease in the superior right lower lobe and new LLE DVT which developed while on therapeutic Lovenox, Xa 0.93>0.17. Lovenox held for procedures, heparin drip started 8/6 @ 2000 - will hold 2hrs prior to procedures. Plan IVC filter placement with IR today  CV: Tachycardia w/ known b/l PE, no right heart strain on CTA 8/6/18  Resp: Significant bilateral pleural effusions--medicine procedure team consulted for thoracentesis, planned this AM  GI: Currently NPO for IVC filter. Scheduled bowel regimen, prn antiemetics. Malignant ascites s/p failed paracentesis yesterday, 10cc obtained with WBC 1500, neutrophil count requested from lab  :  NI  MSK/Extremities: Lower back pain, improved. LE edema BLE Yoshi Hose and Lymphedema consult  Neuro/Psych: NI  Endocrine: NI  ID: Triggered sepsis protocol x 2 due to  tachycardia, lactate peak at 2.3 (8/4, 1530), now normalized. Mild leukocytosis, stable. Afebrile. No localizing signs of infection.  PPx: SCDs. Heparin drip  Dispo: Discharge to home w/ family assist, home PTO/OT following inpatient mgmt of pleural effusions, hyponatremia, and possibly inpatient chemotherapy.     # Pain Assessment:  Current Pain Score 8/7/2018   Patient currently in pain? yes   Pain score (0-10) -   Pain location Abdomen   Pain descriptors Discomfort;Pressure   - Maria Esther is experiencing pain due to cancer. Pain management was discussed and the plan was created in a collaborative fashion.  Maria Esther's response to the current recommendations: engaged  - Please see the plan for pain management as documented above      María Simpson MD PGY3  Gyn Onc [phone]  08/07/18    I have examined this patient personally with the team and agree with the above findings and plan.  Pt slightly improved clinically but will still plan for filter and thoracentesis.    Darrick Maurice

## 2018-08-06 NOTE — PROCEDURES
Hospitalist Procedure Service - Paracentesis Procedure Note  Date of Service: 08/06/2018    Patient: Maria Esther Wang  MRN: [id number]  Admission Date: 8/3/2018  Hospital Day # 2    Attending: Dewayne Dominguez   Resident: Sg Romero  Procedure:  therapeutic paracentesis  Indication: abdominal distention  Pre-procedure diagnosis: ascites  Post-procedure diagnosis: ascites    The risks and benefits of the procedure were explained to Maria Esther Wang who expressed understanding and opted to proceed.  Consent was obtained and placed in the chart.  Ultrasound was used to find a pocket of ascites in the right lower quadrant, which was notable for possible septations and an abdominal wall mass. A time out was performed. The area was prepped and draped in the usual sterile fashion.  4 ml of 1% lidocaine was instilled. A black tipped needle was inserted under direct ultrasound guidance and ascites located.  The paracentesis catheter was then inserted under direct ultrasound guidance, but fluid was difficult to aspirate. Decision was made to abort procedure, as bowel moved closer to abdominal wall with external pressure from ultrasound probe.   Approximately 10 ml was removed.   A specimen was sent for analysis. The catheter was withdrawn and the area dressed. Pre-procedure images were saved to the medical record.    Patient tolerated the procedure well with above mentioned immediate complications.  The primary team was informed of the procedure.     Sg Romero MD/NORA  Internal Medicine, PGY3  [phone]

## 2018-08-06 NOTE — CONSULTS
Patient is on IR schedule 8/7/18 for a temporary IVC filter placement. Will need to coordinate with  services    Labs WNL for procedure.      Preprocedural orders have been entered.     Consent will be done prior to procedure.     Please contact the IR charge RN at [phone] for estimated time of procedure.     Case discussed with primary team.    Guy Farley PA-C  Interventional Radiology  [phone] pgr.

## 2018-08-06 NOTE — PROGRESS NOTES
[facility], [place]   Palliative Care Daily Progress Note          Recommendations, Patient/Family Counseling & Coordination     Palliative will continue to follow with you. Discussed with staff RN and primary team MD  - Palliative social work coordinate with hospital social work to help identify options for additional support at home (PCA vs other)  - Agree with continuing acetaminophen for pain. Could consider scheduling (650mg 6h or 1000mg TID) if pain escalates  - Agree with continuing oxycodone 5mg prn. Reports abdominopelvic pain well-controlled at home q4h. Less report of back pain  - Agree with ondansetron/lorazepam for nausea; reports that lorazepam has been effective at home  - Agree with current bowel regimen; consider escalating regimen if increase opioids  - Continues with weekly paracenteses as she remains inpatient.  - POLST not completed. Should be addressed prior to discharge      Thank you for the opportunity to continue to participate in the care of this patient and family.  Please feel free to contact on-call palliative provider with any emergent needs.  We can be reached via team pager [phone] (answered 8-4:30 Monday-Friday); after-hours answering service ([phone])    Osmany Coreas, NP    Osmany Coreas APRN NP ACHPN  Nurse Practitioner- Lead Advanced Practice Provider  [facility] Palliative Medicine Consult Service   [phone]    TT spent: 33 minutes of which 22 minutes were spent in direct face to face contact with patient/family. Greater than 50% of time spent counseling and/or coordinating care.         Assessment      1) Diagnoses & symptoms:        Metastatic clear cell ovarian carcinoma   Pelvic mass  Ascites (paracentesis once weekly)  Hyponatremia   Leukocytosis  Anemia  Tachypnea  Pulmonary emboli on therapeutic lovenox  Severe protein-calorie malnutrition (notes 20 lb weight loss in 4 months)                    Interval History:   Pt seen  and examined. Much improved since last visit. Pain and sx  Controlled. Planned paracentesis this afternoon has limited PO. No N&V. Smiling and more enagaged. 02 sats in 90's on room air. Video  present via phone. Family not present.            Review of Systems:   Palliative Symptom Review (0=no symptom/no concern, 1=mild, 2=moderate, 3=severe):      Pain: 1      Fatigue: 0-1      Nausea: 0      Constipation: 0      Shortness of Breath: 1                 Medications:   I have reviewed this patient's medication profile and medications given in past 24 hours.       Physical exam: Vitals: /68 (BP Location: Left arm)  Pulse 102  Temp 97.5  F (36.4  C) (Oral)  Resp 23  Wt 46 kg (101 lb 8 oz)  SpO2 100%  BMI 18.56 kg/m2  BMI= Body mass index is 18.56 kg/(m^2).   General: cachectic, more wakeful and vital appearing- leaning forward on edge of bed  HEENT: NC/AT, EOMI, non-icteric sclerae. Trachea midline. L supraclavicular node noted.   CV: monitor with sinus regular rhythm.  Resp: even relaxed respirations, no supplemental 02. Port- upper right chest wall.    Extrem: WWP. 2+ pedal edema bilaterally to ankles.  Skin: no rashes, ecchymoses noted.  Neuro: moving all extremities purposefully and equally. No facial asymmetry detected.                   Data Reviewed:   ROUTINE LABS (Last four results)  BMP  Recent Labs  Lab 08/06/18  1554 08/06/18  0756 08/06/18  0203 08/05/18 1748 08/05/18  1435 08/05/18  0613   NA  --  131* 132* 129* 129* 128*   POTASSIUM 3.4 4.4 3.0* 3.2*  --  3.3*   CHLORIDE  --  100 98 96  --  95   IRON  --  7.8* 7.6* 7.9*  --  7.7*   CO2  --  28 26 25  --  26   BUN  --  9 9 9  --  8   CR  --  0.31* 0.36* 0.36*  --  0.30*   GLC  --  92 98 113*  --  102*     CBC  Recent Labs  Lab 08/06/18  0756 08/05/18 1748 08/05/18  0613 08/04/18  1748   WBC 13.6* 13.4* 14.1* 13.8*   RBC 3.38* 3.51* 3.54* 3.66*   HGB 8.6* 9.0* 9.2* 9.4*   HCT 27.6* 28.6* 28.5* 29.6*   MCV 82 82 81 81   MCH 25.4*  25.6* 26.0* 25.7*   MCHC 31.2* 31.5 32.3 31.8   RDW 20.9* 20.6* 20.5* 20.4*    271 269 268     INR  Recent Labs  Lab 08/06/18  0811   INR 1.21*

## 2018-08-06 NOTE — PROVIDER NOTIFICATION
Notified MD at 2145 PM regarding change in condition.      Spoke with: María Simpson MD    Orders were obtained.    Comments: Pt became very short of breath and said that she felt like her heart was pounding out of her chest. MD ordered additional dose of IV Lasix.

## 2018-08-06 NOTE — PLAN OF CARE
Problem: Patient Care Overview  Goal: Plan of Care/Patient Progress Review  PT - 7C  Discharge Planner PT   Patient plan for discharge: home with family A  Current status: Pt transfers sup > sit EOB with SBA, STS SBA, sit EOB > sup with Juhi at BLE and HOB flat. Ambulated 150'x2 with FWW, CGA-SBA, seated rest break ~5 min between bouts. Pt c/o mild SOB and fatigue with ambulation. Please encourage pt to use walker consistently in room.    Edema - Switched from Jobst to comperm for hopefully improved comfort.  Size F tubular compression system to remain on BLE from base of toes to knee crease for day/night, but must be removed 1x/day for skin hygiene, lotion application and inspection.  Frequently smooth tubular compression to prevent rolling, bunching and to ensure proper coverage.  Remove compression from limb if directly creating pain, increased SOB, unable to communicate pain, change in sensation or skin color, or soiled bandages.     Barriers to return to prior living situation: medical status, decreased activity tolerance, impaired balance, weakness  Recommendations for discharge: home with assist from family, home PT/OT for safety evaluation  Rationale for recommendations: Pt has family support in the morning/night, home alone ~3 hours/day with pt denying any concerns regarding her mobility during this time.. Brother plans to call health partners insurance to determine allowed DME providers and take pt to showroom to test drive 4 wheel walker prior to purchase.  Due to poor activity tolerance, pt would benefit from 4 wheel walker with seat, rather than traditional front wheeled walker (order is entered in disch orders).  Pt would benefit from home PT/OT evaluation for additional equipment at home, progress strength and activity tolerance.  Entered by: Evi Munoz 08/06/2018 1:30 PM

## 2018-08-06 NOTE — PLAN OF CARE
Problem: Anemia (Adult)  Goal: Identify Related Risk Factors and Signs and Symptoms  Related risk factors and signs and symptoms are identified upon initiation of Human Response Clinical Practice Guideline (CPG).   Outcome: Declining  Tachycardic, OVSS. At beginning of shift Pt endorsed labored breathing with visible distress in respiratory effort. Rapid response called and MD order IV lasix with some relief of symptoms. Pt had a CT scan that showed worsening clinical condition. Approximately 2130 Pt endorsed a return of symptoms of labored breathing and a feeling of her heart jumping out of her chest. MD ordered repeat dose of IV lasix as well as 100cc 3% NS bolus and 20 mEq K+ replacement. K+ currently infusing through port. Pt's brother at bedside, supportive with cares. Plan for para/throacentesis tomorrow AM. Cont. POC.

## 2018-08-06 NOTE — PROGRESS NOTES
Brief Progress Note    Pt returned from CT. Spoke to radiologist who reported new superior RLL subsegmental PE, significant b/l pleural effusions, increased metastatic disease from prior CT (7/9). Reports pleural effusions likely not significantly different from CXR earlier today though difficult to compare modalities.    Spoke to patient who is feeling palpitations but otherwise feels ok. Feels like she is breathing better than when rapid response was called.     Patient Vitals for the past 4 hrs:   BP Temp Temp src Pulse Resp SpO2   08/05/18 1959 119/57 97.4  F (36.3  C) Axillary 107 16 100 %   O2 at 1L/m via NC    Discussed CT findings and plan going forward. Will replete electrolytes gently through her IV - 100cc 3% NS + 50cc w/ 20mEq KCl through port. Encouraged her to continue increased salt intake as able to tolerate with decreased plan water intake (currently eating salty broth).  Will talk to medicine team about draining fluid from her lungs tmr AM before her therapeutic lovenox dose.    María Simpson MD PGY3

## 2018-08-07 NOTE — PLAN OF CARE
Problem: Patient Care Overview  Goal: Plan of Care/Patient Progress Review  PT - 7C  Discharge Planner PT   Patient plan for discharge: home with family A  Current status: Pt transfers sup > sit EOB with SBA, STS SBA. Ambulated 10' with FWW, CGA-SBA navigating narrow spaces and turning without LOB. Pt navigates aerobic step with use of FWW and close CGA stepping backwards onto step to reach bed to go to procedure. Pt c/o mild SOB with ambulation, very SOB when lying flat needing HOB raised to breathe. Please encourage pt to use walker consistently in room.    Edema - Pt indicates she is more comfortable with comperm than Jobst.  Size F tubular compression system to remain on BLE from base of toes to knee crease for day/night, but must be removed 1x/day for skin hygiene, lotion application and inspection.  Frequently smooth tubular compression to prevent rolling, bunching and to ensure proper coverage.  Remove compression from limb if directly creating pain, increased SOB, unable to communicate pain, change in sensation or skin color, or soiled bandages.     Barriers to return to prior living situation: medical status, decreased activity tolerance, impaired balance, weakness  Recommendations for discharge: home with assist from family, home PT to progress balance/gait & home OT for safety evaluation  Rationale for recommendations: Pt has family support in the morning/night, home alone ~3 hours/day with pt denying any concerns regarding her mobility during this time.. Brother plans to call health partners insurance to determine allowed DME providers and take pt to showroom to test drive 4 wheel walker prior to purchase.  Due to poor activity tolerance, pt would benefit from 4 wheel walker with seat, rather than traditional front wheeled walker (order is entered in disch orders).  Pt would benefit from home PT/OT for additional equipment at home, progress strength and activity tolerance.

## 2018-08-07 NOTE — PLAN OF CARE
Problem: Patient Care Overview  Goal: Plan of Care/Patient Progress Review  Outcome: Therapy, progress toward functional goals is gradual  Up with assist of 1, ambulating slowly. Pain in the abdomen managed with prn po oxycodone 5 mg. Voiding, no BM this shift. NPO for procedure this afternoon. SIRS protocol triggered, VS every 30 minutes taken. Lactic acid 1.3. K+ this am 3.8. Heparin rate changed this am at 750 U/hr, stopped at around 1230H per IR prior to transfer. Heparin Xa at 1515H. Report given to IR. Pt transferred via stretcher with  at around 1330H. Pt will have thoracentesis and IVC filter in IR. PLAN: To continue with the care plan.     ADDENDUM: Per Modesta Cheng NP to notify gyn-onc team when pt is back from IR.  is scheduled until 1630H.

## 2018-08-07 NOTE — PROGRESS NOTES
Calorie Count  Intake recorded for: 8/6 Kcals: 0  Protein: 0g  # Meals Recorded: 0 meals ordered, no intake recorded  # Supplements Recorded: 0

## 2018-08-07 NOTE — PROGRESS NOTES
Interventional Radiology Pre-Procedure Sedation Assessment   Time of Assessment: 2:50 PM    Expected Level: Moderate Sedation    Indication: Sedation is required for the following type of Procedure: Venous    Sedation and procedural consent: Risks, benefits and alternatives were discussed with Patient    PO Intake: Appropriately NPO for procedure    ASA Class: Class 2 - MILD SYSTEMIC DISEASE, NO ACUTE PROBLEMS, NO FUNCTIONAL LIMITATIONS.    Mallampati: Grade 1:  Soft palate, uvula, tonsillar pillars, and posterior pharyngeal wall visible    Lungs: Lungs Clear with good breath sounds bilaterally    Heart: Normal heart sounds and rate    History and physical reviewed and no updates needed. I have reviewed the lab findings, diagnostic data, medications, and the plan for sedation. I have determined this patient to be an appropriate candidate for the planned sedation and procedure and have reassessed the patient IMMEDIATELY PRIOR to sedation and procedure.    Robert Bautista MD

## 2018-08-07 NOTE — CONSULTS
Patient is on IR schedule 8/7/18 for IVC filter placement. Team also requesting paracentesis and bilateral thoracenteses. Due to scheduling constraints, IR will perform IVC filter placement and therapeutic paracentesis today, 8/7/18, with plan for bilateral therapeutic thoracentesis in the near future.    Labs WNL for procedure.     Preprocedural orders have been entered.  Will hold Heparin prior to thoracenteses.  Consent will be done prior to procedure.     Please contact the IR charge RN at [phone] for estimated time of procedure.     Case discussed with primary team.    Guy Farley PA-C  Interventional Radiology  [phone] pgr.

## 2018-08-07 NOTE — PLAN OF CARE
Problem: Patient Care Overview  Goal: Plan of Care/Patient Progress Review  Outcome: Therapy, progress toward functional goals is gradual  Patient requested oxycodone twice during the night for abdominal pain, dyspneic on exertion, lethargic, family brought dinner in the evening and patient ate a small portion, heparin drip is running @850 units/hr. Anti 10A @0215 was 0.45 and no dose change was initiated. Voided adequate amount, NPO overnight for procedure.

## 2018-08-07 NOTE — PROGRESS NOTES
Patient Name: Maria Esther Wang  Medical Record Number: [id number]  Today's Date: 8/7/2018    Procedure: 1-Paracentesis  2- IVC filter placement  Proceduralist: Dr. Michele Hartmann    Sedation start time: 1410  Sedation end time: 1450  Sedation medications administered: versed 1 mg, fentanyl 50 mcg   Total sedation time: 40 min    Procedure start time: 1410  Puncture time: 1413  Procedure end time: 1450    Report given to:       1,750 ml yellow output from paracentesis.     Puncture to R internal jugular for IVC filter placement. No noted bleeding.     Other Notes: Pt arrived to IR room 1 from . Pt denies any questions or concerns regarding procedure. Pt positioned supine with HOB elevated and monitored per protocol. Pt tolerated procedure without any noted complications. Pt transferred back to .

## 2018-08-07 NOTE — PROCEDURES
Interventional Radiology Brief Post Procedure Note    Procedure: IVC filter placement. Paracentesis.    Proceduralist: Pedrito Cao MD    Assistant: IR Fellow Physician, Angel Bautista MD    Time Out: Prior to the start of the procedure and with procedural staff participation, I verbally confirmed the patient s identity using two indicators, relevant allergies, that the procedure was appropriate and matched the consent or emergent situation, and that the correct equipment/implants were available. Immediately prior to starting the procedure I conducted the Time Out with the procedural staff and re-confirmed the patient s name, procedure, and site/side. (The Joint Commission universal protocol was followed.)  Yes        Sedation: IR Nurse Monitored Care   Post Procedure Summary:  Prior to the start of the procedure and with procedural staff participation, I verbally confirmed the patient s identity using two indicators, relevant allergies, that the procedure was appropriate and matched the consent or emergent situation, and that the correct equipment/implants were available. Immediately prior to starting the procedure I conducted the Time Out with the procedural staff and re-confirmed the patient s name, procedure, and site/side. (The Joint [employer] universal protocol was followed.)  Yes       Sedatives: Fentanyl and Midazolam (Versed)    Vital signs, airway and pulse oximetry were monitored and remained stable throughout the procedure and sedation was maintained until the procedure was complete.  The patient was monitored by staff until sedation discharge criteria were met.    Patient tolerance: Patient tolerated the procedure well with no immediate complications.    Time of sedation in minutes: 30 Minutes minutes from beginning to end of physician one to one monitoring.          Findings: Paracentesis. IVC filter placement    Estimated Blood Loss: Minimal    Fluoroscopy Time:  minute(s)    SPECIMENS:  None    Complications: 1. None     Condition: Stable    Plan: Bedrest with head of bed elevated to 30 degrees for 1 hour.    Comments: See dictated procedure note for full details.    Robert Bautista MD

## 2018-08-07 NOTE — CONSULTS
Patient is on IR schedule 8/8/2018 for a Therapeutic thoracentesis.   Labs WNL for procedure.    No NPO required.   Consent will be done prior to procedure.      Please contact the IR charge RN at [phone] for estimated time of procedure.       kOsana Corona IR RPA  [phone] Call pager  [phone] pager

## 2018-08-07 NOTE — PROGRESS NOTES
"  Care Coordinator - Discharge Planning    Admission Date/Time:  8/3/2018  Attending MD:  Yeni Haas*     Data  Date of initial CC assessment:  Patient has been followed daily in rounds by the interdisciplinary medical team.  Chart reviewed, discussed with interdisciplinary team.   Patient was admitted for:   1. Anemia in neoplastic disease    2. Hyponatremia    3. Ovarian cancer, unspecified laterality (H)    4. Tachypnea    5. Other acute pulmonary embolism without acute cor pulmonale (H)         Assessment     The following home care plans of care have been arranged pending insurance inquiry after talking with patient at bedside with a Sanjiv :  Please fax discharge orders to [facility]     Ph:  [phone]     Fax: [phone]     Skilled home care RN for initial home safety evaluation and 1-3 times a week to evaluate medication management, nutrition and hydration evaluation, endurance evaluation, and general status evaluation after discharge from the acute care hospital setting.     Skilled home care RN to assist with admission to the Palliative Home Care Team.     Skilled home care Physical Therapist and Occupational Therapist to evaluate and treat in home setting.     Skilled home care  to evaluate and assist with community services that patient may be eligible fore in home community.     Home Health Aide to assist with bathing and grooming 3 times a week for 2 weeks after discharge.     Per patient, she has no appetite stating \"food does not tast good.\"  Patient also stated that she is hopeful that her insurance will assist with the cost of home care services.  Patient did confirm that she will be at home alone and she is 'kind of afraid to be home alone.\"  Patient confirmed that the nieces and nephews who are at home with her that was discovered during my chart review are minor children.  This writer confirmed with her that minor children can not be " considered care givers in the home setting.    This writer also discovered that patients Primary Care MD under her [employer] Plan is Dr. Kory Scott M.D.from the [facility].    Coordination of Care and Referrals: Provided patient/family with options for home care agency of choice in home area.      Plan  Anticipated Discharge Date:  To be determined.  Anticipated Discharge Plan:  As above and per updated MD team plans of care.  Per report in interdisciplinary team rounds, patient is declining a TCU stay at this time.    CTS Handoff completed: (Clinic Letter)  To be sent    ANYI Truong.S.ANTHONY., P.H.N.,R.N.         Pager

## 2018-08-07 NOTE — PLAN OF CARE
Problem: Patient Care Overview  Goal: Plan of Care/Patient Progress Review  OT: deferring acute OT after conversation with PT post PT evaluation and chart reviewing. Pt would bell served by home OT to assess and treat her ADL deficits in her living situation. PT to continue following in acute setting for deconditioning and edema.

## 2018-08-08 NOTE — PLAN OF CARE
Problem: Patient Care Overview  Goal: Plan of Care/Patient Progress Review  PT - 7C  Discharge Planner PT   Patient plan for discharge: home with family A  Current status: Pt STS SBA. Ambulated 100' + 200' + 50' + 15' with FWW, CGA-SBA navigating narrow spaces and turning without LOB. Pt navigates 4 stairs with use of BUE support on unilateral rail, as pt completes at home this way, CGA. Please encourage pt to use walker consistently in room.  Barriers to return to prior living situation: medical status, decreased activity tolerance, impaired balance, weakness  Recommendations for discharge: home with assist from family, home PT/OT for safety evaluation, and likely home lymphedema therapy to fully teach brother on comperm management.  Rationale for recommendations: Pt has family support in the morning/night, home alone ~3 hours/day with pt denying any concerns regarding her mobility during this time. Brother plans to call health partners insurance to determine allowed DME providers and take pt to showroom to test drive 4 wheel walker prior to purchase.  Due to poor activity tolerance, pt would benefit from 4 wheel walker with seat, rather than traditional front wheeled walker (order is entered in disch orders).  Pt would benefit from home PT/OT evaluation for additional equipment at home, progress strength and activity tolerance.

## 2018-08-08 NOTE — PROGRESS NOTES
Gynecologic Oncology Progress Note    24 hour events:   - IR paracentesis (1700cc drained) & IVC filter placement  - Heparin drip continued    Subjective: Patient is feeling well overall. Does feel SOB still. Abdominal pain not bothersome. Eating as much as tolerated, voiding.    Objective:   Patient Vitals for the past 24 hrs:   BP Temp Temp src Pulse Heart Rate Resp SpO2 Weight   08/08/18 0106 108/57 96.1  F (35.6  C) Oral 107 - 18 98 % -   08/07/18 2014 123/53 97.8  F (36.6  C) Oral 109 109 18 99 % -   08/07/18 1650 119/52 - - - 103 - 100 % -   08/07/18 1620 132/52 - - - 105 - 98 % -   08/07/18 1605 125/58 - - - 102 - 100 % -   08/07/18 1550 124/63 - - - 100 - 100 % -   08/07/18 1535 111/51 - - - 103 - 100 % -   08/07/18 1521 140/67 96.3  F (35.7  C) Oral - 106 20 99 % -   08/07/18 1440 118/76 - - 104 - 26 100 % -   08/07/18 1428 116/81 - - 104 - 26 100 % -   08/07/18 1419 118/76 - - 112 - 26 100 % -   08/07/18 1411 134/71 - - 103 - 26 100 % -   08/07/18 1356 140/78 - - 104 - 26 - -   08/07/18 1153 122/51 98.5  F (36.9  C) Oral 106 - 26 94 % -   08/07/18 1122 122/51 97.4  F (36.3  C) Oral 107 - 26 97 % -   08/07/18 1048 112/50 98.2  F (36.8  C) Oral 106 - 28 96 % -   08/07/18 0935 - - - - - - - 46.6 kg (102 lb 12.8 oz)   08/07/18 0752 111/59 97.7  F (36.5  C) Oral - 105 22 - -   on 2L/in via NC since 1400 on 8/7    GEN - lying on right side, appears comfortable.  CV - Tachycardic, regular rhythm, systolic murmur @ left sternal border  PULM - NO increased work of breathing, no accessory muscle use. Absent breath sounds inL lung base, decreased on right lung base. Crackles at mid lung b/l, clear breath sounds in upper lobes  ABD - firm abdomen (abdominal mass throughout), moderately distended, slight tenderness to palpation  Ext - 3+ edema b/l, nontender    I/O last 3 completed shifts:  In: 710 [P.O.:630; I.V.:80]  Out: 900 [Urine:900]  Since MN: IN 0 [ PO 0, IV 0]    [urine 300]    New Labs/Imaging-   WBC  9.5  Hgb 7.7  Na 130  K 3.8  Albumin 1.4  Hep Xa - <0.10 (0030)    Ascites culture - pending  Blood culture - NGx4d    Assessment:  58 year old female with metastatic clear cell carcinoma. HD#6 admitted from clinic with tachypnea, hyponatremia, anemia and failure to thrive in the setting of progressive disease with extensive ascites and bilateral pleural effusions.    Disease: Stage IVB clear cell carcinoma undergoing neoadjuvant chemo, C1D1 Carbo/Taxol on 7/12/18.  increased to 592 (8/3).  Plan chemotherapy while inpatient, possibly today after procedures  FEN: Severe malnutrition and cachexia, albumin 1.9, nutrition consulted. Reg diet  - Hypervolemic hyponatremia, likely due to extensive disease with third spacing of fluids with physiologic response in ADH as well as weekly paracenteses. Encouraging increased salt intake and limited free water intake. S/p [id number] hypertonic saline (3%) with improvement to 132 (8/6), slight downtrend on this AM labs to 130  - Hypokalemia, cautious central line repletion to avoid worsening respiratory status w/ excess fluids  Pain: Tylenol, lidocaine patches, oxycodone prn.   Heme: Severe anemia on admission likely due to chemotherapy and chronic disease, s/p 2U pRBCs with appropriate rise.  - Bilateral pulmonary emboli, with new subsegmental disease in the superior right lower lobe and new LLE DVT which developed while on therapeutic Lovenox. S/p IVC filter 8/7. Lovenox held for procedures, heparin drip started 8/6 @ 2000 - will hold 2hrs prior to thoracentesis today then restart lovenox.  CV: Tachycardia w/ known b/l PE, no right heart strain on CTA 8/6/18  Resp: Significant bilateral pleural effusions--thoracentesis with IR today  GI: Reg diet. Scheduled bowel regimen, prn antiemetics. Malignant ascites s/p paracentesis yesterday. Gets weekly paracenteses  :  NI  MSK/Extremities: Lower back pain, improved. LE edema BLE Yoshi Hose and Lymphedema consult  Neuro/Psych:  NI  Endocrine: NI  ID: Triggered sepsis protocol x 2 due to tachycardia, lactate peak at 2.3 (8/4, 1530), now normalized. Mild leukocytosis, stable. Afebrile. No localizing signs of infection.  PPx: SCDs. Heparin drip  Dispo: Discharge to home w/ family assist, home PTO/OT following inpatient mgmt of pleural effusions and chemotherapy.     # Pain Assessment:  Current Pain Score 8/8/2018   Patient currently in pain? sleeping: patient not able to self report   Pain score (0-10) -   Pain location -   Pain descriptors -   - Maria Esther is experiencing pain due to cancer. Pain management was discussed and the plan was created in a collaborative fashion.  Maria Esther's response to the current recommendations: engaged  - Please see the plan for pain management as documented above      María Simpson MD PGY3  Gyn Onc [phone]  08/08/18    I have examined this patient personally with the team and agree with the above findings and plan.    Darrick Maurice

## 2018-08-08 NOTE — PLAN OF CARE
Problem: Patient Care Overview  Goal: Plan of Care/Patient Progress Review  Outcome: No Change   Arrived from IR at 1520. Laotian speaking. VSS.Currently on 2L via NC.  Pain managed with oxycodone q 4 hrs.  RIJ puncture site c/d/i. R abdominal paracentesis dressing c/d/i. Up with sba. Dyspnea on exertion. Abdominal distended. Tolerating small amounts of regular diet. Calorie counts maintained. Voiding spontaneously. + flatus, - stool. Patient to have bilateral thoracentesis tomorrow. Pre op scrub x 1 completed. Patient will need another scrub in the morning. NPO after MN. Heparin gtt infusing at 750 units/hr. Hep 10 a to be drawn at 2300.  Family visited this evening. Resting in-between cares. PLAN: IR tomorrow for thoracentesis.

## 2018-08-08 NOTE — PROCEDURES
Interventional Radiology Brief Post Procedure Note    Procedure: IR THORACENTESIS    Proceduralist: Jose Mcgrath MD    Assistant: Radiology Resident Physician, Bon Cameron DO    Time Out: Prior to the start of the procedure and with procedural staff participation, I verbally confirmed the patient s identity using two indicators, relevant allergies, that the procedure was appropriate and matched the consent or emergent situation, and that the correct equipment/implants were available. Immediately prior to starting the procedure I conducted the Time Out with the procedural staff and re-confirmed the patient s name, procedure, and site/side. (The Joint Commission universal protocol was followed.)  Yes    Medications   Medication Event Details Admin User Admin Time       Sedation: None. Local Anesthestic used    Findings: large left and moderate right pleural effusions on US. US guided bilateral thoracentesis. 850cc serosanguinous fluid out of the right and 550cc out of the left. Hemostasis achieved.     Estimated Blood Loss: Minimal    Fluoroscopy Time:  minute(s)    SPECIMENS: None    Complications: 1. None     Condition: Stable    Plan: Patient to return to the floor. Bedrest for 1 hour on back.     Comments: See dictated procedure note for full details.    Bon Cameron DO

## 2018-08-08 NOTE — PROGRESS NOTES
Calorie Count  Intake recorded for: 8/7. Kcals: 559  Protein: 17g  # Meals Recorded: 100% rafiq, grapes, 75% white rice  # Supplements Recorded: 100% Boost shake

## 2018-08-08 NOTE — PROGRESS NOTES
Patient Name: Maria Esther Wang  Medical Record Number: [id number]  Today's Date: 8/8/2018    Procedure: Bilateral Therapeutic Thoracentesis  Proceduralist: Dr. Cameron, TO with Dr. Mcgrath    Sedation start time: None    Procedure start time: 1500  Puncture time: 1507  Procedure end time: 1530    Report given to: Angie MCDOWELL RN  : yes, Corinna Chacko (Sanjiv)    Other Notes: Pt arrived to IR room 6 from . Pt denies any questions or concerns regarding procedure. Pt positioned sitting on edge of bed and monitored per protocol. Pt tolerated procedure without any noted complications.  Dressing applied to bilateral upper back, CDI. Pt transferred back to .    Right- 550 ml  Left- 850 ml    Caterina Red

## 2018-08-08 NOTE — PLAN OF CARE
Problem: Patient Care Overview  Goal: Plan of Care/Patient Progress Review  Outcome: Therapy, progress toward functional goals is gradual  Tachy and on 2L, OVSS. Pt Latian speaking. Pain managed with oxycodone q4hrs. R paracentesis side dressing c/d/i. Port infusing. Xa level less than 0.1- Heparin bolus given and pump reprogrammed. +gas no BM. Pan for thoracentesis today at 1430- heparin to be held at 1230. One scrub completed. Up with SBA ambulating to bathroom; voiding. Continue with POC.

## 2018-08-08 NOTE — PLAN OF CARE
Problem: Patient Care Overview  Goal: Plan of Care/Patient Progress Review  Pt speaks Sanjiv. AVSS, on RA. Pt c/o back pain, relieved with 5mg PRN oxycodone. Pt denies nausea, tolerates regular diet, on eligio counts. Pt up with SBA. Compression stockings on. Abdomen with ascites. Lung sounds diminished. Thoracentesis today at 1430,  scheduled for procedure. Pt has had 2 scrubs prior to procedure. Port is HL, + blood return. Heparin gtt stopped at 1230 for procedure. Per NP: pt to start on lovenox again post-procedure and possible chemo either tonight or tomorrow morning. Continue POC.       Addendum: Bilateral posterior back thoracentesis sites dried bright red bloody drainage. Pt c/o severe pain upon arrival to unit from IR, PRN oxycodone given, pt reports relief.

## 2018-08-09 NOTE — PLAN OF CARE
Problem: Patient Care Overview  Goal: Plan of Care/Patient Progress Review  Outcome: No Change  Laotian speaking. Tachypnic. MD aware. Respirations in the upper 20s to low 30s. Will continue to monitor. O2 on at times for patient comfort. OVSS. 02 sats mid 90s on room air. Pain managed with oxycodone q 4hrs. Up with sba. Dyspnea on exertion. Abdomen firm and distended. + flatus, - stool this shift. Encourage suppository. Voiding spontaneously. Poor PO intake. Patient reports having no appetite. Patient received 1 unit of PRBC and no transfusion reaction noted. However, with blood transfusion, patient bilateral lungs crackles were heard. IV lasix given x1 to prepare for chemo this evening. Patient did report feeling better post lasix.   Per Gyn onc fellow, Patient to only have one unit of PRBC this evening. Notify MD if patient has s/s of respiratory distress.  Patients reports feeling more tired this evening. Resting inbewteen cares. PLAN:  Chemo this evening.

## 2018-08-09 NOTE — PLAN OF CARE
Problem: Patient Care Overview  Goal: Plan of Care/Patient Progress Review  Discharge Planner PT / 7C  Patient plan for discharge: Home with family assist.  Current status: Completes bed mobility independently. Transfers sit <> stand with SBA. Ambulates ~200ft 2x, first bout with FWW + CGA-SBA and second bout with 4WW + CGA-SBA. Frequent seated/standing breaks taken 2/2 fatigue, though overall steady on feet with no LOB. Ascends/descends 4 stairs with use of BUE support on 1 HR + CGA. VSS on RA.  Barriers to return to prior living situation: Medical status, weakness, impaired balance, decreased activity tolerance.  Recommendations for discharge: Home with family assist, home PT/OT for safety evaluation, and likely home lymphedema therapy to fully teach brother on comperm management.  Rationale for recommendations: Pt has family support in the morning/night, home alone ~3 hours/day with pt denying any concerns regarding her mobility during this time. Brother plans to call health partners insurance to determine allowed DME providers and take pt to showroom to test drive 4 wheel walker prior to purchase.  Due to poor activity tolerance, pt would benefit from 4 wheel walker with seat, rather than traditional front wheeled walker (order is entered in disch orders).  Pt would benefit from home PT/OT evaluation for additional equipment at home, progress strength and activity tolerance.

## 2018-08-09 NOTE — PROGRESS NOTES
Calorie Counts  Intake recorded for: 8/8 Kcals: 290  Protein: 28g  # Meals Recorded: 100% 2 cups of rice & meat, 10 [employer] fruit, 25% fruit cup  # Supplements Recorded: 0

## 2018-08-09 NOTE — PROGRESS NOTES
Gynecologic Oncology Progress Note    24 hour events:   - IR thoracentesis, 550mL from right and 850 mL from left  - 10mg lasix  - transitioned from heparin drip to therapeutic lovenox    Subjective: Patient is feeling okay, did not get enough sleep with new neighbor making noise overnight. SOB improved. Abdominal pain present but tolerable with pain meds. Eating okay, voiding.    Objective:   Patient Vitals for the past 24 hrs:   BP Temp Temp src Pulse Heart Rate Resp SpO2 Weight   08/08/18 2200 96/52 99.2  F (37.3  C) Oral - 106 18 97 % -   08/08/18 2049 115/57 99.8  F (37.7  C) Oral - 108 18 97 % -   08/08/18 1555 111/46 98.6  F (37  C) Oral - 105 20 99 % -   08/08/18 1515 105/56 - - - 102 18 97 % -   08/08/18 1500 112/55 - - - 102 20 97 % -   08/08/18 1450 102/42 - - - 105 20 97 % -   08/08/18 1335 127/64 - - - 102 - 98 % -   08/08/18 1307 118/61 - - - 103 - 98 % -   08/08/18 0926 - - - 100 - - 98 % -   08/08/18 0900 - - - - - - - 46.1 kg (101 lb 9.6 oz)   08/08/18 0640 113/50 96.1  F (35.6  C) Oral 100 - 18 100 % -   on RA since 2000 on 8/8    GEN - lying on right side, appears comfortable.  CV - Tachycardic, regular rhythm, systolic murmur @ left sternal border  PULM - No increased work of breathing, no accessory muscle use. Crackles in b/l lung bases, clear breath sounds in upper lobes  ABD - firm abdomen (abdominal mass throughout), moderately distended, slight tenderness to palpation  Ext - 3+ edema b/l, nontender    I/O last 3 completed shifts:  In: 606.67 [P.O.:300; I.V.:306.67]  Out: 1300 [Urine:1300] thoracentesis 1400  Since MN: IN 0 [ PO 0, IV 0]    [urine 200]    New Labs/Imaging-   CBC, BMP pending    Ascites culture - pending  Blood culture - NGx5d    Assessment:  58 year old female with metastatic clear cell carcinoma. HD#7 admitted from clinic with tachypnea, hyponatremia, anemia and failure to thrive in the setting of progressive disease with extensive ascites and bilateral pleural  effusions.    Disease: Stage IVB clear cell carcinoma undergoing neoadjuvant chemo, C1D1 Carbo/Taxol on 7/12/18.  increased to 592 (8/3).  Plan chemotherapy today  FEN: Severe malnutrition and cachexia, albumin 1.4, nutrition consulted. Reg diet  - Hypervolemic hyponatremia, likely due to extensive disease with third spacing of fluids with physiologic response in ADH as well as weekly paracenteses. Encouraging increased salt intake and limited free water intake. S/p [id number] hypertonic saline (3%) with improvement to 132 (8/6), will f/u this AM  - Hypokalemia, cautious central line repletion to avoid worsening respiratory status w/ excess fluids  Pain: Tylenol, lidocaine patches, oxycodone prn.   Heme: Severe anemia on admission likely due to chemotherapy and chronic disease, s/p 2U pRBCs with appropriate rise.  - Bilateral pulmonary emboli, with new subsegmental disease in the superior right lower lobe and new LLE DVT which developed while on therapeutic Lovenox. IVC filter 8/7S/p heparin drip myra-procedural, now back on therapeutic lovenox.  CV: Tachycardia w/ known b/l PE, no right heart strain on CTA 8/6/18  Resp: Significant bilateral pleural effusions s/p thoracentesis 8/8  GI: Reg diet. Scheduled bowel regimen, prn antiemetics. Malignant ascites s/p paracentesis 8/7  :  NI  MSK/Extremities: Lower back pain, improved. LE edema BLE Yoshi Hose and Lymphedema consult  Neuro/Psych: NI  Endocrine: NI  ID: Triggered sepsis protocol x 2 due to tachycardia, lactate peak at 2.3 (8/4, 1530), now normalized. Leukocytosis, resolved. Afebrile. No localizing signs of infection.  PPx: SCDs. Therapeutic lovenox  Dispo: Discharge to home w/ family assist & home PT/OT/lymphedema following inpatient chemotherapy    # Pain Assessment:  Current Pain Score 8/8/2018   Patient currently in pain? yes   Pain score (0-10) -   Pain location Generalized   Pain descriptors Aching   - Theuangeoffreykeo is experiencing pain due to cancer.  Pain management was discussed and the plan was created in a collaborative fashion.  Maria Esther's response to the current recommendations: engaged  - Please see the plan for pain management as documented above    María Simpson MD PGY3  Gyn Onc [phone]  08/09/18

## 2018-08-09 NOTE — PROGRESS NOTES
"[facility], [place]    Palliative Care Progress Note    Patient: Maria Esther Wang  Date of Admission:  8/3/2018    Recommendations:  -Marinol offered to patient, she declines this intervention at this time.     Assessment  Kd is a 58 year old woman with metastatic clear cell ovarian caner with recurrent hospitalizations, dyspnea, ascites, and overall functional decline with sarcopenia and albumin of 1.3.      Symptoms:    -Fatigue:  In setting of cancer, appears to be in the end stages of her illness despite recent diagnosis.    -Decreased appetite: Marinol offered, declined by patient.    -Pain: has been adequately controlled. May need escalation of oxycodone soon as Kd has been using maximum doses of oxycodone in the past 24h.    Disease Understanding: Kd expressed some uncertainty about her cancer and its status because \"sometimes it's good, and sometimes it's bad.\"  We reviewed that the changes she has in her symptoms day to day do not indicate how her cancer is doing overall, but rather that our interventions help her symptoms for a time before they re-appear.  I would be happy to continue to support conversation around disease understanding as Kd allows.     These recommendations have been discussed with the GynOnc team and bedside RN.    Neda Marx MD  Pager: [phone]  [facility] Inpatient Team Consult pager [phone] (M-F 8-4:30)  After-hours Answering Service [phone]   Palliative Clinic: [phone]       Interval History:      History obtained with the assistance of a professional . Kd reports that she does not have significant pain, but that she is very tired.  She does not want to eat, because her taste seems to have changed today.  She denies nausea.  See above for additional conversation.        Medications:   I have reviewed this patient's medication profile and medications during this hospitalization  Miralax daily  Senna 2 tab " BID  Oxycodone 5mg q4h PRN x 6 doses in past 24h               Physical Exam:     Vital Signs: Temp: 97.8  F (36.6  C) Temp src: Axillary BP: 123/65 Pulse: 95 Heart Rate: 91 Resp: 26 SpO2: 98 % O2 Device: None (Room air) Oxygen Delivery: 2 LPM  Weight: 99 lbs 0 oz    Physical Exam:  General: Alert, cachectic woman sitting in bed with mildly increased work of breathing. Fatigued, chronically ill appearing.   ENT: Temporal wasting present.   Resp: Mildly increased work of breathing on current support.   Abd: Distention present.   Neuro: Alert, appropriately interactive through . No facial asymmetry.    Ext: Very thin.         Data Reviewed:    Cr 0.36  Alb 1.3  Hgb 7.6    Neda Marx MD  Pager: [phone]  [facility] Inpatient Team Consult pager [phone] (M-F 8-4:30)  After-hours Answering Service [phone]  Palliative Clinic: [phone]

## 2018-08-09 NOTE — PROGRESS NOTES
DATE/TIME  (DOT-TD, DOT-NOW) CHEMO CHECK ACTIVITY (REGIMEN & DOSE CHECK, DAY, DOSE #, NAME OF CHEMO #1)  CHEMO DRUG #2  CHEMO DRUG #3 NAME OF RN #1 (USE DOT-ME HERE) NAME OF RN#2 (2ND RN TO LOG IN SEPARATELY)   8/9/2018  10:52 AM   Regimen and dose check, paclitaxel carboplatin   JOSE GUADALUPE Yanez

## 2018-08-10 NOTE — PROGRESS NOTES
"Gynecologic Oncology Progress Note    24 hour events:   - 1 unit(s) PRBC  - 10mg lasix x2  - C2D1 chemotherapy (carboplatin, paclitaxol)  - sepsis protocol triggered (tachypnea, ), lactate 2.0    Subjective: Patient is feeling fine this AM, tolerated infusion. SOB improved. Abdominal pain present but tolerable with pain meds, increased available dose but hasn't needed yet. Eating okay, voiding, denies BM.    Objective:   Patient Vitals for the past 24 hrs:   BP Temp Temp src Pulse Heart Rate Resp SpO2 Height Weight   08/10/18 0400 110/54 96.9  F (36.1  C) Oral 78 - 18 95 % - -   08/09/18 2325 102/48 96.3  F (35.7  C) Oral 85 - 20 96 % - -   08/09/18 2042 106/57 96.7  F (35.9  C) Oral 95 - - 100 % - -   08/09/18 1734 125/67 - - - 95 - 95 % - -   08/09/18 1721 123/68 - - 92 - - 96 % - -   08/09/18 1659 118/57 98.1  F (36.7  C) Oral 101 - 28 95 % - -   08/09/18 1507 123/65 97.8  F (36.6  C) Axillary 95 - 26 98 % - -   08/09/18 1430 132/48 95.4  F (35.2  C) Oral - 91 26 98 % - -   08/09/18 1358 131/64 95.8  F (35.4  C) Axillary 89 - (!) 32 100 % - -   08/09/18 1300 118/48 96.5  F (35.8  C) Axillary - 92 28 100 % - -   08/09/18 1210 115/42 97.1  F (36.2  C) Oral - 94 30 100 % - -   08/09/18 1159 118/64 97.5  F (36.4  C) Oral 95 - 26 98 % - -   08/09/18 1148 106/46 97.4  F (36.3  C) Oral 96 - (!) 32 97 % - -   08/09/18 0930 - - - - - - - 1.575 m (5' 2\") 44.9 kg (99 lb)   08/09/18 0659 104/51 97.9  F (36.6  C) Oral 95 - 18 97 % - -   on RA since 8/9 1430    GEN - lying on right side, appears comfortable.  CV - Regular rate & rhythm, systolic murmur @ left sternal border  PULM - No increased work of breathing. Crackles in b/l lung bases through mid lungs b/l, clear breath sounds in upper lobes  ABD - firm abdomen (abdominal mass throughout), moderately distended, slight tenderness to palpation  Ext - 3+ edema b/l, nontender    I/O last 3 completed shifts:  In: 320 [I.V.:20]  Out: 2175 [Urine:2175]  Since MN: IN 0 [ PO " 0, IV 0]    [urine 600]    New Labs/Imaging-   WBC 6.3  Hgb 9.0  Lactate 1.4  BMP pending  Ascites culture - pending  Blood culture - NGx5d - final    Assessment:  58 year old female with metastatic clear cell carcinoma. HD#8 admitted from clinic with tachypnea, hyponatremia, anemia and failure to thrive in the setting of progressive disease with extensive ascites and bilateral pleural effusions.    Disease: Stage IVB clear cell carcinoma undergoing neoadjuvant chemo, C1D1 Carbo/Taxol on 7/12/18.  increased to 592 (8/3).S/p C2D1 chemotherapy yesterday  FEN: Severe malnutrition and cachexia, albumin 1.4, nutrition consulted. Reg diet. Discussed feeding tube vs appetite stimulant, pt prefers the latter - megace to start today  - Hypervolemic hyponatremia, likely due to extensive disease with third spacing of fluids with physiologic response in ADH as well as weekly paracenteses. Encouraging increased salt intake and limited free water intake. S/p [id number] hypertonic saline (3%) with improvement to 132 (8/6), will f/u value this AM  - Hypokalemia, cautious central line repletion to avoid worsening respiratory status w/ excess fluids  Pain: Tylenol, lidocaine patches, oxycodone prn.   Heme: Severe anemia on admission likely due to chemotherapy and chronic disease, s/p 3U pRBCs with appropriate rise, at 9.0 this AM.  - Bilateral pulmonary emboli, with new subsegmental disease in the superior right lower lobe and new LLE DVT which developed while on therapeutic Lovenox. IVC filter 8/7 S/p heparin drip myra-procedural, now back on therapeutic lovenox.  CV: Tachycardia w/ known b/l PE, no right heart strain on CTA 8/6/18  Resp: Significant bilateral pleural effusions on admit now s/p thoracentesis 8/8. Lasix PRN when given excess fluids (blood txf, chemo)  GI: Reg diet. Scheduled bowel regimen, prn antiemetics. Malignant ascites s/p paracentesis 8/7  :  NI  MSK/Extremities: Lower back pain, improved. LE edema  BLE Yoshi Hose and Lymphedema consult  Neuro/Psych: NI  Endocrine: NI  ID: Triggered sepsis protocol x3 due to tachycardia & tachypnea, lactate peak at 2.3 (8/4, 1530), last 2.0 at 1800 last night, 1.4 this AM. Leukocytosis, resolved. Afebrile. No localizing signs of infection.  PPx: SCDs. Therapeutic lovenox  Dispo: Discharge to home w/ family assist & home PT/OT/lymphedema, possibly today    # Pain Assessment:  Current Pain Score 8/10/2018   Patient currently in pain? sleeping: patient not able to self report   Pain score (0-10) -   Pain location -   Pain descriptors -   - Maria Esther is experiencing pain due to cancer. Pain management was discussed and the plan was created in a collaborative fashion.  Maria Esther's response to the current recommendations: engaged  - Please see the plan for pain management as documented above    María Simpson MD PGY3  Gyn Onc [phone]  08/10/18    I have examined this patient personally with the team and agree with the above findings and plan.    Darrick Maurice

## 2018-08-10 NOTE — PROGRESS NOTES
CLINICAL NUTRITION SERVICES - REASSESSMENT NOTE     Nutrition Prescription    RECOMMENDATIONS FOR MDs/PROVIDERS TO ORDER:  None at this time     Malnutrition Status:    Severe malnutrition in the context of chronic illness     Recommendations already ordered by Registered Dietitian (RD):  None at this time     Future/Additional Recommendations:  Pending pt goals of care, LOS and PO intake, may need to consider starting nutrition support.     EVALUATION OF THE PROGRESS TOWARD GOALS   Diet: Regular + Boost shake between meals   Intake: Poor PO intake 2/2 lack of appetite and taste changes. Eating range of 300-550 kcal/day and 20-30 g PRO/day per calorie counts over the past 2 days. This meets </= 45% minimum energy and protein needs.       NEW FINDINGS   - Pt declined starting marinol   - Potential plan to discharge today  - 44.9 kg today --> suggests pt lost 2 lbs (2%) over the past week, cannot rule out fluctuation in fluid status contributing to wt changes.     MALNUTRITION  % Intake: </=75% for >/= 1 month (severe)  % Weight Loss: Up to 1-2% in 1 week (non-severe) -- cannot rule out fluctuation in fluid status contributing to wt changes.   Subcutaneous Fat Loss: Facial region, Upper arm, Lower arm and Thoracic/intercostal: Moderate/severe  Muscle Loss: Temporal:, Facial & jaw region:, Thoracic region (clavicle, acromium bone, deltoid, trapezius, pectoral), Upper arm (bicep, tricep), Lower arm  (forearm), Upper leg (quadricep, hamstring), Patellar region and Posterior calf: Severe   Fluid Accumulation/Edema: Mild  Malnutrition Diagnosis: Severe malnutrition in the context of chronic illness     Previous Goals   Patient to consume % of nutritionally adequate meal trays TID, or the equivalent with supplements/snacks.  Evaluation: Not met    Previous Nutrition Diagnosis  Inadequate oral intake related to lack of appetite as evidenced by suspect meeting < 75% nutrition needs, BMI of 18.56.       Evaluation: No  change, updated     CURRENT NUTRITION DIAGNOSIS  Inadequate oral intake related to lack of appetite and taste changes as evidenced by BMI of 18, eating </= 45% minimum energy and protein needs per calorie counts       INTERVENTIONS  Implementation  NA    Goals  Patient to consume % of nutritionally adequate meal trays TID, or the equivalent with supplements/snacks.    Monitoring/Evaluation  Progress toward goals will be monitored and evaluated per protocol.    Esther Stevenson RD, LD   Pager: [phone]

## 2018-08-10 NOTE — PLAN OF CARE
Problem: Patient Care Overview  Goal: Plan of Care/Patient Progress Review  Outcome: No Change  Pt AVSS. Pt sleeping at long intervals tonite. Pain managed with oxycodone 5mg x1. Lungs with fine crax in bases. Thoracentesis sites with transparent drsgs both D/I.  No ROSARIO noted when amb to BR. Gait steady. neal well. Pt voiding good amts. Abd dist, bs + tympanic. Denied any nausea. Paracentesis site D/I. Pt bilat LE edema. 4+pitting edema in feet. Amb well with SBA. Cont to monitor resp status closely. Maintain pain control.

## 2018-08-10 NOTE — PLAN OF CARE
Problem: Patient Care Overview  Goal: Plan of Care/Patient Progress Review  Discharge Planner PT / 7C  Patient plan for discharge: Home with family assist.  Current status: Completes bed mobility independently. Transfers sit <> stand with SBA. Ambulates ~150ft and ~250ft with FWW + SBA, seated break required between bouts 2/2 fatigue, moves slowly though steady on feet with FWW. VSS on RA.  Barriers to return to prior living situation: Medical status, weakness, impaired balance, decreased activity tolerance.  Recommendations for discharge: Home with family assist, home PT/OT for safety evaluation, and likely home lymphedema therapy to fully teach brother on comperm management.  Rationale for recommendations: Pt has family support in the morning/night, home alone ~3 hours/day with pt denying any concerns regarding her mobility during this time. Brother plans to call [employer] to determine allowed DME providers and take pt to showroom to test drive 4 wheel walker prior to purchase.  Due to poor activity tolerance, pt would benefit from 4 wheel walker with seat, rather than traditional front wheeled walker (order is entered in disch orders).  Pt would benefit from home PT/OT evaluation for additional equipment at home, progress strength and activity tolerance.

## 2018-08-10 NOTE — PLAN OF CARE
Problem: Patient Care Overview  Goal: Plan of Care/Patient Progress Review  Outcome: No Change  VSS. Patient has some ROSARIO . Will continue to monitor respiratory status. Up with sba. Pain managed with oxycodone q 4 hrs. Poor appetite. Patient eating small amounts of food. Voiding spontanouesly. Urine dark in color.Chemo precautions maintained. Encourage patient to increase PO intake. Per patient report, she had a bm today. Port heparin locked. Patient will need port needle changed since she is not discharging today. Potassium 3.3. Potassium replaced. Recheck ordered for 2000. Patient very tired from chemo yesterday.  More awake this afternoon. Worked with PT this afternoon. Family visited today. . Resting in-between cares. Will continue with current plan of care.

## 2018-08-10 NOTE — PLAN OF CARE
Problem: Patient Care Overview  Goal: Plan of Care/Patient Progress Review  Outcome: No Change    Pt is Sanjiv speaking, she understands and speaks some english, denies pain, no nausea, Port with good blood return, Taxol and Carboplatin given. Pt tolerated chemo very well, no difficulty or reaction during infusion. Patient is voiding and ambulating without difficulty, LE with lymphedema wraps in place. Pt on continuous pulse oxymetry, call light in reach.

## 2018-08-11 NOTE — PROGRESS NOTES
Gynecologic Oncology Progress Note    24 hour events: No acute events    Subjective: Patient is feeling well this morning. Denies any shortness of breath. Abdominal pain present but controlled with pain meds and was able to sleep overnight. Eating okay, voiding, denies BM.    Objective:   Patient Vitals for the past 24 hrs:   BP Temp Temp src Pulse Heart Rate Resp SpO2   08/11/18 0300 103/51 97.5  F (36.4  C) Oral - 97 20 95 %   08/10/18 2212 131/48 97.3  F (36.3  C) Axillary - 115 18 97 %   08/10/18 1536 110/54 98.1  F (36.7  C) Oral - 102 18 97 %   08/10/18 0728 117/64 96.8  F (36  C) Oral 83 - 20 95 %   on RA    GEN - lying on right side, appears comfortable.  CV - Regular rate & rhythm  PULM - No increased work of breathing. Crackles in right lower and middle lung fields, left lung clear to auscultation.  ABD - firm abdomen (abdominal mass throughout), moderately distended, slight tenderness to palpation  Ext - 3+ edema b/l, nontender    I/O last 3 completed shifts:  In: 400 [P.O.:400]  Out: 1550 [Urine:1550]  Since MN: IN 0 [ PO 0, IV 0]    [urine 300]    New Labs/Imaging-   None new    Assessment:  58 year old female with metastatic clear cell carcinoma. HD#9 admitted from clinic with tachypnea, hyponatremia, anemia and failure to thrive in the setting of progressive disease with extensive ascites and bilateral pleural effusions.    Disease: Stage IVB clear cell carcinoma undergoing neoadjuvant chemo, C1D1 Carbo/Taxol on 7/12/18.  increased to 592 (8/3).S/p C2D1 on 8/9  FEN: Severe malnutrition and cachexia, albumin 1.4, nutrition consulted. Reg diet.  - Hypervolemic hyponatremia, likely due to extensive disease with third spacing of fluids with physiologic response in ADH as well as weekly paracenteses. Encouraging increased salt intake and limited free water intake. S/p [id number] hypertonic saline (3%) with improvement to 132 (8/6), will f/u value this AM  - Hypokalemia, cautious central line  repletion to avoid worsening respiratory status w/ excess fluids  Pain: Tylenol, lidocaine patches, oxycodone prn.   Heme: Severe anemia on admission likely due to chemotherapy and chronic disease, s/p 3U pRBCs with appropriate rise, at 9.0 this AM.  - Bilateral pulmonary emboli, with new subsegmental disease in the superior right lower lobe and new LLE DVT which developed while on therapeutic Lovenox. IVC filter 8/7 S/p heparin drip myra-procedural, now back on therapeutic lovenox.  CV: Tachycardia w/ known b/l PE, no right heart strain on CTA 8/6/18  Resp: Significant bilateral pleural effusions on admit now s/p thoracentesis 8/8. Lasix PRN when given excess fluids (blood txf, chemo)  GI: Reg diet. Scheduled bowel regimen, prn antiemetics. Malignant ascites s/p paracentesis 8/7  :  NI  MSK/Extremities: Lower back pain, improved. LE edema BLE Yoshi Hose and Lymphedema consult  Neuro/Psych: NI  Endocrine: NI  ID: Triggered sepsis protocol x3 due to tachycardia & tachypnea, lactate peak at 2.3 (8/4, 1530), last 2.0 at 1800 last night, 1.4 this AM. Leukocytosis, resolved. Afebrile. No localizing signs of infection.  PPx: SCDs. Therapeutic lovenox  Dispo: Discharge to home w/ family assist & home PT/OT/lymphedema, probably today    # Pain Assessment:  Current Pain Score 8/10/2018   Patient currently in pain? sleeping: patient not able to self report   Pain score (0-10) -   Pain location -   Pain descriptors -   - Maria Esther is experiencing pain due to cancer. Pain management was discussed and the plan was created in a collaborative fashion.  Maria Esther's response to the current recommendations: engaged  - Please see the plan for pain management as documented above    Ze Bradley MD PGY3  Gyn Onc [phone]  08/11/18    I have examined this patient personally with the team and agree with the above findings and plan.    Darrick Maurice

## 2018-08-11 NOTE — PLAN OF CARE
Problem: Patient Care Overview  Goal: Plan of Care/Patient Progress Review  Discharge Planner PT 7C  Patient plan for discharge: home with assist   Current status: pt declines active participation in therapy today in anticipation of discharge and feeling tired from AM meds. Discussed discharge plans; pt has not concerns of return home; discussed 4WW order placed and need for pt/family to go to DME store to ; pt understanding and agreeable.   Barriers to return to prior living situation: activity tolerance, medical status, balance deficits  Recommendations for discharge: home with assist; HHPT/OT for home safety evaluation and likely home edema management to teach pt's family cares.   Rationale for recommendations: pt has support from family at home; denies any mobility concerns.        Entered by: Dewayne Ca 08/11/2018 10:41 AM       Physical Therapy Discharge Summary    Reason for therapy discharge:    Discharged to home with home therapy.    Progress towards therapy goal(s). See goals on Care Plan in [facility] electronic health record for goal details.  Goals partially met.  Barriers to achieving goals:   discharge from facility.    Therapy recommendation(s):    Continued therapy is recommended.  Rationale/Recommendations:  home safety evaluation, improve functional strength, mobility and activity tolerance.

## 2018-08-11 NOTE — PLAN OF CARE
Problem: Patient Care Overview  Goal: Plan of Care/Patient Progress Review  Outcome: No Change  Pt AVSS. Slept between cares. Med x1 with oxycodone 10mg for abd pain. Abd dist, hyper bs. Lungs dim bases with some crackles hear. Pt amb to BR with SBA. Gait slow but steady. Voiding good. IVAD accessed and heplocked. Cont to maintain pain control.

## 2018-08-11 NOTE — PLAN OF CARE
Problem: Patient Care Overview  Goal: Plan of Care/Patient Progress Review  Outcome: Adequate for Discharge Date Met: 08/11/18  Patient states she is ready for discharge, reviewed DC instructions with  present. Patient states she is comfortable giving herself lovenox injections at home. Port flushed and de-accessed. Oxycodone for pain. Eating food from home, also ordered a pizza. Awaiting ride.

## 2018-08-11 NOTE — CONSULTS
Social Work Services Progress Note    Hospital Day: 9  Date of Initial Social Work Evaluation:  Not completed   Collaborated with:  Chart review     Data:  Maria Esther Wang is a 58 year old with pelvic mass and biopsy-proven metastatic clear cell carcinoma who presents from clinic with severe back pain, lab abnormalities, and  tachypnea. She was at her baseline yesterday but this morning she felt extremely fatigued and noted low back pain. She presented to clinic for planned chemotherapy.    Intervention:  Pt has order for homecare. RN CC to complete.     Assessment:  Requiring homecare. Please see RN CC charting.     Plan:   See RN CC charting     CHIKI Arnett

## 2018-08-11 NOTE — PROGRESS NOTES
"Brief Progress Note - Code status update    Discussed with pt at length this morning with an in-house Croatian . Filled out POLST form (scanned and in chart, pt also given a copy).     Pt notes that if her heart stops she does desire manual chest compressions. She notes that she does not want any machine to give compressions, noting that her body is \"too small\". I reiterated that even with manual compressions there is likely to be trauma such as rib fractures and lung damage. She is aware of this and continues to desire manual compressions if her heart stops. Even so, she does wish to be DNI, she does not want a breathing tube. We discussed that in a situation where her heart stops it often leads to intubation. She notes that if she is in that situation she would have us stop doing compressions and \"just let her die\" because she does not want a breathing tube or any prolonged life-sustaining care.     She does wish to have antibiotics to save her life, but no IV/NG nutrition to prolong her life.    Code status was changed from DNR/DNI to DNI only in her chart. POLST form was filled out and signed by patient, who is decisional.    Rosa Isela Pedraza MD  Obstetrics and Gyncology, PGY-2  August 11, 2018 , 10:45 AM    "

## 2018-08-11 NOTE — PLAN OF CARE
Problem: Patient Care Overview  Goal: Plan of Care/Patient Progress Review  Outcome: No Change  Writer assumed care of patient from appx 1930 - 2330.     DNR/ DNI.     VSS. Up with standby assistance. Kittitian speaking patient - minimal English, day shift said that the iPad  works well.  Pt sleeping majority of the shift, declining most medications outside of pain, and zantac for heartburn, agreeable to lovenox as planned. Family visited for a short visit and allowed the patient to go back to bed. Patient was pleasant and agreeable to plan of care.

## 2018-08-11 NOTE — PLAN OF CARE
Problem: Patient Care Overview  Goal: Plan of Care/Patient Progress Review  Pt Sanjiv speaking. DNR/DNI. HR max 102, AOVSS. Pt c/o back pain, well controlled with 5mg PRN oxycodone. Denies nausea, tolerating regular diet, poor appetite. Port needle changed, HL. Thoracentesis sites c/d/i. Lower lobes diminished and coarse crackles. Pt had chemo last on 8/9, on chemo precautions until 8/11. Pt up with SBA. Voiding spontaneously. Today's DC delayed d/t decreased nutritional status. Continue POC.

## 2018-08-14 NOTE — PROGRESS NOTES
Care Coordinator Note-Post Hospital  Left message on sister-in-law and on brother's phone that care coordinator is checking on patient post hospital.  Also left message with rescheduled chemotherapy appointments.    Lauren Griffin MSN, RN  Care Coordinator  Gynecologic Cancer   Office:  [phone]  Pager: [phone] #[phone]

## 2018-08-17 NOTE — PATIENT INSTRUCTIONS
Discharge Instructions for Paracentesis  Paracentesis is a procedure to remove extra fluid from your belly (abdomen). This fluid buildup in the abdomen is called ascites. The procedure may have been done to take a sample of the fluid. Or, it may have been done to drain the extra fluid from your abdomen and help make you more comfortable.     Ascites is buildup of excess fluid in the abdomen.   Home care    If you have pain after the procedure, your healthcare provider can prescribe or recommend pain medicines. Take these exactly as directed. If you stopped taking other medicines before the procedure, ask your provider when you can start them again.    Take it easy for 24 hours after the procedure. Avoid physical activity until your provider says it s OK.    You will have a small bandage over the puncture site. Stitches (sutures), surgical staples, adhesive tapes, adhesive strips, or surgical glue may be used to close the incision. They also help stop bleeding and speed healing. You may take the bandage off in 24 hours.    Check the puncture site for the signs of infection listed below.  Follow-up care  Make a follow-up appointment with your healthcare provider as directed. During your follow-up visit, your provider will check your healing. Let your provider know how you are feeling. You can also discuss the cause of your ascites and if you need any further treatment.  When to seek medical advice  Call your healthcare provider if you have any of the following after the procedure:    A fever of 100.4 F (38.0 C) or higher    Trouble breathing    Pain that doesn't go away even after taking pain medicine    Belly pain not caused by having the skin punctured    Bleeding from the puncture site    More than a small amount of fluid leaking from the puncture site    Swollen belly    Signs of infection at the puncture site. These include increased pain, redness, or swelling, warmth, or bad-smelling drainage.    Blood in your  urine    Feeling dizzy or lightheaded, or fainting   Date Last Reviewed: 7/1/2016 2000-2017 [employer]. [address], [place] [address]. All rights reserved. This information is not intended as a substitute for professional medical care. Always follow your healthcare professional's instructions.

## 2018-08-17 NOTE — MR AVS SNAPSHOT
After Visit Summary   8/17/2018    Maria Esther Wang    MRN: [id number]           Patient Information     Date Of Birth          1960        Visit Information        Provider Department      8/17/2018 9:30 AM Corinna Chacko; Provider, León Spec Inf Para;  40 ATC [facility]        Today's Diagnoses     Malignant ascites    -  1      Care Instructions      Discharge Instructions for Paracentesis  Paracentesis is a procedure to remove extra fluid from your belly (abdomen). This fluid buildup in the abdomen is called ascites. The procedure may have been done to take a sample of the fluid. Or, it may have been done to drain the extra fluid from your abdomen and help make you more comfortable.     Ascites is buildup of excess fluid in the abdomen.   Home care    If you have pain after the procedure, your healthcare provider can prescribe or recommend pain medicines. Take these exactly as directed. If you stopped taking other medicines before the procedure, ask your provider when you can start them again.    Take it easy for 24 hours after the procedure. Avoid physical activity until your provider says it s OK.    You will have a small bandage over the puncture site. Stitches (sutures), surgical staples, adhesive tapes, adhesive strips, or surgical glue may be used to close the incision. They also help stop bleeding and speed healing. You may take the bandage off in 24 hours.    Check the puncture site for the signs of infection listed below.  Follow-up care  Make a follow-up appointment with your healthcare provider as directed. During your follow-up visit, your provider will check your healing. Let your provider know how you are feeling. You can also discuss the cause of your ascites and if you need any further treatment.  When to seek medical advice  Call your healthcare provider if you have any of the following after the procedure:    A fever of 100.4 F  (38.0 C) or higher    Trouble breathing    Pain that doesn't go away even after taking pain medicine    Belly pain not caused by having the skin punctured    Bleeding from the puncture site    More than a small amount of fluid leaking from the puncture site    Swollen belly    Signs of infection at the puncture site. These include increased pain, redness, or swelling, warmth, or bad-smelling drainage.    Blood in your urine    Feeling dizzy or lightheaded, or fainting   Date Last Reviewed: 7/1/2016 2000-2017 [employer]. [address] [place] [address]. All rights reserved. This information is not intended as a substitute for professional medical care. Always follow your healthcare professional's instructions.                Follow-ups after your visit        Your next 10 appointments already scheduled     Aug 22, 2018  7:30 AM CDT   Paracentesis Visit with León Spec Inf Para Provider, UC 39 ATC   [facility] ([facility])    [address]  [address]  [place] [address]   [phone]            Aug 29, 2018  3:30 PM CDT   [facility] Lab Draw with UC [facility] LAB DRAW   [facility] Lab Draw ([facility])    [address] [place] [address]  [address]  [facility] [address]   [phone]            Aug 29, 2018  4:00 PM CDT   (Arrive by 3:45 PM)   Return Active Treatment with KATHRYN Stiles CNP   [facility] ([facility])    [address]  [address]  [place] [address]   [phone]            Aug 30, 2018  7:30 AM CDT   Paracentesis Visit with  Spec Inf Para Provider, UC 39 ATC   [facility] ([facility])    [address]  [address]  [place] [address]   [phone]            Aug 30, 2018  1:00 PM CDT   (Arrive by 12:45 PM)   Return Visit  "with Evelyn Marinelli MD   [facility] ([facility])    [address]  [address]  [place] [id number] [phone]            Aug 31, 2018  7:00 AM CDT   Infusion 360 with  ONCOLOGY INFUSION, UC 10 ATC   [facility] ([facility])    [address]  [address]  [place] [id number] [phone]            Sep 06, 2018  7:30 AM CDT   Paracentesis Visit with  Spec Inf Para Provider   [facility] ([facility])    [address]  [address]  [place] [address] [phone]              Who to contact     If you have questions or need follow up information about today's clinic visit or your schedule please contact [facility] directly at [phone].  Normal or non-critical lab and imaging results will be communicated to you by [employer], letter or phone within 4 business days after the clinic has received the results. If you do not hear from us within 7 days, please contact the clinic through [employer] or phone. If you have a critical or abnormal lab result, we will notify you by phone as soon as possible.  Submit refill requests through [employer] or call your pharmacy and they will forward the refill request to us. Please allow 3 business days for your refill to be completed.          Additional Information About Your Visit        [employer] [facility] Information     [employer] lets you send messages to your doctor, view your test results, renew your prescriptions, schedule appointments and more. To sign up, go to [email or web address] . Click on \"Log in\" on the left side of the screen, which will take you to the Welcome page. Then click on \"Sign up Now\" on the right side of the page.     You will be asked to enter the access code listed below, as well as some personal information. Please " follow the directions to create your username and password.     Your access code is: [email or web address]  Expires: 2018  8:06 AM     Your access code will  in 90 days. If you need help or a new code, please call your [facility] clinic or [phone].        Care EveryWhere ID     This is your Care EveryWhere ID. This could be used by other organizations to access your [facility] medical records  [id number]        Your Vitals Were     Pulse Temperature Pulse Oximetry BMI (Body Mass Index)          105 97.3  F (36.3  C) (Oral) 99% 17.52 kg/m2         Blood Pressure from Last 3 Encounters:   18 109/62   18 131/58   18 113/78    Weight from Last 3 Encounters:   18 43.5 kg (95 lb 12.8 oz)   18 44.9 kg (99 lb)   18 43.9 kg (96 lb 11.2 oz)              We Performed the Following     US Paracentesis        Primary Care Provider Fax #    Physician No Ref-Primary [phone]       No address on file        Equal Access to Services     [facility] : Hadii kian chandler Solisseth, wadamarisda luflorence, qaybta kaalmada katerin, bessie jacques . So [facility] [phone].    ATENCIÓN: Si habla español, tiene a alford disposición servicios gratuitos de asistencia lingüística. Llame al [phone].    We comply with applicable federal civil rights laws and [place] laws. We do not discriminate on the basis of race, color, national origin, age, disability, sex, sexual orientation, or gender identity.            Thank you!     Thank you for choosing [facility]  for your care. Our goal is always to provide you with excellent care. Hearing back from our patients is one way we can continue to improve our services. Please take a few minutes to complete the written survey that you may receive in the mail after your visit with us. Thank you!             Your Updated Medication List - Protect others around you: Learn how to safely use,  store and throw away your medicines at www.disposemymeds.org.          This list is accurate as of 8/17/18  1:19 PM.  Always use your most recent med list.                   Brand Name Dispense Instructions for use Diagnosis    acetaminophen 325 MG tablet    TYLENOL    100 tablet    Take 2 tablets (650 mg) by mouth every 4 hours as needed for mild pain    Ovarian cancer, unspecified laterality (H)       COMPRESSION STOCKINGS     1 each    1 each daily    Bilateral lower extremity edema       enoxaparin 60 MG/0.6ML injection    LOVENOX    60 Syringe    Inject 0.6 mLs (60 mg) Subcutaneous every 12 hours    Other acute pulmonary embolism without acute cor pulmonale (H)       LORazepam 1 MG tablet    ATIVAN    30 tablet    Take 1 tablet (1 mg) by mouth every 6 hours as needed (nausea/vomiting, anxiety or sleep)    Ovarian cancer, unspecified laterality (H), Encounter for long-term (current) use of medications       mirtazapine 15 MG tablet    REMERON    30 tablet    Take 1 tablet (15 mg) by mouth At Bedtime For appetite stimulant    Ovarian cancer, unspecified laterality (H)       ondansetron 4 MG tablet    ZOFRAN    20 tablet    Take 1 tablet (4 mg) by mouth every 6 hours as needed for nausea    Nausea       order for DME     1 each    Equipment being ordered: Other: Four wheeled walker with seat due to poor activity tolerance, high fall risk Treatment Diagnosis: gait instability    Ovarian cancer, unspecified laterality (H)       oxyCODONE IR 5 MG tablet    ROXICODONE    180 tablet    Take 1 tablet (5 mg) by mouth every 4 hours as needed for pain    Neoplasm related pain       polyethylene glycol Packet    MIRALAX/GLYCOLAX    30 packet    Take 17 g by mouth daily Hold for loose stools    Ovarian cancer, unspecified laterality (H)       prochlorperazine 10 MG tablet    COMPAZINE    30 tablet    Take 1 tablet (10 mg) by mouth every 6 hours as needed (nausea/vomiting)    Ovarian cancer, unspecified laterality (H),  Encounter for long-term (current) use of medications       senna-docusate 8.6-50 MG per tablet    SENOKOT-S;PERICOLACE    100 tablet    Take 1-2 tablets by mouth 2 times daily as needed for constipation    Pelvic mass

## 2018-08-17 NOTE — PROGRESS NOTES
Paracentesis Nursing Note  Maria Esther Wang [facility].    During today's appointment orders from Dr. Fernandez were completed.    Progress Note:  Patient identification verified by name and date of birth.  Assessment completed.  Vitals monitored throughout appointment and recorded in Doc Flowsheets.  See proceduralist note in ultrasound.    Date of consent or authorization: 6/28/18.  Invasive Procedure Safety Checklist was completed and sent for scanning.     Paracentesis performed by Dr. Cao.    The following labs were communicated to provider performing paracentesis:  Lab Results   Component Value Date     08/10/2018       Total amount of ascites fluid drained: 2.3 liters.  Color of ascites fluid: mack.  Total amount of albumin given: 25  grams.    Patient tolerated procedure well.    Post procedure,denies pain or discomfort post paracentesis.      Discharge Plan:  Discharge instructions were reviewed with patient.  Patient/Representative verbalized understanding and all questions were answered.   Discharged from [facility] in stable condition.    Kayleigh Chapin RN       Administrations This Visit     albumin human 25 % injection 12.5 g     Admin Date Action Dose Route Administered By             08/17/2018 New Bag 12.5 g Intravenous Kayleigh Chapin, JULIANNE              Admin Date Action Dose Route Administered By             08/17/2018 New Bag 12.5 g Intravenous Kayleigh Chapin, JULIANNE                    lidocaine 1 % 20 mL     Admin Date Action Dose Route Administered By             08/17/2018 Given by Other 10 mL Other Kayleigh Chapin, JULIANNE                            Temp 97.3  F (36.3  C) (Oral)  Wt 43.5 kg (95 lb 12.8 oz)  SpO2 99%  BMI 17.52 kg/m2

## 2018-08-17 NOTE — PROGRESS NOTES
Care Coordinator Note  Treatment schedule reviewed via , calendar given.    Patient verbalized back understanding of the above information discussed.     Lauren Griffin MSN, RN  Care Coordinator  Gynecologic Cancer   Office:  [phone]  Pager: [phone] #[phone]

## 2018-08-20 NOTE — ED TRIAGE NOTES
Pt arrived via EMS from home, last chemo 8/14/18. C/o increased weakness and decreased appetite. . BP 100s/50s. 20 ga AC. IV fluids given in route. 650 ml IV fluids given. HR initially 108, decreased to 99 post fluids. RR 24.

## 2018-08-20 NOTE — LETTER
[address] [facility]  [address] [place] [address]  [phone]          August 23, 2018    RE:  Maria Esther Wang                                                                                                                                                       [address]  [place] [address]            To whom it may concern:    Maria Esther Wang is under out professional care in the gynecologic oncology for    Anemia, unspecified type  Muscle weakness (generalized)  Chemotherapy-induced neutropenia (H)  Elevated troponin  Anemia in neoplastic disease  Malignant neoplasm of ovary, unspecified laterality (H)     She will have to be off of work indefinitely while she is receiving treatment.  This can be readdressed in the future.     Please contact our clinic for additional questions or concerns [phone]/Fax [phone].     Sincerely,        Modesta Ruiz, CNP

## 2018-08-20 NOTE — IP AVS SNAPSHOT
MRN:[id number]                      After Visit Summary   8/20/2018    Maria Esther Wang    MRN: [id number]           Thank you!     Thank you for choosing [facility] for your care. Our goal is always to provide you with excellent care. Hearing back from our patients is one way we can continue to improve our services. Please take a few minutes to complete the written survey that you may receive in the mail after you visit with us. Thank you!        Patient Information     Date Of Birth          1960        Designated Caregiver       Most Recent Value    Caregiver    Will someone help with your care after discharge? yes    Name of designated caregiver sister and family [they work, though. not there 24/7]    Phone number of caregiver see chart    Caregiver address see chart      About your hospital stay     You were admitted on:  August 20, 2018 You last received care in the:  [address] [place] [address]    You were discharged on:  September 4, 2018        Reason for your hospital stay       You were in the hospital for weakness and comfort care.                  Who to Call     For medical emergencies, please call 911.  For non-urgent questions about your medical care, please call your primary care provider or clinic, [phone]          Attending Provider     Provider Specialty    Rajinder Elizondo, DO Emergency Medicine    Bobbi Browne MD OB/Gyn       Primary Care Provider Office Phone # Fax #    [facility] [phone]       When to contact your care team       Call [phone] (or [phone] on nights or weekends) if you have any questions or concerns                  After Care Instructions     Activity       Your activity upon discharge: activity as tolerated            Diet       Follow this diet upon discharge: Regular, as tolerated                  Follow-up Appointments     Follow Up and recommended labs and tests       No follow up  "required                  Additional Services     Home care nursing referral           Home care nursing referral       Admit to Hospice Cares with [facility] and [facility] to evaluate and treat.    Please provide Sanjiv  with home care visits.  Thank you.            Home care nursing referral       [facility] and [facility] to admit to Hospice Cares/Evaluation and Treat.                  Pending Results     Date and Time Order Name Status Description    2018 1441 IR Paracentesis In process             Statement of Approval     Ordered          18 1415  I have reviewed and agree with all the recommendations and orders detailed in this document.  EFFECTIVE NOW     Approved and electronically signed by:  Susan Cunningham MD             Admission Information     Date & Time Provider Department Dept. Phone    2018 Bobbi Browne MD [address] [facility] [phone]      Your Vitals Were     Blood Pressure Pulse Temperature Respirations Weight Pulse Oximetry    90/54 (BP Location: Right arm) 99 96.6  F (35.9  C) (Oral) 28 37.3 kg (82 lb 4.8 oz) 100%    BMI (Body Mass Index)                   15.05 kg/m2           [facility] [employer] Information     [email or web address] lets you send messages to your doctor, view your test results, renew your prescriptions, schedule appointments and more. To sign up, go to [email or web address] . Click on \"Log in\" on the left side of the screen, which will take you to the Welcome page. Then click on \"Sign up Now\" on the right side of the page.     You will be asked to enter the access code listed below, as well as some personal information. Please follow the directions to create your username and password.     Your access code is: [email or web address]  Expires: 2018  8:06 AM     Your access code will  in 90 days. If you need help or a new code, please call your [place] clinic or [phone].        Care EveryWhere ID     This is your " Care EveryWhere ID. This could be used by other organizations to access your [facility] medical records  [id number]        Equal Access to Services     KHADIJAH ROCHA : Hadii aad ku haddidierarlene Janicelisseth, wadamarisda paolaferminha, baldemarta kajasonda katerin, bessie parrish yanetbreanne navarro lasushmaanel valerie Camacho [facility] [phone].    ATENCIÓN: Si habla español, tiene a alford disposición servicios gratuitos de asistencia lingüística. Llame al [phone].    We comply with applicable federal civil rights laws and [place] laws. We do not discriminate on the basis of race, color, national origin, age, disability, sex, sexual orientation, or gender identity.               Review of your medicines      START taking        Dose / Directions    * acetaminophen 650 MG Suppository   Commonly known as:  TYLENOL   Replaces:  acetaminophen 325 MG tablet        Dose:  650 mg   Place 1 suppository (650 mg) rectally every 4 hours as needed for fever or mild pain (Do not exceed 4000 mg per day.) Unwrap   Quantity:  12 suppository   Refills:  1       * acetaminophen 325 MG tablet   Commonly known as:  TYLENOL        Dose:  325-650 mg   Take 1-2 tablets (325-650 mg) by mouth every 4 hours as needed for mild pain or fever   Quantity:  100 tablet   Refills:  1       atropine 1 % ophthalmic solution        Dose:  2-4 drop   Take 2-4 drops by mouth, place under tongue or place inside cheek every 2 hours as needed for other (terminal respiratory secretions)   Quantity:  5 mL   Refills:  1       bisacodyl 10 MG Suppository   Commonly known as:  DULCOLAX        Unwrap and insert 1 suppository rectally twice daily as needed for constipation.   Quantity:  12 suppository   Refills:  1       dronabinol 2.5 MG capsule   Commonly known as:  MARINOL        Dose:  2.5 mg   Take 1 capsule (2.5 mg) by mouth 2 times daily   Quantity:  30 capsule   Refills:  0       haloperidol 1 MG tablet   Commonly known as:  HALDOL        Dose:  1-2 mg   Take 1-2 tablets (1-2 mg) by mouth,  place under tongue or insert rectally every 6 hours as needed for agitation (nausea)   Quantity:  30 tablet   Refills:  1       oxyCODONE 20 mg/mL (HIGH CONC) solution   Commonly known as:  ROXICODONE INTENSOL   Replaces:  oxyCODONE IR 5 MG tablet        Dose:  10 mg   Take 0.5 mLs (10 mg) by mouth every 4 hours as needed for moderate to severe pain   Quantity:  30 mL   Refills:  0       sennosides 8.6 MG tablet   Commonly known as:  SENOKOT        Dose:  1-2 tablet   Take 1-2 tablets by mouth 2 times daily as needed for constipation   Quantity:  100 tablet   Refills:  1       * Notice:  This list has 2 medication(s) that are the same as other medications prescribed for you. Read the directions carefully, and ask your doctor or other care provider to review them with you.      CONTINUE these medicines which may have CHANGED, or have new prescriptions. If we are uncertain of the size of tablets/capsules you have at home, strength may be listed as something that might have changed.        Dose / Directions    LORazepam 0.5 MG tablet   Commonly known as:  ATIVAN   This may have changed:    - medication strength  - how much to take  - how to take this  - when to take this  - reasons to take this        Dose:  0.5-1 mg   Take 1-2 tablets (0.5-1 mg) by mouth, place under tongue or insert rectally every 4 hours as needed for anxiety (restlessness)   Quantity:  30 tablet   Refills:  1         CONTINUE these medicines which have NOT CHANGED        Dose / Directions    COMPRESSION STOCKINGS   Used for:  Bilateral lower extremity edema        Dose:  1 each   1 each daily   Quantity:  1 each   Refills:  0       mirtazapine 15 MG tablet   Commonly known as:  REMERON   Used for:  Ovarian cancer, unspecified laterality (H)        Dose:  15 mg   Take 1 tablet (15 mg) by mouth At Bedtime For appetite stimulant   Quantity:  30 tablet   Refills:  3       order for DME   Used for:  Ovarian cancer, unspecified laterality (H)         Equipment being ordered: Other: Four wheeled walker with seat due to poor activity tolerance, high fall risk Treatment Diagnosis: gait instability   Quantity:  1 each   Refills:  0         STOP taking     acetaminophen 325 MG tablet   Commonly known as:  TYLENOL   Replaced by:  acetaminophen 650 MG Suppository           enoxaparin 60 MG/0.6ML injection   Commonly known as:  LOVENOX           ondansetron 4 MG tablet   Commonly known as:  ZOFRAN           oxyCODONE IR 5 MG tablet   Commonly known as:  ROXICODONE   Replaced by:  oxyCODONE 20 mg/mL (HIGH CONC) solution           polyethylene glycol Packet   Commonly known as:  MIRALAX/GLYCOLAX           prochlorperazine 10 MG tablet   Commonly known as:  COMPAZINE           senna-docusate 8.6-50 MG per tablet   Commonly known as:  SENOKOT-S;PERICOLACE                Where to get your medicines      These medications were sent to [facility] - [place], [place] - [address] [address]     Phone:  [phone]     acetaminophen 325 MG tablet    acetaminophen 650 MG Suppository    atropine 1 % ophthalmic solution    bisacodyl 10 MG Suppository    haloperidol 1 MG tablet    sennosides 8.6 MG tablet         Some of these will need a paper prescription and others can be bought over the counter. Ask your nurse if you have questions.     Bring a paper prescription for each of these medications     dronabinol 2.5 MG capsule    LORazepam 0.5 MG tablet    oxyCODONE 20 mg/mL (HIGH CONC) solution                Protect others around you: Learn how to safely use, store and throw away your medicines at www.disposemymeds.org.        Information about OPIOIDS     PRESCRIPTION OPIOIDS: WHAT YOU NEED TO KNOW   We gave you an opioid (narcotic) pain medicine. It is important to manage your pain, but opioids are not always the best choice. You should first try all the other options your care team gave you. Take this medicine for as  short a time (and as few doses) as possible.    Some activities can increase your pain, such as bandage changes or therapy sessions. It may help to take your pain medicine 30 to 60 minutes before these activities. Reduce your stress by getting enough sleep, working on hobbies you enjoy and practicing relaxation or meditation. Talk to your care team about ways to manage your pain beyond prescription opioids.    These medicines have risks:    DO NOT drive when on new or higher doses of pain medicine. These medicines can affect your alertness and reaction times, and you could be arrested for driving under the influence (DUI). If you need to use opioids long-term, talk to your care team about driving.    DO NOT operate heavy machinery    DO NOT do any other dangerous activities while taking these medicines.    DO NOT drink any alcohol while taking these medicines.     If the opioid prescribed includes acetaminophen, DO NOT take with any other medicines that contain acetaminophen. Read all labels carefully. Look for the word  acetaminophen  or  Tylenol.  Ask your pharmacist if you have questions or are unsure.    You can get addicted to pain medicines, especially if you have a history of addiction (chemical, alcohol or substance dependence). Talk to your care team about ways to reduce this risk.    All opioids tend to cause constipation. Drink plenty of water and eat foods that have a lot of fiber, such as fruits, vegetables, prune juice, apple juice and high-fiber cereal. Take a laxative (Miralax, milk of magnesia, Colace, Senna) if you don t move your bowels at least every other day. Other side effects include upset stomach, sleepiness, dizziness, throwing up, tolerance (needing more of the medicine to have the same effect), physical dependence and slowed breathing.    Store your pills in a secure place, locked if possible. We will not replace any lost or stolen medicine. If you don t finish your medicine, please throw  away (dispose) as directed by your pharmacist. The [place] Pollution Control Agency has more information about safe disposal: https://www.pca.[facility].[place].us/living-green/managing-unwanted-medications             Medication List: This is a list of all your medications and when to take them. Check marks below indicate your daily home schedule. Keep this list as a reference.      Medications           Morning Afternoon Evening Bedtime As Needed    * acetaminophen 650 MG Suppository   Commonly known as:  TYLENOL   Place 1 suppository (650 mg) rectally every 4 hours as needed for fever or mild pain (Do not exceed 4000 mg per day.) Unwrap                                * acetaminophen 325 MG tablet   Commonly known as:  TYLENOL   Take 1-2 tablets (325-650 mg) by mouth every 4 hours as needed for mild pain or fever   Last time this was given:  650 mg on 8/31/2018 12:23 AM                                atropine 1 % ophthalmic solution   Take 2-4 drops by mouth, place under tongue or place inside cheek every 2 hours as needed for other (terminal respiratory secretions)                                bisacodyl 10 MG Suppository   Commonly known as:  DULCOLAX   Unwrap and insert 1 suppository rectally twice daily as needed for constipation.                                COMPRESSION STOCKINGS   1 each daily                                dronabinol 2.5 MG capsule   Commonly known as:  MARINOL   Take 1 capsule (2.5 mg) by mouth 2 times daily   Last time this was given:  2.5 mg on 8/30/2018  8:37 PM                                haloperidol 1 MG tablet   Commonly known as:  HALDOL   Take 1-2 tablets (1-2 mg) by mouth, place under tongue or insert rectally every 6 hours as needed for agitation (nausea)                                LORazepam 0.5 MG tablet   Commonly known as:  ATIVAN   Take 1-2 tablets (0.5-1 mg) by mouth, place under tongue or insert rectally every 4 hours as needed for anxiety (restlessness)                                 mirtazapine 15 MG tablet   Commonly known as:  REMERON   Take 1 tablet (15 mg) by mouth At Bedtime For appetite stimulant   Last time this was given:  15 mg on 8/29/2018 10:34 PM                                order for DME   Equipment being ordered: Other: Four wheeled walker with seat due to poor activity tolerance, high fall risk Treatment Diagnosis: gait instability                                oxyCODONE 20 mg/mL (HIGH CONC) solution   Commonly known as:  ROXICODONE INTENSOL   Take 0.5 mLs (10 mg) by mouth every 4 hours as needed for moderate to severe pain                                sennosides 8.6 MG tablet   Commonly known as:  SENOKOT   Take 1-2 tablets by mouth 2 times daily as needed for constipation                                * Notice:  This list has 2 medication(s) that are the same as other medications prescribed for you. Read the directions carefully, and ask your doctor or other care provider to review them with you.

## 2018-08-20 NOTE — ED PROVIDER NOTES
History     Chief Complaint   Patient presents with     Dehydration     Generalized Weakness     HPI  Maria Esther Wang is a 58 year old female with a medical history of pelvic mass and biopsy-proven metastatic clear cell carcinoma as well as PE who presents to the Emergency Department today for evaluation of shortness of breath and increased weakness. The patient presents with her sister as well as an  who interpreted for the patient. It is reported that the patient's home nurse came to visit the patient today and the patient complained of feeling dehydrated, weak, and shortness of breath. The patient states that she does not always have shortness of breath and states that it is off and on. The patient reports that she has not been able to eat, but has been keeping down some water. She also complains of increasing weakness in her legs and has difficulty lifting her legs up. The patient then told her home nurse that she thinks that she should be taken to the ED for evaluation. The patient's sister also states that the patient has been more fatigued since her second round of chemotherapy that she received on 8/16. The patient denies fevers or pain.     I have reviewed the Medications, Allergies, Past Medical and Surgical History, and Social History in the [facility] system.    Past Medical History:   Diagnosis Date     Anemia      NO ACTIVE PROBLEMS      Ovarian cancer (H)      Pulmonary embolism (H)        Past Surgical History:   Procedure Laterality Date     INSERT PORT VASCULAR ACCESS N/A 7/9/2018    Procedure: INSERT PORT VASCULAR ACCESS;  Single Lumen Chest Power Port Placement, Leave Access for Chemotherapy;  Surgeon: Alexandro Sharif PA-C;  Location: [facility] OR     OTHER SURGICAL HISTORY      no surgical history       No family history on file.    Social History   Substance Use Topics     Smoking status: Never Smoker     Smokeless tobacco: Never Used     Alcohol use No       No current  facility-administered medications for this encounter.      Current Outpatient Prescriptions   Medication     acetaminophen (TYLENOL) 325 MG tablet     COMPRESSION STOCKINGS     enoxaparin (LOVENOX) 60 MG/0.6ML injection     LORazepam (ATIVAN) 1 MG tablet     mirtazapine (REMERON) 15 MG tablet     ondansetron (ZOFRAN) 4 MG tablet     order for DME     oxyCODONE IR (ROXICODONE) 5 MG tablet     polyethylene glycol (MIRALAX/GLYCOLAX) Packet     prochlorperazine (COMPAZINE) 10 MG tablet     senna-docusate (SENOKOT-S;PERICOLACE) 8.6-50 MG per tablet      No Known Allergies     Review of Systems   Constitutional: Positive for appetite change (decrease) and fatigue. Negative for fever.   HENT: Negative for congestion.    Eyes: Negative for redness.   Respiratory: Negative for shortness of breath.    Cardiovascular: Negative for chest pain.   Gastrointestinal: Negative for abdominal pain.   Genitourinary: Negative for difficulty urinating.   Musculoskeletal: Negative for arthralgias and neck stiffness.   Skin: Negative for color change.   Neurological: Positive for weakness. Negative for headaches.   Psychiatric/Behavioral: Negative for confusion.   All other systems reviewed and are negative.    Physical Exam   BP: 110/65  Heart Rate: 98  Temp: 98.1  F (36.7  C)  Resp: 24  Weight: 43.5 kg (95 lb 14.4 oz)  SpO2: 97 %    Physical Exam   Constitutional: She appears cachectic. She has a sickly appearance. No distress.   HENT:   Head: Atraumatic.   Mouth/Throat: Oropharynx is clear and moist. No oropharyngeal exudate.   Eyes: Pupils are equal, round, and reactive to light. No scleral icterus.   Cardiovascular: Intact distal pulses.  Tachycardia present.    Murmur heard.   Systolic murmur is present with a grade of 4/6   Pulmonary/Chest: Breath sounds normal. No respiratory distress.   Abdominal: Soft. Bowel sounds are normal. There is no tenderness.   Musculoskeletal: She exhibits edema. She exhibits no tenderness.    Neurological: She is alert.   Skin: Skin is warm. No rash noted. She is not diaphoretic. No erythema. No pallor.       ED Course   4:45 PM  The patient was seen and examined by Dr. Elizondo in Room 08.    ED Course   Value Comment Time   WBC: (!) 2.0 (Reviewed) 08/20 1818     .    Procedures    CXR:  Impression:   1. Persistent bilateral pleural effusion and compressive atelectasis.  2. Increased pulmonary interstitial opacities, representing pulmonary  edema is atelectasis versus infection.                 EKG Interpretation:      Interpreted by Rajinder Elizondo  Time reviewed: 93  Symptoms at time of EKG: Generalized weakness/shortness of breath.  Rhythm: normal sinus   Rate: 93  Axis: Normal  Ectopy: none  Conduction: normal  ST Segments/ T Waves: No ST-T wave changes  Q Waves: none  Comparison to prior: Unchanged from 8/5/2018.    Clinical Impression: no acute changes                Critical Care time:  none             Labs Ordered and Resulted from Time of ED Arrival Up to the Time of Departure from the ED   CBC WITH PLATELETS DIFFERENTIAL   COMPREHENSIVE METABOLIC PANEL            Assessments & Plan (with Medical Decision Making)   This is a 58-year-old female with metastatic ovarian cancer and pulmonary emboli who presents with generalized weakness.  Had recent chemotherapy and since that time has been had p.o. intake.  Patient was seen by home health nurse today and advised to come to this facility as she is increasingly weak and unable to lift her legs due to weakness.  Also no shortness of breath.  Patient was found to have a hemoglobin of 6 a WBC of 2 as well as an elevated troponin of 0.098.  Patient was consented for blood products.  She was seen by Gyn/Onc in the department.  We will transfuse 2 units of PRBCs and admit.    I have reviewed the nursing notes.    I have reviewed the findings, diagnosis, plan and need for follow up with the patient.  New Prescriptions    No medications on file       Final  diagnoses:   None   I, Syed Burris, am serving as a trained medical scribe to document services personally performed by Rajinder Elizondo DO, based on the provider's statements to me.   I, Rajinder Elizondo DO, was physically present and have reviewed and verified the accuracy of this note documented by Syed Burris.     8/20/2018   [facility], [place], EMERGENCY DEPARTMENT     Rajinder Elizondo DO  08/20/18 2072

## 2018-08-20 NOTE — IP AVS SNAPSHOT
MRN:[id number]                      After Visit Summary   8/20/2018    Maria Esther Wang    MRN: [id number]           Thank you!     Thank you for choosing [facility] for your care. Our goal is always to provide you with excellent care. Hearing back from our patients is one way we can continue to improve our services. Please take a few minutes to complete the written survey that you may receive in the mail after you visit with us. Thank you!        Patient Information     Date Of Birth          1960        Designated Caregiver       Most Recent Value    Caregiver    Will someone help with your care after discharge? yes    Name of designated caregiver sister and family [they work, though. not there 24/7]    Phone number of caregiver see chart    Caregiver address see chart      About your hospital stay     You were admitted on:  August 20, 2018 You last received care in the:  [address] [place] [address]    You were discharged on:  September 4, 2018        Reason for your hospital stay       You were in the hospital for weakness and comfort care.                  Who to Call     For medical emergencies, please call 911.  For non-urgent questions about your medical care, please call your primary care provider or clinic, [phone]          Attending Provider     Provider Specialty    Rajinder Elizondo, DO Emergency Medicine    Bobbi Browne MD OB/Gyn       Primary Care Provider Office Phone # Fax #    [facility] [phone]       When to contact your care team       Call [phone] (or [phone] on nights or weekends) if you have any questions or concerns                  After Care Instructions     Activity       Your activity upon discharge: activity as tolerated            Diet       Follow this diet upon discharge: Regular, as tolerated                  Follow-up Appointments     Follow Up and recommended labs and tests       No follow up  "required                  Additional Services     Home care nursing referral           Home care nursing referral       Admit to Hospice Cares with [facility] and [facility] to evaluate and treat.    Please provide Sanjiv  with home care visits.  Thank you.            Home care nursing referral       [facility] and [facility] to admit to Hospice Cares/Evaluation and Treat.                  Pending Results     Date and Time Order Name Status Description    2018 1441 IR Paracentesis In process             Statement of Approval     Ordered          18 1415  I have reviewed and agree with all the recommendations and orders detailed in this document.  EFFECTIVE NOW     Approved and electronically signed by:  Susan Cunningham MD             Admission Information     Date & Time Provider Department Dept. Phone    2018 Bobbi Browne MD [address] [facility] [phone]      Your Vitals Were     Blood Pressure Pulse Temperature Respirations Weight Pulse Oximetry    90/54 (BP Location: Right arm) 99 96.6  F (35.9  C) (Oral) 28 37.3 kg (82 lb 4.8 oz) 100%    BMI (Body Mass Index)                   15.05 kg/m2           FonalityharJ.G. ink Information     [employer] lets you send messages to your doctor, view your test results, renew your prescriptions, schedule appointments and more. To sign up, go to [email or web address] . Click on \"Log in\" on the left side of the screen, which will take you to the Welcome page. Then click on \"Sign up Now\" on the right side of the page.     You will be asked to enter the access code listed below, as well as some personal information. Please follow the directions to create your username and password.     Your access code is: [email or web address]  Expires: 2018  8:06 AM     Your access code will  in 90 days. If you need help or a new code, please call your [facility] clinic or [phone].        Care EveryWhere ID     This is your " Care EveryWhere ID. This could be used by other organizations to access your [place] medical records  [id number]        Equal Access to Services     KHADIJAH ROCHA : Hadii aad ku haddidierarlene Nathan, wadamarisda paolaferminha, baldemarta kajasonda katerin, bessie connerin hayaaanel holtbreanne navarro lasushmaanel valerie Camacho [facility] [phone].    ATENCIÓN: Si habla español, tiene a alford disposición servicios gratuitos de asistencia lingüística. Llame al [phone].    We comply with applicable federal civil rights and [place] laws. We do not discriminate on the basis of race, color, national origin, age, disability, sex, sexual orientation or gender identity.                             Review of your medicines      START taking        Dose / Directions    * acetaminophen 650 MG Suppository   Commonly known as:  TYLENOL   Replaces:  acetaminophen 325 MG tablet        Dose:  650 mg   Place 1 suppository (650 mg) rectally every 4 hours as needed for fever or mild pain (Do not exceed 4000 mg per day.) Unwrap   Quantity:  12 suppository   Refills:  1       * acetaminophen 325 MG tablet   Commonly known as:  TYLENOL        Dose:  325-650 mg   Take 1-2 tablets (325-650 mg) by mouth every 4 hours as needed for mild pain or fever   Quantity:  100 tablet   Refills:  1       atropine 1 % ophthalmic solution        Dose:  2-4 drop   Take 2-4 drops by mouth, place under tongue or place inside cheek every 2 hours as needed for other (terminal respiratory secretions)   Quantity:  5 mL   Refills:  1       bisacodyl 10 MG Suppository   Commonly known as:  DULCOLAX        Unwrap and insert 1 suppository rectally twice daily as needed for constipation.   Quantity:  12 suppository   Refills:  1       dronabinol 2.5 MG capsule   Commonly known as:  MARINOL        Dose:  2.5 mg   Take 1 capsule (2.5 mg) by mouth 2 times daily   Quantity:  30 capsule   Refills:  0       haloperidol 1 MG tablet   Commonly known as:  HALDOL        Dose:  1-2 mg   Take 1-2 tablets (1-2 mg) by  mouth, place under tongue or insert rectally every 6 hours as needed for agitation (nausea)   Quantity:  30 tablet   Refills:  1       oxyCODONE 20 mg/mL (HIGH CONC) solution   Commonly known as:  ROXICODONE INTENSOL   Replaces:  oxyCODONE IR 5 MG tablet        Dose:  10 mg   Take 0.5 mLs (10 mg) by mouth every 4 hours as needed for moderate to severe pain   Quantity:  30 mL   Refills:  0       sennosides 8.6 MG tablet   Commonly known as:  SENOKOT        Dose:  1-2 tablet   Take 1-2 tablets by mouth 2 times daily as needed for constipation   Quantity:  100 tablet   Refills:  1       * Notice:  This list has 2 medication(s) that are the same as other medications prescribed for you. Read the directions carefully, and ask your doctor or other care provider to review them with you.      CONTINUE these medicines which may have CHANGED, or have new prescriptions. If we are uncertain of the size of tablets/capsules you have at home, strength may be listed as something that might have changed.        Dose / Directions    LORazepam 0.5 MG tablet   Commonly known as:  ATIVAN   This may have changed:    - medication strength  - how much to take  - how to take this  - when to take this  - reasons to take this        Dose:  0.5-1 mg   Take 1-2 tablets (0.5-1 mg) by mouth, place under tongue or insert rectally every 4 hours as needed for anxiety (restlessness)   Quantity:  30 tablet   Refills:  1         CONTINUE these medicines which have NOT CHANGED        Dose / Directions    COMPRESSION STOCKINGS   Used for:  Bilateral lower extremity edema        Dose:  1 each   1 each daily   Quantity:  1 each   Refills:  0       mirtazapine 15 MG tablet   Commonly known as:  REMERON   Used for:  Ovarian cancer, unspecified laterality (H)        Dose:  15 mg   Take 1 tablet (15 mg) by mouth At Bedtime For appetite stimulant   Quantity:  30 tablet   Refills:  3       order for DME   Used for:  Ovarian cancer, unspecified laterality (H)         Equipment being ordered: Other: Four wheeled walker with seat due to poor activity tolerance, high fall risk Treatment Diagnosis: gait instability   Quantity:  1 each   Refills:  0         STOP taking     acetaminophen 325 MG tablet   Commonly known as:  TYLENOL   Replaced by:  acetaminophen 650 MG Suppository           enoxaparin 60 MG/0.6ML injection   Commonly known as:  LOVENOX           ondansetron 4 MG tablet   Commonly known as:  ZOFRAN           oxyCODONE IR 5 MG tablet   Commonly known as:  ROXICODONE   Replaced by:  oxyCODONE 20 mg/mL (HIGH CONC) solution           polyethylene glycol Packet   Commonly known as:  MIRALAX/GLYCOLAX           prochlorperazine 10 MG tablet   Commonly known as:  COMPAZINE           senna-docusate 8.6-50 MG per tablet   Commonly known as:  SENOKOT-S;PERICOLACE                Where to get your medicines      These medications were sent to [facility] - [place], [place] - [address] [address]     Phone:  [phone]     acetaminophen 325 MG tablet    acetaminophen 650 MG Suppository    atropine 1 % ophthalmic solution    bisacodyl 10 MG Suppository    haloperidol 1 MG tablet    sennosides 8.6 MG tablet         Some of these will need a paper prescription and others can be bought over the counter. Ask your nurse if you have questions.     Bring a paper prescription for each of these medications     dronabinol 2.5 MG capsule    LORazepam 0.5 MG tablet    oxyCODONE 20 mg/mL (HIGH CONC) solution                Protect others around you: Learn how to safely use, store and throw away your medicines at www.disposemymeds.org.        Information about OPIOIDS     PRESCRIPTION OPIOIDS: WHAT YOU NEED TO KNOW   We gave you an opioid (narcotic) pain medicine. Opioids can cause addiction. If you have a history of chemical dependency of any type, you are at a higher risk of becoming addicted to opioids. Only take this medicine after  all other options have been tried. Take it for as short a time and as few doses as possible.     Do not:    Drive. If you drive while taking these medicines, you could be arrested for driving under the influence (DUI).    Operate heave machinery    Do any other dangerous activities while taking these medicines.     Drink any alcohol while taking these medicines.      Take with any other medicines that contain acetaminophen. Read all labels carefully. Look for the word  acetaminophen  or  Tylenol.  Ask your pharmacist if you have questions or are unsure.    Store your pills in a secure place, locked if possible. We will not replace any lost or stolen medicine. If you don t finish your medicine, please throw away (dispose) as directed by your pharmacist. The [place] Pollution Control Agency has more information about safe disposal: https://www.pca.[facility].[place].us/living-green/managing-unwanted-medications    All opioids tend to cause constipation. Drink plenty of water and eat foods that have a lot of fiber, such as fruits, vegetables, prune juice, apple juice and high-fiber cereal. Take a laxative (Miralax, milk of magnesia, Colace, Senna) if you don t move your bowels at least every other day.                Medication List: This is a list of all your medications and when to take them. Check marks below indicate your daily home schedule. Keep this list as a reference.      Medications           Morning Afternoon Evening Bedtime As Needed    * acetaminophen 650 MG Suppository   Commonly known as:  TYLENOL   Place 1 suppository (650 mg) rectally every 4 hours as needed for fever or mild pain (Do not exceed 4000 mg per day.) Unwrap                                * acetaminophen 325 MG tablet   Commonly known as:  TYLENOL   Take 1-2 tablets (325-650 mg) by mouth every 4 hours as needed for mild pain or fever   Last time this was given:  650 mg on 8/31/2018 12:23 AM                                atropine 1 % ophthalmic  solution   Take 2-4 drops by mouth, place under tongue or place inside cheek every 2 hours as needed for other (terminal respiratory secretions)                                bisacodyl 10 MG Suppository   Commonly known as:  DULCOLAX   Unwrap and insert 1 suppository rectally twice daily as needed for constipation.                                COMPRESSION STOCKINGS   1 each daily                                dronabinol 2.5 MG capsule   Commonly known as:  MARINOL   Take 1 capsule (2.5 mg) by mouth 2 times daily   Last time this was given:  2.5 mg on 8/30/2018  8:37 PM                                haloperidol 1 MG tablet   Commonly known as:  HALDOL   Take 1-2 tablets (1-2 mg) by mouth, place under tongue or insert rectally every 6 hours as needed for agitation (nausea)                                LORazepam 0.5 MG tablet   Commonly known as:  ATIVAN   Take 1-2 tablets (0.5-1 mg) by mouth, place under tongue or insert rectally every 4 hours as needed for anxiety (restlessness)                                mirtazapine 15 MG tablet   Commonly known as:  REMERON   Take 1 tablet (15 mg) by mouth At Bedtime For appetite stimulant   Last time this was given:  15 mg on 8/29/2018 10:34 PM                                order for DME   Equipment being ordered: Other: Four wheeled walker with seat due to poor activity tolerance, high fall risk Treatment Diagnosis: gait instability                                oxyCODONE 20 mg/mL (HIGH CONC) solution   Commonly known as:  ROXICODONE INTENSOL   Take 0.5 mLs (10 mg) by mouth every 4 hours as needed for moderate to severe pain                                sennosides 8.6 MG tablet   Commonly known as:  SENOKOT   Take 1-2 tablets by mouth 2 times daily as needed for constipation                                * Notice:  This list has 2 medication(s) that are the same as other medications prescribed for you. Read the directions carefully, and ask your doctor or other  care provider to review them with you.

## 2018-08-20 NOTE — ED NOTES
Bed: ED08  Expected date:   Expected time:   Means of arrival:   Comments:  [id number] F  Ovarian cancer, weakness.

## 2018-08-20 NOTE — IP AVS SNAPSHOT
[address] [address]    Phone:  [phone]                                       After Visit Summary   8/20/2018    Maria Esther Wang    MRN: [id number]           After Visit Summary Signature Page     I have received my discharge instructions, and my questions have been answered. I have discussed any challenges I see with this plan with the nurse or doctor.    ..........................................................................................................................................  Patient/Patient Representative Signature      ..........................................................................................................................................  Patient Representative Print Name and Relationship to Patient    ..................................................               ................................................  Date                                            Time    ..........................................................................................................................................  Reviewed by Signature/Title    ...................................................              ..............................................  Date                                                            Time          22EPIC Rev 08/18

## 2018-08-20 NOTE — LETTER
Transition Communication Hand-off for Care Transitions to Next Level of Care Provider    Name: Maria Esther Wang  : 1960  MRN #: [id number]  Primary Care Provider: [facility]     Primary Clinic: [address] [place] [id number]     Reason for Hospitalization:  Muscle weakness (generalized) [M62.81]  Elevated troponin [R74.8]  Chemotherapy-induced neutropenia (H) [D70.1, T45.1X5A]  Anemia, unspecified type [D64.9]  Admit Date/Time: 2018  4:05 PM  Discharge Date:  To be determined.  Patient will have recommendations for TCU but if she declines, home care services have been resumed with a [facility]  once she is stable for discharge.  Payor Source: Payor: [facility] / Plan: [facility] CARE / Product Type: HMO /   Discharge Plan:  To be determined.    Discharge Needs Assessment:  Needs       Most Recent Value    Equipment Currently Used at Home -- [has 4 WW, was feeling too weak to use]    Transportation Available family or friend will provide, ambulance [came to ED by ambulance]    Home Care -- [To resume [facility] if not open to a TCU stay.]      Follow-up plan:  Future Appointments  Date Time Provider Department Center   2018 2:00 AM Hina Douglas, PT [facility] O   2018 2:30 AM Daphne Claire, OT [facility] O   2018 7:30 AM Provider,  Spec Inf Para [facility]   2018 3:30 PM UC MASONIC LAB DRAW ONL [facility]   2018 4:00 PM Angie Whelan APRN CNP WHON [facility]   2018 7:30 AM Provider,  Spec Inf Para [facility]   2018 1:00 PM Evelyn Marinelli MD OSC [facility]   2018 7:00 AM UC ONCOLOGY INFUSION UCONA [facility]   2018 7:30 AM Provider, Uc Spec Inf Para [facility]   2018 7:30 AM Provider,  Spec Inf Para [facility]   2018 7:30 AM Provider, Uc Spec Inf Para [facility]   2018 7:30 AM Provider, Uc Spec Inf Para [facility]       Any outstanding tests or  procedures:        Referrals     Future Labs/Procedures    Home care nursing referral     Comments:    Home Care and Hospice     Ph:  [phone]     Fax: [phone]     Skilled home care agency to provide a Sanjiv  with all home care visits.    Skilled home care RN for resumption of home care services as prior to re hospitalization and to assist with updated MD orders as outlined in discharge orders.    Skilled home care RN to evaluate medication management, nutrition and hydration evaluation, endurance evaluation, and general status evaluation after discharge from the acute care hospital setting.     Skilled home care RN to assist with follow up for admission to the Palliative Home Care Team if this is not in place.     Skilled home care Physical Therapist and Occupational Therapist to evaluate and treat in home setting.     Skilled home care  to evaluate and assist with community services that patient may be eligible fore in home community.     Home Health Aide to assist with bathing and grooming 3 times a week for 2 weeks after discharge.     Skilled home care agency to evaluate for [employer] and Meals on Wheels services.          Rika Euceda, ANYI.S.N., P.H.N.,R.N.         Pager

## 2018-08-21 NOTE — ED NOTES
[facility] [place]   ED Nurse to Floor Handoff     Maria Esther Wang is a 58 year old female who speaks Latvian and lives with family members,  in a home  They arrived in the ED by ambulance from home    ED Chief Complaint: Dehydration and Generalized Weakness    ED Dx;   Final diagnoses:   Anemia, unspecified type   Muscle weakness (generalized)   Chemotherapy-induced neutropenia (H)   Elevated troponin         Needed?: Yes    Allergies: No Known Allergies.  Past Medical Hx:   Past Medical History:   Diagnosis Date     Anemia      NO ACTIVE PROBLEMS      Ovarian cancer (H)      Pulmonary embolism (H)       Baseline Mental status: WDL  Current Mental Status changes: at basesline    Infection present or suspected this encounter: no  Sepsis suspected: No  Isolation type: No active isolations     Activity level - Baseline/Home:  Stand with Assist of 2  Activity Level - Current:   Stand with Assist of 2    Bariatric equipment needed?: No    In the ED these meds were given: Medications - No data to display    Drips running?  No    Home pump  No    Current LDAs  Port A Cath Single 08/03/18 Right Chest wall (Active)   Access Date 08/20/18 8/20/2018  4:22 PM   Access Attempts 1 8/20/2018  4:22 PM   Gauge/Length Power noncoring 90 degree bend;20 gauge;3/4 inch 8/20/2018  4:22 PM   Site Assessment WDL 8/20/2018  4:22 PM   Line Status Blood return noted;Saline locked 8/20/2018  4:22 PM   Dressing Intervention Transparent 8/20/2018  4:22 PM   Number of days:17       Incision/Surgical Site 07/09/18 Right Chest (Active)   Number of days:42       Labs results:   Labs Ordered and Resulted from Time of ED Arrival Up to the Time of Departure from the ED   CBC WITH PLATELETS DIFFERENTIAL - Abnormal; Notable for the following:        Result Value    WBC 2.0 (*)     RBC Count 2.32 (*)     Hemoglobin 6.0 (*)     Hematocrit 19.0 (*)     MCH 25.9 (*)     RDW 19.8 (*)     Absolute Neutrophil 1.2 (*)      Absolute Lymphocytes 0.5 (*)     All other components within normal limits   COMPREHENSIVE METABOLIC PANEL - Abnormal; Notable for the following:     Potassium 3.1 (*)     Creatinine 0.39 (*)     Calcium 6.7 (*)     Albumin 1.3 (*)     Protein Total 5.3 (*)     All other components within normal limits   INR - Abnormal; Notable for the following:     INR 1.19 (*)     All other components within normal limits   PARTIAL THROMBOPLASTIN TIME - Abnormal; Notable for the following:     PTT 54 (*)     All other components within normal limits   TROPONIN I - Abnormal; Notable for the following:     Troponin I ES 0.098 (*)     All other components within normal limits   ERYTHROCYTE SEDIMENTATION RATE AUTO - Abnormal; Notable for the following:     Sed Rate 137 (*)     All other components within normal limits   LACTIC ACID WHOLE BLOOD   ROUTINE UA WITH MICROSCOPIC   IP ASSIGN PROVIDER TEAM TO TREATMENT TEAM   ABO/RH TYPE AND SCREEN   RED BLOOD CELL PREPARE ORDER UNIT   BLOOD COMPONENT       Imaging Studies:   Recent Results (from the past 24 hour(s))   XR Chest 2 Views    Narrative    Exam: XR CHEST 2 VW, 8/20/2018 7:00 PM    Indication: Weakness, shortness of breath.;     Comparison: Chest x-ray 8/5/2018    Findings: Frontal and lateral projection x-ray of the chest. Right  chest wall Port-A-Cath with tip projecting over the right superior  cavoatrial junction. Bilateral pleural effusion with bibasilar  atelectasis. Increased pulmonary interstitial opacities.      Impression    Impression:   1. Persistent bilateral pleural effusion and compressive atelectasis.  2. Increased pulmonary interstitial opacities, representing pulmonary  edema vs atelectasis versus infection.    I have personally reviewed the examination and initial interpretation  and I agree with the findings.    ISAIAH AVILA MD       Recent vital signs:   BP 93/52  Pulse 95  Temp 97.6  F (36.4  C) (Oral)  Resp 18  Wt 43.5 kg (95 lb 14.4 oz)  SpO2  95%  BMI 17.54 kg/m2    Cardiac Rhythm: Normal Sinus  Pt needs tele? Yes  Skin/wound Issues: Unknown, did not perform full skin assessment    Code Status: Full Code    Pain control: pt had none    Nausea control: pt had none    Abnormal labs/tests/findings requiring intervention: Hgb     Family present during ED course? Yes   Family Comments/Social Situation comments: Daughter was previously at bedside but now went home    Tasks needing completion: None    Helen Tillman, RN  [phone] [facility]

## 2018-08-21 NOTE — PLAN OF CARE
Problem: Patient Care Overview  Goal: Plan of Care/Patient Progress Review  Outcome: No Change  Neuro: AOX4, able to make needs known.   Resp: Course crackles through out lungs. 3LPM nc.   Cardiac: Soft BP's while pt is sleeping on side.  GI: No BM or gas. Reg diet.   : 400mL on shift, commode at bedside.   LDAs: rt port heparin locked.   Act: Assist x2 to commode.   Pain: denies    K+ replaced overnight, recheck scheduled for 0900 lab draws. Troponin recheck at 0900. Pt triggered sepsis, lactate came back at 1.0.    See provider notification note, pt reported shortness of breath at beginning of shift. MD notified, RBC infusion stopped, 2nd unit of RBC held. Lasix administered. Pt reported decrease in shortness of breath.

## 2018-08-21 NOTE — PROGRESS NOTES
Gynecology Oncology Progress Note    24 hour events:   - 1 unit pRBCs transfused, transfusion stopped near completion due to acute onset shortness of breath  - given 10 mg IV lasix with prompt resolution of SOB, diuresed 400cc urine  - troponin increase 0.098 to 0.159, cardiology curbside and since no chest pain will re-evaluate in am  - repeat EKG obtained, unchanged from EKG on admission  - triggered sepsis protocol d/t tachypnea, lactate 1.0    Subjective: Unable to get Italian interpretor over phone for am interview. Pt is able to understand some basic questions. Denies chest pain. Denies shortness of breath. No abdominal pain at this time.  Plan to return with team and interpretor to elicit more information.    Objective:   Vitals:    08/21/18 0245 08/21/18 0400 08/21/18 0430 08/21/18 0609   BP: 100/55 92/56 104/53 (!) 84/42   BP Location: Left arm Left arm Left arm Left arm   Pulse: 95      Resp:    15   Temp:    95.7  F (35.4  C)   TempSrc:    Oral   SpO2: 99% 99% 99% 97%   Weight:           Intake/Output Summary (Last 24 hours) at 08/21/18 0624  Last data filed at 08/21/18 0500   Gross per 24 hour   Intake              515 ml   Output              400 ml   Net              115 ml   Since [place]: IN 270cc PO, 245cc blood// UOP 400cc    General - NAD, laying comfortably in bed  CV - RRR, no murmurs  Resp - CTAB in posterior lung fields, no crackles, no wheezing, unlabored breathing  Abdomen - firm, mild distended, non-tender to palpation  Ext - warm and well perfused, 2+ pitting edema, non-tender    New Labs/Imaging-   EKG - normal sinus rhythm  Repeat EKG - unchanged    CXR (8/20)  Impression:   1. Persistent bilateral pleural effusion and compressive atelectasis.  2. Increased pulmonary interstitial opacities, representing pulmonary  edema vs atelectasis versus infection.    hgb 6.0> 8.1  Lactate 1.1> 1.0  Troponin 0.098>0.159  Wbc 2.6  ANC 1.7    Assessment:  58 year old female with metastatic clear cell carcinoma  HD#2 admitted for generalized weakness and elevated troponin likely due to demand ischemia and recent chemotherapy on 8/9/18 leading to anemia.  Given no chest pain, safe to continue to observe.    Plan:  Dz: metastatic clear cell carcinoma. S/p C2D1 carbo/taxol 8/9/18 neoadjuvant chemo.   FEN: regular diet, hypokalemia, K repletion, preferably packets given very delicate fluid status, strict I/O  Pain: Tylenol, oxycodone PRN  Heme: pancytopenia d/t chemotherapy, hgb 6.0>1u pRBC> 8.1, appropriate rise in hgb following transfusion, leukopenia d/t chemo but improving  CV: trop 0.098>0.159, demand ischemia, EKG on admit NSR, repeat EKG unchanged, cardiology curbsided overnight and recommends consult in am, stable to watch given still no chest pain  Resp: known pulmonary emboli, on therapeutic lovenox, CXR (8/20) with pleural effusions and increased pulmonary interstitial opacities  GI: bowel regimen PRN  : NI, voiding  ID: NI  Endocrine: NI  Psych/Neuro: NI  PPX: SCDs, IS, therapeutic lovenox ord'd  Dispo:  Continued inpatient cares  Consults: nutrition, cards    Amy Schumer, MD  Obstetrics and Gynecology, PGY-2  August 21, 2018, 6:22 AM    Provider Disclosure:   I agree with above History, Review of Systems, Physical exam and Plan. I have reviewed the content of the documentation and have edited it as needed. I have personally performed the services documented here and the documentation accurately represents those services and the decisions I have made.     Electronically signed by:   Bobbi Browne MD   Gynecologic Oncology   [facility]

## 2018-08-21 NOTE — DISCHARGE SUMMARY
Gynecologic Oncology Discharge Summary    Maria Esther Wang  [id number]    Admit Date: 8/20/2018  Discharge Date: 9/4/2018  Admitting Provider: Dr. Bobbi Browne  Discharge Provider: Dr. Kamila Bocanegra    Admission Dx:   Metastatic clear cell carcinoma  Pulmonary emboli  Ascites  Pleural effusion  Hypokalemia  Pancytopenia due to chemotherapy  Generalized weakness  Severe malnutrition    Discharge Dx:  Metastatic clear cell carcinomoa  Pulmonary emboli  Ascites, sp paracentesis  Pleural effusion  Pancytopenia due to chemotherapy  Cardiac demand ischemia  Generalized weakness  Severe malnutrition   GI bleed      Patient Active Problem List   Diagnosis     Pulmonary emboli (H)     Ascites     Ovarian cancer, unspecified laterality (H)     Encounter for long-term (current) use of medications     Anemia in neoplastic disease     Shortness of breath     Tachypnea     Anemia     Ovarian cancer (H)       Procedures: 3 units pRBC (8/20, 8/24, 8/25), TTE (8/21), Abdominal US (8/23), Paracentesis (8/24), laryngoscopy    Prior to Admission Medications:  Prescriptions Prior to Admission   Medication Sig Dispense Refill Last Dose     acetaminophen (TYLENOL) 325 MG tablet Take 2 tablets (650 mg) by mouth every 4 hours as needed for mild pain 100 tablet       COMPRESSION STOCKINGS 1 each daily 1 each 0      enoxaparin (LOVENOX) 60 MG/0.6ML injection Inject 0.6 mLs (60 mg) Subcutaneous every 12 hours 60 Syringe 11      LORazepam (ATIVAN) 1 MG tablet Take 1 tablet (1 mg) by mouth every 6 hours as needed (nausea/vomiting, anxiety or sleep) 30 tablet 1 Taking     mirtazapine (REMERON) 15 MG tablet Take 1 tablet (15 mg) by mouth At Bedtime For appetite stimulant 30 tablet 3      ondansetron (ZOFRAN) 4 MG tablet Take 1 tablet (4 mg) by mouth every 6 hours as needed for nausea 20 tablet 1 Taking     order for DME Equipment being ordered: Other: Four wheeled walker with seat due to poor activity tolerance, high fall risk  Treatment  Diagnosis: gait instability 1 each 0      oxyCODONE IR (ROXICODONE) 5 MG tablet Take 1 tablet (5 mg) by mouth every 4 hours as needed for pain 180 tablet 0 Taking     polyethylene glycol (MIRALAX/GLYCOLAX) Packet Take 17 g by mouth daily Hold for loose stools 30 packet 1      prochlorperazine (COMPAZINE) 10 MG tablet Take 1 tablet (10 mg) by mouth every 6 hours as needed (nausea/vomiting) (Patient not taking: Reported on 8/2/2018) 30 tablet 2 Not Taking     senna-docusate (SENOKOT-S;PERICOLACE) 8.6-50 MG per tablet Take 1-2 tablets by mouth 2 times daily as needed for constipation 100 tablet 0        Discharge Medications:     Review of your medicines      START taking       Dose / Directions    * acetaminophen 650 MG Suppository   Commonly known as:  TYLENOL   Used for:  Malignant neoplasm of ovary, unspecified laterality (H)   Replaces:  acetaminophen 325 MG tablet        Dose:  650 mg   Place 1 suppository (650 mg) rectally every 4 hours as needed for fever or mild pain (Do not exceed 4000 mg per day.) Unwrap   Quantity:  12 suppository   Refills:  1       * acetaminophen 325 MG tablet   Commonly known as:  TYLENOL   Used for:  Malignant neoplasm of ovary, unspecified laterality (H)        Dose:  325-650 mg   Take 1-2 tablets (325-650 mg) by mouth every 4 hours as needed for mild pain or fever   Quantity:  100 tablet   Refills:  1       atropine 1 % ophthalmic solution   Used for:  Malignant neoplasm of ovary, unspecified laterality (H)        Dose:  2-4 drop   Take 2-4 drops by mouth, place under tongue or place inside cheek every 2 hours as needed for other (terminal respiratory secretions)   Quantity:  5 mL   Refills:  1       bisacodyl 10 MG Suppository   Commonly known as:  DULCOLAX   Used for:  Malignant neoplasm of ovary, unspecified laterality (H)        Unwrap and insert 1 suppository rectally twice daily as needed for constipation.   Quantity:  12 suppository   Refills:  1       dronabinol 2.5 MG  capsule   Commonly known as:  MARINOL   Used for:  Malignant neoplasm of ovary, unspecified laterality (H)        Dose:  2.5 mg   Take 1 capsule (2.5 mg) by mouth 2 times daily   Quantity:  30 capsule   Refills:  0       haloperidol 1 MG tablet   Commonly known as:  HALDOL   Used for:  Malignant neoplasm of ovary, unspecified laterality (H)        Dose:  1-2 mg   Take 1-2 tablets (1-2 mg) by mouth, place under tongue or insert rectally every 6 hours as needed for agitation (nausea)   Quantity:  30 tablet   Refills:  1       oxyCODONE 5 MG/5ML solution   Commonly known as:  ROXICODONE   Used for:  Malignant neoplasm of ovary, unspecified laterality (H)   Replaces:  oxyCODONE IR 5 MG tablet        Dose:  5-10 mg   Take 5-10 mLs (5-10 mg) by mouth every 2 hours as needed for pain or other (For pain, dyspnea)   Quantity:  500 mL   Refills:  0       sennosides 8.6 MG tablet   Commonly known as:  SENOKOT   Used for:  Malignant neoplasm of ovary, unspecified laterality (H)        Dose:  1-2 tablet   Take 1-2 tablets by mouth 2 times daily as needed for constipation   Quantity:  100 tablet   Refills:  1       * Notice:  This list has 2 medication(s) that are the same as other medications prescribed for you. Read the directions carefully, and ask your doctor or other care provider to review them with you.      CONTINUE these medicines which may have CHANGED, or have new prescriptions. If we are uncertain of the size of tablets/capsules you have at home, strength may be listed as something that might have changed.       Dose / Directions    LORazepam 0.5 MG tablet   Commonly known as:  ATIVAN   This may have changed:    - medication strength  - how much to take  - how to take this  - when to take this  - reasons to take this   Used for:  Malignant neoplasm of ovary, unspecified laterality (H)        Dose:  0.5-1 mg   Take 1-2 tablets (0.5-1 mg) by mouth, place under tongue or insert rectally every 4 hours as needed for  anxiety (restlessness)   Quantity:  30 tablet   Refills:  1         CONTINUE these medicines which have NOT CHANGED       Dose / Directions    COMPRESSION STOCKINGS   Used for:  Bilateral lower extremity edema        Dose:  1 each   1 each daily   Quantity:  1 each   Refills:  0       mirtazapine 15 MG tablet   Commonly known as:  REMERON   Used for:  Ovarian cancer, unspecified laterality (H)        Dose:  15 mg   Take 1 tablet (15 mg) by mouth At Bedtime For appetite stimulant   Quantity:  30 tablet   Refills:  3       order for DME   Used for:  Ovarian cancer, unspecified laterality (H)        Equipment being ordered: Other: Four wheeled walker with seat due to poor activity tolerance, high fall risk Treatment Diagnosis: gait instability   Quantity:  1 each   Refills:  0         STOP taking          acetaminophen 325 MG tablet   Commonly known as:  TYLENOL   Replaced by:  acetaminophen 650 MG Suppository           enoxaparin 60 MG/0.6ML injection   Commonly known as:  LOVENOX           ondansetron 4 MG tablet   Commonly known as:  ZOFRAN           oxyCODONE IR 5 MG tablet   Commonly known as:  ROXICODONE   Replaced by:  oxyCODONE 5 MG/5ML solution           polyethylene glycol Packet   Commonly known as:  MIRALAX/GLYCOLAX           prochlorperazine 10 MG tablet   Commonly known as:  COMPAZINE           senna-docusate 8.6-50 MG per tablet   Commonly known as:  SENOKOT-S;PERICOLACE                Where to get your medicines      These medications were sent to [facility] [place], [place] - [address] [address]     Phone:  [phone]      acetaminophen 325 MG tablet     acetaminophen 650 MG Suppository     atropine 1 % ophthalmic solution     bisacodyl 10 MG Suppository     haloperidol 1 MG tablet     sennosides 8.6 MG tablet         Some of these will need a paper prescription and others can be bought over the counter. Ask your nurse if you have  questions.     Bring a paper prescription for each of these medications      dronabinol 2.5 MG capsule     LORazepam 0.5 MG tablet     oxyCODONE 5 MG/5ML solution             Consultations:   ENT, pulmonology, nutrition, cardiology, PT, IR, GI, Heme/Onc, palliative, social work, hospice    Brief History of Illness:  Maria Esther is a 59 yo with metastatic clear cell carcinoma currently undergoing neoadjuvant chemotherapy s/p C2 IV carbo/taxol on 8/9 admitted for generalized weakness, poor PO intake.  Found to be pancytopenic with hemoglobin of 6.0 as well as other lab abnormalities.    Hospital Course:  Dz:   - Metastatic clear cell carcinoma currently undergoing neoadjuvant chemotherapy, C2D1 carbo/taxol on 8/9/18, not tolerating chemo well. She had a goals of care discussion with her family and Dr Fernandez on 8/24/18 and plan was for patient to work on nutrition over the next few weeks and follow-up with Dr. Fernandez. The goal was for her to regain strength and improve her nutrition status so that she would be a candidate for chemotherapy. Unfortunately, over the next several days her PO intake continued to decrease further. At this point, in the setting of GI bleed, palliative care was consulted to discuss goals of care. Patient stated that she did not want to undergo anymore chemotherapy as her body was too weak and hospice team was consulted with plan to discontinue any treatment, and discharge to hospice.  She was transitioned to comfort cares on HD#10 and discharged home with  homecare hospice on HD#15.  FEN:   - She was given a regular diet with boost shakes. She was continued on remeron for appetite stimulation for severe malnutrition. Nutrition was consulted. Calorie counts initiated. She had an episode of aspiration while eating (see GI) following which she had swallow eval and was placed on a dysphagia 2 diet. She continued to have poor PO intake and was offered TF supplementation but declined.  SLP  re-evaluated her on HD#7 and she was graduated to a regular diet.  She continued to have very poor PO intake. Hypokalemia on admission and received repletion.   Pain:   - Her pain home pain regimen of tylenol and oxycodone was continued during this hospital admission.  Oxycodone was switched to solution form. She began requiring more narcotics for pain control on HD#14, and dosage was increased. Pain was well managed on discharge to hospice care.  CV:   - She has no history of CV issues.  On presentation to the emergency department she had an EKG which showed normal sinus rhythm but an elevated troponin to 0.098 felt to be demand ischemia.  Her troponins were trended and increased to 0.202 before decreasing to 0.195 and trending was discontinued. Repeat EKG on HD#2 was unchanged from admission.  She did not have any chest pain during this time.  Cardiology was called and they recommended transthoracic echocardiogram. TTE on HD#2 showed EF 60-65% with normal LV and RV, trace to mild tricuspid insufficiency.  Cardiology also recommended repeat ECG if any chest pain and to continue to follow troponins.  Her vital signs stablized while in house before time of discharge.  Likely due to her poor nutritional status she had significant edema requiring intermittent diuresis with IV lasix 10mg throughout her hospitalization.  PULM:   - Patient has a history of known pulmonary emboli for which she has an IVC filter and takes therapeutic lovenox which was continued in the hospital until GI bleed on HD#4.  On HD#2 patient suffered significant respiratory distress near the end of blood transfusion of 1 unit pRBC. Given history of similar episodes as well as crackles on exam, respiratory distress was felt to be related to volume overload. Patient received 10mg IV lasix with resolution of labored breathing. Later that day she aspirated a piece of food which caused significant respiratory distress and stridor. ENT was consulted  given respiratory distress and concern for upper airway foreign body. Laryngoscopy performed with no abnormalities or foreign bodies noted. By this time her cough had improved. She was also given duonebs and 10mg IV lasix with improvement in symptoms.  Patient received IV lasix as needed throughout her admission for respiratory distress related to volume overload and severe malnutrition.   HEME:   - She was anemic with a hemoglobin on admission was 6.0 felt to be related to recent chemotherapy.  She was ordered to receive 2 units of pRBCs but given episode of shortness of breath only received 1 unit. Repeat hemoglobin was 8.1 but trended down to 6.5 during her hospital stay. She was transfused with another 1U PRBC on HD#4. After recognition of GI bleed was transfused another unit of pRBC on HD#5. Hematology was officially consulted at diagnosis of GI bleed and recommended discontinuing therapeutic lovenox indefinitely given no treatment for etiology of GI bleed. Pt has an IVC filter and it was felt this would have to be sufficient in prevention of PE given her untreated GI bleed. On HD#10 patient transitioned to comfort cares and lab draws were stopped. She had no other acute heme issues while in house.  GI:   - She was ordered a regular diet with boost shakes. On HD#2 had aspiration event while eating rice and had difficulty clearing her airway. ENT was consulted for given respiratory distress and concern for upper airway foreign body. Laryngoscopy showed no abnormalities. Due to concern for aspiration, a swallow study was completed and she was recommended dysphagia 2 diet.On HD#7 she was transitioned back to regular diet. She had little to no PO intake through the remainder of her hospitalization.     -The patient had worsening abdominal pain on admission and was noted to have a moderate amount of complex septated ascitic fluid. On HD#4, she underwent a paracentesis with IR and had 1.9 L removed.   - HD#4 noted to  have large tarry stools with a positive occult stool. Concern for GI bleed.  GI was consulted and recommended at 72 hour protonix drip.  Due to increased risk of myra-procedure complication with likely little benefit favored against any GI procedures and GI recommended conservative management.  Lovenox was stopped as a result (see Heme).  She was transitioned to PO protonix on HD#8. On HD#10 patient transitioned to comfort cares.  :    - The patient was voiding spontaneously without difficulty on admission. Developed oliguria on HD#13 and was anuric upon discharge.  ID:   - The patient was afebrile during her hospitalization. Chemotherapy induced neutropenia. WBC 2.0/ANC 1.2 on admission. WBC increased to 11.1 on HD#9. Labs were discontinued on HD#10, as patient elected for comfort care.  ENDO:   - no acute issues  PSYCH/NEURO:   - no acute issues  PPX:    - She was given IS and therapeutic lovenox initially during her hospital course.  She tolerated these prophylactic interventions until diagnosis of GI bleed and lovenox was discontinued indefinitely. Patient stopped wearing IS when transitioned to comfort care.      Discharge Instructions and Follow up:  Ms. Maria Esther Wang was discharged from the hospital to [facility]. Family has agreed to provide 24 hour care.     Discharge Diet: As tolerated  Discharge Activity: As tolerated  Discharge Follow up: None    Discharge Disposition:  [facility]    Discharge Staff: Dr. Kamila Cunningham MD  OB/GYN Resident, PGY-1  9/4/2018 1:49 PM

## 2018-08-21 NOTE — PROGRESS NOTES
CLINICAL NUTRITION SERVICES - ASSESSMENT NOTE     Nutrition Prescription    RECOMMENDATIONS FOR MDs/PROVIDERS TO ORDER:  1. Recommend ordering a Multivitamin with mineral tab (thera-vit-M) to ensure pt is meeting her micronutrient needs with minimal intake and no nutrition support started at this time.   2. If plan for nutrition support please consult RD to assess and order TF per MNT recommendations.     Malnutrition Status:    Severe malnutrition in the context of chronic illness or disease     Recommendations already ordered by Registered Dietitian (RD):  1. Order Meals order assist per pt request.   2. Ordered Scheduled Boost TID   3. Ordered scheduled snacks per pt request  4. Ordered calorie counts     Future/Additional Recommendations:  1. Evaluate calorie counts: as pt may consume more food in hospital than at home as she has easier access to food.    2. If it aligns with POC would recommend FT placement and the start of nutrition support given malnourished state. TF regimen recommendations below:   - Nutren 1.5 @ 40 mL/hr to provide 1440 kcals (33 kcal/kg/day), 65 g PRO (1.5 g/kg/day), 730 mL H2O, 169 g CHO and no Fiber daily.  -- With the start of nutrition support please ensure that pt is on an electrolyte protocol as as she is at high risk for refeeding syndrome given pt is underweight and minimal intake at home  3. Update food choice for snacks/meals with NSA per pt's liking        REASON FOR ASSESSMENT  Maria Esther Wang is a/an 58 year old female assessed by the dietitian for Admission Nutrition Risk Screen for reduced oral intake over the last month and Provider Order - poor appetite, weight loss    NUTRITION HISTORY  Per chart review, nutritionally significant information includes metastatic clear cell carcinoma, ascites, and poor PO intake. Pt presented to hospital with generalized weakness.   - Pt underwent paracentesis on 8/17 with 2.3L removed.      Spoke with pt today with a Sanjiv  ". Pt states at home that she eats Sanjiv food but that she will only eat a few bites of two meals daily. Pt reports drinking 1-2 Ensure at home.   Pt denies any N/V or other GI symptoms today.   -per discussion with pt would like meals sent up automatically. Went over menu with pt and decided some food that she would like to have ordered for certain meals. Spoke with Nutrition  to relay information for ordering foods.     Spoke with nurse this morning; she reported that the pt does not eat much at home as she is too weak to cook. Family will bring food by but she is to weak to heat it up. She reports that the pt likes Vanilla Boost.      CURRENT NUTRITION ORDERS  Diet: Regular  Intake/Tolerance: Pt was sipping on a boost at bedside this morning.      LABS  Labs reviewed  K+: 8/20: 3.1 (L), 8/21: 3.4 (WNL)    MEDICATIONS  Medications reviewed  Lasix: one time dose of 10 mg     ANTHROPOMETRICS  Height: 0 cm (Data Unavailable)  Ht Readings from Last 2 Encounters:   08/09/18 1.575 m (5' 2\")   08/03/18 1.575 m (5' 2\")   Most Recent Weight: 43.5 kg (95 lb 14.4 oz)    IBW: 50 kg  BMI: Underweight BMI <18.5  Weight History:   Wt Readings from Last 15 Encounters:   08/20/18 43.5 kg (95 lb 14.4 oz)   08/17/18 43.5 kg (95 lb 12.8 oz)   08/09/18 44.9 kg (99 lb)   08/03/18 43.9 kg (96 lb 11.2 oz)   08/02/18 46 kg (101 lb 6 oz)   07/25/18 43 kg (94 lb 12.8 oz)   07/16/18 45.5 kg (100 lb 6.4 oz)   07/12/18 46.7 kg (103 lb)   07/09/18 44.5 kg (98 lb)   07/06/18 43.8 kg (96 lb 8 oz)   06/28/18 45.2 kg (99 lb 11.2 oz)   06/25/18 49 kg (108 lb)   06/23/18 47.4 kg (104 lb 8 oz)   05/30/18 47.4 kg (104 lb 9.6 oz)   Noted: 3.9 kg (8%) weight loss over the past 3 months.     Dosing Weight: 44 kg (actual)     ASSESSED NUTRITION NEEDS  Estimated Energy Needs: 0930-8178 kcals/day (30 - 35 kcals/kg )  Justification: Increased needs and Repletion  Estimated Protein Needs: 53-66+ grams protein/day (1.2 - 1.5+ grams of " pro/kg)  Justification: Increased needs and Repletion  Estimated Fluid Needs:  (1 mL/kcal) or Per provider pending fluid status  Justification: Maintenance    PHYSICAL FINDINGS  See malnutrition section below.  Ascites     MALNUTRITION  % Intake: </=75% for >/= 1 month (severe)  % Weight Loss: > 7.5% in 3 months (severe)  Subcutaneous Fat Loss: Facial region, Upper arm and Lower arm: all Severe  Muscle Loss: Temporal: severe , Facial & jaw region:  Severe , Scapular bone:  Svere, Thoracic region (clavicle, acromium bone, deltoid, trapezius, pectoral):  Severe, Upper arm (bicep, tricep):  Severe, Lower arm  (forearm):  Severe, Dorsal hand:  Moderate, Patellar region:  Severe and Posterior calf:  Severe  Fluid Accumulation/Edema: Mild  Malnutrition Diagnosis: Severe malnutrition in the context of chronic illness or disease     NUTRITION DIAGNOSIS  Inadequate protein-energy intake related to decreased appetite and weakness as evidenced by pt report and 8% weight loss over the past 3 months.       INTERVENTIONS  Implementation  1. Nutrition Education: Dicussed eating small meals throughout the day with pt, encouraged intake in general. Discussed role of RD and meal ordering with nutrition  (NSA).   2. Collaboration with other providers - 7B rounds discussed with team and provider about possible future nutrition plans. Discussed with nurse about nutrition cares and ongoing need for encouragement to eat. Collaborated with NSA about sending meals and updating meals for pt.      Goals  Total avg nutritional intake to meet a minimum of 30 kcal/kg and 1.2 g PRO/kg daily (per dosing wt 44 kg).     Monitoring/Evaluation  Progress toward goals will be monitored and evaluated per protocol.    Cinthia Cao, MS, RD, LD   Pr

## 2018-08-21 NOTE — PROGRESS NOTES
08/21/18 0800   Quick Adds   Type of Visit Initial PT Evaluation       Present yes   Language Laotian   Living Environment   Lives With sibling(s)  (older brother, sis-in-law and child)   Living Arrangements house   Home Accessibility stairs to enter home;stairs within home;bed and bath on same level   Number of Stairs to Enter Home 3   Number of Stairs Within Home 10  (can stay on main)   Stair Railings at Home inside, present at both sides   Transportation Available family or friend will provide;ambulance  (came to ED by ambulance)   Living Environment Comment Pt is alone during the day because family is at work and school   Self-Care   Usual Activity Tolerance fair   Current Activity Tolerance poor   Regular Exercise no   Equipment Currently Used at Home (has 4 WW, was feeling too weak to use)   Activity/Exercise/Self-Care Comment When pt discharged from the hospital she was able to get up and get around the the apartment herself.  She was making simple meals, dressing but now she cannot.  After 2nd day of chemo pt couldn't do more than get up with assistance.   Functional Level Prior   Ambulation 3-->assistive equipment and person   Transferring 3-->assistive equipment and person   Toileting 3-->assistive equipment and person   Bathing 2-->assistive person   Dressing 2-->assistive person   Eating 2-->assistive person   Communication 0-->understands/communicates without difficulty   Swallowing 0-->swallows foods/liquids without difficulty   Cognition 0 - no cognition issues reported   Fall history within last six months yes   Number of times patient has fallen within last six months 1   Which of the above functional risks had a recent onset or change? ambulation;transferring;toileting;bathing;dressing;eating   Prior Functional Level Comment Pt has been declining   General Information   Onset of Illness/Injury or Date of Surgery - Date 08/20/18   Referring Physician Dr. Amy Schumer    Patient/Family Goals Statement Does not state, willing to work with PT   Pertinent History of Current Problem (include personal factors and/or comorbidities that impact the POC) Pt with with metastatic clear cell carcinoma who presents with generalized weakness off and on since receiving C2 carbo/taxol on 8/9/18.  Pt was recently admitted from 8/3 to 8/11 for back pain, lab abnormalities and tachypnea.  During this admission she had an IVC filter placed for worsening bilateral pulmonary emboli. Pt admitted through ED after 4 days of extreme weakness and was staying in bed. She was only able to get up with help and this was very limited.  Elevated troponin levels   Heart Disease Risk Factors (Elevated troponin needing to be drawn again, RN homar'sami PT )   General Observations Pt in bed,  present. She has nasal cannula around ears but not in nose, sats 98%   Cognitive Status Examination   Orientation orientation to person, place and time   Level of Consciousness alert   Follows Commands and Answers Questions 100% of the time   Personal Safety and Judgment intact   Memory intact   Pain Assessment   Patient Currently in Pain No   Integumentary/Edema   Integumentary/Edema Comments Pt with swelling in B lower legs and feet. She does have ascities with recent paracentesis 8/17 of 2.3L   Posture    Posture Not impaired   Range of Motion (ROM)   ROM Quick Adds No deficits were identified   Strength   Strength Comments Functional strength noted: Pt with 3-/5 knee extension, 2-/5 hip flexion, 2+/5 knee flexion, 2/5 ankle DF   Bed Mobility   Bed Mobility Comments Pt moves R side to sitting at max A for legs and trunk, sit to R side max A, rolling R side to L min A. Pt at mod A to scoot foward to EOB when seated   Transfer Skills   Transfer Comments Pt with short stature, able to stand bracing against bed to walker at min A. Stand to sitting at min A.     Gait   Gait Comments 2 steps forward and back, RR elevated. Min  "A to block knees and ensure stability while pt uses walker   Balance   Balance Comments B UE support, seated balance fair as static is good but pt very cautious with low limits of stability   Sensory Examination   Sensory Perception no deficits were identified   Sensory Perception Comments pt denies numbness   General Therapy Interventions   Planned Therapy Interventions bed mobility training;gait training;transfer training;strengthening   Clinical Impression   Criteria for Skilled Therapeutic Intervention yes, treatment indicated   PT Diagnosis impaired mobility and activity tolerance due to illness   Influenced by the following impairments decreased aerobic capacity, decreased strength, edema,   Functional limitations due to impairments bed mobility, transfers, gait, stairs   Clinical Presentation Evolving/Changing   Clinical Presentation Rationale pt very ill, re-admission, failure to thrive at home,   Clinical Decision Making (Complexity) Moderate complexity   Therapy Frequency` daily   Predicted Duration of Therapy Intervention (days/wks) 1 week   Anticipated Discharge Disposition Transitional Care Facility   Risk & Benefits of therapy have been explained Yes   Patient, Family & other staff in agreement with plan of care Yes   Clinical Impression Comments Pt is agreeable to working with PT, she did not discuss discharge planning today due to ECHO right after PT   [facility] [employer] TM \"6 Clicks\"   2016, Trustees of [facility], under license to [employer].  All rights reserved.   6 Clicks Short Forms Basic Mobility Inpatient Short Form   [facility] AM-PAC  \"6 Clicks\" V.2 Basic Mobility Inpatient Short Form   1. Turning from your back to your side while in a flat bed without using bedrails? 3 - A Little   2. Moving from lying on your back to sitting on the side of a flat bed without using bedrails? 2 - A Lot   3. Moving to and from a bed to a chair (including a wheelchair)? 2 - A Lot   4. " Standing up from a chair using your arms (e.g., wheelchair, or bedside chair)? 3 - A Little   5. To walk in hospital room? 2 - A Lot   6. Climbing 3-5 steps with a railing? 1 - Total   Basic Mobility Raw Score (Score out of 24.Lower scores equate to lower levels of function) 13   Total Evaluation Time   Total Evaluation Time (Minutes) 13

## 2018-08-21 NOTE — PROGRESS NOTES
Interval Progress Note     Called to assess patient respiratory distress s/p aspiration event. Note delayed due to patient care    Patient reports she was eating rice + broccoli when something got caught in her throat and she began to choke. Tried sitting up in bed and drinking sips of water but is unable to dislodge the food. States it became significantly more difficult to breath after this incident. Continues to feel like something is caught in her throat and is unable to cough it up. Unable to speak more than 1-2 words at a time.    Objective  BP 97/59 (BP Location: Left arm)  Pulse 117  Temp 96.5  F (35.8  C) (Axillary)  Resp (!) 32  Wt 43.5 kg (95 lb 14.4 oz)  SpO2 98%  BMI 17.54 kg/m2   Gen: Alert, oriented, sitting up in bed, in distress  Resp: Tachypneic, increased work of breathing, coughing, crackles bilaterally L>R    Assessment/Plan:  Acute respiratory distress in the setting of likely aspiration event while eating solid food (pt has been drinking mostly boost). Tachypneic to 30-40 and in distress but O2 sats remained >94%. Due to acute change in respiratory status and initial concern for partial airway obstruction in the setting of DNI status ENT was contacted. Furthermore, patient is extremely weak and malnourished and I worried that she would tire out if respiratory status continued to worsen. ENT felt that upper airway obstruction was unlikely given mechanism/food. Scope exam confirmed no obstruction or foreign body. Also discussed case with pulmonary service. By this time she had improved significantly from a respiratory standpoint and no longer felt like something was stuck in her throat. Her lungs still have ongoing crackles and CXR shows evdience of pulmonary edema. Plan to give dose of IV lasix. Will also obtain swallow study due to concern for aspiration. Plan to discuss goals of care and plan with patient and Dr. Fernandez tomorrow.  -10mg IV lasix  -Duonebs  -CXR  -Repeat swallow  study    Discussed with Dr. Bautista and Dr. Jim Li MD  Ob/Gyn PGY-1  August 21, 2018 3:35 PM

## 2018-08-21 NOTE — PLAN OF CARE
Problem: Patient Care Overview  Goal: Plan of Care/Patient Progress Review  Pt evaluated by PT this AM with  present. Pt reports that when she returned home from the hospital on 8/11/18 she was able to get out of bed and walk around the home with her 4WW without help but then become very weak (states weakness after second chemo infusion).  Pt has spent most of the last 5 days in bed, only getting up when help is available. She does live with her brother and his family but everyone is working/school during the day so she is alone for long period.  Pt would benefit from rehab, we did not have this discussion today, she has declined rehab during previous admission but PT would recommend and will discuss with patient.  Today's evaluation limited by other services and tests however pt was significantly fatigued with 90s of standing at edge of bed and likely would not have been able to participate in more activity at the time.  Recommend use of gait belt/walker during transfers to commode, do not try to ambulate further than bedside chair or commode at this time.  Discharge Planner PT   Patient plan for discharge: not discussed  Current status: max A bed mobility sit to/from supine, min A for rolling, min A sit to/from standing at bed which is high for her, Pt took 2 steps forward and back with walker, min A and increased RR with all activity to 54 breaths/min.    Barriers to return to prior living situation: inability to care for self when home alone, activity tolerance, inability to ambulate/stairs  Recommendations for discharge: transitional care  Rationale for recommendations: Pt cannot care for self, she needs extensive rehab and nutrition that she is not getting during the day.         Entered by: Claribel Pandya 08/21/2018 9:16 AM

## 2018-08-21 NOTE — PROGRESS NOTES
"  Care Coordinator Progress Note    Admission Date/Time:  8/20/2018  Attending MD:  Bobbi Browne MD    Data  Chart reviewed, discussed with interdisciplinary team.   Patient was admitted for:    Anemia, unspecified type  Muscle weakness (generalized)  Chemotherapy-induced neutropenia (H)  Elevated troponin  Anemia in neoplastic disease  Malignant neoplasm of ovary, unspecified laterality (H).    Concerns with insurance coverage for discharge needs: No concerns  Current Living Situation: Patient Lives with family and sister Jarret Wang is patients designated caregiver.  Family members also work and are not always at home with Ms. Wang.  Patient admits that minor children are in home setting and she was updated during last admission with the use of a Glarity  that minor children can not be considered care givers.  Patient also acknowledge that they will be returning to school soon.  Support System: Supportive family who can not always be present in home setting.  Services Involved: Home care services were arranged with [facility] after patient declined a TCU stay with last most recent hospitalization (less than 2 weeks)  Transportation at Discharge: Family transport patient to home setting.  Transportation to Medical Appointments:  Family.  Barriers to Discharge: Patient with progressive/advanced disease symptoms this admission.  Full inpatient work up pending and MD team plans to page designated unit  to evaluate for TCU and  Palliative for symptom management and recommendations.    Assessment    Patient re admitted and acknowledges with use of  services that she is \"not feeling well.\"  In light of the above data, [facility] Care services have been re arranged as a PLAN B of discharge per MD team who will request SW evaluation for TCU. The following home care plans of care have been arranged in the event patient is not eligible for and or declines a " potential recommendation for a TCU stay:    Home Care and Hospice     Ph:  [phone]     Fax: [phone]     Skilled home care agency to provide a [place]  with all home care visits.     Skilled home care RN for resumption of home care services as prior to re hospitalization and to assist with updated MD orders as outlined in discharge orders.     Skilled home care RN to evaluate medication management, nutrition and hydration evaluation, endurance evaluation, and general status evaluation after discharge from the acute care hospital setting.     Skilled home care RN to assist with management and education reinforcement with home [place] enteral feedings if indicated at time of discharge.    Skilled home care RN to assist with follow up for admission to the Palliative Home Care Team if this is not in place.     Skilled home care Physical Therapist and Occupational Therapist to evaluate and treat in home setting.     Skilled home care  to evaluate and assist with community services that patient may be eligible fore in home community.     Home Health Aide to assist with bathing and grooming 3 times a week for 2 weeks after discharge.     Skilled home care agency to evaluate for [facility] and Meals on Wheels services.      Plan  Anticipated Discharge Date:  To be determined.  Anticipated Discharge Plan:  As above and per final plans of care once stable for discharge after inpatient work up is completed.    Rika Euceda, B.S.N., P.H.N.,R.N.         Pager

## 2018-08-21 NOTE — PHARMACY-ADMISSION MEDICATION HISTORY
Admission medication history interview status for the 8/20/2018 admission is complete. See Epic admission navigator for allergy information, pharmacy, prior to admission medications and immunization status.     Medication history interview sources:  Patient (via ), [facility], Chart Review    Changes made to PTA medication list (reason)  Added: None  Deleted: None  Changed: None    Additional medication history information (including reliability of information, actions taken by pharmacist):  -Per pt, she is only taking her enoxaparin, mirtazapine, and oxycodone  -Per [facility], pt got her meds filled on the following dates:    Enoxaparin 8/20    Lorazepam 7/6    Mirtazapine 8/10 (to start on discharge 8/11)    Ondansetron 7/13    Oxycodone 8/2    Polyethylene Glycol 8/10 (to start on discharge 8/11)    Prochlorperazine 7/6    Senna-docusate 8/10      Prior to Admission medications    Medication Sig Last Dose Taking? Auth Provider   oxyCODONE IR (ROXICODONE) 5 MG tablet Take 1 tablet (5 mg) by mouth every 4 hours as needed for pain 8/19/2018 at Unknown time Yes Neda Marx MD   acetaminophen (TYLENOL) 325 MG tablet Take 2 tablets (650 mg) by mouth every 4 hours as needed for mild pain  Patient not taking: Reported on 8/21/2018 Not Taking at Unknown time  Modesta Cheng APRN CNP   COMPRESSION STOCKINGS 1 each daily   Angie Whelan APRN CNP   enoxaparin (LOVENOX) 60 MG/0.6ML injection Inject 0.6 mLs (60 mg) Subcutaneous every 12 hours 8/19/2018 at 1800  Modesta Cheng APRN CNP   LORazepam (ATIVAN) 1 MG tablet Take 1 tablet (1 mg) by mouth every 6 hours as needed (nausea/vomiting, anxiety or sleep)  Patient not taking: Reported on 8/21/2018 Not Taking at Unknown time  Geo Fernandez MD   mirtazapine (REMERON) 15 MG tablet Take 1 tablet (15 mg) by mouth At Bedtime For appetite stimulant 8/19/2018 at 2200  Modesta Cheng APRN CNP   ondansetron (ZOFRAN) 4 MG tablet Take  1 tablet (4 mg) by mouth every 6 hours as needed for nausea  Patient not taking: Reported on 8/21/2018 Not Taking at Unknown time  Jocy Dasilva MD   order for DME Equipment being ordered: Other: Four wheeled walker with seat due to poor activity tolerance, high fall risk  Treatment Diagnosis: gait instability   Dennis Price MD   polyethylene glycol (MIRALAX/GLYCOLAX) Packet Take 17 g by mouth daily Hold for loose stools  Patient not taking: Reported on 8/21/2018 Not Taking at Unknown time  Modesta Cheng APRN CNP   prochlorperazine (COMPAZINE) 10 MG tablet Take 1 tablet (10 mg) by mouth every 6 hours as needed (nausea/vomiting)  Patient not taking: Reported on 8/2/2018   Geo Fernandez MD   senna-docusate (SENOKOT-S;PERICOLACE) 8.6-50 MG per tablet Take 1-2 tablets by mouth 2 times daily as needed for constipation  Patient not taking: Reported on 8/21/2018 Not Taking at Unknown time  Modesta Cheng APRN CNP         Medication history completed by:   Lee Murray  Pharmacy Student  [employer]

## 2018-08-21 NOTE — PROGRESS NOTES
[facility]  Patient is currently open to home care services with [facility].  The patient is currently receiving RN services.  [facility]  and team have been notified of patient admission.  [facility] liaison will continue to follow patient during stay.  If appropriate provide orders to resume home care at time of discharge.    Thank you  Mary Ann Delvalle RN, BSN  [facility] Liaison  [facility]  [phone]

## 2018-08-21 NOTE — PLAN OF CARE
Problem: Anemia (Adult)  Goal: Identify Related Risk Factors and Signs and Symptoms  Related risk factors and signs and symptoms are identified upon initiation of Human [employer] Clinical Practice Guideline (CPG).   Outcome: Therapy, progress toward functional goals as expected   08/20/18 2370   Anemia   Related Risk Factors (Anemia) chemotherapy;chronic illness;poor nutrition   Signs and Symptoms (Anemia) fatigue;pallor     Afeb. VSS with soft bps in 90s/50s. Sating 99% on 2L (manily for comfort). 1 of 2 units of PRBCs infusing through portacath. No complaints at this time. Assist x2 when out of bed for safety. Continue to monitor.

## 2018-08-21 NOTE — PROGRESS NOTES
Social Work Services Progress Note    Hospital Day: 2  Date of Initial Social Work Evaluation:  Not yet completed  Collaborated with:  Gyn onc team, pt, pt's nurse    Data:Pt is a 58 year old female with a past medical history of metastatic clear cell carcinoma admitted for generalized weakness and elevated troponin thought 2/2 demand ischemia. She also has known pulmonary emboli and pleural effusions    Intervention:  Sw involved for discharge planning. SW attempted to meet with pt today and went in to introduce self.  Pt had just been eating lunch and had an aspiration event. Otolaryngology arrived to meet with pt and SW left stating plans to return later.     Assessment:  SW involved for TCU placement and will attempt to meet with pt tomorrow.   Plan:    Anticipated Disposition:  Facility:  TCU TBD    Barriers to d/c plan:  Bed availability, medical stability    Follow Up:  SW will continue to follow and assist.     LUIS Souza, STEPHANIE Benton   8/21/2018

## 2018-08-21 NOTE — PROGRESS NOTES
Went back in to evaluate patient. Respiratory effort has normalized. No audible crackles.  Patient reports feeling more comfortable. Had just been up to commode to void ~400cc urine. Will wean O2 as able. Continue to hold on 2nd unit of pRBCs for now.    Amy Schumer, MD  Obstetrics and Gynecology, PGY-2  Pager: [phone]

## 2018-08-21 NOTE — PROVIDER NOTIFICATION
0000 pt reported shortness of breath and labored breathing. Provider notified. Provider seen pt. Blood was stopped and lasix was given per provider order. Partial re-breather mask placed on pt with 3LPM O2. Pt O2 sats in mid to upper 90s.

## 2018-08-21 NOTE — H&P
[facility] History and Physical    Maria Esther Wang MRN# [id number]   Age: 58 year old YOB: 1960     Date of Admission: 8/20/2018    Primary care provider: [place], [facility]             Chief Complaint:   Generalized weakness         History of Present Illness:   This patient is a 58 year old female with metastatic clear cell carcinoma who presents with generalized weakness off and on since receiving C2 carbo/taxol on 8/9/18.  Pt was recently admitted from 8/3 to 8/11 for back pain, lab abnormalities and tachypnea.  During this admission she had an IVC filter placed for worsening bilateral pulmonary emboli, and received a paracentesis and thoracentesis as well as her scheduled chemotherapy.  On 8/17 she underwent paracentesis for 2.3L.      ED course: In the ED she had lab work which showed a hemoglobin of 6.0, WBC 2.0, and ANC 1.1.  She had a CXR which showed bilateral pleural effusions and pulmonary interstitial opacities.  Lactate is 1.1. K 3.1      Presently, she reports being very fatigued and is only able to nod of give 1 or 2 word answers to questions.  Pt is accompanied by sister today and the patient is so fatigued she initially participated in answering questions but had sister provide majority of history.  They report she has been very fatigued off and on since her last chemotherapy.  She feels very weak. She denies nausea or vomiting but reports decreased appetite. Sister reports she has gone from 116 lbs prior to diagnosis to 95 lbs most recently.  Pt reports she can only eat a very small amount of food at meals.  She reports having a formed bowel movement today. No diarrhea or blood in stool.  She is not having any pain with urination or hematuria.  Denies chest pain or shortness of breath currently.    In person interpretor used to acquire all history.              Cancer Treatment History:   Disease: Biopsy proven metastatic clear cell carcinoma, pelvic mass  7/9/18:  Reaction to blood products as well as fluid overload, admitted for tachypnea, and received C1D1 neoadjuvant carboplatin/paclitaxel. Thoracentesis for pleural effusions, cytology positive for adenocarcinoma.  8/3-11: Admitted for worsening back pain, lab abnormalities, found to have worsening bilateral PEs. Receives paracentesis, thoracentesis, IVC filter placed.  8/9/18: C2D1 IV carbo/taxol  8/17/18: Paracentesis 2.3L         Past Medical History:     Past Medical History:   Diagnosis Date     Anemia      NO ACTIVE PROBLEMS      Ovarian cancer (H)      Pulmonary embolism (H)    Pleural effusions  Malignant ascites         Past Surgical History:      Past Surgical History:   Procedure Laterality Date     INSERT PORT VASCULAR ACCESS N/A 7/9/2018    Procedure: INSERT PORT VASCULAR ACCESS;  Single Lumen Chest Power Port Placement, Leave Access for Chemotherapy;  Surgeon: Alexandro Sharif PA-C;  Location: [facility] OR     OTHER SURGICAL HISTORY      no surgical history            Social History:   Denies any tobacco, alcohol or other drug use.  Pt reports she would like to be DNR/DNI.  Accompanied by her sister today.         Allergies:   No Known Allergies         Medications:   Lovenox 60mg BID, did not take this evening  Oxycodone         Review of Systems:     CONSTITUTIONAL: +Anorexia, weight loss, fatigue Negative for  fevers, chills, night sweats   SKIN: Negative for rashes, lumps, or bumps  RESPIRATORY: Negative for  cough, shortness of breath, dyspnea and wheezing, hemoptysis  CARDIOVASCULAR: + Edema Negative for  chest pain, dyspnea  GASTROINTESTINAL: + Abdominal distension Negative for nausea, vomiting, change in bowel habits, diarrhea, constipation,  GENITOURINARY: Negative for frequency, dysuria, hematuria  MUSCULOSKELETAL: + Muscle weakness   NEUROLOGIC: + Weakness        Physical Exam:     Vitals:    08/20/18 2030 08/20/18 2045 08/20/18 2110 08/20/18 2120   BP:   93/52    Pulse:   95    Resp:   18    Temp:    97.9  F (36.6  C) 97.6  F (36.4  C)   TempSrc:   Oral Oral   SpO2: 94% 95%     Weight:         Constitutional: Cachectic appearing female, sleepy but rousable, answers questions appropriately, no acute distress  HEENT: Normal appearance.  Cardiovascular: Regular rate and rhythm without murmurs, clicks, gallops or rub  Respiratory: Clear to auscultation bilaterally without crackles in anterior lung fields  Gastrointestinal: Abdomen firm, moderately distended, non-tender. Firm mass, no guarding  Neuro: Gross movement intact  Extremities: 3+ lower extremity pitting edema  Psychiatric: mentation appears normal  Lines: Port-a-cath accessed         Data:     Results for orders placed or performed during the hospital encounter of 08/20/18 (from the past 24 hour(s))   CBC with platelets differential   Result Value Ref Range    WBC 2.0 (L) 4.0 - 11.0 10e9/L    RBC Count 2.32 (L) 3.8 - 5.2 10e12/L    Hemoglobin 6.0 (LL) 11.7 - 15.7 g/dL    Hematocrit 19.0 (L) 35.0 - 47.0 %    MCV 82 78 - 100 fl    MCH 25.9 (L) 26.5 - 33.0 pg    MCHC 31.6 31.5 - 36.5 g/dL    RDW 19.8 (H) 10.0 - 15.0 %    Platelet Count 254 150 - 450 10e9/L    Diff Method Automated Method     % Neutrophils 58.8 %    % Lymphocytes 24.0 %    % Monocytes 14.2 %    % Eosinophils 1.0 %    % Basophils 0.5 %    % Immature Granulocytes 1.5 %    Nucleated RBCs 0 0 /100    Absolute Neutrophil 1.2 (L) 1.6 - 8.3 10e9/L    Absolute Lymphocytes 0.5 (L) 0.8 - 5.3 10e9/L    Absolute Monocytes 0.3 0.0 - 1.3 10e9/L    Absolute Eosinophils 0.0 0.0 - 0.7 10e9/L    Absolute Basophils 0.0 0.0 - 0.2 10e9/L    Abs Immature Granulocytes 0.0 0 - 0.4 10e9/L    Absolute Nucleated RBC 0.0     Platelet Estimate Confirming automated cell count    Comprehensive metabolic panel   Result Value Ref Range    Sodium 136 133 - 144 mmol/L    Potassium 3.1 (L) 3.4 - 5.3 mmol/L    Chloride 102 94 - 109 mmol/L    Carbon Dioxide 26 20 - 32 mmol/L    Anion Gap 8 3 - 14 mmol/L    Glucose 78 70 - 99 mg/dL     Urea Nitrogen 12 7 - 30 mg/dL    Creatinine 0.39 (L) 0.52 - 1.04 mg/dL    GFR Estimate >90 >60 mL/min/1.7m2    GFR Estimate If Black >90 >60 mL/min/1.7m2    Calcium 6.7 (L) 8.5 - 10.1 mg/dL    Bilirubin Total 0.4 0.2 - 1.3 mg/dL    Albumin 1.3 (L) 3.4 - 5.0 g/dL    Protein Total 5.3 (L) 6.8 - 8.8 g/dL    Alkaline Phosphatase 85 40 - 150 U/L    ALT 23 0 - 50 U/L    AST 14 0 - 45 U/L   INR   Result Value Ref Range    INR 1.19 (H) 0.86 - 1.14   Partial thromboplastin time   Result Value Ref Range    PTT 54 (H) 22 - 37 sec   Troponin I   Result Value Ref Range    Troponin I ES 0.098 (H) 0.000 - 0.045 ug/L   Erythrocyte sedimentation rate auto   Result Value Ref Range    Sed Rate 137 (H) 0 - 30 mm/h   ABO/Rh type and screen   Result Value Ref Range    Units Ordered 1     ABO A     RH(D) Pos     Antibody Screen Neg     Test Valid Only At          [facility]    Specimen Expires 08/23/2018     Crossmatch Red Blood Cells    Blood component   Result Value Ref Range    Unit Number [id number]     Blood Component Type Red Blood Cells Leukocyte Reduced     Division Number 00     Status of Unit Released to care unit 08/20/2018 2100     Blood Product Code [id number]     Unit Status ISS    Lactic acid whole blood   Result Value Ref Range    Lactic Acid 1.1 0.7 - 2.0 mmol/L   EKG 12-lead, tracing only   Result Value Ref Range    Interpretation ECG Click View Image link to view waveform and result    XR Chest 2 Views    Narrative    Exam: XR CHEST 2 VW, 8/20/2018 7:00 PM    Indication: Weakness, shortness of breath.;     Comparison: Chest x-ray 8/5/2018    Findings: Frontal and lateral projection x-ray of the chest. Right  chest wall Port-A-Cath with tip projecting over the right superior  cavoatrial junction. Bilateral pleural effusion with bibasilar  atelectasis. Increased pulmonary interstitial opacities.      Impression    Impression:   1. Persistent bilateral pleural effusion and  compressive atelectasis.  2. Increased pulmonary interstitial opacities, representing pulmonary  edema vs atelectasis versus infection.    I have personally reviewed the examination and initial interpretation  and I agree with the findings.    ISAIAH AVILA MD       Imaging  CXR (8/20)  Impression:   1. Persistent bilateral pleural effusion and compressive atelectasis.  2. Increased pulmonary interstitial opacities, representing pulmonary  edema vs atelectasis versus infection.          Assessment and Plan:   Assessment: 58 year old female with metastatic clear cell carcinoma admitted for generalized weakness in the setting of recent chemotherapy C2D1 carbo/taxol on 8/9/18 as well as for elevated troponin felt to be demand ischemia.  Suspect generalized weakness is multifactorial including due to anemia as well as chemotherapy.  Poor PO intake and dehydration as well as significant pitting edema may also be exacerbating weakness.        Problem List  1) metastatic clear cell carcinoma  2) pleural effusion  3) pulmonary emboli  4) ascites  5) generalized weakness  6) hypokalemia  7) pancytopenia due to chemotherapy    Plan:  Dz: metastatic clear cell carcinoma. S/p C2D1 carbo/taxol 8/9/18 neoadjuvant chemo.   FEN: regular diet, hypokalemia, K repletion, strict I/O  Pain: Tylenol, oxycodone PRN  Heme: pancytopenia d/t chemotherapy, hgb 6.0, plan to transfuse 2 units pRBC, repeat cbc in am, wbc 2.0, ANC 1.2  CV: Trop 0.098, EKG on admission NSR, demand ischemia, plan to trend troponins  Resp: known pulmonary emboli, on therapeutic lovenox, CXR (8/20) with pleural effusions and increased pulmonary interstitial opacities  GI: bowel regimen PRN  : no issues  ID: no issues  Endocrine: NI  Psych/Neuro: NI  PPX: SCDs, IS, therapeutic lovenox  Dispo:  Continued inpatient cares  Consults: nutrition    Discussed with Dr. Rehman.    Amy Schumer, MD   Resident Physician, PGY2  Obstetrics, Gynecology, and Women's Health        Provider Disclosure:   I agree with above History, Review of Systems, Physical exam and Plan. I have reviewed the content of the documentation and have edited it as needed. I have personally performed the services documented here and the documentation accurately represents those services and the decisions I have made.     Electronically signed by:   Bobbi Browne MD   Gynecologic Oncology   [facility]

## 2018-08-21 NOTE — PROGRESS NOTES
Called to evaluate Ms. Wang this morning due to concern for aspiration while eating lunch and foreign body in airway.  Upon arrival she was mildly tachypneic and using some accessory muscles.  She had oxygen saturations of 95% on room air.  She was attempting to cough, though this was weak.  On exam she had crackles and coarse rhonchi throughout both lungs and decreased lung sounds at the left base.  Recommended chest x-ray, nebulizer therapy, nasotracheal suctioning to the primary team.  She was subsequently evaluated by otolaryngology who did not note a foreign body in the airway.    Upon returning in the afternoon, her respiratory symptoms had resolved.  She was no longer coughing and had normal oxygen saturations on room air.  Her exam was somewhat improved though she continues to have some crackles and rhonchi.  We will not plan to formally follow the patient, if additional needs arise please feel free to page.      Nacho Garcia MD      Attending note:  Patient seen, examined, and discussed with Dr. Garcia. All data reviewed. Agree with assessment and plan as outlined in the above note.    Micheline Stover MD  [phone]

## 2018-08-21 NOTE — PROGRESS NOTES
Notified by RN that patient has some shortness of breath.  1 unit pRBC currently running. Went to see patient and she is audibly short of breath, with crackles and labored breathing.  Called RN to immediately stop blood tranfusion. 10mg IV lasix ordered as patient has had issues with fluid overload in setting of blood transfusions.      Vitals:    08/20/18 2145 08/20/18 2200 08/20/18 2230 08/21/18 0026   BP:    106/40   BP Location:    Left arm   Pulse:       Resp:    24   Temp:   97.9  F (36.6  C) 98.6  F (37  C)   TempSrc:   Axillary Axillary   SpO2: 99% 100% 100%    Weight:         General - sitting up in bed, labored breathing  Cardio: RRR  Pulm: audible crackles, labored breathing, lungs with crackles throughout on auscultation, nasal canula in place    A/P: Maria Esther is a 57 yo with history of metastatic clear cell carcinoma admitted for generalized weakness and found to have hemoglobin 6.0.  Known pulmonary effusions, history of shortness of breath with blood transfusions resolved with lasix.    - Stop transfusion now, of note 99% transfused  - Increased O2 to 3L and placed rebreather  - Continue to monitor for respiratory distress  - IV lasix 10mg given  - Given no symptoms of itching, and history of very similar presentation with other transfusions that resolved with lasix do not suspect this is a blood transfusion reaction. However if symptoms do not improve in next 30 minutes or worsening shortness of breath will treat as infusion reaction.  - Hold on 2nd unit of pRBCs  Amy Schumer, MD  Obstetrics and Gynecology, PGY-2  Pager: [phone]

## 2018-08-21 NOTE — CONSULTS
Otolaryngology Consult Note  August 21, 2018    ENT consulted by Tere Li of the Gyn/Onc service     CC: Difficulty breathing, concern for upper airway foreign body s/p aspiration event    HPI: Maria Esther Wang is a 58 year old female with a past medical history of metastatic clear cell carcinoma admitted for generalized weakness and elevated troponin thought 2/2 demand ischemia. She also has known pulmonary emboli and pleural effusions. Today she was eating rice and had a witnessed aspiration event. Afterward she had noisy breathing and complained of feeling as though something was caught in her throat. ENT was consulted for visualization of the upper airway to evaluate for foreign body.     Past Medical History:   Diagnosis Date     Anemia      NO ACTIVE PROBLEMS      Ovarian cancer (H)      Pulmonary embolism (H)        Past Surgical History:   Procedure Laterality Date     INSERT PORT VASCULAR ACCESS N/A 7/9/2018    Procedure: INSERT PORT VASCULAR ACCESS;  Single Lumen Chest Power Port Placement, Leave Access for Chemotherapy;  Surgeon: Alexandro Sharif PA-C;  Location: [facility] OR     OTHER SURGICAL HISTORY      no surgical history       No current outpatient prescriptions on file.        No Known Allergies    Social History     Social History     Marital status: Single     Spouse name: N/A     Number of children: N/A     Years of education: N/A     Occupational History     Not on file.     Social History Main Topics     Smoking status: Never Smoker     Smokeless tobacco: Never Used     Alcohol use No     Drug use: No     Sexual activity: No     Other Topics Concern     Not on file     Social History Narrative       No family history on file.    ROS: 12 point review of systems is negative unless noted in HPI.    PHYSICAL EXAM:  General: laying in bed, no acute distress, cachectic appearing  BP 97/59 (BP Location: Left arm)  Pulse 117  Temp 96.5  F (35.8  C) (Axillary)  Resp (!) 32  Wt 43.5 kg (95 lb  14.4 oz)  SpO2 98%  BMI 17.54 kg/m2  HEAD: normocephalic, atraumatic  Face: symmetrical, CN VII intact bilaterally (HB 1), no swelling, edema, or erythema.  Eyes: EOMI, clear sclera  Nose: no anterior drainage, intact and midline septum without perforation or hematoma   Mouth: moist, tongue midline and symmetric  Oropharynx: tonsils within normal limits, uvula midline, no oropharyngeal erythema  Neck: thin, no swelling or masses, trachea midline  Neuro: cranial nerves 2-12 grossly intact  Respiratory: breathing non-labored on RA, no stridor  Skin: no rashes or skin lesions of the face/neck    FIBEROPTIC ENDOSCOPY:  Due to increased work of breathing and globus sensation, fiberoptic laryngoscopy was indicated. After obtaining verbal consent, the nose was topically decongested and anesthetized. The fiberoptic laryngoscope was passed under endoscopic vision through the right nasal passage. The turbinates were normal. The inferior and middle meati were clear bilaterally without purulence, masses, or polyps. The nasopharynx was clear. The eustachian tubes were clear. The soft palate appeared normal with good mobility. The epiglottis was sharp, and the visualized portion of the vallecula was clear. The larynx was clear with mobile cords. The arytenoids were clear, and there was no pooling in the hypopharynx. No foreign body visualized.     ROUTINE IP LABS (Last four results)  BMP  Recent Labs  Lab 08/21/18  1004 08/21/18  0450 08/20/18  1625   NA  --  135 136   POTASSIUM 4.0 3.4 3.1*   CHLORIDE  --  100 102   IRON  --  7.4* 6.7*   CO2  --  26 26   BUN  --  12 12   CR  --  0.39* 0.39*   GLC  --  74 78     CBC  Recent Labs  Lab 08/21/18  0450 08/20/18  1625   WBC 2.6* 2.0*   RBC 3.05* 2.32*   HGB 8.1* 6.0*   HCT 25.8* 19.0*   MCV 85 82   MCH 26.6 25.9*   MCHC 31.4* 31.6   RDW 18.7* 19.8*    254     INR  Recent Labs  Lab 08/20/18  1625   INR 1.19*       Imaging:  Results for orders placed or performed during the  hospital encounter of 08/20/18   XR Chest 2 Views    Narrative    Exam: XR CHEST 2 VW, 8/20/2018 7:00 PM    Indication: Weakness, shortness of breath.;     Comparison: Chest x-ray 8/5/2018    Findings: Frontal and lateral projection x-ray of the chest. Right  chest wall Port-A-Cath with tip projecting over the right superior  cavoatrial junction. Bilateral pleural effusion with bibasilar  atelectasis. Increased pulmonary interstitial opacities.      Impression    Impression:   1. Persistent bilateral pleural effusion and compressive atelectasis.  2. Increased pulmonary interstitial opacities, representing pulmonary  edema vs atelectasis versus infection.    I have personally reviewed the examination and initial interpretation  and I agree with the findings.    ISAIAH AVILA MD         Assessment and Plan  Maria Esther Wang is a 58 year old female with a past medical history of metastatic clear cell carcinoma admitted for generalized weakness and elevated troponin. ENT was consulted to evaluate for upper airway foreign body s/p aspiration event while eating rice. No foreign body on laryngoscopy, upper airway widely patent with mobile vocal cords and grossly normal.     - No acute ENT intervention  - Further pulmonary and aspiration workup per primary team    -- Patient and above plan to be discussed with ENT attending on call, Dr. Case.     Peggy Spann PA-C  Otolaryngology-Head & Neck Surgery  Please page ENT with questions by dialing [phone] and entering job code [phone] when prompted.

## 2018-08-21 NOTE — PROGRESS NOTES
Patient admitted to: 7C  Admitted from: ED  Arrived by: Cart/ pt transport  Reason for admission: Dehydration, low hgb  Patient accompanied by: self  Belongings: with patient  Teaching: completed as able - will need Edvin

## 2018-08-21 NOTE — PROVIDER NOTIFICATION
Notified Resident at 0145 AM regarding lab results.      Text sent: [id number]   Schumer  Orders were obtained.    Comments: Troponin level back- 0.159.  Will recheck level @ 0900.

## 2018-08-21 NOTE — PLAN OF CARE
Problem: Patient Care Overview  Goal: Plan of Care/Patient Progress Review  RR max 40 during aspiration, AOVSS. Pt c/o abdominal pain, controlled with 5mg PRN oxycodone. Pt denies nausea. Pt very malnourished, consulted by nutrition and started on nutrition-assist meals (automatic). Meals have since been held/DC'd d/t pt's aspiration during lunch. Pt unable to clear throat, weak cough, drinking water did not help. MDs paged, ENT came as well, ruled out upper respiratory issues. Chest xray, lasix, and scheduled nebs ordered, lungs very coarse, some friction rub heard (all around 1230 today). Pt voided. No BMs. Pt very weak, is Aof1-2 to commode. Family at bedside, concerned about pt's status, supportive. Swallow study ordered. PT did eval today, OT consult ordered for tomorrow. SW to follow-up with pt tomorrow. Continue POC.     Per pt: lives with sister and sister's  and children. They are able to care for her while home, but are most often at work or school. Home care comes to see pt 1x per day. Meals are pre-made for pt by sister. However, pt becoming too weak to heat up meals or prepare them for herself. Pt only has about 1 carton of vanilla boost per day. RN concerned that pt may need closer monitoring once outpatient.

## 2018-08-22 NOTE — PROGRESS NOTES
08/22/18 1100   Quick Adds   Type of Visit Initial Occupational Therapy Evaluation   Living Environment   Lives With sibling(s)  (brother, sister-in-law and their children)   Living Arrangements house   Home Accessibility stairs to enter home;stairs within home;tub/shower is not walk in   Number of Stairs to Enter Home 3   Number of Stairs Within Home 10   Stair Railings at Home inside, present at both sides   Transportation Available family or friend will provide   Living Environment Comment Pt lives with her family. There is a bedroom and bathroom for her to use on the main level. Both adults work during the day and therefore the pt would be alone for a couple hours a day when the kids go back to school.   Self-Care   Dominant Hand left   Usual Activity Tolerance fair   Current Activity Tolerance poor   Regular Exercise no   Equipment Currently Used at Home shower chair   Activity/Exercise/Self-Care Comment When pt was discharged from the hospital she was able to dress herself, and make simple meals. However recently she has not been able to and now requires assistance for most ADL activities   Functional Level Prior   Ambulation 3-->assistive equipment and person   Transferring 3-->assistive equipment and person   Toileting 3-->assistive equipment and person   Bathing 3-->assistive equipment and person   Dressing 3-->assistive equipment and person   Eating 2-->assistive person   Communication 0-->understands/communicates without difficulty   Cognition 0 - no cognition issues reported   Fall history within last six months yes   Number of times patient has fallen within last six months 1  (Pt fell in the shower last week)   Which of the above functional risks had a recent onset or change? ambulation;transferring;toileting;bathing;dressing       Present yes   Language Indonesian   General Information   Onset of Illness/Injury or Date of Surgery - Date 08/20/18   Referring Physician Modesta Cheng    Patient/Family Goals Statement To return home   Additional Occupational Profile Info/Pertinent History of Current Problem This patient is a 58 year old female with metastatic clear cell carcinoma who presents with generalized weakness off and on since receiving C2 carbo/taxol on 8/9/18.  Pt was recently admitted from 8/3 to 8/11 for back pain, lab abnormalities and tachypnea.  During this admission she had an IVC filter placed for worsening bilateral pulmonary emboli, and received a paracentesis and thoracentesis as well as her scheduled chemotherapy.  On 8/17 she underwent paracentesis for 2.3L.     Precautions/Limitations fall precautions   General Observations Pt is pleasant and agreeable to therapy.   General Info Comments Activity: Up ad julius   Cognitive Status Examination   Orientation orientation to person, place and time   Level of Consciousness lethargic/somnolent   Able to Follow Commands WNL/WFL   Personal Safety (Cognitive) WNL/WFL   Cognitive Comment Pt is alert, oriented and generally appropriate in conversation. Pt was very tired during evaluation, however still participated in answering questions   Visual Perception   Visual Perception Wears glasses  (for reading)   Visual Perception Comments Pt denies acute visual changes   Sensory Examination   Sensory Comments Pt denies any numbness or tingling   Pain Assessment   Patient Currently in Pain No   Integumentary/Edema   Integumentary/Edema Comments Pt denies any swelling and edema   Range of Motion (ROM)   ROM Comment BUE ROM WNL, BLE not assessed due to pt not willing for OOB activity   Strength   Strength Comments Not formally assessed, generalized weakness   Instrumental Activities of Daily Living (IADL)   Previous Responsibilities meal prep   IADL Comments Pt was preiously able to do small meal prep such as using the microwave, however has not been able to recently, Pt has family that helps with most IADLs   Activities of Daily Living Analysis  "  Impairments Contributing to Impaired Activities of Daily Living balance impaired;ROM decreased;strength decreased   General Therapy Interventions   Planned Therapy Interventions ADL retraining;ROM;strengthening;home program guidelines;progressive activity/exercise;transfer training   Clinical Impression   Criteria for Skilled Therapeutic Interventions Met yes, treatment indicated   OT Diagnosis decreased activity tolerance and independence with ADL completion   Influenced by the following impairments weakness, fatigue, deconditioning, acute medical needs   Assessment of Occupational Performance 5 or more Performance Deficits   Identified Performance Deficits functional transfers, functional mobility, g/h, bathing, dressing, toileting   Clinical Decision Making (Complexity) Low complexity   Therapy Frequency 5 times/wk   Predicted Duration of Therapy Intervention (days/wks) 9/5/18   Anticipated Equipment Needs at Discharge other (see comments)  (TBD)   Anticipated Discharge Disposition Transitional Care Facility   Risks and Benefits of Treatment have been explained. Yes   Patient, Family & other staff in agreement with plan of care Yes   [facility] TM \"6 Clicks\"   2016, Trustees of [facility], under license to [employer].  All rights reserved.   6 Clicks Short Forms Daily Activity Inpatient Short Form   [facility]  \"6 Clicks\" Daily Activity Inpatient Short Form   1. Putting on and taking off regular lower body clothing? 2 - A Lot   2. Bathing (including washing, rinsing, drying)? 2 - A Lot   3. Toileting, which includes using toilet, bedpan or urinal? 2 - A Lot   4. Putting on and taking off regular upper body clothing? 2 - A Lot   5. Taking care of personal grooming such as brushing teeth? 3 - A Little   6. Eating meals? 3 - A Little   Daily Activity Raw Score (Score out of 24.Lower scores equate to lower levels of function) 14   Total Evaluation Time   Total Evaluation Time " (Minutes) 5

## 2018-08-22 NOTE — PROGRESS NOTES
Hospital Day: 3  Date of Initial Social Work Evaluation:  Not yet completed  Collaborated with:  Gyn onc team, pt, pt's nurse     Data:Pt is a 58 year old female with a past medical history of metastatic clear cell carcinoma admitted for generalized weakness and elevated troponin thought 2/2 demand ischemia. She also has known pulmonary emboli and pleural effusions     Intervention:  Sw stopped to meet with pt and family member this morning however  had already left.  SW left Medicare TCU list with pt and stated plans to return at another time when an  is scheduled.    SW conferred with RNCC who stated pt will likely not be discharging soon.  SW will attempt to meet with pt and family tomorrow with  present.      Assessment:  SW involved for TCU placement and will attempt to meet with pt tomorrow.   Plan:    Anticipated Disposition:  Facility:  TCU TBD    Barriers to d/c plan:  Bed availability, medical stability    Follow Up:  Pt not medically ready for discharge at this time, SW will continue to follow.     LUIS Souza, LGSTEPHANIE  [facility]   8/22/2018

## 2018-08-22 NOTE — PROGRESS NOTES
Gynecology Oncology Progress Note    24 hour events:   - TTE overall normal  - serial trops downtrended  - nutrition recommended Boost, snacks, and future feeding tube if aligns with care goals  - aspiration during lunch, nasotracheal suctioning performed, given nebs. ENT performed fiberoptic laryngoscopy without visualization of foreign body, pulmonary also saw patient however symptoms had resolved  - CXR due to aspiration, findings unchanged from CXR on admission    Subjective:Denies chest pain. Denies shortness of breath. No abdominal pain at this time.      Objective:   Vitals:    08/21/18 2100 08/21/18 2115 08/21/18 2215 08/22/18 0452   BP: 92/48 111/65 114/57 103/61   BP Location: Left arm Left arm Left arm Other (Comment)   Pulse:   97    Resp:   18 17   Temp:   97.7  F (36.5  C) 95.5  F (35.3  C)   TempSrc:   Oral Axillary   SpO2:   97% 97%   Weight:           Intake/Output Summary (Last 24 hours) at 08/22/18 0504  Last data filed at 08/22/18 0242   Gross per 24 hour   Intake                0 ml   Output             1000 ml   Net            -1000 ml     Since MN:UOP 150cc    General - NAD, laying comfortably in bed  CV - RRR, no murmurs  Resp - CTAB in posterior lung fields, no crackles, no wheezing, unlabored breathing  Abdomen - firm, mild distended, non-tender to palpation  Ext - warm and well perfused, 2+ pitting edema, non-tender    New Labs/Imaging-     CXR (8/21)  IMPRESSION:   1. Unchanged moderate bilateral pleural effusions.  2. Unchanged bibasilar opacities compatible with atelectasis mixed  with infection, aspiration, and/or pulmonary edema.     TTE 8/21  Left ventricular function, chamber size, wall motion, and wall thickness are  normal.The EF is 60-65%. Grade I or early diastolic dysfunction. No regional  wall motion abnormalities are seen.  Right ventricular function, chamber size, wall motion, and thickness are  normal.  Trace to mild tricuspid insufficiency is present.  Pulmonary artery  systolic pressure is normal. The inferior vena cava was  normal in size with preserved respiratory variability. No pericardial effusion  is present.      Assessment:  58 year old female with metastatic clear cell carcinoma HD#3 admitted for generalized weakness and elevated troponin likely due to demand ischemia and recent chemotherapy on 8/9/18 leading to anemia.    Plan:  Dz: metastatic clear cell carcinoma. S/p C2D1 carbo/taxol 8/9/18 neoadjuvant chemo.   FEN: full liquid diet, hypokalemia s/p repletion, strict I/O  Pain: Tylenol, oxycodone PRN  Heme: pancytopenia d/t chemotherapy, hgb 6.0>1u pRBC> 8.1, appropriate rise in hgb following transfusion, leukopenia d/t chemo but improving  CV: troponin elevations, now downtrending likely d/t demand ischemia, EKG x2 stable, TTE largely normal  Resp: known pulmonary emboli, on therapeutic lovenox, CXR (8/20) with pleural effusions and increased pulmonary interstitial opacities, aspiration on HD#2 with negative laryngoscopy, swallow study ordered  GI: bowel regimen PRN  : NI, voiding  ID: NI  Endocrine: NI  Psych/Neuro: NI  PPX: SCDs, IS, therapeutic lovenox ord'd  Dispo:  D/c to TCU with clinical improvement  Consults: nutrition, cards, PT, s/p ENT, s/p pulm, SLP    Amy Schumer, MD  Obstetrics and Gynecology, PGY-2  08/22/18 5:06 AM      Provider Disclosure:   I agree with above History, Review of Systems, Physical exam and Plan. I have reviewed the content of the documentation and have edited it as needed. I have personally performed the services documented here and the documentation accurately represents those services and the decisions I have made.     Electronically signed by:   Bobbi Browne MD   Gynecologic Oncology   [facility]

## 2018-08-22 NOTE — PLAN OF CARE
Problem: Patient Care Overview  Goal: Plan of Care/Patient Progress Review  Discharge Planner SLP   Patient plan for discharge: Did not discuss  Current status: Pt seen for bedside swallow evaluation with family and  present. Pt had a potential aspiration episode during which pt consumed rice and broccoli, waited a few minutes, then started coughing and choking which resulted in respiratory distress. Nasosuctioning was completed and pt was able to recover. ENT also scoped pt in search of a foreign body, however, this revealved no significant abnormalities and no evidence of foreign body in the air way. Pt was assessed at bedside with thin liquids, puree and regular solids. Oral phase is prolonged likely d/t significant weakness and deconditioning. Pt admits to eating only very small amounts at a time at home. No s/sx of aspiration across trials. Based on recent incident and pt/family report I question if there is an esophogeal component to her dysphagia.     Recommend pt be closely monitored with a dysphagia diet 2 and thin liquids, head of bed fully elevated, small bites/sips, altnerating solids and liquids and staying upright or 30 minutes following oral intake. Supervision is recommended to reduce risk for aspiration. Consider esophageal evaluation of swallow (ie esophagram or EGD) with ongoing concerns     Barriers to return to prior living situation: Defer to other disciplines/medical team  Recommendations for discharge: Potential for ongoing ST  Rationale for recommendations: Aspiration risk, modified diet        Entered by: Maureen Li 08/22/2018 3:44 PM

## 2018-08-22 NOTE — PLAN OF CARE
Problem: Patient Care Overview  Goal: Plan of Care/Patient Progress Review  OT 7C  Discharge Planner OT   Patient plan for discharge: after discussion with pt and pt brother, they are both open to rehab at discharge  Current status: Eval complete, treatment indicated. Pt is lethargic, oriented, and appropriate in conversation. Pt declining OOB activity this AM with max motivation for encouragement. Pt completed 4 BUE strengthening exercises x10 reps while laying supine in bed. Educated pt and family on discharge planning.  Barriers to return to prior living situation: fatigue, weakness, deconditioning, inability to have 24 hour care, acute medical needs  Recommendations for discharge: TCU  Rationale for recommendations: Pt is below baseline and would benefit from continued skilled therapy to increase strength and independence with ADL completion.       Entered by: Natali Carlisle 08/22/2018 1:07 PM

## 2018-08-22 NOTE — PLAN OF CARE
Problem: Patient Care Overview  Goal: Plan of Care/Patient Progress Review  Discharge Planner PT / 7C   Patient plan for discharge: Not stated.  Current status: ModA for supine > sit at EOB, Deion for sit > supine. Deion for x3 sit <> stand transfers. Pt takes ~3-4 small steps toward HOB with Deion. Participates in seated LE therex. SOB/fatigue with increased activity, frequent rest breaks needed, encouragement required for activity.  Barriers to return to prior living situation: LE weakness, impaired balance, decreased activity tolerance, level of assist for functional mobility, home alone during the day and unable to care for self.  Recommendations for discharge: TCU.  Rationale for recommendations: Pt would benefit from rehab stay to progress strength, balance, activity tolerance, and mobility - pt not safe to return home based on current level of function.

## 2018-08-22 NOTE — PROVIDER NOTIFICATION
Notified Resident at 2023 PM regarding changes in vital signs. BP was 80/44, 103/46, 85/36, 90/49. HR 99, SPO2 96%.    Spoke with: Amy Schumer, MD    Orders were not obtained.    Comments: Per gyn onc resident continue to monitor BPs q30 min for now, notify if anything changes, and encourage sips of full liquids.

## 2018-08-22 NOTE — PROGRESS NOTES
"   08/22/18 1500   General Information   Onset Date 08/20/18   Start of Care Date 08/22/18   Referring Physician Tere Li MD    Patient Profile Review/OT: Additional Occupational Profile Info See Profile for full history and prior level of function   Patient/Family Goals Statement Did not state   Swallowing Evaluation Bedside swallow evaluation   Behaviorial Observations WFL (within functional limits)   Mode of current nutrition Oral diet   Type of oral diet Thin liquid  (full liquid)   Respiratory Status Room air   Comments 58 year old female with metastatic clear cell carcinoma HD#3 admitted for generalized weakness and elevated troponin likely due to demand ischemia and recent chemotherapy on 8/9/18 leading to anemia. Swallow evaluation ordered after a possible aspiration event yesterday. Per documentation and family description pt was eating solid foods and after about 2 minutes she started coughing and choking. She then developed respiratory distress but was able to recover. ENT completed laryngoscopy d/t concern for foreign body in the trachea, however, nothing was found and pharynx. The pharynx was grossly normal, vocal folds were mobile, and airway was patent. During evaluation pt and family state pt can only eat very small amounts at home as well, and often feels a \"sticking\" or full sensation. Pt also reports sticky phlegm often. Pt seen in presence of     Clinical Swallow Evaluation   Oral Musculature generally intact   Structural Abnormalities none present   Dentition present and adequate   Mucosal Quality adequate  (pt reports intermittent sticky/thick secretions )   Oral Labial Strength and Mobility WFL   Lingual Strength and Mobility WFL   Velar Elevation intact   Laryngeal Function Cough;Throat clear;Swallow;Voicing initiated   Additional Documentation Yes   Clinical Swallow Eval: Thin Liquid Texture Trial   Mode of Presentation, Thin Liquids straw   Volume of Liquid or Food " Presented 2 oz    Oral Phase of Swallow WFL   Pharyngeal Phase of Swallow intact   Diagnostic Statement No overt s/sx of aspiration. No changes in breath sounds or vocal quality. Belch x 1   Clinical Swallow Eval: Puree Solid Texture Trial   Mode of Presentation, Puree spoon;self-fed  (very small bites consumed )   Volume of Puree Presented .5 oz    Oral Phase, Puree WFL   Pharyngeal Phase, Puree intact   Diagnostic Statement Adequate oral manipulation. No overt s/sx of aspiration. Pt reports little appetite and feels full    Clinical Swallow Eval: Solid Food Texture Trial   Mode of Presentation, Solid self-fed   Volume of Solid Food Presented 1/4 cracker    Oral Phase, Solid WFL  (Some munching noted, pt hesitant to consume a lot)   Pharyngeal Phase, Solid intact  (pt reports feeling full, denies sticking or fullness in thro)   Diagnostic Statement Pt's oral phase is disorganized but funcitonal for solids. She is hesistant to eat much and states she feels full digestive wise vs. fullness in her chest or throat. No s/sx of aspiration.    Swallow Compensations   Swallow Compensations Effortful swallow;Reduce amounts;Multiple swallow   Esophageal Phase of Swallow   Patient reports or presents with symptoms of esophageal dysphagia Yes   Esophageal comments Belch x 1, pt reports being able to eat only small amounts    General Therapy Interventions   Planned Therapy Interventions Dysphagia Treatment   Dysphagia treatment Modified diet education;Instruction of safe swallow strategies;Compensatory strategies for swallowing   Swallow Eval: Clinical Impressions   Skilled Criteria for Therapy Intervention Skilled criteria met.  Treatment indicated.   Functional Assessment Scale (FAS) 4   Treatment Diagnosis Mild-moderate oropharyngeal dysphagia, potential esophgeal component    Diet texture recommendations Dysphagia diet level 2;Thin liquids   Recommended Feeding/Eating Techniques alternate between small bites and sips of  food/liquid;check mouth frequently for oral residue/pocketing;hard swallow w/ each bite or sip;maintain upright posture during/after eating for 30 mins;small sips/bites   Therapy Frequency 5 times/wk   Predicted Duration of Therapy Intervention (days/wks) 2 week   Risks and Benefits of Treatment have been explained. Yes   Patient, family and/or staff in agreement with Plan of Care Yes   Clinical Impression Comments Pt seen for bedside swallow evaluation with family and  present. Pt had a potential aspiration episode during which pt consumed rice and broccoli, waited a few minutes, then started coughing and choking which resulted in respiratory distress. Nasosuctioning was completed and pt was able to recover. ENT also scoped pt in search of a foreign body, however, this revealved no significant abnormalities and no evidence of foreign body in the air way. Pt was assessed at bedside with thin liquids, puree and regular solids. Oral phase is prolonged likely d/t significant weakness and deconditioning. Pt admits to eating only very small amounts at a time at home. No s/sx of aspiration across trials. Based on recent incident and pt/family report I question if there is an esophgeal component to her dysphagia. Recommend pt be closely monitored with a dysphagia diet 2 and thin liquids, head of bed fully elevated, small bites/sips, altnerating solids and liquids and staying upright or 30 minutes following oral intake. Supervision is recommended to reduce risk for aspiration. Consider esophgeal evaluation of swallow (ie esophagram or EGD) with ongoing concerns    Total Evaluation Time   Total Evaluation Time (Minutes) 10

## 2018-08-22 NOTE — PLAN OF CARE
Problem: Patient Care Overview  Goal: Plan of Care/Patient Progress Review  Outcome: No Change  Neuro: AOx4, up with assist x1.   Cardiac: Soft BP's   Musculoskeletal: generalized weakness.   Resp: Dyspnea on exertion  GI/: Commode at bedside. AUO. No BM on shift. Full liquid diet with Kcal count.  LDA's Rt port, heparin locked     POC: Informal care plan conference with family today. Called  services to schedule Lo[place]  0600 per provider request. Encourage nutrion intake.

## 2018-08-22 NOTE — PLAN OF CARE
Problem: Patient Care Overview  Goal: Plan of Care/Patient Progress Review  Outcome: No Change  Intermittently soft blood pressures. SBP 80s-low 100s, DBP 36-65. MD notified. Changed blood pressure cuff to infant size 7. Intermittently tachycardic, ROSARIO, O2 Sats upper 90s on room air. Respirations low 20s. Pt denied pain most of the shift. Oxycodone given x1 at bedtime. Denies nausea. Up with assist of 1-2. Abdomen is distended. Lung sounds coarse and diminished. +bowel sounds. Voiding spontaneously with adequate urine output. Pt very malnourished - nutrition consulted. Pt now full liquid diet due to aspiration event earlier, pt only taking ice chips, tolerated small amounts of boost. Swallow study planned for tomorrow. Family supportive at bedside, concerned about pt, many questions. Plan is for social work to be involved in DC to TCU. PLAN: monitor VS and respiratory status, continue with current plan of care.

## 2018-08-23 NOTE — PROVIDER NOTIFICATION
Notified Resident at 1530 PM regarding changes in vital signs.  RR 38. 02 sats 95% on rm air. Tripod breathing, shallow. Cough, nonproductive. Went down for US of ascites. Para planned for tomorrow. Lasix given.     Spoke with: Mariana    Orders were obtained. Neb treatment and 2nd dose of lasix.     Comments: Will cont to monitor resp status.       Addendum: triggered Sepsis protocol. Lactic 1.9. RR 32-36 now.

## 2018-08-23 NOTE — CONSULTS
INTERVENTIONAL RADIOLOGY CONSULT NOTE    Patient is on IR schedule for therapeutic paracentesis on Friday 8/24/18.  Labs WNL for procedure.   No NPO required.  Medications to be held include: None  Consent will be done prior to procedure.     Platelet Count   Date Value Ref Range Status   08/23/2018 216 150 - 450 10e9/L Final     INR   Date Value Ref Range Status   08/20/2018 1.19 (H) 0.86 - 1.14 Final        Please contact the IR charge RN at [phone] for estimated time of procedure.     Katiuska Arzola PA-C  Interventional Radiology  Phone: [phone]

## 2018-08-23 NOTE — PROGRESS NOTES
Social Work: Assessment with Discharge Plan    Patient Name: Maria Esther Wang  : 1960  Age: 58 year old  MRN: [id number]   Risk/Complexity Score: 12  Completed assessment with: pt, pt's brother (Nacho), Sanjiv     Presenting Information   Reason for Referral: chart review   Date of intake: 2018  Referral Source: auto case finding   Decision Maker: self at baseline  Alternate Decision Maker: pt's brother (Walter) and sister (Corinna)  Health Care Directive: Patient considering completing  Living Situation: Pt has been living with brother and sister-in-law  Previous Functional Status: moderately indepndent  Patient and family understanding of hospitalization: increased weakness,    Cultural/Language/Spiritual Considerations: Pt is from [facility]  Adjustment to Illness: pt is motivated to go to TCU in hopes of regaining strength    Physical Health  Reason for admission:   1. Anemia, unspecified type    2. Muscle weakness (generalized)    3. Chemotherapy-induced neutropenia (H)    4. Elevated troponin    5. Anemia in neoplastic disease    6. Malignant neoplasm of ovary, unspecified laterality (H)      Services Needed/Recommended: n/a    Mental Health/Chemical Dependency:   Diagnosis: None noted, pt reports some depressed feelings related to medical issues.   Support/Services in Place: none  Services Needed/Recommended: none noted at this time    Support System  Significant Relationship at Present time:  Pt's brother and sister  Family of Origin is available for support: yes  Other support available:  No family support noted beyond brother, sister, and sister-in-law  Current in home services: none noted  Gaps in Support System: n/a    Provider Information   Primary Care Physician:[facility]      Clinic:       :     Financial   Income Source: Pt reports she used to work but now does not have an income  Financial Concerns:  Pt has no income and is financially  support by brother and sister-in-law  Insurance: Pt has       Discharge Plan   Patient and family discharge goal:Pt is motivated for rehab at TCU  Provided Education on discharge plan: YES  Patient agreeable to discharge plan:  YES  A list of Medicare Certified Facilities was provided to the patient and/or family to encourage patient choice. Patient's choices for facility are:     1) [facility] (first choice, within walking distance from brother)- [phone]  2) [facility] [phone]  3) [facility]- [phone]        Will NH provide Skilled rehabilitation or complex medical:  YES  General information regarding anticipated insurance coverage and possible out of pocket cost was discussed. Patient and patient's family are aware patient may incur the cost of transportation to the facility, pending insurance payment: YES  Barriers to discharge: medical stability    Discharge Recommendations   Disposition: Therapies recommending TCU  Transportation Needs: to be determined, brother or sister possibly available to transport.   Name of Transportation Company and Phone: n/a    Additional comments   Pt is a 58 year old female who metastatic clear cell carcinoma who presented with generalized weakness off and on since receiving C2 carbo/taxol on 8/9/18. Pt recently admitted from 8/3 to 8/11 for back pain, lab abnormalities and tachypnea.  Pt open to TCU placement and has support from siblings. Pt hoping to get stronger and return home.     LUIS Souza, SW  Float   8/23/2018

## 2018-08-23 NOTE — PROVIDER NOTIFICATION
Notified Resident at 1730 PM regarding low urine output and change in VS. BP 90s/50s. HR 90s.     Spoke with: Marisela Alejandro    Orders were not obtained.    Comments: Pt UO adequate at 21ml/hr according to weight. +3 pitting edema. No extra fluids at this time.

## 2018-08-23 NOTE — PLAN OF CARE
Problem: Patient Care Overview  Goal: Plan of Care/Patient Progress Review  Discharge Planner SLP   Patient plan for discharge: Did not discuss, defer to other disciplines and medical team  Current status: Tolerating current diet and appears appropriate for diet advancement. Pt is able to chew and swallow all solid foods and liquids without related s/sx of aspiration. She continues to be able to eat only very small amounts prior to feeling full. Continue to suspect possible esophageal/GI component to patient's difficulty given her occasional belching and frequent feeling of fullness. She does appear to be managing this with her swallowing, but very limited oral intake.     Recommend advancing diet to regular solids and thin liquids. Pt to sit fully upright , take small bites/sips, alternate solids and liquids, stay sitting up following oral intake for 20 minutes.     Barriers to return to prior living situation: Defer to medical team, ST not impacting disposition   Recommendations for discharge: Anticipate pt may likely meet ST goals prior to d/c  Rationale for recommendations: Regular diet recommended from an oropharyngeal standpoint, regardless of limited oral intake        Entered by: Maureen Li 08/23/2018 11:24 AM

## 2018-08-23 NOTE — PROGRESS NOTES
Social Work Services Progress Note    Hospital Day: 4  Date of Initial Social Work Evaluation:8/23/18  Collaborated with:  Pt, pt's brotherSanjiv     Data:  Pt is a 58 year old female who metastatic clear cell carcinoma who presented with generalized weakness off and on since receiving C2 carbo/taxol on 8/9/18. Pt recently admitted from 8/3 to 8/11 for back pain, lab abnormalities and tachypnea.  Pt open to TCU placement and has support from siblings. Pt hoping to get stronger and return home.     Intervention:  SW sent referrals to the following locations:     1) [facility] (first choice, within walking distance from brother)- [phone]  2) [facility] [phone]-denied due to insurance plan   3) [facility]- [phone]      Update: SW does not have SNF benefits with current insurance. STEPHANIE consulted with financial counseling who states pt will need to call [facility] ([phone]) and state she has a life change event to see if she can be reconsidered for MA.  Pt is no longer working due to health and this would be considered her life change event. SW will follow up with pt tomorrow and give her this information with  present.          Assessment:  SW will continue to follow for discharge planning  Plan:    Anticipated Disposition: TCU to be determined      Barriers to d/c plan: bed availability, medical stability    Follow Up:  STEPHANIE will continue to follow and assist.         LUIS Souza, CHIKI  [facility]   Pager:   8/23/2018  '

## 2018-08-23 NOTE — PROGRESS NOTES
Gynecology Oncology Progress Note    24 hour events:   - swallow eval, dysphagia 2 diet recommended  - seen by OT, recommended TCU    Subjective: Pt is feeling ok this morning. Has felt very tired since yesterday around noon. Not sure why. No abdominal pain. No chest pain. Is trying to drink Boosts. Voiding spontaneously.    Objective:   Vitals:    08/22/18 0731 08/22/18 1413 08/22/18 1857 08/22/18 2309   BP: 121/65 94/51 115/54 116/64   BP Location: Left arm Left arm Left arm Left arm   Pulse:       Resp: 16 16 16 16   Temp: 97.2  F (36.2  C) 96.5  F (35.8  C) 95.8  F (35.4  C) 97.2  F (36.2  C)   TempSrc: Oral Oral Oral Oral   SpO2: 94% 95% 96% 92%   Weight:           Intake/Output Summary (Last 24 hours) at 08/23/18 0555  Last data filed at 08/23/18 0506   Gross per 24 hour   Intake              825 ml   Output            397.5 ml   Net            427.5 ml     Since MN:  cc    General - NAD, laying comfortably in bed  CV - RRR, no murmurs  Resp - CTAB in posterior lung fields, no crackles, no wheezing, unlabored breathing  Abdomen - firm, mild distended, non-tender to palpation  Ext - warm and well perfused, 2+ pitting edema, non-tender    New Labs/Imaging-   hgb 7.6  Wbc 5.2    Assessment:  58 year old female with metastatic clear cell carcinoma HD#4 admitted for generalized weakness and elevated troponin likely due to demand ischemia and recent chemotherapy on 8/9/18 leading to anemia.  Continued failure to thrive, pt declining feeding tube.    Plan:  Dz: metastatic clear cell carcinoma. S/p C2D1 carbo/taxol 8/9/18 neoadjuvant chemo. Plan for family conference today to discuss care goals, delay in chemo until improved nutritional status and to consider option of NG feeds.  FEN: full liquid diet, hypokalemia s/p repletion, strict I/O  Pain: Tylenol, oxycodone PRN  Heme: pancytopenia d/t chemotherapy, s/p 1 unit pRBC, hgb now stable, leukopenia d/t chemo but improving  CV: elevated trops HD#1-2 thought to  be demand ischemia, EKG x2 stable, TTE largely normal  Resp: known pulmonary emboli, on therapeutic lovenox, CXR (8/20) with pleural effusions and increased pulmonary interstitial opacities, aspiration on HD#2 with negative laryngoscopy, swallow study recommending dysphagia 2 diet  GI: bowel regimen PRN  : NI, voiding  ID: NI  Endocrine: NI  Psych/Neuro: NI  PPX: SCDs, IS, therapeutic lovenox ord'd  Dispo:  D/c to TCU with clinical improvement  Consults: nutrition, cards, PT, s/p ENT, s/p pulm, SLP    Amy Schumer, MD  Obstetrics and Gynecology, PGY-2  08/23/18 5:58 AM      Provider Disclosure:   I agree with above History, Review of Systems, Physical exam and Plan. I have reviewed the content of the documentation and have edited it as needed. I have seen and personally performed the services documented here and the documentation accurately represents those services and the decisions I have made.     Electronically signed by:   Geo Fernandez MD   Gynecologic Oncology   [facility]

## 2018-08-23 NOTE — PROGRESS NOTES
Calorie Counts  Intake recorded for: 8/22  Kcals: 1096  Protein: 38g  # Meals Recorded: 2 meals   # Supplements Recorded: 100% 2 Boost Plus

## 2018-08-23 NOTE — PLAN OF CARE
Problem: Patient Care Overview  Goal: Plan of Care/Patient Progress Review  Outcome: No Change  A&Ox4. Tachycardic, VSS. Pt denies pain and nausea. Up with assist of 1. Abdomen distended and taut. +bowel sounds and passing gas. Voiding spontaneously with low urine output, MD aware, received order for IV lasix. Port heparin locked. Low PO intake of Dysphagia Diet 2, consumed two boost shakes. Calorie counts. PLAN: Continue to monitor pain, encourage PO intake, monitor I&O, and encourage strengthening with PT. Pt to receive one unit of blood with lasix given before and after blood administration.

## 2018-08-23 NOTE — PLAN OF CARE
Problem: Patient Care Overview  Goal: Plan of Care/Patient Progress Review  PT / 7C - CXL -  not present at scheduled set time. Rescheduled per POC.

## 2018-08-23 NOTE — PLAN OF CARE
Problem: Patient Care Overview  Goal: Plan of Care/Patient Progress Review  Outcome: No Change  Assumed care of pt from [id number]. VSS. A&Ox4. Apical pulse regular, tachycardic at times. Lungs diminished. Abdomen distended. Bowel sounds hypoactive in all quadrants. Minimal PO intake this shift. Urine output low overnight, MD aware. Port is Heparin locked. Pain controlled with Oxycodone and repositioning. Continue with POC. Pt would not like bedside report if awake.

## 2018-08-23 NOTE — PLAN OF CARE
"Problem: Patient Care Overview  Goal: Plan of Care/Patient Progress Review  Outcome: No Change  Pt A/O x4, VSS. BP 90s/50s, came up @ 2000. Resident aware. See other note. Pain managed w/ oxy. Given x1. Abd firm, distended. Lungs diminished in bases. Encouraging cough. +3 pitting edema in BLEs. Small frequent voids, 21ml/hr is adequate UO @ pts current weight. High PVR was likely d/t ascites in abdomen. Straight cath'd for 30ml. Up to commode SBA. BM yesterday, denies n/v. Dysphagia 2 diet, allowed to drink normal liquids. On eligio counts. Drank boost, poor appetite. Pt refused weight. Stated \"very tired today.\" Port hep locked. Care conference w/ Dr. Fernandez tomorrow between 12-2. Laotian speaking. DNR/DNI. Cont POC.       "

## 2018-08-23 NOTE — PROGRESS NOTES
RN came to discuss concern regarding low urine output for patient.  She has had approximately 350cc since 7am making her urine output 0.56 ml/kg/hr which is appropriate. Went to see patient who is resting in bed currently.  She reports she felt well this morning but around noon started to feel very tired. She is doing well with the Boost shakes. Safe to continue to monitor. May encourage sips.    Amy Schumer, MD  Obstetrics and Gynecology, PGY-2  Pager: [phone]

## 2018-08-23 NOTE — PLAN OF CARE
Problem: Patient Care Overview  Goal: Plan of Care/Patient Progress Review  Discharge Planner OT   Patient plan for discharge: Open to TCU  Current status: Tolerated 15 min sitting EOB to complete g/h tasks SBA with set up. CGA STS to FWW, functional in room ambulation ~10 feet CGA with FWW.  Pt with very slow francisco j this date and reporting increased fatigue with activity. tolerated 10 reps of 2 different BUE exercises, reporting increased fatigue and denying further activity.  Barriers to return to prior living situation: Deconditioning, decreased IND in ADLs, medical status  Recommendations for discharge: TCU  Rationale for recommendations: Pt currently below baseline and would benefit from continued therapy to increase activity tolerance, strength and endurance for IND in ADL completion.       Entered by: Natasha Mendez 08/23/2018 12:56 PM

## 2018-08-24 NOTE — PROGRESS NOTES
Social Work Services Progress Note    Hospital Day: 5  Date of Initial Social Work Evaluation:8/23/18  Collaborated with:  PtSanjiv, RNCC (Flor)     Data:  Pt is a 58 year old female who metastatic clear cell carcinoma who presented with generalized weakness off and on since receiving C2 carbo/taxol on 8/9/18. Pt recently admitted from 8/3 to 8/11 for back pain, lab abnormalities and tachypnea.  Pt open to TCU placement and has support from siblings. Pt hoping to get stronger and return home.      Intervention:  SW met with pt with  present.  SW explained that pt will need to contact [facility]  ([phone]) and state she has a life change event to see if she can be reconsidered for MA.  Pt is no longer working due to health and this would be considered her life change event. Pt expressed understanding of what SW was explaining to her but will follow up with pt this afternoon.        Assessment:  SW will continue to follow for discharge planning     Plan:    Anticipated Disposition: TCU to be determined      Barriers to d/c plan: bed availability, medical stability    Follow Up:  SW will continue to follow and assist.            LUIS Souza, CHIKI [place]   8/24/2018

## 2018-08-24 NOTE — PLAN OF CARE
Problem: Patient Care Overview  Goal: Plan of Care/Patient Progress Review  Outcome: No Change  Pt A/O x4. RR @ 15:00 38. HR tachy low 100s. Team pg'd came to assess pt. Ordered Lasix. After US of ascites, ordered 2nd lasix and neb. Mild improvement w/ breathing. RR now 28. HR 100s. OVSS. Sating 97% on rm air. Triggered sepsis, lactic 1.9. Abd pain managed with oxy prn. Given 2x. Abd firm, taut. CXR done. Plan for para tomorrow. Blood not given d/t SOB, fluid up. +3/+4 pitting edema BLE. Up to commode SBA, voiding adequately after Lasix given. Small formed/loose BM. Dysphagia 2 diet, 1 boost drank. On eligio counts. Wt down 5lbs since admission. Plan: TCU w/ improved strength and nutrition. Cont to encourage oral intake, cough. Cont POC. DNR/DNI. Laotian-speaking.

## 2018-08-24 NOTE — PROGRESS NOTES
Patient Name: Maria Esther Wang  Medical Record Number: [id number]  Today's Date: 8/24/2018    Procedure: Paracentesis.  Proceduralist: Guy Farley Pa-C    No sedation per providers.    Patient in room: 1210  Procedure start time: 1240  Puncture time: 1242  Procedure end time: 1300  Patient out of room:    Paracentesis fluid removed:  1900cc    Report given to:  RN  : Corinna Chacko here from [place]    Other Notes: Pt arrived to IR room 2 from  with .  Providers here to consent patient.  Pt denies any questions or concerns regarding procedure. Pt positioned supine and monitored per protocol. Pt tolerated procedure without any noted complications. Pt transferred back to .

## 2018-08-24 NOTE — PLAN OF CARE
Problem: Patient Care Overview  Goal: Plan of Care/Patient Progress Review  Outcome: No Change  Sanjiv speaking patient, hgb this am 6.5, 1 unit of PRBC given with no s/s of reaction. VS taken per unit protocol. Port heparinised after the blood transfusion. Went to IR a little after 1100H in bed for paracentesis. Came back to the unit at around 1330H. No appetite, no BM this shift. Lasix 10 mg given IV with 600 ml of urine. Bilateral feet edematous. PLAN: To continue with the care plan. Waiting of placement.

## 2018-08-24 NOTE — PLAN OF CARE
Problem: Patient Care Overview  Goal: Plan of Care/Patient Progress Review  Outcome: No Change  Cared for pt [id number]. Tachypenic otherwise, VSS. Slept well throughout the night.  Denies pain. Up SBA, pivot to commode. Up x 1 to commode. Urinated 250 mL, averaging 25mL/hr. 9299 informed. NPO at midnight d/t scheduled paracentesis today. Dysphagia diet level 2. Plan for TCU. Laotian speaking. Continue POC.    Pt would LIKE to receive bedside shift report.

## 2018-08-24 NOTE — PLAN OF CARE
Problem: Patient Care Overview  Goal: Plan of Care/Patient Progress Review  PT / 7C - CXL - Pt declining PT session this afternoon 2/2 significant fatigue and wanting to rest. Rescheduled per POC.

## 2018-08-24 NOTE — PROGRESS NOTES
Gynecology Oncology Progress Note    24 hour events:   - Family meeting to discuss nutrition status, plan to attempt to increase PO intake   - Triggered sepsis protocol, lactate 1.9  - CXR due to borderline lactate showed bibasilar opacities either infection vs aspiration vs pulmonary edema  - SOB today resolved with IV lasix x2    Subjective: Pt is feeling ok this morning. Reports periods of feeling unwell and then feeling ok. No chest pain, shortness of breath. Abdominal pain is not too bad.  Having bowel movements and voiding.    Objective:   Vitals:    08/23/18 1636 08/23/18 1717 08/23/18 1940 08/23/18 2233   BP: 113/66 129/74 108/58 97/52   BP Location: Left arm Left arm     Pulse: 111 112     Resp: (!) 38 (!) 36 28 24   Temp: 97  F (36.1  C) 97.2  F (36.2  C) 97.6  F (36.4  C) 98.3  F (36.8  C)   TempSrc: Axillary Axillary Oral Oral   SpO2: 99% 98% 98% 93%   Weight:           Intake/Output Summary (Last 24 hours) at 08/24/18 0614  Last data filed at 08/24/18 0550   Gross per 24 hour   Intake             1000 ml   Output             1000 ml   Net                0 ml   Since MN: UOP 250cc    General - NAD, laying comfortably in bed  CV - RRR, no murmurs  Resp - CTAB in posterior lung fields, no crackles, no wheezing, unlabored breathing  Abdomen - firm, mildly distended, non-tender to palpation  Ext - warm and well perfused, 3+ pitting edema, non-tender    New Labs/Imaging-   CXR (8/23)  Impression:  1. Unchanged moderate bilateral pleural effusion.  2. No new airspace opacity, compared to x-ray dated 8/21/2018 .  3. Persistent bibasilar opacities representing atelectasis mixed with  infection, aspiration and/or pulmonary edema.    Hgb 6.5  WBC pending    Assessment:  58 year old female with metastatic clear cell carcinoma HD#4 admitted for generalized weakness and elevated troponin likely due to demand ischemia and recent chemotherapy on 8/9/18 leading to anemia.  Continued failure to thrive, pt declining tube  feeds.  Weight down 2.4kg since admission.    Plan:  Dz: metastatic clear cell carcinoma. S/p C2D1 carbo/taxol 8/9/18 neoadjuvant chemo. Plan for family conference today to discuss care goals, delay in chemo until improved nutritional status and to consider option of NG feeds.  FEN: dysphagia II diet, hypokalemia s/p repletion, strict I/O  Pain: Tylenol, oxycodone PRN  Heme: pancytopenia d/t chemotherapy, s/p 1 unit pRBC, hgb decreasing gradually, transfuse 1 unit pRBCs with lasix, leukopenia d/t chemo but improving  CV: elevated trops HD#1-2 thought to be demand ischemia, EKG x2 stable, TTE largely normal  Resp: known pulmonary emboli, on therapeutic lovenox, CXR (8/20) with pleural effusions and increased pulmonary interstitial opacities, aspiration on HD#2 with negative laryngoscopy, repeat CXR (8/23) with bibasilar opacities infection vs aspiration vs pulmonary edema  GI: bowel regimen PRN, worsening abdominal pain, plan for paracentesis today by IR  : NI, voiding  ID: lactate 1.9, CXR with bibasilar opacities however no fevers or cough  Endocrine: NI  Psych/Neuro: NI  PPX: SCDs, IS, therapeutic lovenox to be held this am for procedure and resumed in pm  Dispo:  D/c to TCU with clinical improvement    Amy Schumer, MD  Obstetrics and Gynecology, PGY-2  08/24/18      Provider Disclosure:   I agree with above History, Review of Systems, Physical exam and Plan. I have reviewed the content of the documentation and have edited it as needed. I have seen and personally performed the services documented here and the documentation accurately represents those services and the decisions I have made.     Electronically signed by:   Geo Fernandez MD   Gynecologic Oncology   [facility]

## 2018-08-24 NOTE — PLAN OF CARE
Problem: Patient Care Overview  Goal: Plan of Care/Patient Progress Review  OT 7C: Cancel.  Pt reporting increased abdominal pain, discomfort and fatigue. Pleasantly requested therapy at a later time.  Will reschedule per POC as pt requires an  and will be unable to check back later today.

## 2018-08-24 NOTE — PROCEDURES
Interventional Radiology Brief Post Procedure Note    Procedure: IR PARACENTESIS    Proceduralist: Mi Shen PA-C    Assistant: None    Time Out: Prior to the start of the procedure and with procedural staff participation, I verbally confirmed the patient s identity using two indicators, relevant allergies, that the procedure was appropriate and matched the consent or emergent situation, and that the correct equipment/implants were available. Immediately prior to starting the procedure I conducted the Time Out with the procedural staff and re-confirmed the patient s name, procedure, and site/side. (The Joint Commission universal protocol was followed.)  Yes    Medications   Medication Event Details Admin User Admin Time   lidocaine (PF) (XYLOCAINE) 1 % injection 1-30 mL Medication Given by Other Dose: 6 mL; Route: Subcutaneous; Comment: given by ANNETTE Gerardo Sean, RN 8/24/2018 12:42 PM       Sedation: None. Local Anesthestic used    Findings: Ultrasound guided therapeutic RLQ paracentesis performed. Hazy serosanguinous fluid returned.    Estimated Blood Loss: Minimal    Fluoroscopy Time:  None    SPECIMENS: None    Complications: 1. None     Condition: Stable    Plan: Follow up per primary team.    Comments: See dictated procedure note for full details.    Teo Farley PA-C

## 2018-08-24 NOTE — PLAN OF CARE
Problem: Patient Care Overview  Goal: Plan of Care/Patient Progress Review  SLP: Dysphagia treatment session cancelled. Pt stated she was too tired from her procedure and she can swallow fine. Will continue to follow per POC.

## 2018-08-24 NOTE — PROVIDER NOTIFICATION
Notified Resident at 0620 AM regarding lab results.      [id number]    Orders were obtained.    Comments: Hgb 6.5  1 unit RBC's to be given.

## 2018-08-24 NOTE — PROGRESS NOTES
Calorie Count  Intake recorded for: 8/23                   Kcals: 1102                    Protein: 42  # Meals Recorded: <25% pancake   # Supplements Recorded: 300% Boost Plus

## 2018-08-25 NOTE — PLAN OF CARE
Problem: Anemia (Adult)  Goal: Identify Related Risk Factors and Signs and Symptoms  Related risk factors and signs and symptoms are identified upon initiation of Human Response Clinical Practice Guideline (CPG).   Outcome: No Change  Tachypnic, Tachycardic, OVSS. Pt up with pivot assist to bedside commode. Has not urinated. Hgb 6.9. 1 unit PRBC will be infused with IV lasix to follow. NPO. Protonix drip infusing through port. Denies pain. Cont. POC.

## 2018-08-25 NOTE — PLAN OF CARE
"Problem: Patient Care Overview  Goal: Plan of Care/Patient Progress Review  Outcome: Improving  BP soft, HR tachycardic, & RR tachypnic, basically baseline, MD aware.  Pt reports abdominal pain, controlled with Oxy x2 & rest. Port hep locked. +1-+3 BLE edema. Pt reports ROSARIO, lung sounds clear bilaterally. DD2 with thins, pt able to intake x2 boost for dinner. x3 large, loose tarry black stools, occult stool +, MD came to discuss with pt. Pt denies dizziness, but feels \"very tired\". Drsg from paracentesis site C/D/I. Pt up assist of 1, voiding adequately post Lasix. Hgb recheck 7.7 post transfusion.       "

## 2018-08-25 NOTE — PROVIDER NOTIFICATION
Provide notified (7510) @ 9321 d/t no urine output in last 11 hours. Writer will continue to monitor for urine output.     0621- Voided 285 mL

## 2018-08-25 NOTE — PLAN OF CARE
"Problem: Patient Care Overview  Goal: Plan of Care/Patient Progress Review  Outcome: No Change  HD #5 \"admitted for generalized weakness and elevated troponin likely due to demand ischemia and recent chemotherapy on 8/9/18 leading to anemia\" per MD note. Hypotensive and tachycardic (baseline during stay), otherwise VSS. Pain is being managed with Oxycodone 5mg q4h. 13 hours inbetweeen voids. 285 mL total/13 hours=  21.9. Gyn/Onc aware. Paracentesis completed yesterday. 1900 mL removed. Site covered, c/d/i. Suffering from new GI bleed. Discovered yesterday with + occult stool test. Has not had any black tarry stools since 2200. 80 mg one time dose of Protonix given, Lovenox then administered at 0400 d/t clotting history.  Plan for GI consult to evaluate bleed. ROSARIO. Up with 1 to pivot to commode. Bilateral ischial crests w/ erythema. Ordering sacral mepilex to apply. Please encourage patient to turn and redistribute weight frequently. Continue POC.    *Laotian speaking    Pt would LIKE to receive bedside shift report.       "

## 2018-08-25 NOTE — PROGRESS NOTES
Brief Progress Note    RN notified me of 2 occassions of large volume black tarry stool earlier this evening, the second measured at 800cc.  Vitals stable, BP 100s/50s, HR 100s.  Patient Vitals for the past 12 hrs:   BP Temp Temp src Pulse Heart Rate Resp SpO2   08/24/18 2239 95/46 - - - 108 - 99 %   08/24/18 1700 109/63 96.2  F (35.7  C) Axillary 110 - 28 96 %     Evening lovenox dose held. Hgb and occult stool sent. Hgb 7.7, appropriate rise from 6.5 after 1u pRBC today. Occult stool positive.  Pt states she is feeling well. Does feel fatigued with walking around but not feeling dizzy when up, not feeling palpitations. No abdominal pain or nausea.    Gen: comfortable, lying in bed  Cardiac: RRR  Pulm: CTAB in posterior lung fields  Abd: diffusely firm with tumor throughout abdomen  Ext: 2+ pitting edema b/l to knees    Stable vitals, pt asymptomatic and seems to be a small GI bleed. Pt is at high risk of clotting with increase in PE clot burden last time lovenox was held (last admission), so I am weary to continue holding lovenox but think with this new finding until we have more information it is in her best interest.  - 80mg IV protonix now, start 40mg BID  - hold lovenox dose tonight  - NPO  - will likely consult GI in the AM  - vitals q2hrs overnight    Discussed with GynOnc fellow, Dr Price.    María Simpson MD  GynOnc Pager [phone]  08/24/18 10:50PM    Addendum:    Will give lovenox after PPI bolus given. D/w Fellow, Dr Albert Simpson MD PGY3  08/24/18 11:58 PM

## 2018-08-25 NOTE — PROGRESS NOTES
Gynecology Oncology Progress Note    24 hour events:   - Lovenox held due to GI bleed after curbside consultation with hematology  - GI consultation, recommended Protonix gtt, no EGD at this time  - Discussion with patient regarding goals of care in regards to GI bleed. Would like continued blood transfusions as indicated, would undergo EGD if offered in the case of worsening bleeding  - PM Hgb 6.9, received 1u pRBC's  - Low UOP, improved after 250 mL bolus and unit of pRBC's. 10 mg IV Lasix given after transfusion to prevent fluid overload    Subjective: No further melenic stools in >24 hours. Reports abdominal pain is her usual. Reports fatigue, did not get out of bed much yesterday. No c/p, shortness of breath, palpitations, dizziness. Voiding spontaneously. States she is feeling very thirsty and is wondering when she can drink water.     Objective:   Vitals:    08/25/18 2010 08/25/18 2100 08/25/18 2200 08/26/18 0253   BP: 170/60 100/50 108/53 110/47   BP Location: Left arm Left arm Left arm Left arm   Pulse:       Resp: 25 22 22 28   Temp: 95.8  F (35.4  C) 96.7  F (35.9  C) 97  F (36.1  C) 96.8  F (36  C)   TempSrc: Axillary Axillary Axillary Oral   SpO2: 94% 93% 95% 93%   Weight:         I/O last 3 completed shifts:  In: 468.58 [P.O.:120; I.V.:50]  Out: 1285 [Urine:1285]   Since MN: UOP 400cc    General - NAD, laying comfortably in bed  CV - RRR, no murmurs  Resp - Crackles in posterior lung fields, mildly tachypneic at rest.  Abdomen - firm, mildly distended, non-tender to palpation  Ext - warm and well perfused, 1+ pitting edema b/l, non-tender    New Labs/Imaging-   Hgb 6.9 yesterday PM (s/p 1 unit(s) pRBC's)  AM CBC/BMP pending    Assessment:  58 year old female with metastatic clear cell carcinoma HD#6 admitted for generalized weakness and elevated troponin likely due to demand ischemia. Continued failure to thrive.    Plan:  Dz: metastatic clear cell carcinoma. S/p C2D1 neoadjuvant carbo/taxol 8/9/18.  Plan to delay chemo until improved nutritional status.  FEN: NPO given new GI bleed, consider progressing to clears today if no further bleeding, will discuss with GI. Hypokalemia s/p repletion. Tendency to fluid overload, IV lasix 10mg daily this admission, will monitor today. If remains NPO, will start maintenance IVF today.  Pain: Tylenol, oxycodone PRN  Heme: Anemia d/t chemotherapy, now with GI bleed s/p 3unit pRBC, last 8/25 with post transfusion Hgb pending.   #Pulmonary emboli, previously on therapeutic lovenox (held per hematology in the context of new GI bleed). Has IVC filter in place.   CV: Elevated trops HD#1-2 due to demand ischemia, EKG x2 stable, TTE largely normal  Resp: CXR (8/20) with pleural effusions and increased pulmonary interstitial opacities, aspiration on HD#2 with negative laryngoscopy, repeat CXR (8/23) with bibasilar opacities.  GI: Bowel regimen PRN. Ascites d/t carcinomatosis, s/p paracentesis 8/24, 1.9L removed. Large tarry BM 8/24 PM, occult stool positive. GI consulted, D#2/3 protonix gtt. EGD not recommended due to patient status. No further melena.  : NI, voiding  ID: lactate 1.7 on 8/24, 1.3 8/25. CXR with bibasilar opacities however no fevers or cough  Endocrine: NI  Psych/Neuro: NI  PPX: SCDs, IS, therapeutic lovenox held  Dispo:  D/c to TCU when placement found and GI bleed resolved    Karen Pastor MD  [facility] [phone]  08/25/18 7:47 AM

## 2018-08-25 NOTE — PLAN OF CARE
Problem: Patient Care Overview  Goal: Plan of Care/Patient Progress Review  Outcome: No Change  Lethargic, in bed all shift. No verbalization of pain. VS monitored every 2 hours, per gyn-onc resident, okay to get VS every 4 hours since pt is hemodynamically stable. Sanjiv speaking,  at the bedside this am during the team rounds. Protonix drip started, K+ replacement for 2.9 this am. Ordered K+ recheck at 1830H. No BM this shift no void. Notified gyn-onc on call. Ordered for small bolus to give after the last bag of K+ replacement. Last hgb 7.1, recheck this afternoon, likely to have blood transfusion. Lovenox held this shift. Family updated this afternoon as well re: pt current status. PLAN: To continue with the care plan. Continue monitor for any signs of active bleeding.

## 2018-08-25 NOTE — CONSULTS
GASTROENTEROLOGY CONSULTATION      Date of Admission:  8/20/2018          ASSESSMENT AND RECOMMENDATIONS:   Assessment:  Maria Esther Wang is a 58 year old female with a medical history significant for metastatic clear cell carcinoma, with uterine invasion peritoneal carcinomatosis,  large volume ascites and bilateral PEs on therapeutic lovenox (with evidence of progression with prior interruption of anticoagulation) seen by the GI team for melena.     The primary team is concerned for blood loss. The patient is quite ill, with severe malignant disease and cachexia.   At this point, she is a very poor candidate for any procedures. Did discuss with the team, that palliative care should be involved as prognosis is quite grim.     Recommendations  -- Discontinue Lovenox   Discuss utility/pros and cons with Heme/onc  -- Continue PPI  -- Hold off on any endoscopic procedure  -- Monitor hemodynamic status  -- palliative care consultation       Thank you for involving us in this patient's care. Please do not hesitate to contact the GI service with any questions or concerns.     Pt care plan discussed with Dr. Min, GI staff physician.    Zana Patel MD  Gastroenterology fellow  PGY 4  -------------------------------------------------------------------------------------------------------------------          Chief Complaint:   We were asked by Dr. Pastor of Gyn/Onc to evaluate this patient with melena    History is obtained from the patient and the medical record.          History of Present Illness:   Maria Esther Wang is a 58 year old female with a medical history significant for metastatic clear cell carcinoma, and bilateral PEs on therapeutic lovenox (with evidence of progression with prior interruption of anticoagulation) seen by the GI team for melena.     She was admitted for fatigue, and GI is consulted given 3 episodes of melena in the last 1-2 days.  She has not had any nausea or  vomiting, no hematochezia, no hematemesis.  No fevers or chills.  Complains that she has been tired a lot since her last chemotherapy and continues to feel weak.  She has decreased appetite, and has continued to lose weight going from about 160 pounds previously to 95 pounds.  She is unable to eat large amounts of food and only eats small meals.  He has continued to deny any diarrhea prior to this happening.  She denies any pain with urination, denies any chest pain or shortness of breath.  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  She has since been on Lovenox, and was readmitted recently for fatigue and generalized weakness.  At that time she had a paracentesis with temporary interruption of her Lovenox.  However she did have progression of her bilateral PEs.  She is now status post IVC filter for this.                Past Medical History:   Reviewed and edited as appropriate  Past Medical History:   Diagnosis Date     Anemia      NO ACTIVE PROBLEMS      Ovarian cancer (H)      Pulmonary embolism (H)             Past Surgical History:   Reviewed and edited as appropriate   Past Surgical History:   Procedure Laterality Date     INSERT PORT VASCULAR ACCESS N/A 7/9/2018    Procedure: INSERT PORT VASCULAR ACCESS;  Single Lumen Chest Power Port Placement, Leave Access for Chemotherapy;  Surgeon: Alexandro Sharif PA-C;  Location: [facility] OR     OTHER SURGICAL HISTORY      no surgical history            Previous Endoscopy:   No results found for this or any previous visit.         Social History:   Reviewed and edited as appropriate  Social History     Social History     Marital status: Single     Spouse name: N/A     Number of children: N/A     Years of education: N/A     Occupational History     Not on file.     Social History Main Topics     Smoking status: Never Smoker      Smokeless tobacco: Never Used     Alcohol use No     Drug use: No     Sexual activity: No     Other Topics Concern     Not on file     Social History Narrative            Family History:   Reviewed and edited as appropriate  No known history of gastrointestinal/liver disease or  gastrointestinal malignancies       Allergies:   Reviewed and edited as appropriate   No Known Allergies         Medications:     Current Facility-Administered Medications   Medication     0.9% sodium chloride BOLUS     acetaminophen (TYLENOL) tablet 650 mg     albumin human 25 % injection 12.5-50 g     benzocaine-menthol (CEPACOL) 15-3.6 MG lozenge 1 lozenge     ferrous sulfate (IRON) tablet 325 mg     heparin 100 UNIT/ML injection 5 mL     heparin lock flush 10 UNIT/ML injection 5-10 mL     heparin lock flush 10 UNIT/ML injection 5-10 mL     ipratropium - albuterol 0.5 mg/2.5 mg/3 mL (DUONEB) neb solution 3 mL     lidocaine (LMX4) cream     lidocaine 1 % 1 mL     melatonin tablet 1 mg     mirtazapine (REMERON) tablet 15 mg     multivitamin, therapeutic with minerals (THERA-VIT-M) tablet 1 tablet     naloxone (NARCAN) injection 0.1-0.4 mg     oxyCODONE IR (ROXICODONE) tablet 5 mg     pantoprazole (PROTONIX) 0.8 mg/mL in sodium chloride 0.9 % 50 mL infusion     Patient is already receiving anticoagulation with heparin, enoxaparin (LOVENOX), warfarin (COUMADIN)  or other anticoagulant medication     polyethylene glycol (MIRALAX/GLYCOLAX) Packet 17 g     potassium chloride (KLOR-CON) Packet 20-40 mEq     potassium chloride 10 mEq in 100 mL sterile water intermittent infusion (premix)     potassium chloride 20 mEq in 50 mL intermittent infusion     potassium chloride SA (K-DUR/KLOR-CON M) CR tablet 20-40 mEq     prochlorperazine (COMPAZINE) injection 10 mg    Or     prochlorperazine (COMPAZINE) tablet 10 mg    Or     prochlorperazine (COMPAZINE) Suppository 25 mg     senna-docusate (SENOKOT-S;PERICOLACE) 8.6-50 MG per tablet 1-2 tablet      sodium chloride (PF) 0.9% PF flush 10-20 mL     sodium chloride (PF) 0.9% PF flush 3 mL     sodium chloride (PF) 0.9% PF flush 3 mL             Review of Systems:   A complete review of systems was performed and is negative except as noted in the HPI           Physical Exam:   BP 98/52 (BP Location: Right arm)  Pulse 102  Temp 97.6  F (36.4  C) (Oral)  Resp 26  Wt 41.1 kg (90 lb 9.6 oz)  SpO2 92%  BMI 16.57 kg/m2  Wt:   Wt Readings from Last 2 Encounters:   08/23/18 41.1 kg (90 lb 9.6 oz)   08/17/18 43.5 kg (95 lb 12.8 oz)      Constitutional: cooperative, cachectic looking, severe malnutrition and weight loss  Eyes: Sclera anicteric/injected  Ears/nose/mouth/throat: Normal oropharynx    Neck: supple, thyroid normal size  CV: No edema  Respiratory: Unlabored breathing  Lymph: No axillary, submandibular, supraclavicular or inguinal lymphadenopathy  Abd: Firm, mass in abdomen, BS +   Skin: warm, perfused, no jaundice  Neuro: AAO x 3, No asterixis  Psych: Normal affect  MSK: Normal gait         Data:   Labs and imaging below were independently reviewed and interpreted    BMP  Recent Labs  Lab 08/25/18  0526 08/22/18  0415 08/21/18  1004 08/21/18  0450 08/20/18  1625    135  --  135 136   POTASSIUM 2.9* 3.6 4.0 3.4 3.1*   CHLORIDE 102 101  --  100 102   IRON 7.4* 7.5*  --  7.4* 6.7*   CO2 27 26  --  26 26   BUN 24 12  --  12 12   CR 0.43* 0.38*  --  0.39* 0.39*   GLC 82 73  --  74 78     CBC  Recent Labs  Lab 08/25/18  0526  08/24/18  0553 08/23/18  0509 08/22/18  0415   WBC 8.6  --  6.8 5.2 3.4*   RBC 2.63*  --  2.41* 2.88* 2.93*   HGB 7.1*  < > 6.5* 7.6* 7.6*   HCT 22.6*  --  20.6* 24.2* 24.7*   MCV 86  --  86 84 84   MCH 27.0  --  27.0 26.4* 25.9*   MCHC 31.4*  --  31.6 31.4* 30.8*   RDW 18.2*  --  19.4* 19.2* 19.0*     --  213 216 215   < > = values in this interval not displayed.  INR  Recent Labs  Lab 08/20/18  1625   INR 1.19*     LFTs  Recent Labs  Lab 08/20/18  1625   ALKPHOS 85   AST 14   ALT  23   BILITOTAL 0.4   PROTTOTAL 5.3*   ALBUMIN 1.3*      PANCNo lab results found in last 7 days.    Imaging:

## 2018-08-25 NOTE — PROGRESS NOTES
Gynecology Oncology Progress Note    24 hour events:   - 1u pRBC  - IV lasix 10mg x1  - Triggered sepsis protocol, lactate 1.7  - Large tarry stool, occult stool positive, Hgb 7.7  - lovenox held until 4AM, IV PPI bolus given, NPO    Subjective: Feeling some abd pain this AM but not outside the usual. Otherwise feels ok. Some fatigue overall but denies CP or shortness of breath. Denies feeling dizzy when sitting up or standing and no palpitations.    Objective:   Vitals:    08/25/18 0147 08/25/18 0248 08/25/18 0342 08/25/18 0600   BP: 97/47  115/60 93/46   BP Location: Left arm  Left arm Left arm   Pulse:       Resp: 28  22 20   Temp:  98.3  F (36.8  C) 96.1  F (35.6  C) 96.2  F (35.7  C)   TempSrc:  Oral Oral Oral   SpO2: 92%  (!) 89% 93%   Weight:         I/O last 3 completed shifts:  In: 150 [P.O.:120; I.V.:30]  Out: 2235 [Urine:1535; Stool:700] Stool x4  Since MN: UOP 285cc    General - NAD, laying comfortably in bed  CV - RRR, no murmurs  Resp - Crackles in posterior lung fields, unlabored breathing when resting but does increase work of breathing when moving around in bed  Abdomen - firm, mildly distended, non-tender to palpation  Ext - warm and well perfused, 2+ pitting edema b/l, non-tender    New Labs/Imaging-   WBC 8.6  Hgb 7.1  K 2.9    Assessment:  58 year old female with metastatic clear cell carcinoma HD#5 admitted for generalized weakness and elevated troponin likely due to demand ischemia. Continued failure to thrive.    Plan:  Dz: metastatic clear cell carcinoma. S/p C2D1 neoadjuvant carbo/taxol 8/9/18. Plan to delay chemo until improved nutritional status.  FEN: NPO given new GI bleed, hypokalemia s/p repletion. Tendency to fluid overload, IV lasix 10mg daily this admission, will monitor today  Pain: Tylenol, oxycodone PRN  Heme: Anemia d/t chemotherapy, s/p 2unit pRBC, last yesterday with appropriate rise in Hgb though has downtrended overnight again  #Pulmonary emboli, on therapeutic lovenox (AM  dose held yesterday for paracentesis & PM dose delayed with new GI bleed)  CV: Elevated trops HD#1-2 due to demand ischemia, EKG x2 stable, TTE largely normal  Resp: CXR (8/20) with pleural effusions and increased pulmonary interstitial opacities, aspiration on HD#2 with negative laryngoscopy, repeat CXR (8/23) with bibasilar opacities.  GI: Bowel regimen PRN. Ascites d/t carcinomatosis, s/p paracentesis yesterday, 1.9L removed. Large tarry BM 8/24 PM, occult stool positive. IV PPI started. Will consult GI today regarding recs (possible protonix drip or EGD)  : NI, voiding  ID: lactate 1.7, CXR with bibasilar opacities however no fevers or cough  Endocrine: NI  Psych/Neuro: NI  PPX: SCDs, IS, therapeutic lovenox  Dispo:  D/c to TCU when placement found and w/u for GI bleed completed    María Simpson MD  [facility] [phone]  08/25/18 7:47 AM     Provider Disclosure:   I agree with above History, Review of Systems, Physical exam and Plan. I have reviewed the content of the documentation and have edited it as needed. I have personally performed the services documented here and the documentation accurately represents those services and the decisions I have made.     Serial Hgb, check Xa level, GI    Electronically signed by:   Bobbi Browne MD   Gynecologic Oncology   [facility]

## 2018-08-25 NOTE — PROGRESS NOTES
Calorie Counts:     Intake recorded for: 8/24 Kcals:  720 Protein: 28g    # meals recorded: 0    # supplements recorded: 100% 2 boost plus

## 2018-08-26 NOTE — PLAN OF CARE
Problem: Patient Care Overview  Goal: Plan of Care/Patient Progress Review  Outcome: No Change  DNR/DNI. Tachy, HR max 103. Other VSS. Dyspnea on exertion. LS diminished, clear. 1 unit RBCs given on days, finished on eves for Hgb 6.9, recheck Hgb this AM scheduled. Gave prn oxy for pain. NPO except meds. Up to bedside commode w/ assist of 1 twice overnight. Port infusing Protonix in NS. Mepilex on coccyx/sacrum in place. Continue to assess pain. Continue w/ POC.

## 2018-08-26 NOTE — PROGRESS NOTES
[facility], [place]    Sepsis Evaluation Progress Note    Date of Service: 08/25/2018    I went to evaluate Maria Esther Wang due to abnormal vital signs triggering the Sepsis SIRS screening alert. She is not known to have an infection.     Physical Exam    Vital Signs:  Temp: 95.8  F (35.4  C) Temp src: Axillary BP: 94/37  Heart Rate: 102 Resp: 25 SpO2: 94 % O2 Device: None (Room air)      Lab:  Lactic Acid   Date Value Ref Range Status   08/20/2018 1.1 0.7 - 2.0 mmol/L Final     Lactate for Sepsis Protocol   Date Value Ref Range Status   08/24/2018 1.7 0.7 - 2.0 mmol/L Final       The patient is at baseline mental status.    The rest of their physical exam is significant for:  CV: Borderline tachycardia, regular rhythm, no murmurs  Resp: Tachypneic, Diminished breath sounds at the bases, o/w CTAB.  Abd: Firm, non-tender, non-distended  Extremities: 2+ pitting edema b/l to knees    Assessment and Plan    The SIRS and exam findings are likely due to recent GI bleed, known bilateral PE's, and profound anemia,, there is no sign of sepsis at this time.    Disposition: The patient will remain on the current unit. We will continue to monitor this patient closely. Repeat lactate pending at this time. If elevated, will draw blood cultures.     Karen Pastor MD

## 2018-08-26 NOTE — PLAN OF CARE
Problem: Patient Care Overview  Goal: Plan of Care/Patient Progress Review  SLP: Tx session cancelled today. Per RN, pt to remain NPO d/t GI bleed. Will continue to follow per POC.

## 2018-08-26 NOTE — PLAN OF CARE
Problem: Anemia (Adult)  Goal: Identify Related Risk Factors and Signs and Symptoms  Related risk factors and signs and symptoms are identified upon initiation of Human Response Clinical Practice Guideline (CPG).   Outcome: No Change  Tachypnic/Tachycardic, OVSS. Pt up with assist of 1 to bedside commode. Denies pain. Tolerating DD2/thin liquid diet. MIV/protonix drip infusing through port. Pt was hypoglycemic to 42 with AM labs. IV D10 given with raise in BG to 104. Family at bedside, supportive with cares. Cont. POC.

## 2018-08-27 NOTE — PROGRESS NOTES
Gynecology Oncology Progress Note    24 hour events:   - no urine output during pm shift  - GI saw pt, agreed with stopping lovenox, continue PPI gtt overnight and transition to PO today  - advanced to dysphagia level 2 diet  - episode of hypoglycemia to 42, resolved with IV dextrose bolus, repeat blood glucose 104    Subjective: Pt feels ok this morning. No appetite yet this morning. Reports sensation of something feeling stuck in her throat when she has food. Denies this sensation when swallowing liquids. Reports if she drinks slowly that she doesn't get this sensation.  Otherwise no chest pain, shortness of breath, or abdominal pain. Reports voiding. No BM overnight. No rectal bleeding.    Objective:   Vitals:    08/26/18 2203 08/27/18 0143 08/27/18 0600 08/27/18 0601   BP: 105/54 100/53 118/60    BP Location: Left arm Left arm Left arm    Pulse:       Resp: 24 22 18    Temp: 95.3  F (35.2  C) 96  F (35.6  C) 96.4  F (35.8  C)    TempSrc: Oral Axillary Axillary    SpO2: 92% 94% (!) 87% 97%   Weight:         I/O last 3 completed shifts:  In: 676.67 [P.O.:410; I.V.:266.67]  Out: 1100 [Urine:1100]   Since MN: UOP 0 cc    General - NAD, laying comfortably in bed  CV - RRR, no murmurs  Resp - Clear to auscultation bilaterally, unlabored breathing  Abdomen - firm, mildly distended, non-tender to palpation  Ext - warm and well perfused, 2+ pitting edema b/l, non-tender    New Labs/Imaging-   Hgb 8.9> pending  BMP pending    Assessment:  58 year old female with metastatic clear cell carcinoma HD#7 admitted for generalized weakness and elevated troponin likely due to demand ischemia, complicated by GI bleed on HD#6. Continued failure to thrive.    Plan:  Dz: metastatic clear cell carcinoma. S/p C2D1 neoadjuvant carbo/taxol 8/9/18. Plan to delay chemo until improved nutritional status.  FEN: dysphagia level 2 diet, Hypokalemia, PRN repletion. Tendency to fluid overload, IV lasix 10mg almost daily this admission.  Pain:  Tylenol, oxycodone PRN  Heme: Anemia d/t chemotherapy, recent GI bleed s/p 3unit pRBC, last 8/25, am hgb pending.  #Pulmonary emboli, previously on therapeutic lovenox (held per hematology in the context of new GI bleed). Has IVC filter in place.   CV: Elevated trops HD#1-2 due to demand ischemia, EKG x2 stable, TTE largely normal  Resp: CXR (8/20) with pleural effusions and increased pulmonary interstitial opacities, aspiration on HD#2 with negative laryngoscopy, repeat CXR (8/23) with bibasilar opacities.  GI: Bowel regimen PRN. Ascites d/t carcinomatosis, s/p paracentesis 8/24, 1.9L removed. Large tarry BM 8/24 PM, occult stool positive. GI consulted, D#3/3 protonix gtt then transition to PO protonix. EGD not recommended due to patient status. No further melena  : NI, voiding  ID: lactate 1.7 on 8/24, 1.3 8/25. CXR with bibasilar opacities however no fevers or cough  Endocrine: NI  Psych/Neuro: NI  PPX: SCDs, IS, therapeutic lovenox held  Dispo:  D/c to TCU when placement found and GI bleed resolved    Amy Schumer, MD  [facility] [phone]    Patient seen and evaluated with resident. Discussed current clinical course, consulted with patient regarding plan as stated above in residents note. Agree with assessment and plan as set under my direction.    Gauri Saucedo M.D., MPH,  F.A.C.O.G.  Division of Gynecologic Oncololgy

## 2018-08-27 NOTE — PROGRESS NOTES
Brief GI Follow-up Note    No further BMs, no melena. Hgb stable.     Likely low utility for endoscopic procedures as discussed in prior notes. Would recommend remaining off anticoagulation to reduce risk of further bleeding. Continue PPI.    GI will sign off    Discussed with Dr. Pako Thibodeaux MD  GI Fellow  p [phone]

## 2018-08-27 NOTE — CONSULTS
Hematology -- New Consult          Maria Esther Wang MRN# [id number]   Age: 58 year old YOB: 1960   Date of Admission: 8/20/2018  DOS: 8/27/2018    Reason for consult: PE on lovenox, now with GI bleeding.     HPI / COURSE  Maria Esther Wang is a 58 year old yo female with PMH of metastatic ovarian clear cell carcinoma, with uterine invasion, peritoneal carcinomatosis,  large volume ascites and bilateral PEs on therapeutic lovenox (with evidence of progression with prior interruption of anticoagulation), who presents with GI bleeding, and anemia due to acute blood loss.     Patient was initially admitted due to weakness, developed large volume tarry stool on  8/24 during this hospitalization, with worsening anemia. GI was consulted, and recommend holding lovenox. Given the overall poor condition and poor prognosis in the setting of metastatic ovarian cancer, GI has recommended against further workup to identify source of bleeding. Hematology was consulted for further management of anitcoagulation.    Patient was seen and examed with . She is very cachectic and looks frail, laying on bed, denies any abdominal pain or more melenic stools. She has mild dyspnea at rest, though she denies shortness of breath.  Pt does not report fevers, chills, dysphagia or dysarthria, chest pain, palpitations, constipation, diarrhea.     ROS: A complete ROS was otherwise negative    PERTINENT PAST MEDICAL HISTORY  Past Medical History:   Diagnosis Date     Anemia      NO ACTIVE PROBLEMS      Ovarian cancer (H)      Pulmonary embolism (H)         Past Surgical History:   Procedure Laterality Date     INSERT PORT VASCULAR ACCESS N/A 7/9/2018    Procedure: INSERT PORT VASCULAR ACCESS;  Single Lumen Chest Power Port Placement, Leave Access for Chemotherapy;  Surgeon: Alexandro Sharif PA-C;  Location: [facility] OR     OTHER SURGICAL HISTORY      no surgical history       SOCIAL / FAMILY HISTORY  Social History      Social History     Marital status: Single     Spouse name: N/A     Number of children: N/A     Years of education: N/A     Social History Main Topics     Smoking status: Never Smoker     Smokeless tobacco: Never Used     Alcohol use No     Drug use: No     Sexual activity: No     Other Topics Concern     Not on file     Social History Narrative     Family history:  Non-contributory    MEDICATIONS  No current outpatient prescriptions on file.     Prescriptions Prior to Admission   Medication Sig Dispense Refill Last Dose     enoxaparin (LOVENOX) 60 MG/0.6ML injection Inject 0.6 mLs (60 mg) Subcutaneous every 12 hours 60 Syringe 11 8/19/2018 at 1800     mirtazapine (REMERON) 15 MG tablet Take 1 tablet (15 mg) by mouth At Bedtime For appetite stimulant 30 tablet 3 8/19/2018 at 2200     oxyCODONE IR (ROXICODONE) 5 MG tablet Take 1 tablet (5 mg) by mouth every 4 hours as needed for pain 180 tablet 0 8/19/2018 at Unknown time     acetaminophen (TYLENOL) 325 MG tablet Take 2 tablets (650 mg) by mouth every 4 hours as needed for mild pain (Patient not taking: Reported on 8/21/2018) 100 tablet  Not Taking at Unknown time     COMPRESSION STOCKINGS 1 each daily 1 each 0      LORazepam (ATIVAN) 1 MG tablet Take 1 tablet (1 mg) by mouth every 6 hours as needed (nausea/vomiting, anxiety or sleep) (Patient not taking: Reported on 8/21/2018) 30 tablet 1 Not Taking at Unknown time     ondansetron (ZOFRAN) 4 MG tablet Take 1 tablet (4 mg) by mouth every 6 hours as needed for nausea (Patient not taking: Reported on 8/21/2018) 20 tablet 1 Not Taking at Unknown time     order for DME Equipment being ordered: Other: Four wheeled walker with seat due to poor activity tolerance, high fall risk  Treatment Diagnosis: gait instability 1 each 0      polyethylene glycol (MIRALAX/GLYCOLAX) Packet Take 17 g by mouth daily Hold for loose stools (Patient not taking: Reported on 8/21/2018) 30 packet 1 Not Taking at Unknown time      prochlorperazine (COMPAZINE) 10 MG tablet Take 1 tablet (10 mg) by mouth every 6 hours as needed (nausea/vomiting) (Patient not taking: Reported on 8/2/2018) 30 tablet 2 Not Taking     senna-docusate (SENOKOT-S;PERICOLACE) 8.6-50 MG per tablet Take 1-2 tablets by mouth 2 times daily as needed for constipation (Patient not taking: Reported on 8/21/2018) 100 tablet 0 Not Taking at Unknown time     No current outpatient prescriptions on file.         ALLERGIES  No Known Allergies    PHYSICAL EXAMINATION:  /78 (BP Location: Left arm)  Pulse 97  Temp 97.7  F (36.5  C) (Oral)  Resp 18  Wt 37.3 kg (82 lb 4.8 oz)  SpO2 94%  BMI 15.05 kg/m2  Constitutional: Awake and alert, appears cachectic and frail, not in acute distress.  Eyes: No scleral icterus. Eyes exhibit no discharge.  ENT/Mouth: Oral mucosa pink and moist  Cardiovascular: Normal rate, regular rhythm, S1, S2. No murmur or rub. + LE edema.  Respiratory: Mild respiratory distress. Diminished breath sounds. No wheezes.  Gastrointestinal: mildly distended. No tenderness or garding. +BS  Neurological: AAOX3, grossly non-focal  Psychiatric: Mentation and affect appear normal.  Skin: Skin is warm, not diaphoretic.  Hematologic/Lymphatic/Immunologic: No overt bleeding. No lymphadenopathy      DATA: reviewed   Recent Labs   Lab Test  08/27/18   0554  08/26/18   0809   08/22/18   0415   08/09/18   0646   06/22/18   1807   NA  139  140   < >  135   < >  128*   < >  132*   POTASSIUM  3.2*  3.4   < >  3.6   < >  3.8   < >  4.0   CHLORIDE  105  105   < >  101   < >  96   < >  100   CO2  26  22   < >  26   < >  26   < >  22   ANIONGAP  8  14   < >  8   < >  6   < >  10   BUN  16  16   < >  12   < >  12   < >  9   CR  0.41*  0.47*   < >  0.38*   < >  0.36*   < >  0.52   GLC  74  42*   < >  73   < >  111*   < >  140*   IRON  7.9*  7.8*   < >  7.5*   < >  7.7*   < >  7.5*   MAG   --    --    --   1.9   --   1.8   < >  1.9   PHOS   --    --    --    --    --    --    --    3.4    < > = values in this interval not displayed.     Recent Labs   Lab Test  08/27/18   0554  08/26/18   0809  08/25/18   1706  08/25/18   0526   08/22/18   0415  08/21/18   0450  08/20/18   1625   WBC  9.4  10.3   --   8.6   < >  3.4*  2.6*  2.0*   HGB  8.5*  8.9*  6.9*  7.1*   < >  7.6*  8.1*  6.0*   PLT  160  204   --   181   < >  215  195  254   MCV  88  89   --   86   < >  84  85  82   NEUTROPHIL   --    --    --    --    --   73.2  66.0  58.8    < > = values in this interval not displayed.     Recent Labs   Lab Test  08/20/18   1625  08/09/18   0646  08/08/18   0350   BILITOTAL  0.4  0.6  0.6   ALKPHOS  85  110  105   ALT  23  13  15   AST  14  12  14   ALBUMIN  1.3*  1.3*  1.4*     TSH   Date Value Ref Range Status   08/05/2018 2.92 0.40 - 4.00 mU/L Final   ]    Results for orders placed or performed during the hospital encounter of 08/20/18   XR Chest 2 Views    Narrative    Exam: XR CHEST 2 VW, 8/20/2018 7:00 PM    Indication: Weakness, shortness of breath.;     Comparison: Chest x-ray 8/5/2018    Findings: Frontal and lateral projection x-ray of the chest. Right  chest wall Port-A-Cath with tip projecting over the right superior  cavoatrial junction. Bilateral pleural effusion with bibasilar  atelectasis. Increased pulmonary interstitial opacities.      Impression    Impression:   1. Persistent bilateral pleural effusion and compressive atelectasis.  2. Increased pulmonary interstitial opacities, representing pulmonary  edema vs atelectasis versus infection.    I have personally reviewed the examination and initial interpretation  and I agree with the findings.    ISAIAH AVILA MD   XR Chest Port 1 View    Narrative    EXAM: XR CHEST PORT 1 VW  8/21/2018 3:25 PM      HISTORY: tachypnea, suspected aspiration;     COMPARISON: 8/20/2018    FINDINGS: Portable semiupright AP image of the chest. Right chest  Port-A-Cath device tip projects over the distal SVC. Trachea is  midline. Cardiac silhouette is  unchanged. Unchanged moderate bilateral  pleural effusions. Unchanged bibasilar airspace opacities. No  pneumothorax. Upper abdomen is unremarkable. Degenerative changes in  the shoulders.       Impression    IMPRESSION:   1. Unchanged moderate bilateral pleural effusions.  2. Unchanged bibasilar opacities compatible with atelectasis mixed  with infection, aspiration, and/or pulmonary edema.    I have personally reviewed the examination and initial interpretation  and I agree with the findings.    ROWENA ADAMSON MD   US Abdomen Limited ([facility] only)    Narrative    EXAM: Ultrasound abdomen limited  8/23/2018 4:04 PM      HISTORY: Evaluate for ascites    COMPARISON: Ultrasound 8/17/2018, 8/2/2018, CT 8/5/2018    FINDINGS: Limited grayscale evaluation of the abdomen was performed to  assess for ascites.    Complex appearing ascitic fluid in all the abdominal quadrants with  septations, predominantly in the lower quadrants.      Impression    IMPRESSION:  Moderate volume complex septated ascitic fluid throughout the abdomen,  most pronounced in the lower quadrants.    I have personally reviewed the examination and initial interpretation  and I agree with the findings.    YUVAL MUSA MD   XR Chest 2 Views    Narrative    Exam: XR CHEST 2 VW, 8/23/2018 7:14 PM    Indication: inc resp effort, cough;     Comparison: Chest x-ray 8/21/2018    Findings:     Frontal and lateral view x-ray of the chest. Right chest Port-A-Cath  with tip projecting over the right atrium. Trachea is midline.  Unchanged bilateral pleural effusion. No pneumothorax. Upper abdomen  is unremarkable. No new airspace opacity.      Impression    Impression:    1. Unchanged moderate bilateral pleural effusion.  2. No new airspace opacity, compared to x-ray dated 8/21/2018 .  3. Persistent bibasilar opacities representing atelectasis mixed with  infection, aspiration and/or pulmonary edema.    I have personally reviewed the examination and initial  interpretation  and I agree with the findings.    ANTOINE TOVAR MD       IMPRESSION:  58 year old yo female with metastatic ovarian clear cell carcinoma, with uterine invasion, peritoneal carcinomatosis,  large volume ascites and bilateral PEs on therapeutic lovenox, who presents with GI bleeding, and anemia due to acute blood loss. We were consulted to help manage her anticoagulation    RECS:  - agree with previous GI recommendation to stop lovenox  - as GI is not planning for endoscopy to identify source of bleeding, the risk of recurrent bleeding is very high if restarting anticoagulation. Patient already has IVC filter placement which decreases risk of major PE. Without source control of her GI bleeding, would recommend against restart anticoagulation  - recommend palliative medicine consult and family meeting to discuss goals of care      We will sign off, please page with any questions and concerns.       Staffed w/ Dr. Spencer Brownlee MD/MPH  Hem/Onc Fellow        HEMATOLOGY STAFF:  Seen with fellow, whose note reflects our joint evaluation, assessment, and plan.      Olaf Palmer MD  Associate Professor of Medicine  Division of Hematology, Oncology, and Transplantation  Director, [facility]

## 2018-08-27 NOTE — PROGRESS NOTES
Social Work Services Progress Note    Hospital Day: 8  Date of Initial Social Work Evaluation:8/23/18   Collaborated with:  Pt, pt's brother, pt's sister-in-law (Corinna), Sanjiv , Hem-onc team, financial counseling    Data:  Pt is a 58 year old female who metastatic clear cell carcinoma who presented with generalized weakness off and on since receiving C2 carbo/taxol on 8/9/18. Pt recently admitted from 8/3 to 8/11 for back pain, lab abnormalities and tachypnea.     Pt does not have TCU coverage with [facility].     Intervention:  STEPHANIE met with pt and brother to follow up on [employer] coverage issues. Brother (Walter) reports he and his wife (Corinna) received Specific Population forms from financial counseling including a consent form that would allow them to speak to [facility] on pt's behalf as pt has speech issues at this time.   SW had pt's sister-in-law e-mail forms and pt completed consent with brother today. Per financial counseling, forms need to be mailed in they cannot be faxed. Pt should also be able to complete the life change event process via phone with brother present to assist, and [employer] would be able to accommodate pt's language needs.   Pt's family will need to complete application (consent form signed today).  STEPHANIE spoke again with pt's brother via phone and reminded him that he or his wife can call [employer] with patient to complete life change event in attempts of qualifying for MA, and that this would be faster than sending in the application.  Brother expressed understanding of this.     STEPHANIE spoke with  ARU liaison for potential review of pt as pt does have ARU coverage ARU liaison stated pt is not appropriate for ARU due to lack of ability to participate in therapy frequency.     Assessment:  STEPHANIE to continue to follow and assist.     Plan:    Anticipated Disposition:  Facility:  TCU TBD    Barriers to d/c plan:  Pt lacks TCU coverage with current insurance plan.     Follow Up:  STEPHANIE will  follow up with pt's brother tomorrow when next visiting.     LUIS Souza, LGSW  [facility]   8/27/2018

## 2018-08-27 NOTE — PLAN OF CARE
Problem: Patient Care Overview  Goal: Plan of Care/Patient Progress Review  Outcome: No Change  VSS. 1L via NC. Pain managed with oxycodone prn. Up with assist to bedside commode. Patietn sleeping off and on throughout the day. Oliguria. Patient voided x1 this afternoon. Bladder scan for 357 mL. Patient voided 325 ml. Poor po intake. - flatus or stool noted. Continue to encourage patient to increase PO intake. Patient had a few bites of mased potatoes this morning and some raspberry tea. Protonix gtt infusing via port. Patient brother visited this afternoon. Potassium replaced. Recheck ordered for 1700. Resting in-between cares. Will continue with current plan of care.

## 2018-08-27 NOTE — PLAN OF CARE
"Problem: Patient Care Overview  Goal: Plan of Care/Patient Progress Review  AVSS. Pt sleeping between cares. No appetite, no intake other than water. Pt c/o generalize pain, well controlled with 5mg PRN oxycodone. Denies nausea, on Dysphagie level 2 diet. Pt states she has not eaten much because \"it gets stuck\" (and pointed to throat). Would appreciate speech therapy consult tomorrow. Protonix drip infusing, MIVF to TKO for the drip. Pt up with Aof1 to commode. No output this shift, pt urged to use bathroom but refuses. MD paged about no UOP, they are not concerned d/t pt's wt and more than adequate output in previous shifts. Continue to monitor.       "

## 2018-08-27 NOTE — PLAN OF CARE
Problem: Patient Care Overview  Goal: Plan of Care/Patient Progress Review  Outcome: No Change  Hypotensive. Order parameters for MD notification not met. Continue to monitor. On 1L per NC. AOVSS. Pt c/o generalized pain. Declined oxycodone. Distraction and repositioning utilized, helpful. Denies n/v. Port infusing Protonix and LR TKO. PIV SL. Marginal urine output noted. MD's aware. Total output for shift 300ml. Pt refused multiple attempts to ambulate to bedside commode. Pt c/o difficulty swallowing. Please consult with speech therapy for an evaluation. Dysphagia 2 diet + thin liquids. Continue to monitor.     Plan of care: Per MD request, carlos  scheduled for morning rounds - 6:30am-8:30am.       .

## 2018-08-27 NOTE — PROVIDER NOTIFICATION
Notified Resident at 1900 PM regarding low urine output.      Spoke with: resident on call    Orders were not obtained.    Comments: Pt has not had UOP since 1430. Pt frequently encouraged to void, but refuses. Pt has had minimal oral intake and PIV changed to TKO. MDs aware, are not concerned as pt had been voiding large amounts on previous shifts considering pt's weight. Continue to encourage pt and monitor for s/s of respiratory distress (pt has pleural effusions and hx of aspiration and ascites).

## 2018-08-27 NOTE — PROGRESS NOTES
Care Coordinator - Discharge Planning    Admission Date/Time:  8/20/2018  Attending MD:  Bobbi Browne MD     Data  Date of initial CC assessment:  Patient has been followed daily in interdisciplinary rounds.  Chart reviewed, discussed with interdisciplinary team.   Patient was admitted for:   1. Ovarian cancer, unspecified laterality (H)    2. Anemia, unspecified type    3. Muscle weakness (generalized)    4. Chemotherapy-induced neutropenia (H)    5. Elevated troponin    6. Anemia in neoplastic disease    7. Malignant neoplasm of ovary, unspecified laterality (H)         Assessment     In the event patient improves and or has no coverage for rehabilitation services, the following home care plans of care have been tentatively arranged for patient in her home setting:    Please fax discharge orders to [facility]     Ph:  [phone]     Fax: [phone]     Skilled home care RN for resumption of home care services as prior to re hospitalization and to assist with updated MD orders as outlined in discharge orders.     Skilled home care RN to evaluate medication management, nutrition and hydration evaluation, endurance evaluation, and general status evaluation after discharge from the acute care hospital setting.       Skilled home care agency to provide a English  with all home care visits.     Skilled home care RN to assist with follow up for admission to the Palliative Home Care Team if this is not in place.     Skilled home care Physical Therapist and Occupational Therapist to evaluate and treat in home setting.     Skilled home care  to evaluate and assist with community services that patient may be eligible fore in home community.     Skilled home care agency to evaluate for [employer] and Meals on Wheels services in home area.     Home Health Aide to assist with bathing and grooming 3 times a week for 2 weeks after discharge.       Coordination of Care and Referrals:  Provided patient/family with options for home care agency of choice.      Plan  Anticipated Discharge Date:  Too be determined  Anticipated Discharge Plan:  As above.  Patient will all updates should have a Sanjiv .     CTS Handoff completed:  (clinic letter)  To be sent.    ANYI Truong.S.ANTHONY., P.H.N.,R.N.         Pager

## 2018-08-27 NOTE — PROGRESS NOTES
GI Progress Note  Date of Service:  8/26/2018      Assessment:   Maria Esther Wang is a 58 year old female with a medical history significant for metastatic clear cell carcinoma, with uterine invasion peritoneal carcinomatosis,  large volume ascites and bilateral PEs on therapeutic lovenox (with evidence of progression with prior interruption of anticoagulation) seen by the GI team for melena.     #. Melena:  She has had some melena with acute anemia requiring PRBC transfusion. Considered differentials include small bowel source given diffuse peritoneal and omental tumor implants vs AVMs vs gastritis given severe illness. At this time, supportive cares with IVF, PRBC and PPI is favoured given prohibitive risk of endoscopic procedures, as she has severe cachexia and increased cardiovascular risk from bilateral PEs.   Overall, it is unlikely that any additional endoscopic procedures will changes the course of her current illness.   Her prognosis remains grim.    Recommendations:   -- Agree with stopping Lovenox for now  -- Continue PPI gtt to complete 24 hours , then Switch to PO  -- Transfuse to keep Hgb>7  -- Advance diet as tolerated  -- Agree with goals of care discussion    Patient was discussed with Dr. Pako Patel   GI fellow  Pager: [phone]      __________________________________________________________________________________      Subjective:  Required one unit of PRBC, otherwise, no other acute issues.   No other complaints. Quite tired and fatigued    Physical Examination:  Vital Signs:  /63 (BP Location: Left arm)  Pulse 89  Temp 98.6  F (37  C) (Oral)  Resp 24  Wt 37.3 kg (82 lb 4.8 oz)  SpO2 90%  BMI 15.05 kg/m2   General:  NAD. In bed.  Neck:  Supple.   Chest:  Clear bilaterally.    Cardiovascular: RRR. No m/r/g.   Abdomen:  Soft, NT, ND. BS+, No organomegally.   Extremities:  No peripheral edema.      DATA:  Labs:    Reviewed and noted

## 2018-08-27 NOTE — PLAN OF CARE
Problem: Patient Care Overview  Goal: Plan of Care/Patient Progress Review  Discharge Planner SLP   Patient plan for discharge: not discussed  Current status:  From an oropharyngeal swallowing standpoint, the patient is appropriate for a regular diet and thin liquids. Intake amount is low due to poor appetite.   Barriers to return to prior living situation: none from SLP standpoint  Recommendations for discharge: defer to OT & PT, medical team  Rationale for recommendations: oropharyngeal swallowing intact, no ongoing SLP tx needed       Entered by: Leigha Ortiz 08/27/2018 11:31 AM         Speech Language Therapy Discharge Summary    Reason for therapy discharge:    All goals and outcomes met, no further needs identified.    Progress towards therapy goal(s). See goals on Care Plan in [facility] electronic health record for goal details.  Goals met    Therapy recommendation(s):    No further therapy is recommended.

## 2018-08-27 NOTE — PLAN OF CARE
Problem: Patient Care Overview  Goal: Plan of Care/Patient Progress Review  PT 7C: Discharge Planner PT   Patient plan for discharge: Agreeable to TCU  Current status: PT educated pt on importance of mobility and upright positioning for recovery, and concerns with declining function. Also educated benefits of TCU discharge recommendations and concern for high falls risk and inability to care for self when family not home. Pt extremely lethargic and declined PT exercise today.     Barriers to return to prior living situation: deconditioning, generalized weakness, home alone during day, falls risk, level of assist  Recommendations for discharge: TCU  Rationale for recommendations: To progress (I) in functional mobility and activity tolerance in order to be home alone safely during the day while family members are at work.       Entered by: Evelina Castillo 08/27/2018 3:51 PM

## 2018-08-28 NOTE — PROGRESS NOTES
"Gynecology Oncology Progress Note    24 hour events:   -poor  PO intake throughout evening and overnight, low urine output, pt encouraged to take in more PO fluids  - OT and PT saw, recommending TCU  - GI saw, signing off, agrees with holding lovenox, feels endoscopy would be low utility  - heme/onc saw pt, agrees with d/cing lovenox if there will not be GI source control of bleed, recommended palliative care  - SLP, passed swallow, appropriate for regular diet  - low urine output O/N, pt not interested in taking in PO or getting up to void, 300cc void in early am  - blood glucose 45, received ampule D50    Subjective: Pt reports feeling tired this morning and just wants to rest and have \"some peace and quiet\".  She reports she doesn't have an appetite right now and was only able to get through 1.5 boost shakes yesterday. She reports she is urinating. Denies any more bowel movements or bleeding.  She reports her mood alternates between feeling frustrated and feeling at peace.       Objective:   Vitals:    08/27/18 1541 08/27/18 1953 08/27/18 2300 08/28/18 0313   BP:  103/53 101/54 110/62   BP Location:  Left arm Left arm Left arm   Pulse:       Resp:  22 22 22   Temp:  97.2  F (36.2  C) 96.3  F (35.7  C) 96.3  F (35.7  C)   TempSrc:  Oral Axillary Axillary   SpO2: 95% 94% 95% 96%   Weight:       HR 96-98    I/O last 3 completed shifts:  In: 300 [P.O.:300]  Out: 325 [Urine:325]   Since MN:  cc    General - NAD, laying on side in bed  CV - RRR, no murmurs  Resp - Clear to auscultation bilaterally with decreased breath sounds in left lower lobe, unlabored breathing  Abdomen - firm, mildly distended, non-tender to palpation  Ext - warm and well perfused, 3+ pitting edema b/l, non-tender    New Labs/Imaging-   Hgb 8.2  WBC 9.0  plt 77  K pending  Cr pending  BG 45    Assessment:  58 year old female with metastatic clear cell carcinoma HD#8 admitted for generalized weakness and elevated troponin likely due to " demand ischemia, complicated by GI bleed on HD#6. Continued severe malnutrition with very little PO intake.    Plan:  Dz: metastatic clear cell carcinoma. S/p C2D1 neoadjuvant carbo/taxol 8/9/18. Plan to delay chemo until improved nutritional status.  FEN: regular diet, Hypokalemia, PRN repletion. Tendency to fluid overload, IV lasix 10mg almost daily this admission. Severe malnutrition, consider palliative consult for assistance with appetite stimulation  Pain: Tylenol, oxycodone PRN  Heme: Anemia d/t chemotherapy, recent GI bleed s/p 3unit pRBC, last 8/25, am hgb stable, thrombocytopenia likely due to recent GI bleed  #Pulmonary emboli, previously on therapeutic lovenox, discontinued indefinitely d/t GI bleed. Has IVC filter in place.   CV: Elevated trops HD#1-2 due to demand ischemia, EKG x2 stable, TTE largely normal  Resp: CXR (8/20) with pleural effusions and increased pulmonary interstitial opacities, aspiration on HD#2 with negative laryngoscopy, repeat CXR (8/23) with bibasilar opacities.  GI: Bowel regimen PRN. Ascites d/t carcinomatosis, s/p paracentesis 8/24, 1.9L removed. Large tarry BM 8/24 PM, occult stool positive. GI consulted, s/p 3D protonix gtt, started on PO protonix. EGD not recommended due to patient status. No further melena  : NI, voiding  ID: lactate 1.7 on 8/24, 1.3 8/25. CXR with bibasilar opacities however no fevers or cough  Endocrine: NI  Psych/Neuro: NI  PPX: SCDs, IS, therapeutic lovenox held  Dispo:  D/c to TCU when placement found     Amy Schumer, MD  [facility] [phone]    Patient evaluated this AM. Agree with resident assessment and plan as noted above. After counseling patient and family and further discussion with primary oncologist, patient will transition to palliative therapy. Patient has decided again further chemotherapy.    Gauri Saucedo M.D., MPH,  F.A.C.O.G.  Division of Gynecologic Oncology  [phone]

## 2018-08-28 NOTE — PLAN OF CARE
Problem: Patient Care Overview  Goal: Plan of Care/Patient Progress Review  PT 7C: Discharge Planner PT   Patient plan for discharge: TCU  Current status: Pt (I) in rolling and sit<>stands, but requires modA for sidelying>sit due to UE weakness. Quickly fatigues with supine LE exercises and with upright positioning. Abnormal increase in RR with activity (20 at rest to 40 breaths/min after sit<>stands).   Barriers to return to prior living situation: deconditioning, fatigue, SOB, home alone during day, falls risk  Recommendations for discharge: TCU   Rationale for recommendations: Pt severely deconditioned and currently unsafe to be home alone while family is at work. TCU to increase (I) in functional mobility and activity tolerance in order to be (I) in self-cares and home ambulation.        Entered by: Evelina Castillo 08/28/2018 12:01 PM

## 2018-08-28 NOTE — PROGRESS NOTES
CLINICAL NUTRITION SERVICES - REASSESSMENT NOTE     Nutrition Prescription    RECOMMENDATIONS FOR MDs/PROVIDERS TO ORDER:  Monitor lytes and replete as appropriate (pt is at high risk for refeeding)  If pt's PO intake remains poor, consider nutritional support to meet pt's nutritional needs if within pt's Goals of Care.     Malnutrition Status:    Severe malnutrition in the context of Acute on chronic illness    Recommendations already ordered by Registered Dietitian (RD):  Will re-order calorie counts to assess pt's ability to meet her nutritional needs and to evaluate any improvement if at all in PO with addition of appetite stimulants and also to evaluate the need for Nutrition Support    Future/Additional Recommendations:  - Monitor GOC re: nutrition  - Monitor results of calorie counts, if pt unable to meet at least 75% of needs from oral diet and ONS (Goal: 990 kcal with 40 gm protein per day), and if NS is within pt's goals of care, recommend EN support with post pyloric FT/TF with Isosource 1.5 @ goal 40 ml/hr (960 ml/day) to provide 1440 kcals (33 kcal/kg/day), 65 g PRO (1.5 g/kg/day), 730 ml free H2O, 169 g CHO and 14 g Fiber daily. Start TF at 10 ml and increase rate by 10 ml q 12 hours until goal of 40 ml/hr provided pt is not refeeding. Recommend 30 ml q 4 hrs for FT patency and additional fluids per MD pending pt's hydration status       EVALUATION OF THE PROGRESS TOWARD GOALS   Diet: Regular  Nutrition Support: None at this time  Intake: Intake has been poor. Pt has been consuming Boost, mashed potatoes and fruit ice in the past 2 days. Pt does endorse very poor appetite and reports possibly to have consume ~ 1-1.5 Boost on 8/27. Pt did not want to discuss further about intake/food at this time.         NEW FINDINGS   Noted PT recommending TCU (severe deconditioning). Palliative Consult in place and team had met with pt and family conference to discuss GOC. Pt opting against chemo at this time. SLP  saw pt and cleared pt for regular diet with thin liquids (SLP was following for difficulty swallowing)    GI: dry throat, no nausea/vomiting noted, Abdomen distended, passing gas, + Bowel Sounds    Labs:  Date: 8/28/18  K+ - 4.1 (wnl)  Date: 8/22/18  Mg++ - 1.9 (wnl)  Date: 6/22/18  Phos - 3.4 (wnl)    Medications: Marinol, Ferrous Sulfate, Remeron, Theravite-M, Protonix    Wt Hx:  08/26/18 1210  37.3 kg (82 lb 4.8 oz)   08/25/18 1121 37.5 kg (82 lb 11.2 oz)   08/23/18 1622 41.1 kg (90 lb 9.6 oz)   08/20/18 1605 43.5 kg (95 lb 14.4 oz)   13 lb wt loss in the past 1 week (since 8/20 - date of admit). ? Some of the wt loss possibly due to due to fluid loss 2/2 pt receiving Lasix on 8/23 -8/25.         MALNUTRITION  % Intake: </= 50% for >/= 5 days (severe)  % Weight Loss: > 2% in 1 week (severe)  Subcutaneous Fat Loss: Facial region, upper arm, lower arm: severe  Muscle Loss: Temporal, Facial & jaw region, Scapular bone, Thoracic region (clavicle, acromium bone, deltoid, trapezius, pectoral),  Upper arm (bicep, tricep) and Lower arm  (forearm): severe. Noted per RD previous assessment Dorsal hand:  Moderate, Patellar region:  Severe and Posterior calf:  Severe   Fluid Accumulation/Edema: Moderate  Malnutrition Diagnosis: Severe malnutrition in the context of Acute on chronic illness    Previous Goals   Total avg nutritional intake to meet a minimum of 30 kcal/kg and 1.2 g PRO/kg daily (per dosing wt 44 kg).   Evaluation: Not met    Previous Nutrition Diagnosis  Inadequate protein-energy intake related to decreased appetite and weakness as evidenced by pt report and 8% weight loss over the past 3 months.   Evaluation: No change    CURRENT NUTRITION DIAGNOSIS  Inadequate protein-energy intake related to decreased appetite, possible dry throat as evidenced by pt with very little PO (eating only mashed potatoes, fruit ice, and Vanilla boost), pt is at severe malnutrition 2/2 severe fat and muscle wasting, low BMI with 13  lb wt loss since admit     INTERVENTIONS  Implementation  Calorie Counts    Goals  Total avg nutritional intake to meet a minimum of 30 kcal/kg and 1.2 g PRO/kg daily (per dosing wt 44 kg).    Monitoring/Evaluation  Progress toward goals will be monitored and evaluated per protocol.    Tamela Watters RD LD  Weekday pager: [phone]  Weekend pager - [phone]

## 2018-08-28 NOTE — PROGRESS NOTES
Social Work Services Progress Note     Hospital Day: 9  Date of Initial Social Work Evaluation:8/23/18                  Collaborated with:  Pt, pt's brother, RNCC, gyn-onc team,  hospice liaison(Danuta Weir).      Data:  Pt is a 58 year old female who metastatic clear cell carcinoma who presented with generalized weakness off and on since receiving C2 carbo/taxol on 8/9/18. Pt recently admitted from 8/3 to 8/11 for back pain, lab abnormalities and tachypnea.      Pt does not have TCU coverage with [facility].      Intervention:  SW followed up with pt's brother regarding call to [facility].  Brother initiated phone call with SW present but had to end the call due to being on hold too long and brother had to go to work.  Brother stated he would attempt again later on when he has more time.      Assessment:  SW to continue to follow and assist.     SW spoke with gyn-onc team who stated pt is now being recommended for hospice as she is refusing chemo and eating/drinking very little.  SW notified  hospice liaison, Danuta who states she has met with pt and family previously.      Plan:    Anticipated Disposition:  Facility:  TCU TBD    Barriers to d/c plan:  Pt lacks TCU coverage with current insurance plan.     Follow Up:  SW will follow up with family regarding interest in hospice tomorrow.      LUIS Souza, CHIKI Benton   8/28/2018

## 2018-08-28 NOTE — CONSULTS
"[facility], [place]    Palliative Care Consultation Note    Patient: Maria Esther Wang  Date of Admission:  8/20/2018    Requesting provider/team: Schumer, Amy, MD  Reason for consult: Symptom management    Recommendations:  -ongoing goals of care discussions are appropriate  -she no longer wants to take chemotherapy  -already has order for DNR/DNI in place, would recommend a POLST  -agree with marinol, she was able to tolerate pill form  -could consider steroids for appetite, and could be helpful from a pain and nausea stantpoint    These recommendations have been discussed with primary team.    Thank you for the opportunity to participate in the care of this patient and family. Our team will follow. Please feel free to contact the on-call Palliative provider with any urgent needs.     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    Met with Maria Esther \"[place]\" Felipe today and introduced our services. She was very tired since she worked with therapy and she says its usually just after therapy that she gets tired. She said that she was open to talking today with us and attempted to engage her but it was pretty difficult during this visit. She ultimately wanted to get together a visit with her brother, Walter, and the doctors to discuss next steps.    Symptoms:   Appetite: seems like her appetite is something that she has wanted to work on " in the past to help her get toward more chemotherapy. Not entirely clear to me this visit if appetite is important to her as she has voiced wishes not to continue chemotherapy. Did discuss this is one symptom if its important to her we can try medications. She is having a hard time taking pills because of her dry throat and is preferring liquids. Marinol started today and agree with this, also taking mirtazapine.    Pain:    Social:         Living situation: lives with brother       Support system: most of family works outside the home, [employer]    Prognostic Information: discussed with her the the setting in not pursuing chemotherapy or it no longer being an option, the cancer will continue to grow. This has been discussed with primary team.    Advance Care Planning:        Decision making capacity: did not fully assess given lack of engagement during visit       Disease understanding: she was not able to verbalize understanding of her medical conditions today, she feels like its all confusion/complicated, she just knows that one day she feels good and another day she may feel really bad.       Goals of Care: she is done with chemotherapy, she still finds coming back to the hospital for mostly symptomatic treatment helpful, discussed with her generally the goals of focusing on her comfort and she seemed to agree with this. Discussed some about hospice and the ability for those services to treat her symptoms outside of the hospital and she seemed to be interested in this. However, she would still benefit from ongoing discussion of goals and education on prognosis, what is possible, what to expect, and hospice philosophy and services. She wants to meet later with her brother to discuss this further.       Preferred way of decision making: brother seems to be important in her decision making process.       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    Palliative care consulted 8/28 for decreased appetite     ROS:  A comprehensive ROS has been negative other than stated in the HPI and below:   Palliative Symptom Review (0=no symptom/no concern, 1=mild, 2=moderate, 3=severe):  Pain: 2  Fatigue: 2  Nausea: 0  Constipation: 0  Diarrhea: 0  Depressive Symptoms: 0  Anxiety: 0  Drowsiness: 1  Poor Appetite: 2  Shortness of Breath: 0  Insomnia: 0  Delirium: 0  Other: 0  Overall (0 good/no concerns, 3 very poor): 2       Past Medical History:   Past Medical History:   Diagnosis Date     Anemia      NO ACTIVE PROBLEMS      Ovarian cancer (H)      Pulmonary embolism (H)           Past Surgical History:   Past Surgical History:   Procedure Laterality Date     INSERT PORT VASCULAR ACCESS N/A 7/9/2018    Procedure: INSERT PORT VASCULAR ACCESS;  Single Lumen Chest Power Port Placement, Leave Access for Chemotherapy;  Surgeon: Alexandro Sharif PA-C;  Location: [facility] OR     OTHER SURGICAL HISTORY      no surgical history             Family History:   No family history on file.  Family history reviewed       Allergies:   No Known Allergies         Medications:   I have reviewed this patient's medication profile and medications given in the past 24 hours.    marinol 2.5 mg BID  Mirtazapine 15 mg daily  Acetaminophen PRN  Oxycodone 5 mg q4h PRN- 15 mg over last 24 hours           Physical Exam:   Vital Signs: Temp: 95  F (35  C) Temp src: Oral BP: 117/78 Pulse: 103  Heart Rate: 98 Resp: 24 SpO2: 94 % O2 Device: Nasal cannula Oxygen Delivery: 1 LPM  Weight: 82 lbs 4.8 oz    Physical Exam:  Constitutional: Awake, alert, cooperative, no apparent distress, and cachetic appearing  Lungs: No increased work of breathing, clear to auscultation bilaterally  Abdomen: No scars, normal bowel sounds, soft, non-distended, non-tender  Neurologic: Awake, alert, oriented to name, place and time.    Neuropsychiatric: tired, guarded.  Skin: No rashes, erythema, pallor, petechia or purpura.     Data reviewed:     ROUTINE ICU LABS (Last four results)  CMP  Recent Labs  Lab 08/28/18  0501 08/27/18  1640 08/27/18  0554 08/26/18  0809 08/25/18  1706 08/25/18  0526 08/22/18  0415     --  139 140  --  137 135   POTASSIUM 4.1 4.5 3.2* 3.4 4.4 2.9* 3.6   CHLORIDE 105  --  105 105  --  102 101   CO2 21  --  26 22  --  27 26   ANIONGAP 10  --  8 14  --  8 8   GLC 45*  --  74 42*  --  82 73   BUN 17  --  16 16  --  24 12   CR 0.47*  --  0.41* 0.47* 0.44* 0.43* 0.38*   GFRESTIMATED >90  --  >90 >90 >90 >90 >90   GFRESTBLACK >90  --  >90 >90 >90 >90 >90   IRON 7.6*  --  7.9* 7.8*  --  7.4* 7.5*   MAG  --   --   --   --   --   --  1.9     CBC  Recent Labs  Lab 08/28/18  0501 08/27/18  0554 08/26/18  0809 08/25/18  1706 08/25/18  0526   WBC 9.0 9.4 10.3  --  8.6   RBC 2.94* 3.03* 3.16*  --  2.63*   HGB 8.2* 8.5* 8.9* 6.9* 7.1*   HCT 26.4* 26.7* 28.0*  --  22.6*   MCV 90 88 89  --  86   MCH 27.9 28.1 28.2  --  27.0   MCHC 31.1* 31.8 31.8  --  31.4*   RDW 18.0* 17.6* 17.2*  --  18.2*   PLT 77* 160 204  --  181     INRNo lab results found in last 7 days.  Arterial Blood GasNo lab results found in last 7 days.    KATHRYN Walker CNS  Palliative Care Consult Team  Pager: [phone]    [facility] Inpatient Team Consult pager [phone] (M-F 8-4:30)  After-hours Answering Service [phone]  Palliative Clinic: [phone]     Total time spent was 70 minutes,  >50% of time was spent counseling and/or  coordination of care regarding goals of care and symptom management .

## 2018-08-28 NOTE — PLAN OF CARE
Problem: Patient Care Overview  Goal: Plan of Care/Patient Progress Review  A&Ox4. Slightly tachycardic, OVSS. Pt denied pain most of shift. Oxycodone given x1 at end of shift. Pt denies nausea. Up with assist of 1. Lung sounds clear/diminished with shallow respirations. Abdomen distended. +bowel sounds and passing gas. Pt oliguric, MD notified. Regular diet, Encouraging boost and PO fluids throughout shift with little to no intake. Port needle changed, IV Protonix infusing. PLAN: Continue to encourage PO intake and manage pain.

## 2018-08-28 NOTE — PLAN OF CARE
Problem: Patient Care Overview  Goal: Plan of Care/Patient Progress Review  Outcome: No Change  DNR/DNI. Tachypneic. Dyspnea on exertion. Lung sounds clear. On 0.5 L/min NC. Other VSS. PRN oxycodone given for management of generalized pain. Encouraged pt to take fluids. Pt to start calorie counts at midnight. Assist of 1 to bedside commode. 1BM this shift. Had 1 mixed urine and stool occurrence. Regular diet, denies nausea.  PIV SL. Port running fluids TKO. Continue to encourage PO intake. Continued to monitor BG q4 due to hypoglycemia this AM. Continue w/ POC.

## 2018-08-28 NOTE — PLAN OF CARE
Problem: Patient Care Overview  Goal: Plan of Care/Patient Progress Review    Discharge Planner OT   Patient plan for discharge: not stated  Current status: Pt with minimal participation in OT today due to fatigue, despite significant encouragement. Stood for 1 min and performed brief UE exercise seated EOB, but declining further activity.  Barriers to return to prior living situation: decreased activity tolerance and ADL independence  Recommendations for discharge: TCU  Rationale for recommendations: increase functional endurance and ADL independence at rehab       Entered by: Aleksander Collins 08/28/2018 3:30 PM

## 2018-08-28 NOTE — PLAN OF CARE
Problem: Patient Care Overview  Goal: Plan of Care/Patient Progress Review  Discharge Planner OT   Patient plan for discharge: TCU  Current status: Pt requiring min A to complete bed mobility and sit to stands from EOB.  Pt dependent for donning and doffing socks and complete grooming and hygiene tasks with SBA while on EOBDe  Barriers to return to prior living situation: Decreased activity tolerance and endurance  Recommendations for discharge: TCU  Rationale for recommendations: Pt will benefit from skilled OT services to increase activity tolerance and endurance       Entered by: Alexandro Blanchard 08/27/2018 7:07 PM

## 2018-08-28 NOTE — PLAN OF CARE
Problem: Patient Care Overview  Goal: Plan of Care/Patient Progress Review  Outcome: No Change  DNR/DNI. Tachypneic. Dyspnea on exertion. Lung sounds clear. On 0.5 L/min NC. Other VSS. PRN oxycodone given for management of generalized pain. Encouraged pt to take fluids. Assist of 1 to bedside commode. 300 mLs of urine during shift. Regular diet, denies nausea. Protonix infusion d/c'd. PIV SL. Port running fluids TKO. Continue to encourage PO intake and assess pain. Continue w/ POC.    Got call from lab this AM for critical BG value of 45. 25 mL of IV dextrose was given, rechecks were 169 and 114. Continue to monitor BGs.

## 2018-08-29 NOTE — PLAN OF CARE
Problem: Patient Care Overview  Goal: Plan of Care/Patient Progress Review  Outcome: No Change  SOB with exertion, tachypneic. Abdominal pain managed with oxycodone. Checking pt's BG q4h d/t hypoglycemic episode yesterday morning. PIV saline locked. BG 62 at 0400, apple juice given, BG recheck 74. On regular diet, starting calorie counts today. Pt did not void overnight. Up with assist x1. Tachycardic, OVSS on 2 L O2 NC. [place] speaking.     Addendum: Pt bladder scanned this morning for 33 mL.

## 2018-08-29 NOTE — PROGRESS NOTES
Social Work Services Progress Note    Hospital Day: 10  Date of Initial Social Work Evaluation:  8/23/18  Collaborated with:  Pt, pt's brother,  hospice liaison(Juan Manuel, [facility]    Data:  Pt is a 58 year old female who metastatic clear cell carcinoma who presented with generalized weakness off and on since receiving C2 carbo/taxol on 8/9/18. Pt recently admitted from 8/3 to 8/11 for back pain, lab abnormalities and tachypnea.     Intervention:  SW spoke with pt's brother who requested more information about  hospice.  SWand  hospice liaison met with pt and pt's brother to discuss hospice program and that pt would likely need to go to a nursing home with hospice services as opposed to going home with hospice d/t lack of support at home.     SW assisted pt and pt's brother with calling [facility] to notify them of a Life Change Event.  [facility] staff reported a ticket has been submitted for this life change and that pt would likely qualify for MA based on their assessment today.  Pt's MA status will be available in the next 48 hours. [facility] staff provided number [phone] and case #[id number] to call for follow up on status.  Financial counseling also able to determine pt's status.    Assessment: SW will follow up with pt tomorrow and financial counseling to determine any status changes.    Plan:    Anticipated Disposition:  Facility:  To be determined    Barriers to d/c plan:  Insurance status    Follow Up:  SW to continue to follow and assist.     LUIS Souza, CHIKI Benton   8/29/2018

## 2018-08-29 NOTE — PROGRESS NOTES
Gynecology Oncology Progress Note    24 hour events:   - family meeting and pt decided to no longer pursue chemotherapy, undecided regarding hospice at this time  -worked with PT/OT  - palliative saw pt, agrees with marinol, consider steroids, needs POLST    Subjective: Pt reports slept well overnight. She feels ok right now.  Unsure how much she has taken in. No appetite currently. Says she will talk to family today regarding hospice. Reports mood is good.  Reports BM yesterday unsure if it was melanotic. Voiding. Passing gas. No chest pain, shortness of breath.    Objective:   Vitals:    08/28/18 2020 08/28/18 2332 08/29/18 0321 08/29/18 0649   BP: 102/59 95/56 103/63 108/47   BP Location: Left arm Left arm Left arm Left arm   Pulse: 97 94 98 96   Resp: 20 24 20 18   Temp: 97.4  F (36.3  C) 95.5  F (35.3  C) 96.7  F (35.9  C) 95.5  F (35.3  C)   TempSrc: Axillary Axillary Axillary Oral   SpO2: 97% 97% 97% 98%   Weight:       HR 96-98    I/O last 3 completed shifts:  In: 253 [P.O.:210; I.V.:43]  Out: 350 [Other:100; Stool:250]   Since MN: UOP 0 cc    General - NAD, laying on side in bed  CV - RRR, no murmurs  Resp - Clear to auscultation bilaterally with decreased breath sounds in left lower lobe, unlabored breathing  Abdomen - firm, mildly distended, non-tender to palpation  Ext - warm and well perfused, 3+ pitting edema b/l, non-tender    New Labs/Imaging-   Hgb 8.2  WBC 11.1  Plt 134    Assessment:  58 year old female with metastatic clear cell carcinoma HD#9 admitted for generalized weakness and elevated troponin likely due to demand ischemia, complicated by GI bleed on HD#6. Continued severe malnutrition with very little PO intake. Undecided regarding hospice care, and will discuss further with family today.    Plan:  Dz: metastatic clear cell carcinoma. S/p C2D1 neoadjuvant carbo/taxol 8/9/18. No longer wants to pursue chemotherapy treatment.  FEN: regular diet, Hypokalemia, PRN repletion. Tendency to fluid  overload, IV lasix 10mg almost daily this admission. Severe malnutrition, started on marinol, palliative recommended to consider steroids as well  Pain: Tylenol, oxycodone PRN  Heme: Anemia d/t chemotherapy, recent GI bleed s/p 3unit pRBC, last 8/25, am hgb stable, thrombocytopenia likely due to recent GI bleed, stable   #Pulmonary emboli, previously on therapeutic lovenox, discontinued indefinitely d/t GI bleed. Has IVC filter in place.   CV: Elevated trops HD#1-2 due to demand ischemia, EKG x2 stable, TTE largely normal  Resp: CXR (8/20) with pleural effusions and increased pulmonary interstitial opacities, aspiration on HD#2 with negative laryngoscopy, repeat CXR (8/23) with bibasilar opacities.  GI: Bowel regimen PRN. Ascites d/t carcinomatosis, s/p paracentesis 8/24, 1.9L removed. Large tarry BM 8/24 PM, occult stool positive. GI consulted, s/p 3D protonix gtt, on PO protonix. Not a candidate for EGD. No further melena  : NI, voiding  ID: lactate 1.7 on 8/24, 1.3 8/25. CXR with bibasilar opacities however no fevers or cough  Endocrine: NI  Psych/Neuro: NI  PPX: SCDs, IS  Dispo:  D/c to TCU when placement found     Amy Schumer, MD  [facility] [phone]  08/29/18      Patient seen and evaluated, undergoing management for advancing ovarian cancer now discontinuing cancer therapy with transition to hospice. Assessment and plan as documented in resident note under my direction.        Gauri Saucedo M.D., MPH,  F.A.C.O.G.  Division of Gynecologic Oncology  [phone]

## 2018-08-29 NOTE — PLAN OF CARE
"Problem: Patient Care Overview  Goal: Plan of Care/Patient Progress Review  PT / 7C - Cancel. Pt declining PT session despite max encouragement and education on importance of activity. Pt states 'she is too weak and cannot move'. Per most recent SW note, \"gyn-onc team stated pt is now being recommended for hospice\". Due to many cancels and possible transition to hospice - PT to hold at this time and await updated POC.      "

## 2018-08-29 NOTE — PLAN OF CARE
Problem: Patient Care Overview  Goal: Individualization & Mutuality  B -- I talked with her and she agreed to the Glutose 15 Gel but once it was in her mouth she needed to spit it out. She was able to drink an apple juice when sitting upright. I informed her team (face to face in the  work room). And was told that Ms Wang is trending toward comfort cares and that no further BG follow-up/treatment was needed at this time.

## 2018-08-29 NOTE — PLAN OF CARE
Problem: Patient Care Overview  Goal: Plan of Care/Patient Progress Review  OT 7C: Pt declining therapy this PM stating fatigue. Per chart review, pt may be transitioning to hospice/comfort cares and may no longer require/be appropriate for therapy. Will hold therapy at this time pending decisions made with POC.

## 2018-08-30 NOTE — PROGRESS NOTES
Gynecology Oncology Progress Note    24 hour events:   -transitioned to comfort cares  -hospice met with pt and pt's brother, will likely go to nursing home w/hospice, pending insurance approval in next 48 hours    Subjective: Pt feels fine this morning. Denies any pain. Doesn't have any bothersome symptoms. Declines any further interventions this morning. Reports she feels sleepy.    Objective:   No longer tracking vital signs, pt on comfort care.    General - NAD, laying on side in bed  CV - RRR, no murmurs  Resp - Clear to auscultation bilaterally, unlabored breathing  Abdomen - firm, mildly distended, non-tender to palpation  Ext - warm and well perfused    New Labs/Imaging-   BG 64-66,    Assessment:  58 year old female with metastatic clear cell carcinoma HD#10 admitted for generalized weakness and elevated troponin likely due to demand ischemia, complicated by GI bleed on HD#6. Continued severe malnutrition, transitioned to comfort cares with plan to discharge to nursing home on hospice.    Plan:  Dz: metastatic clear cell carcinoma. S/p C2D1 neoadjuvant carbo/taxol 8/9/18. No longer wants to pursue chemotherapy treatment.  FEN: regular diet, Hypokalemia, PRN repletion. Tendency to fluid overload, IV lasix 10mg almost daily this admission. Severe malnutrition, started on marinol, palliative recommended to consider steroids as well  Pain: Tylenol, oxycodone PRN  Heme: Anemia d/t chemotherapy, recent GI bleed s/p 3unit pRBC, last 8/25, \  #Pulmonary emboli, previously on therapeutic lovenox, discontinued indefinitely d/t GI bleed, IVC filter in place.   CV: Elevated trops HD#1-2 due to demand ischemia, EKG x2 stable, TTE largely normal  Resp: CXR (8/20) with pleural effusions and increased pulmonary interstitial opacities, aspiration on HD#2 with negative laryngoscopy, repeat CXR (8/23) with bibasilar opacities.  GI: Bowel regimen PRN. Ascites d/t carcinomatosis, s/p paracentesis 8/24, 1.9L removed. Large tarry  BM 8/24 PM, occult stool positive. GI consulted, s/p 3D protonix gtt, continued on PO protonix.   : NI, voiding  ID: lactate 1.7 on 8/24, 1.3 8/25. CXR with bibasilar opacities however no fevers or cough  Endocrine: NI  Psych/Neuro: NI  PPX: SCDs, IS  Dispo:  D/c to TCU with hospice likely 8/31, awaiting insurance approval    Amy Schumer, MD  [facility] [phone]  8/30/18    Patient seen and evaluated, agree with assessment and plan as noted in resident note. Patient now on comfort care with plan to discharge to inpatient hospice.       Gauri Saucedo M.D., MPH,  F.A.C.O.G.  Division of Gynecologic Oncology

## 2018-08-30 NOTE — PLAN OF CARE
Problem: Patient Care Overview  Goal: Plan of Care/Patient Progress Review  OT 7C: Per chart review, pt has transitioned to comfort cares and plans to discharge to nursing facility for hospice cares. Skilled therapy no longer indicated; will complete orders.

## 2018-08-30 NOTE — PROGRESS NOTES
Date of Initial Social Work Evaluation:18  Collaborated with:  gyn-onc team, nursing facilities, financial counseling     Data:  Pt is a 58 year old female who metastatic clear cell carcinoma who presented with generalized weakness off and on since receiving C2 carbo/taxol on 18. Pt recently admitted from 8/3 to  for back pain, lab abnormalities and tachypnea.  Pt open to TCU placement and has support from siblings. Pt hoping to get stronger and return home.      Intervention:  SW resent referrals to the following locations and listed pt as MA pendin) [facility] (first choice, within walking distance from brother)- [phone]  2) [facility] [phone]-denied due to insurance plan   3) [facility]- [phone]      SW additionally emailed pt's sister-in-law(brother is at work currently) and attached medicare.gov link for further review of nursing facilities.  SW to make additional referrals.   hospice will continue to follow. STEPHANIE touched base with financial counseling to check pt's MA status.  Pt does not have MA yet.            Assessment:  SW will continue to follow for discharge planning  Plan:    Anticipated Disposition: nursing facility with hospcie    Barriers to d/c plan: bed availability, medical stability    Follow Up:  SW will continue to follow and assist.            LUIS Souza, LGSW  [place]   18

## 2018-08-30 NOTE — PLAN OF CARE
Problem: Patient Care Overview  Goal: Plan of Care/Patient Progress Review  Outcome: No Change  DNR/DNI. Comfort cares. Tachycardic. Dyspnea on exertion.  On 2 L/min NC. Other VSS. Denies pain medication for abdominal pain. Encouraged pt to take fluids. Last BG was in low 60s, per MD gave apple juice and discontinue BG monitoring. Assist of 1 to bedside commode. Had 1 urine occurrence at 1500, 500ml. Regular diet, denies nausea. PIV SL. Port hep locked. Continue to encourage PO intake. Continue w/ POC.

## 2018-08-30 NOTE — PLAN OF CARE
Problem: Patient Care Overview  Goal: Plan of Care/Patient Progress Review  Outcome: No Change  BP 94/46 (BP Location: Left arm)  Pulse 101  Temp 97.2  F (36.2  C) (Oral)  Resp 16  Wt 37.3 kg (82 lb 4.8 oz)  SpO2 96%  BMI 15.05 kg/m2 on 2L n/c. Pt DNR/DNI and on comfort cares.Pt c/o abdominal pain and medicated with oxycodone 5 mg and pt did go to sleep.Continues to be tachycardic but resting.No c/o nausea and no family in room during the night.

## 2018-08-30 NOTE — PROGRESS NOTES
CLINICAL NUTRITION SERVICES    Informed decision made to change pt s status to comfort care. Nutrition interventions discontinued and no further interventions planned at this time. RD can be consulted if needed.    RD signing off on 8/30/2018.     Alexandra Benavides RD, LD  7C RD pager: [phone]

## 2018-08-30 NOTE — PLAN OF CARE
Problem: Patient Care Overview  Goal: Plan of Care/Patient Progress Review  Outcome: No Change  DNR/DNI. Comfort cares. Dyspnea on exertion. On 2 L/min NC. No complaints of dizziness or lightheadedness. Denies pain medication for abdominal pain. Encouraged pt to take fluids. Assist of 1 to bedside commode. Voiding spontaneously. Regular diet, denies nausea. PIV removed. Port hep locked. Continue to encourage PO intake. Resting comfortably. Continue w/ POC.

## 2018-08-31 NOTE — PLAN OF CARE
Problem: Patient Care Overview  Goal: Plan of Care/Patient Progress Review  Outcome: No Change  Pt on comfort cares. Shallow respirations. On nasal cannula 2 LPM. Pain controlled with oxycodone x1. Pt up with 2 assist to commode, voiding with adequate urine output. Abdomen distended. Little to no PO intake. Port heparin locked. Comfortable on right side. Continue providing comfort measures.

## 2018-08-31 NOTE — PROGRESS NOTES
Social Work Services Progress Note    Hospital Day: 12  Date of Initial Social Work Evaluation:18  Collaborated with:  gyn-onc team, nursing facilities, financial counseling      Data:  Pt is a 58 year old female who metastatic clear cell carcinoma who presented with generalized weakness off and on since receiving C2 carbo/taxol on 18. Pt recently admitted from 8/3 to  for back pain, lab abnormalities and tachypnea.  Pt open to TCU placement and has support from siblings. Pt hoping to get stronger and return home.       Intervention:  STEPHANIE resent referrals to the following locations and listed pt as MA pendin) [facility] (first choice, within walking distance from brother)- [phone]- Denied, no anticipated openings for hospice bed for 6-9 months    2) [facility] [phone]-denied due to insurance plan initially, denied again today due to no hospice bed openings anticipated in the next year    3) [facility]- [phone]- Denied no openings    STEPHANIE met with pt and pt's sister-in-law, Corinna, today.  STEPHANIE has been in contact with Corinna via phone until now. Corinna interested in additional referrals being sent and stated she just wants to find a place as close to their home in   [place] as possible and gave SW permission to make referrals as close to this area as available.  STEPHANIE sent additional referrals to the following places based on Medicare.gov list:    4) [facility]   P: [phone], F: [phone]    5) [facility]   P: [phone], F: [phone]    6) [facility]  P: [phone], F: [phone]         hospice will continue to follow.  MA still pending.               Assessment:  STEPHANIE will continue to follow for discharge planning  Plan:    Anticipated Disposition: nursing facility with hospcie    Barriers to d/c plan: bed availability, medical stability    Follow Up:  SW will continue to follow  and assist.               LUIS Souza, [facility]   8/31/18

## 2018-08-31 NOTE — PROGRESS NOTES
Gynecology Oncology Progress Note    24 hour events:   -SW awaiting insurance approval    Subjective: Pain is well controlled. Breathing okay. Continues on comfort cares and awaiting hospice placement.    Objective:   No longer tracking vital signs, pt on comfort care.  General - NAD, laying on side in bed  Resp - unlabored but shallow breathing    New Labs/Imaging-   None    Assessment:  58 year old female with metastatic clear cell carcinoma HD#11 admitted for generalized weakness and elevated troponin likely due to demand ischemia, complicated by GI bleed on HD#6. Continued severe malnutrition, transitioned to comfort cares with plan to discharge to nursing home on hospice.    Plan:  Dz: metastatic clear cell carcinoma. S/p C2D1 neoadjuvant carbo/taxol 8/9/18. No longer wants to pursue chemotherapy treatment.  FEN: regular diet, Tendency to fluid overload, IV lasix 10mg PRN for comfort. Severe malnutrition, started on marinol   Pain: Tylenol, oxycodone PRN  Heme: Anemia d/t chemotherapy, recent GI bleed s/p 3unit pRBC, last 8/25  #Pulmonary emboli, previously on therapeutic lovenox, discontinued indefinitely d/t GI bleed, IVC filter in place.   CV: Elevated trops HD#1-2 due to demand ischemia, EKG x2 stable, TTE largely normal  Resp: CXR (8/20) with pleural effusions and increased pulmonary interstitial opacities, aspiration on HD#2 with negative laryngoscopy, repeat CXR (8/23) with bibasilar opacities.  GI: Bowel regimen PRN. Ascites d/t carcinomatosis, s/p paracentesis 8/24, 1.9L removed. Large tarry BM 8/24 PM, occult stool positive. GI consulted, s/p 3D protonix gtt, continued on PO protonix.   : NI, voiding  ID: lactate 1.7 on 8/24, 1.3 8/25. CXR with bibasilar opacities however no fevers or cough  Endocrine: NI  Psych/Neuro: NI  Dispo:  D/c to TCU with hospice likely 9/1-9/2, awaiting insurance approval    Tere Li MD  Ob/Gyn PGY-1  August 31, 2018 2:51 PM        Patient seen and evaluated, plan as  documented above awaiting transfer to home hospice.      Gauri Saucedo M.D., MPH,  .A.C.O..  Division of Gynecologic Oncology  [phone]

## 2018-08-31 NOTE — PLAN OF CARE
Problem: Patient Care Overview  Goal: Plan of Care/Patient Progress Review  Outcome: No Change  Pt on comfort cares. AOx4, up to commode with assist x2, no UO on shift. Shallow resperations, 2LPM NC. Pain controlled with PRN Tylenol x1. Rt port heparin locked. Cont to provide comfort cares.

## 2018-08-31 NOTE — PLAN OF CARE
Problem: Patient Care Overview  Goal: Plan of Care/Patient Progress Review  Outcome: No Change  DNR/DNI. Comfort cares. Awaiting placement for hospice. Alert and oriented, wants to be left alone to rest much of the day. Up with assistance of 1 to commode to void. R chest port HL. On regular diet, decreased appetite. Patient's family here and discussed placement with SW. Denies pain, declines any pain medications.   Continue with comfort cares while awaiting placement.

## 2018-09-01 NOTE — PLAN OF CARE
Problem: Patient Care Overview  Goal: Plan of Care/Patient Progress Review  Outcome: No Change  DNR/DNI. Comfort cares. Declined scheduled PO meds or any pain medications, reports that pain gets worse overnight. Patient appears comfortable, respirations are shallow/tachypnic. Declines any oral intake today besides sips of water. Up with assist of 1-2 to commode. Voiding minimally. Patient sleeping on right side much of the day stating it is most comfortable. Reports she's been repositioning side to side occasionally.   Social work in process of looking for facility for Hospice care. No family visited today.     Update 1900: Family and friends at bedside, offering patient massage and care. Mepilex applied to blanchable redness on right hip. Oxycodone given for generalized pain per patient request.

## 2018-09-01 NOTE — PLAN OF CARE
"Problem: Patient Care Overview  Goal: Plan of Care/Patient Progress Review  Outcome: No Change    Cared for pt from [id number]. On comfort cares. Lethargic, resting/sleeping. On 4L nasal cannula. Oxycodone 1x for generalized pain, also using cold pack. Very weak, up with assist of 2 to pivot to bedside commode. Voided 2x. Did not want to take pills but is drinking water. Sleeping on R side, encouraged pt to reposition to other side to relieve pressure/redness on R hip but pt did not want to, stated \"it's ok\".       "

## 2018-09-01 NOTE — PROGRESS NOTES
Gynecology Oncology Progress Note    24 hour events:   -SW awaiting insurance approval for Hospice placement, SW looking into [facility]     Subjective: Pain sleeping deeply. Breathing shallow. Continues on comfort cares and awaiting hospice placement.    Objective:   No longer tracking vital signs, pt on comfort care.  General - NAD  Resp - Shallow unlabored breathing    New Labs/Imaging-   None    Assessment:  58 year old female with metastatic clear cell carcinoma HD#12 admitted for generalized weakness and elevated troponin likely due to demand ischemia, complicated by GI bleed on HD#6. Continued severe malnutrition, transitioned to comfort cares with plan to discharge to nursing home on hospice.    Plan:  Dz: metastatic clear cell carcinoma. S/p C2D1 neoadjuvant carbo/taxol 8/9/18. No longer wants to pursue chemotherapy treatment.  FEN: regular diet, Tendency to fluid overload, IV lasix 10mg PRN for comfort. Severe malnutrition, started on marinol   Pain: Tylenol, oxycodone PRN  Heme: Anemia d/t chemotherapy, recent GI bleed s/p 3unit pRBC, last 8/25  #Pulmonary emboli, previously on therapeutic lovenox, discontinued indefinitely d/t GI bleed, IVC filter in place.   CV: Elevated trops HD#1-2 due to demand ischemia, EKG x2 stable, TTE largely normal  Resp: CXR (8/20) with pleural effusions and increased pulmonary interstitial opacities, aspiration on HD#2 with negative laryngoscopy, repeat CXR (8/23) with bibasilar opacities.  GI: Bowel regimen PRN. Ascites d/t carcinomatosis, s/p paracentesis 8/24, 1.9L removed. Large tarry BM 8/24 PM, occult stool positive. GI consulted, s/p 3D protonix gtt, continued on PO protonix.   : NI, voiding  ID: lactate 1.7 on 8/24, 1.3 8/25. CXR with bibasilar opacities however no fevers or cough  Endocrine: NI  Psych/Neuro: NI  Dispo:  D/c to TCU with hospice likely 9/1-9/2, awaiting insurance approval. [facility]     Alicia Myhre,  DO  OB/GYN, PGY-2    The patient was seen and examined with the resident, patient on comfort care awaiting transfer to home  hospice.The medical care outlined above was provided under my directives and direct supervision.    Gauri Saucedo MD, MPH  Gynecology Oncology

## 2018-09-02 NOTE — PLAN OF CARE
Problem: Patient Care Overview  Goal: Plan of Care/Patient Progress Review  Outcome: No Change  DNR/DNI. Comfort cares. Declined scheduled PO meds this morning with  present. Family later explained to patient that Marinol might improve her appetite, patient still declined. Respirations are shallow/tachypnic. Requests sips of ice water only. Up with assist of 1-2 to commode x1. Voiding minimally, concentrated urine. Patient sleeping on right side much of the day stating it is most comfortable, declines repositioning to left side. Mepilex in place on blanchable redness on right hip. Oxycodone given x1 for generalized pain. Social work in process of looking for facility for Hospice care. Family and friends intermittently at bedside, offering patient massage and care.   Continue with comfort cares.

## 2018-09-02 NOTE — PROGRESS NOTES
Referral faxed to [facility] at [phone]. They do not take admissions on the weekend.     Daphne Rodriguez [facility]  Weekend   8am-4:30pm  [phone]    After Hours pager [phone]  4pm-12am

## 2018-09-02 NOTE — PROGRESS NOTES
Gynecology Oncology Progress Note    24 hour events:   -SW awaiting insurance approval for Hospice placement, SW looking into [facility]     Subjective: Patient sleeping this morning. Continues on comfort cares and awaiting hospice placement.    Objective:   No longer tracking vital signs, pt on comfort care.  General - NAD  Resp - Shallow unlabored breathing    New Labs/Imaging-   None    Assessment:  58 year old female with metastatic clear cell carcinoma HD#13 admitted for generalized weakness and elevated troponin likely due to demand ischemia, complicated by GI bleed on HD#6. Continued severe malnutrition, transitioned to comfort cares with plan to discharge to nursing home on hospice.    Plan:  Dz: metastatic clear cell carcinoma. S/p C2D1 neoadjuvant carbo/taxol 8/9/18. No longer wants to pursue chemotherapy treatment.  FEN: regular diet, Tendency to fluid overload, IV lasix 10mg PRN for comfort. Severe malnutrition, started on marinol   Pain: Tylenol, oxycodone PRN  Heme: Anemia d/t chemotherapy, recent GI bleed s/p 3unit pRBC, last 8/25  #Pulmonary emboli, previously on therapeutic lovenox, discontinued indefinitely d/t GI bleed, IVC filter in place.   CV: Elevated trops HD#1-2 due to demand ischemia, EKG x2 stable, TTE largely normal  Resp: CXR (8/20) with pleural effusions and increased pulmonary interstitial opacities, aspiration on HD#2 with negative laryngoscopy, repeat CXR (8/23) with bibasilar opacities.  GI: Bowel regimen PRN. Ascites d/t carcinomatosis, s/p paracentesis 8/24, 1.9L removed. Large tarry BM 8/24 PM, occult stool positive. GI consulted, s/p 3D protonix gtt, continued on PO protonix.   : NI, voiding  ID: lactate 1.7 on 8/24, 1.3 8/25. CXR with bibasilar opacities however no fevers or cough  Endocrine: NI  Psych/Neuro: NI  Dispo:  D/c to TCU with hospice, awaiting insurance approval. SW to investigate placement at [facility]    Karen Pastor MD  Ob/Gyn PGY-2  Pager  [phone]      Patient has ongoing comfort care from progressive ovarian cancer, good pain control. Family is involved in planning for transfer to hospice care. Assessment and plan as documented above.    Gauri Saucedo M.D., MPH,  F.A.C.O.G.  Division of Gynecologic Oncology

## 2018-09-02 NOTE — PLAN OF CARE
Problem: Patient Care Overview  Goal: Plan of Care/Patient Progress Review  Outcome: No Change  Pt DNR/DNI. Comfort cares. Oxy given x2 for pain. Patient appears comfortable, respirations are shallow/tachypnic. Declines any oral intake today besides sips of water with ice. Up with assist of 1-2 to commode. Voiding minimally. Patient sleeping on right side most the night. Reports she's been repositioning side to side occasionally.

## 2018-09-03 NOTE — PLAN OF CARE
Problem: Patient Care Overview  Goal: Plan of Care/Patient Progress Review  Outcome: Declining    Lethargic, at times restless with pain, which is getting progressively worse overnight. Pain is abdominal.  MD paged for increase in oxycodone, now can take 5-10mg oxycodone q3h prn. Also using ice pack. Pt states she is still ok with swallowing pills, has been taking sips of water only.  No appetite.  Abdomen distended. Denies nausea.    Oliguric, has not voided overnight.  Breathing is shallow, quick.  On oxygen via nasal cannula.  Lies on R side, foam dressing placed over redness on R hip, does not care to lie on L side, encouraged weight shift.  Sister at bedside, very supportive.     PLAN: Looking for hospice placement, hoping to get MA coverage.  Sister did say overnight that she would like to care for pt at her home, passed this on to overnight MD.

## 2018-09-03 NOTE — PLAN OF CARE
Problem: Patient Care Overview  Goal: Plan of Care/Patient Progress Review  Outcome: Declining  Comfort cares. DNR/DNI. Tachpenic. Respiration upper 20s and shallow breathing. Lethargic. Arouse to voice. C/o nausea x 1. IV compazine given x 1 with relief. Pain managed with oxycodone prn. PO oxycodone given x 2. Family at the bedside intermittently today. Patient  Brother and sister in law state that there is family who will be able to provide 24 hour care to patient at home. MD and SW notified. Awaiting for Hospice Placement. Patient has had minimal PO intake. No void in 24 hours. MD aware.  Resting in-between cares. Will continue with current plan of care.

## 2018-09-03 NOTE — PROGRESS NOTES
Gynecologic Oncology Daily Progress Note  9/3/18    HD#14 admitted for generalized weakness  Disease: Metastatic clear cell carcinoma    24 hour events:   - Awaiting placement for discharge  - Increased pain overnight, increased medication demands    Subjective: Reporting pain all over her abdomen.  Worse overnight than it has been in the past.  Also thirsty.  Has not eaten much yesterday.  Doesn't participate in much more conversation.      Objective:  Not checking vitals.    GEN - NAD, laying on right side, cachectic  PULM - Shallow breathing, tachypneic  ABD - soft, mildly tender throughout  Ext - no edema, non-tender    I/O (last 24h//since MN)  I: 250mL//NR  O: 700mL//NR    Labs:  No longer checking labs.    Assessment: Maria Esther Wang is a 58 year old with history of metastatic clear cell carcinoma, now HD#14 admitted for generalized weakness and elevated troponin (likely demand ischemia), complicated by a GI bleed on HD#4.  She also has severe malnutrition and has been transitioned to comfort cares with plans to discharge on hospice.    Active Problem List:  - Metastatic clear cell carcinoma  - Generalized weakness  - Severe malnutrition  - Pulmonary emboli  - Desires hospice care  - GI Bleed    Plan:  Dz: Metastatic clear cell carcinoma, s/p C2D1 of neoadjuvant Carboplatin/Taxol therapy (8/9).  No longer wishes to pursue treatment.    FEN: Regular diet, boost PRN. Tendency to fluid overload, IV lasix 10mg PRN for comfort. Severe malnutrition, started on Marinol.   Pain:  Tylenol, oxycodone PRN (increased to 5-10mg q3h)  Heme:  Anemia 2/2 chemotherapy, recent GI bleed s/p 3u pRBCs (last 8/25).  Pulmonary emboli - previously on Tx lovenox, discontinued indefinitely d/t GI bleed, IVC filter in place.   CV:  Elevated troponin HD#1-2 d/t demand ischemia.  EKG x2 stable.  TTE (8/21) largely normal.   Pulm:  CXR (8/20) with pleural effusions and increased pulmonary interstitial opacities, aspiration on HD#2  with negative laryngoscopy, repeat CXR (8/23) with bibasilar opacities.  GI:  Bowel regimen PRN. Ascites d/t carcinomatosis, s/p paracentesis of 1.9L (8/24).  GI bleed on HD#4 (8/24) - s/p 3D protonix gtt, continued on PO protonix.   :  No issues, voiding spontaneously  ID:  Lactate 1.7 on 8/24, 1.3 8/25. CXR with bibasilar opacities however no fevers or cough.   Endo:  No issues  MSK: No issues  Neuro/Psych:  No issues  PPX: SCDs, IS  Lines/Drains: Port  Dispo:  TCU with hospice, awaiting insurance approval vs discharge to [facility]    # Pain Assessment:  Current Pain Score 9/3/2018   Patient currently in pain? yes   Pain score (0-10) -   Pain location Abdomen   Pain descriptors -   - Maria Esther is experiencing pain due to cancer. Pain management was discussed and the plan was created in a collaborative fashion.  Maria Esther's response to the current recommendations: engaged  - Please see the plan for pain management as documented above    Kezia Cheng MD  OBGYN Resident PGY2  9/3/2018 6:10 AM

## 2018-09-04 NOTE — INTERIM SUMMARY
[facility] Per palliative clinical nurse specialist, hospice PRN orders to include:    - Bisacodyl 10 mg Suppository UT daily to bid prn constipation  - Tylenol 650 mg suppository PO/UT q4hr prn pain, fever  - Lorazepam  0.5-1.0 mg PO/SL/UT q4h prn anxiety/restlessness  - Atropine sulfate 1% opth solution 1-2 drops SL q2h prn secretions  - Senna 8.6 mg 1-2 tabs PO prn constipation  - Haldol 1-2 mg PO/SL/UT q6h prn nausea, agitation or delirium.   - oxycodone high concentration solution 5-10 mg q2h PRN  SL for pain or dypnea    Will be e-prescribed to discharge pharmacy. Transportation arranged for 1630.     Bruce Cunningham MD  OB/GYN Resident, PGY-1  9/4/2018 12:24 PM

## 2018-09-04 NOTE — PROGRESS NOTES
Social Work Services Discharge Note      Patient Name:  Maria Esther Wang     Anticipated Discharge Date: 9/4/18    Discharge Disposition:   Hospice:  Home with  hospice    Following MD:  to be determined     Pre-Admission Screening (PAS) online form has been completed.  The Level of Care (LOC) is:  Determined  Confirmation Code is:  n/a  Patient/caregiver informed of referral to [facility] for Pre-Admission Screening for skilled nursing facility (SNF) placement and to expect a phone call post discharge from SNF.     Additional Services/Equipment Arranged:  SW arranged for [facility] stretcher transport with oxygen for 4:30PM today.    Family aware that pt's MA is pending and could potentially receive a bill later on for transportation if MA does not get approved. Pt requires stretcher transport at this time.        hospice aware and involved. Equipment scheduled to be delivered to pt's home by 4 PM today.     Patient / Family response to discharge plan:  Pt's brother and sister-in-law are in agreement.      Persons notified of above discharge plan:  Pt, pt's brother, sister-in-law, Gyn-onc team, RNCC, pt's nurse, charge nurse     Staff Discharge Instructions:  Please fax discharge orders and signed hard scripts for any controlled substances.  Please print a packet and send with patient.     CTS Handoff completed:  YES    Medicare Notice of Rights provided to the patient/family:  N/A    LUIS Souza, CHIKI Benton   9/4/2018

## 2018-09-04 NOTE — PROGRESS NOTES
[facility]  Met with pt and family to discuss plans for hospice.  Pt to be discharged home today at 4:30 pm and has agreed to have [facility] follow with hospice services.  Equipment f a hospital bed with half rails, OBT , Commode and a W/C and O2  was ordered and being delivered to the pt address this afternoon.  Medications are being filled at the discharge pharmacy and will be sent home with the pt.  Pt and family verbalized understanding that completion of the admission visit is scheduled for tomorrow at 1 pm at her home.    The   POLST documentation is completed.     The Hospice consents are signed by the pt brother.         Pt has the 24 hour phone number for  Hospice  for any questions or concerns.     Danuta Weir RN Liaison   [facility]

## 2018-09-04 NOTE — PROGRESS NOTES
Gynecologic Oncology Daily Progress Note  9/4/18    HD#15 admitted for generalized weakness  Disease: Metastatic clear cell carcinoma    24 hour events:   - Awaiting placement for discharge  - Anuric     Subjective: Continues to have abdominal pain, requesting more medications.  Denies any other concerns.  Does not participate much in conversation this morning.  Per RN, continues to be anuric.  No other complaints to RN.     Objective:  Not checking vitals.     GEN - NAD, laying on right side, cachectic  PULM - Shallow breathing, tachypneic  ABD - soft, mildly tender throughout  Ext - no edema, non-tender    I/O (last 24h//since MN)  I: 200mL//NR (PO)  O: 0mL//0mL (UOP)    Labs:  No longer checking labs.    Assessment: Maria Esther Wang is a 58 year old with history of metastatic clear cell carcinoma, now HD#15 admitted for generalized weakness and elevated troponin (likely demand ischemia), complicated by a GI bleed on HD#4.  She also has severe malnutrition and has been transitioned to comfort cares with plans to discharge on hospice.    Active Problem List:  - Metastatic clear cell carcinoma  - Generalized weakness  - Severe malnutrition  - Pulmonary emboli  - Desires hospice care  - GI Bleed  - Elevated troponin 2/2 demand ischemia    # Metastatic clear cell carcinoma: S/p C2D1 of neoadjuvant Carboplatin/Taxol therapy (8/9). No longer wishes to pursue treatment and has transitioned to comfort care only.    - No labs  - No vitals  - IV lasix (10mg) PRN for symptomatic fluid overload  - Pain: Tylenol, oxycodone PRN  - Ascites d/t carcinomatosis: s/p paracentesis of 1.9L (8/24), repeat PRN    # Severe malnutrition:   - Marinol daily  - Remeron daily    # GI Bleed: on HD#4, s/p 3D protonix gtt.  Lovenox discontinued indefinitely.  - Continue PO PPI    # Pulmonary emboli: IVC filter in place, Lovenox discontinued as above.    Dispo:  Awaiting placement to in-patient Hospice    # Pain Assessment:  Current Pain  Score 9/4/2018   Patient currently in pain? sleeping: patient not able to self report   Pain score (0-10) -   Pain location -   Pain descriptors -   - Maria Esther is experiencing pain due to cancer. Pain management was discussed and the plan was created in a collaborative fashion.  Maria Esther's response to the current recommendations: engaged  - Please see the plan for pain management as documented above    Kezia Cheng MD  OBGYN Resident PGY2  9/4/2018 5:33 AM

## 2018-09-04 NOTE — PLAN OF CARE
Problem: Patient Care Overview  Goal: Plan of Care/Patient Progress Review  Outcome: Declining  DNR/DNI, on comfort cares. Lethargic and mostly in bed. [place] speaking, family here this evening at bedside visiting patient. Port a cath heparinized. C/o feeling nauseous and pain. Prn IV compazine and oxycodone given with relief. Respiration rate above 20's, tachypneic. No appetite. Bilateral arms with some petechiae. No appetite, no void and BM this shift. PLAN: Continue to keep pt comfortable. Waiting for hospice placement.

## 2018-09-04 NOTE — PLAN OF CARE
Problem: Patient Care Overview  Goal: Plan of Care/Patient Progress Review  Comfort cares.  DNR/DNI.  Turned as tolerated with skin cares, but prefers right side for comfort.  Oxycodone for pain and appears restful.  Awakens and responds to questions briefly at times but is essentially very lethargic/somnulent.  No urine output.  Oxygen on per family request but patient without increased respiratory distress when oxygen removed (which patient removes on her own at times).  No oral intake except occasional sip of water. Feet mottled and cold.  PLAN: DC home with hospice and family.  Transport arriving at 1630.     arriving at 1530 for dc assistance.  Please de-access port prior to discharge.

## 2018-09-04 NOTE — PLAN OF CARE
Problem: Patient Care Overview  Goal: Plan of Care/Patient Progress Review  Outcome: No Change  Pt sleeping between cares. Pt arouseable,able to converse and follow directions. Pt turned q 2hr- pt able to assist with upper body. Med x1 with oxycodone per request for pain. (Pt neal liquid dose) Slept after. Pt no void or stool. Abd round, hypo bs. Lungs clear. O2 at 4L RR=24-28. Right foot/ankle 3+edema, cold and mottled. Left foot/ankle 1+ edema and toes cool to touch. Cont to provide comfort cares and maintain pain control.

## 2018-09-04 NOTE — PROGRESS NOTES
Brief Palliative Care Note:    Patient has been accepted by  hospice and is discharging home today, family to provide 24/7 care assisance. Appreciate  Hospice Liaison assistance. POLST has been completed previously.     Would add the following Hospice PRN orders for discharge:  - Bisacodyl 10 mg Suppository UT daily to bid prn constipation  - Tylenol 650 mg suppository PO/UT q4hr prn pain, fever  - Lorazepam  0.5-1.0 mg PO/SL/UT q4h prn anxiety/restlessness  - Atropine sulfate 1% opth solution 1-2 drops SL q2h prn secretions  - Senna 8.6 mg 1-2 tabs PO prn constipation  - Haldol 1-2 mg PO/SL/UT q6h prn nausea, agitation or delirium.   - oxycodone high concentration solution 5-10 mg q2h PRN  SL for pain or dypnea      Discussed case with gyn/onc team and RNCC.    KATHRYN Walker CNS  Palliative Care Consult Team  Pager: [phone]

## 2018-11-07 ENCOUNTER — CARE COORDINATION (OUTPATIENT)
Dept: ONCOLOGY | Facility: CLINIC | Age: 58
End: 2018-11-07

## 2018-11-08 NOTE — PROGRESS NOTES
Received notice of patient death.  Date of Death  9/4/18  All appointments, orders and treatment plans cancelled.  Care TEAM and HIM notified

## 2019-10-16 NOTE — PROGRESS NOTES
Problem: Patient Care Overview  Goal: Plan of Care/Patient Progress Review  Outcome: Improving    06/23/18 0641   OTHER   Plan Of Care Reviewed With Patient, family   Plan of Care Review   Progress improving      Pt slept between care.  Neuro: A&Ox4.   Cardiac: tachycardic. Temp max 101.2. Was on room air, however was intermittently desatting with deep sleep, placed on 2 L NC .  PRN Tylenol given for fever. Lactic acid drawn = 1.0.            Respiratory: Sating 98% on 2 L NC.  GI/: LBM 6/22. Adequate urine output.   Diet/appetite: Tolerating regular diet.  Activity:  Stand by assist, up to the bathroom.  Pain: At acceptable level on current regimen.   Skin: No new deficits noted.  LDA's: PIV saline locked x1. PIIV infusing x 1.  Plan: Cardiac ECHO today. Continue with POC. Notify primary team with changes.        Quality 110: Preventive Care And Screening: Influenza Immunization: Influenza Immunization previously received during influenza season Detail Level: Detailed

## 2022-07-28 NOTE — PROGRESS NOTES
Pt on RA. Lethargic and somnolent.  was present for discharge instructions. Instructions were reviewed w/ pt's aunt and niece. Questions were answered and clarifications were provided. Pt's niece retrieved discharge medications from pharmacy. PRN oxycodone oral sol'n was given to pt prior to discharge for comfort during transport. Port was flushed w/ heparin and then de-accessed. EMT came w/ stretcher to transport pt to ambulance. Pt's family members had all pt belongings. Pt's aunt to be present during ambulance ride.     [FreeTextEntry1] : here for annua\par  new patient\par no complaints \par

## 2022-09-22 NOTE — CONSULTS
"[facility]  Palliative Care Consultation         Maria Esther Wang MRN# [id number]   Age: 58 year old YOB: 1960   Date of Admission: 8/3/2018    Reason for consult: Symptom management  Goals of care  Decisional support  Patient and family support       Requesting physician/service: Gyn/Onc       Recommendations        - Palliative will continue to follow with you  - Will have palliative social work coordinate with hospital social work to help identify options for additional support at home (PCA vs other)  - Consider OT consult to evaluate mobility/home safety/recommendations for assistive devices that could help with mobility (pt requesting a wheelchair)  - Agree with continuing acetaminophen for pain. Could consider scheduling (650mg 6h or 1000mg TID) if pain escalates  - Agree with continuing oxycodone 5mg prn. Reports abdominopelvic pain well-controlled at home q6h, but would be reasonable to shorten interval to q4h given new poorly-controlled back pain  - Agree with ondansetron/lorazepam for nausea; reports that lorazepam has been effective at home  - Agree with current bowel regimen; consider escalating regimen if increase opioids  - Consider continuing with weekly paracenteses if remains inpatient for extended period of time  - POLST not completed. Should be addressed prior to discharge     Disease Process/es & Symptoms     Metastatic clear cell ovarian carcinoma   Pelvic mass  Ascites (paracentesis once weekly)  Hyponatremia   Leukocytosis  Anemia  Tachypnea  Pulmonary emboli on therapeutic lovenox  Severe protein-calorie malnutrition (notes 20 lb weight loss in 4 months)     Support/Coping     Has support from family, with whom she lives (nieces and nephews, whom she largely raised). However, niece present at bedside noted that all of the family works every day, and would not be in a position to provide around-the-clock care to Kd.     When asked \"what do you " "worry about,\" she answered \"I don't know what to say. What can I do? I don't know what this is.\" As part of our interview, we attempted to find, but did not see an opening for any deeper discussion of her larger concerns, and whether she has any existential distress from her diagnosis. It's not completely clear whether this is due to a language barrier (as mediated through an ), the presence of family, cultural differences, or a lack of understanding of her disease process.     Per recent palliative outpatient note, Kd identifies as Mandaen, and is connected to a Roman [employer] locally.     We will continue to offer support through social work and  services, and will attempt to revisit some of the larger questions going forward, if she is willing.     Decision-Making & Goals of Care     Discussion/counseling today about prognosis/goals of care/decisions:   Reviewed basic outlines of her disease process. Discussed that it is possible that its progress may be slowed, but addressed the high likelihood that her cancer could not be cured despite planned interventions. She expressed basic understanding that her disease was incurable, that chemotherapy might help prevent progression, and that the cancer was causing her symptoms.   Patient has a completed health care directive available in the chart (Y/N): N  There is no POLST (Physician orders for life-sustaining treatment) form on file for this patient  Code Status: Do not resuscitate   Patient has decision-making capacity (Y/N): Y    Coordination of Care     Findings & plan of care discussed with: patient and family at bedside, primary team  Follow-up plan from palliative team: we will continue to follow  Thank you for involving us in the patient's care.     Osmany PERALTA NP ACHEVENS  Nurse Practitioner- Lead Advanced Practice Provider  [facility] Palliative Medicine Consult Service   [phone]      Total time spent: 45 minutes of which 35 minutes " "were spent in direct face to face contact with patient/family. Greater than 50% of time spent counseling and/or coordinating care.           Chief Complaint     Back pain- described as new         History of Present Illness     History obtained from: chart, patient and niece at bedside via     Maria Esther Wang (\"Leidy") is a 58-year old Laotian-speaking woman with a pelvic mass and biopsy-proven metastatic clear cell carcinoma, who presented to gyn-onc clinic today for chemotherapy. During her visit, she reported severe back pain, which she related to sitting for too long. In clinic, she was noted to be tachypneic. Reported weakness, swollen feet, light vaginal spotting in last few days. Otherwise felt her symptoms (including her abdominopelvic pain) were well-controlled. Labs were obtained, revealing hyponatremia and anemia. Due to dyspnea during her last blood transfusion, and in the setting of her ongoing tachypnea and hyponatremia, she was admitted directly to [facility] for further management.     HPI from recent palliative care outpatient clinic consult note (8/2/2018):    \"This patient's cancer history was reviewed as per the chart.  History obtained with the assistance of a professional . In brief, \"Leidy"was admitted to [facility] this summer with ascites and dyspnea. She was diagnosed with carcinomatosis. She was ultimately found to have metastatic clear cell carcinoma and was started on Carbo/Taxol. She was admitted to [facility] from 7/9/18 to 7/13/18 with dyspnea. Since discharge, Kd notes that she has lost her appetite, has increased nausea over the past day, vomited last night. Nausea comes and goes. Tried lorazepam for this, which was helpful. Has been drinking Ensure as she has struggled to eat much. Drank 2 bottles yesterday. Also had some rice. When she eats, she feels \"tightness\" in the abdomen. Has significant abdominal distension, last paracentesis estimated to be two " "weeks ago. Has an appointment for a repeat paracentesis today. Patient's Disease Understanding: Kd understands that she has ovarian cancer which is causing \"water buildup\" in the abdomen.  She knows that it has spread and that she is not a surgical candidate.\"    As of the time of examination, she reports that she has lost around 20% of her body weight in the last 6 months. She lives at home with family members, all of whom work most if not all days of the week. She spends a significant portion of each day alone. She reports that she \"does nothing\" during the day, walking around the house, finding something to eat (family cooks for her, and she only has to warm food up), and sitting.     She has had some nausea in the last week, and had repeated emesis two days ago that was responsive to ondansetron and lorazepam. She reports that the nausea is better today. She has also had more difficulty breathing over the last few days.     As far as her pain is concerned, she reports that she is taking 5mg oxycodone q6h at home, and that this keeps her pain generally well-controlled (\"It's enough for now. I don't need stronger or more\"). Her pain does wake her up, but taking oxycodone when it does allows her to return to sleep. Her abdominal pain/pressure improved with a 2.3L therapeutic paracentesis yesterday, though she notes she has already had a fair amount of reaccumulation.             Past Medical History:   I have reviewed this patient's past medical history  Past Medical History:   Diagnosis Date     Anemia      NO ACTIVE PROBLEMS      Ovarian cancer (H)      Pulmonary embolism (H)               Past Surgical History:   I have reviewed this patient's past surgical history  Past Surgical History:   Procedure Laterality Date     INSERT PORT VASCULAR ACCESS N/A 7/9/2018    Procedure: INSERT PORT VASCULAR ACCESS;  Single Lumen Chest Power Port Placement, Leave Access for Chemotherapy;  Surgeon: Alexandro Sharif PA-C;  " Location: [facility] OR     OTHER SURGICAL HISTORY      no surgical history               Social/Spiritual History:     Living situation: brother, sister in law, helps her nephew/nieces, helped to raise them  Children: none  Functional status (needs help with ADLs or IADLs):   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 [employer]  Activities/interests: loves to watch movies  History of substance use/abuse: none            Family History:   I have reviewed this patient's family history           Allergies:   All allergies reviewed and addressed  No Known Allergies           Medications:   I have reviewed this patient's medication profile and medications during this hospitalization  Acetaminophen 650mg PO/OH q4h prn pain  Lidocaine patches   Lorazepam 0.5mg 16h prn anxiety/nausea  Ondansetron 4mg q6h prn nausea  Oxycodone 5mg q4h prn pain  Prochlorperazine 10mg q6h prn nausea             Review of Systems:   The comprehensive review of systems is negative other than noted here and in the HPI.    Palliative Symptom Review (0=no symptom/no concern, 1=mild, 2=moderate, 3=severe):      Pain: 1      Fatigue: 2      Nausea: 0      Constipation: 0      Diarrhea: 0      Depressive Symptoms: 0      Anxiety: 0      Drowsiness: 1      Poor Appetite: 1      Shortness of Breath: 2      Insomnia: 0      Overall (0 good/no concerns, 3 very poor):  2            Physical Exam:   Vitals were reviewed and notable for the following (last 24 hours): afebrile. Borderline to mildly tachycardic (). Tachypneic (18-36). Normotensive. O2 sats % on RA to 1LPM via NC.     General: cachectic, seated leaning forward in bed, arms resting on pillows in lap  HEENT: NC/AT, EOMI, non-icteric sclerae. Trachea midline. L supraclavicular node noted.   CV: Tachycardic, regular rhythm. No MRG. No JVD noted.   Resp: Tachypneic. CTAB, no wheezing, rhonchi, crackles. Port noted in upper right chest wall.  Abd: Soft, NT/ND. BS present and  normoactive.  Extrem: WWP. 3+ pedal edema bilaterally to ankles.  Skin: no rashes, ecchymoses noted.  Neuro: moving all extremities purposefully and equally. No facial asymmetry detected.            Data Reviewed:   CBC notable for leukocytosis (13.4, neutrophilic predominance), anemia (6.7), no thrombocytopenia/thrombocytosis    BMP with hyponatremia (126, unclear chronicity), hypochloremia (92).     LFTs wnl, other than hypoalbuminemia (1.9).     CA-125 rising from previous (592 from 220 in 6/2018)               with patient

## 2023-01-13 NOTE — PROGRESS NOTES
Experiencing pain due to known cancer, controlled with sparing use of oxycodone 5mg 1-2 tablets daily without adverse effect. MN  reviewed, use consistent with history, no red flags. Continue to follow opioid safety, will be seeing palliative medicine next month.  KATHRYN Stiles, FNP-C  Gynecologic Oncology  [facility]  Pager: [phone]     Family has decided not to proceed with pacemaker  Will sign off  Call if further cardiac help is needed  Would continue Eliquis 5 mg twice daily, atorvastatin 40 mg and furosemide 40 mg daily  Monitor volume status, daily weights, renal function and electrolytes  Recommend that all negative chronotropic drugs and AV node blocking drugs be avoided

## 2023-09-07 NOTE — PROGRESS NOTES
Care Coordinator Note  Returned patients  call from yesterday.   Reviewed with Dr Fernandez and ok to renew the oxycodone Kaylene signed it.  He did want a Palliative care appt. Order placed and sent to be scheduled.  Called patient and discussed with  her via an interpretor.  Presently she is taking 1 about every 4 hours and does help the pain enough so that she can sleep.         Patient verbalized back understanding of the above information discussed.   Virgen OLSEN RN, OCN  Care Coordinator   Gynecologic Cancer   Office [phone]  Pager [phone] #[phone]   Protopic Pregnancy And Lactation Text: This medication is Pregnancy Category C. It is unknown if this medication is excreted in breast milk when applied topically.

## 2023-12-11 NOTE — PLAN OF CARE
Problem: Patient Care Overview  Goal: Plan of Care/Patient Progress Review  Outcome: No Change  1900-0730:  Sanjiv speaking primarily. Hypotensive but within parameters, tachy to low 100s, AOVSS on RA, pulse ox on continuous. Pain managed with 5 mg oxycodone given around the clock, pt refusing scheduled tylenol or other interventions. Regular diet, on eligio counts, didn't eat anything this shift. Up with assist of 1, denies nausea. Abdomen rounded, taut, has ascites. Thoracentesis sites on back with dried drainage. Port hep locked. Voids spont. Cares clustered, rested between cares. Possible chemo today.        85

## 2024-04-17 NOTE — PROCEDURES
Interventional Radiology Brief Post Procedure Note    Procedure: IR Chest Port Placement     Proceduralist: Alexandro Sharif PA-C    Assistant: None    Time Out: Prior to the start of the procedure and with procedural staff participation, I verbally confirmed the patient s identity using two indicators, relevant allergies, that the procedure was appropriate and matched the consent or emergent situation, and that the correct equipment/implants were available. Immediately prior to starting the procedure I conducted the Time Out with the procedural staff and re-confirmed the patient s name, procedure, and site/side. (The Joint Commission universal protocol was followed.)  Yes        Sedation: Monitored Anesthesia Care (MAC) administered and documented by Anesthesia Care Provider    Findings: Completed image-guided placement of 6 [place]ian 22 cm single lumen power-injectable central venous port via right IJ. Aspirates and flushes freely, heparin locked and is ready for immediate use.    Estimated Blood Loss: Minimal    Fluoroscopy Time:  less than 1 minute(s)    SPECIMENS: None    Complications: 1. None     Condition: Stable    Plan: Follow-up per primary team.     Comments: See dictated procedure note for full details.    Alexandro Sharif PA-C          
Pt complaints of Rt flank pain 3 hours ago ETA. Denies taking any meds for pain.  Denies N/V/D, CP, SOB, urinary symptoms or any other associated discomfort at this time.  Pt is alert and oriented, A&O4, steady gait noted.

## 2024-10-24 NOTE — PROVIDER NOTIFICATION
0300 and 0320 provider notified @ [id number]. Protonix drip is now complete. Writer needs clarification if the provider would like to have the patient still receive her Lovenox dose tonight d/t her history of clotting. Waiting to hear back. Will continue to follow up.     0400- Provider notified writer to administer Lovenox.   no

## (undated) DEVICE — KNIFE HANDLE W/15 BLADE 371615

## (undated) DEVICE — SU VICRYL 3-0 SH 27" J316H

## (undated) DEVICE — LINEN TOWEL PACK X5 5464

## (undated) DEVICE — NDL HUBER 20GA 0.75" GRIPPER

## (undated) DEVICE — SU DERMABOND ADVANCED .7ML DNX12

## (undated) DEVICE — Device

## (undated) DEVICE — KIT INTRODUCER FLUENT MICRO 5FRX10CM ECHO TIP KIT-038-04

## (undated) DEVICE — COVER ULTRASOUND PROBE W/GEL FLEXI-FEEL 6"X58" LF  25-FF658

## (undated) DEVICE — GLOVE PROTEXIS POWDER FREE SMT 7.5  2D72PT75X

## (undated) DEVICE — GOWN XLG DISP 9545

## (undated) DEVICE — DECANTER BAG 2002S

## (undated) DEVICE — SU MONOCRYL 4-0 P-3 18" UND Y494G

## (undated) DEVICE — PACK CENTRAL LINE INSERTION SAN32CLFCG

## (undated) RX ORDER — LIDOCAINE HYDROCHLORIDE 10 MG/ML
INJECTION, SOLUTION EPIDURAL; INFILTRATION; INTRACAUDAL; PERINEURAL
Status: DISPENSED
Start: 2018-01-01

## (undated) RX ORDER — ALBUMIN (HUMAN) 12.5 G/50ML
SOLUTION INTRAVENOUS
Status: DISPENSED
Start: 2018-01-01

## (undated) RX ORDER — LIDOCAINE HYDROCHLORIDE 10 MG/ML
INJECTION, SOLUTION INFILTRATION; PERINEURAL
Status: DISPENSED
Start: 2018-01-01

## (undated) RX ORDER — ACETAMINOPHEN 325 MG/1
TABLET ORAL
Status: DISPENSED
Start: 2018-01-01

## (undated) RX ORDER — HEPARIN SODIUM 1000 [USP'U]/ML
INJECTION, SOLUTION INTRAVENOUS; SUBCUTANEOUS
Status: DISPENSED
Start: 2018-01-01

## (undated) RX ORDER — FENTANYL CITRATE 50 UG/ML
INJECTION, SOLUTION INTRAMUSCULAR; INTRAVENOUS
Status: DISPENSED
Start: 2018-01-01

## (undated) RX ORDER — HEPARIN SODIUM,PORCINE 10 UNIT/ML
VIAL (ML) INTRAVENOUS
Status: DISPENSED
Start: 2018-01-01

## (undated) RX ORDER — HEPARIN SODIUM (PORCINE) LOCK FLUSH IV SOLN 100 UNIT/ML 100 UNIT/ML
SOLUTION INTRAVENOUS
Status: DISPENSED
Start: 2018-01-01